# Patient Record
Sex: FEMALE | Race: WHITE | NOT HISPANIC OR LATINO | Employment: FULL TIME | URBAN - METROPOLITAN AREA
[De-identification: names, ages, dates, MRNs, and addresses within clinical notes are randomized per-mention and may not be internally consistent; named-entity substitution may affect disease eponyms.]

---

## 2023-03-03 ENCOUNTER — TELEPHONE (OUTPATIENT)
Dept: PEDIATRIC CARDIOLOGY | Facility: CLINIC | Age: 38
End: 2023-03-03
Payer: COMMERCIAL

## 2023-03-03 DIAGNOSIS — Z87.74 ADULT PATIENT WITH HISTORY OF CONGENITAL HEART DISEASE: Primary | ICD-10-CM

## 2023-03-03 NOTE — TELEPHONE ENCOUNTER
Returned patients call. No answer; Left message to advise that [facility] appointments are only available on Wednesday & Thursdays. Left call back number for further questions.

## 2023-03-03 NOTE — TELEPHONE ENCOUNTER
----- Message from Tomasa Girard sent at 3/3/2023 11:27 AM CST -----  Contact: Megan [phone]  Returning a phone call.    Who left a message for the patient:  NURSE    Do they know what this is regarding:  Yes    Would they like a phone call back or a response via [facility]:   Lisa Guzman is calling to see if the May appt can be schedule for a Monday appt. Please call Megan capone for more advice.

## 2023-05-15 RX ORDER — MONTELUKAST SODIUM 10 MG/1
10 TABLET ORAL
COMMUNITY
Start: 2022-10-17 | End: 2023-10-17

## 2023-05-15 RX ORDER — ACETAMINOPHEN 500 MG
5000 TABLET ORAL
COMMUNITY
Start: 2022-12-05

## 2023-05-24 ENCOUNTER — OFFICE VISIT (OUTPATIENT)
Dept: CARDIOLOGY | Facility: CLINIC | Age: 38
End: 2023-05-24
Payer: COMMERCIAL

## 2023-05-24 ENCOUNTER — HOSPITAL ENCOUNTER (OUTPATIENT)
Dept: CARDIOLOGY | Facility: CLINIC | Age: 38
Discharge: HOME | End: 2023-05-24
Payer: COMMERCIAL

## 2023-05-24 ENCOUNTER — HOSPITAL ENCOUNTER (OUTPATIENT)
Dept: CARDIOLOGY | Facility: HOSPITAL | Age: 38
Discharge: HOME | End: 2023-05-24
Attending: PEDIATRICS
Payer: COMMERCIAL

## 2023-05-24 VITALS
SYSTOLIC BLOOD PRESSURE: 180 MMHG | WEIGHT: 270 LBS | HEIGHT: 66 IN | BODY MASS INDEX: 44.57 KG/M2 | DIASTOLIC BLOOD PRESSURE: 86 MMHG | BODY MASS INDEX: 43.39 KG/M2 | OXYGEN SATURATION: 100 % | HEART RATE: 76 BPM | HEIGHT: 66 IN | WEIGHT: 277.31 LBS | HEART RATE: 70 BPM

## 2023-05-24 DIAGNOSIS — Q24.9 ADULT CONGENITAL HEART DISEASE: ICD-10-CM

## 2023-05-24 DIAGNOSIS — I06.0 RHEUMATIC AORTIC STENOSIS: ICD-10-CM

## 2023-05-24 DIAGNOSIS — Z87.74 ADULT PATIENT WITH HISTORY OF CONGENITAL HEART DISEASE: ICD-10-CM

## 2023-05-24 DIAGNOSIS — Z95.4 H/O ROSS PROCEDURE: Primary | ICD-10-CM

## 2023-05-24 DIAGNOSIS — I37.0 NONRHEUMATIC PULMONARY VALVE STENOSIS: ICD-10-CM

## 2023-05-24 DIAGNOSIS — Z95.2 PULMONARY VALVE REPLACED: ICD-10-CM

## 2023-05-24 DIAGNOSIS — I01.9 RHEUMATIC FEVER WITH CARDIAC INVOLVEMENT: ICD-10-CM

## 2023-05-24 LAB
ASCENDING AORTA: 4.13 CM
AV INDEX (PROSTH): 0.28
AV MEAN GRADIENT: 34 MMHG
AV PEAK GRADIENT: 53 MMHG
AV VALVE AREA: 1.01 CM2
AV VELOCITY RATIO: 0.24
BSA FOR ECHO PROCEDURE: 2.39 M2
CV ECHO LV RWT: 0.37 CM
DOP CALC AO PEAK VEL: 3.64 M/S
DOP CALC AO VTI: 87.92 CM
DOP CALC LVOT AREA: 3.6 CM2
DOP CALC LVOT DIAMETER: 2.15 CM
DOP CALC LVOT PEAK VEL: 0.89 M/S
DOP CALC LVOT STROKE VOLUME: 89.16 CM3
DOP CALC RVOT PEAK VEL: 0.86 M/S
DOP CALC RVOT VTI: 25.99 CM
DOP CALCLVOT PEAK VEL VTI: 24.57 CM
E WAVE DECELERATION TIME: 279.78 MSEC
E/A RATIO: 1.63
E/E' RATIO: 17.65 M/S
ECHO LV POSTERIOR WALL: 0.83 CM (ref 0.6–1.1)
FRACTIONAL SHORTENING: 30 % (ref 28–44)
INTERVENTRICULAR SEPTUM: 0.96 CM (ref 0.6–1.1)
LA MAJOR: 5.36 CM
LA MINOR: 5.19 CM
LA WIDTH: 3.36 CM
LEFT ATRIUM SIZE: 4.18 CM
LEFT ATRIUM VOLUME INDEX MOD: 21.8 ML/M2
LEFT ATRIUM VOLUME INDEX: 27.4 ML/M2
LEFT ATRIUM VOLUME MOD: 50.1 CM3
LEFT ATRIUM VOLUME: 62.96 CM3
LEFT INTERNAL DIMENSION IN SYSTOLE: 3.1 CM (ref 2.1–4)
LEFT VENTRICLE DIASTOLIC VOLUME INDEX: 38.99 ML/M2
LEFT VENTRICLE DIASTOLIC VOLUME: 89.67 ML
LEFT VENTRICLE MASS INDEX: 56 G/M2
LEFT VENTRICLE SYSTOLIC VOLUME INDEX: 16.5 ML/M2
LEFT VENTRICLE SYSTOLIC VOLUME: 37.89 ML
LEFT VENTRICULAR INTERNAL DIMENSION IN DIASTOLE: 4.44 CM (ref 3.5–6)
LEFT VENTRICULAR MASS: 128.96 G
LV LATERAL E/E' RATIO: 16.67 M/S
LV SEPTAL E/E' RATIO: 18.75 M/S
MV A" WAVE DURATION": 11.99 MSEC
MV PEAK A VEL: 0.92 M/S
MV PEAK E VEL: 1.5 M/S
MV STENOSIS PRESSURE HALF TIME: 81.14 MS
MV VALVE AREA P 1/2 METHOD: 2.71 CM2
PISA TR MAX VEL: 3.29 M/S
PULM VEIN S/D RATIO: 0.72
PV MEAN GRADIENT: 31.5 MMHG
PV PEAK D VEL: 0.5 M/S
PV PEAK S VEL: 0.36 M/S
PV PEAK VELOCITY: 3.56 CM/S
RA MAJOR: 5.53 CM
RA WIDTH: 3.95 CM
RIGHT VENTRICULAR END-DIASTOLIC DIMENSION: 4.42 CM
SINUS: 3.95 CM
STJ: 4.09 CM
TDI LATERAL: 0.09 M/S
TDI SEPTAL: 0.08 M/S
TDI: 0.09 M/S
TR MAX PG: 43 MMHG
TRICUSPID ANNULAR PLANE SYSTOLIC EXCURSION: 1.43 CM

## 2023-05-24 PROCEDURE — 93325 ECHO (CUPID ONLY): ICD-10-PCS | Mod: 26,,, | Performed by: PEDIATRICS

## 2023-05-24 PROCEDURE — 99205 PR OFFICE/OUTPT VISIT, NEW, LEVL V, 60-74 MIN: ICD-10-PCS | Mod: 25,S$GLB,, | Performed by: PEDIATRICS

## 2023-05-24 PROCEDURE — 93010 ELECTROCARDIOGRAM REPORT: CPT | Mod: S$GLB,,, | Performed by: INTERNAL MEDICINE

## 2023-05-24 PROCEDURE — 99205 OFFICE O/P NEW HI 60 MIN: CPT | Mod: 25,S$GLB,, | Performed by: PEDIATRICS

## 2023-05-24 PROCEDURE — 93005 EKG 12-LEAD: ICD-10-PCS | Mod: S$GLB,,, | Performed by: PEDIATRICS

## 2023-05-24 PROCEDURE — 99999 PR PBB SHADOW E&M-EST. PATIENT-LVL IV: ICD-10-PCS | Mod: PBBFAC,,, | Performed by: PEDIATRICS

## 2023-05-24 PROCEDURE — 93303 ECHO TRANSTHORACIC: CPT | Mod: 26,,, | Performed by: PEDIATRICS

## 2023-05-24 PROCEDURE — 93010 EKG 12-LEAD: ICD-10-PCS | Mod: S$GLB,,, | Performed by: INTERNAL MEDICINE

## 2023-05-24 PROCEDURE — 93320 ECHO (CUPID ONLY): ICD-10-PCS | Mod: 26,,, | Performed by: PEDIATRICS

## 2023-05-24 PROCEDURE — 93005 ELECTROCARDIOGRAM TRACING: CPT | Mod: S$GLB,,, | Performed by: PEDIATRICS

## 2023-05-24 PROCEDURE — 93303 ECHO (CUPID ONLY): ICD-10-PCS | Mod: 26,,, | Performed by: PEDIATRICS

## 2023-05-24 PROCEDURE — 93320 DOPPLER ECHO COMPLETE: CPT | Mod: 26,,, | Performed by: PEDIATRICS

## 2023-05-24 PROCEDURE — 93325 DOPPLER ECHO COLOR FLOW MAPG: CPT

## 2023-05-24 PROCEDURE — 93325 DOPPLER ECHO COLOR FLOW MAPG: CPT | Mod: 26,,, | Performed by: PEDIATRICS

## 2023-05-24 PROCEDURE — 99999 PR PBB SHADOW E&M-EST. PATIENT-LVL IV: CPT | Mod: PBBFAC,,, | Performed by: PEDIATRICS

## 2023-05-24 RX ORDER — METOPROLOL SUCCINATE 50 MG/1
50 TABLET, EXTENDED RELEASE ORAL EVERY MORNING
Status: ON HOLD | COMMUNITY
Start: 2023-05-07 | End: 2023-12-21 | Stop reason: HOSPADM

## 2023-05-24 RX ORDER — NORETHINDRONE 0.35 MG/1
TABLET ORAL
COMMUNITY
Start: 2023-05-10

## 2023-05-24 RX ORDER — FUROSEMIDE 20 MG/1
20 TABLET ORAL DAILY PRN
Status: ON HOLD | COMMUNITY
End: 2023-12-21 | Stop reason: HOSPADM

## 2023-05-24 NOTE — PROGRESS NOTES
2023    re:Megan Cook  :1985    [place]  [address] [facility] [place] [address]    Dr. Ahmad Agha [facility]     Dear Doctors:    Megan Cook is a 38 y.o. female seen in my [facility] clinic today for evaluation of heart disease.  To summarize her diagnoses are as follow:  1. Rheumatic heart disease diagnosed at age 11, primarily aortic valve disease   - initially treated with a Ross procedure at the [facility] in    - subsequent aortic root and aortic valve replacement with a bioprosthetic valve at [facility] in [place], [place]   - now with likely moderate to severe aortic and pulmonary valve stenosis with preserved ventricular function   - somewhat abnormal appearing mitral valve, functioning normally  2. Hypertension  3. Obesity  4. Currently being evaluated for thyroid abnormality     To summarize, my recommendations are as follows:  1. SBE prophylaxis is definitely indicated before dental work  2. Healthy diet, regular low intensity exercise.  At present, I would recommend against highly strenuous exercise.    3. Schedule for cardiac CTA  4. After evaluating the CTA, we will likely schedule her for a diagnostic catheterization and consider intervention on her valves  5. Check BMP and BNP today   6. We will request surgical records from [facility] and from [facility]     Discussion:  Not surprisingly, her 23-year-old pulmonary valve is stenotic.  It is actually surprising how well this valve is held up.  I suspect she would be a good candidate for a catheter based pulmonary valve replacement.  A CTA of the chest should help with that.  We are going to try to get some medical records to learn what type of pulmonary valve was initially placed at the time of her Ross procedure.  Her bioprosthetic aortic valve is stenotic.  Although the peak velocity is less than 4 m/sec, the estimated aortic valve  area get is right around 1 centimeters squared, and the aortic stenosis is likely in the moderate to severe range.  She could potentially be a candidate for catheter based intervention on that valve as well.  We will get her CTA and then discuss further.  Her symptoms are much better now than they were few years ago, and the catheterization may prompt us to delay intervention for a few years if her stenoses are only mild.    History of present illness:  The history is provided by the patient.  She is an excellent historian.  At present, I have limited medical records.  At 11 years of age, she was diagnosed with rheumatic fever.  She tells me she had strep throat and was treated with antibiotics.  However, a short while afterwards she developed symptoms of severe heart failure.  She was managed medically until her Ross procedure in 2000.  She then developed an aortic aneurysm, and she underwent aortic root replacement and placement of a bioprosthetic aortic valve in 2012.  She was on penicillin daily until about 15 years of age.  She ultimately developed a mild allergy to penicillin, and it causes mouth sores.  She uses clindamycin for dental work.    A few years ago she was having a lot of issues with orthopnea and dyspnea on exertion.  She was found to be hypertensive and started on metoprolol.  She was also placed on Lasix.  Her symptoms are actually much better now.  She thinks the medication helped, specifically the metoprolol.  She only takes the Lasix on weekends because it was hard to use Lasix and work.  She has occasional swelling in her ankles, but overall she feels well.  No chest pain, shortness of breath, palpitations, syncope, near syncope, cyanosis, or edema.    Past Medical History:   Diagnosis Date    Rheumatic fever with cardiac involvement      Past Surgical History:   Procedure Laterality Date    AORTIC VALVE REPLACEMENT  2000    AORTIC VALVE REPLACEMENT  2012     History reviewed. No pertinent  "family history.  Social History     Socioeconomic History    Marital status: Single   Tobacco Use    Smoking status: Never    Smokeless tobacco: Never   Substance and Sexual Activity    Alcohol use: Yes     Alcohol/week: 1.0 standard drink     Types: 1 Glasses of wine per week     Comment: social     Current Outpatient Medications on File Prior to Visit   Medication Sig Dispense Refill    cholecalciferol, vitamin D3, 125 mcg (5,000 unit) Tab Take 5,000 Units by mouth.      PAMELA 0.35 mg tablet Take by mouth.      furosemide (LASIX) 20 MG tablet Take 20 mg by mouth daily as needed.      metoprolol succinate (TOPROL-XL) 50 MG 24 hr tablet Take 50 mg by mouth every morning.      montelukast (SINGULAIR) 10 mg tablet Take 10 mg by mouth.      [DISCONTINUED] norethindrone-ethinyl estradiol (OVCON) 0.4-35 mg-mcg per tablet Take 1 tablet by mouth every morning.       No current facility-administered medications on file prior to visit.     Review of patient's allergies indicates:   Allergen Reactions    Cephalexin Other (See Comments)     She gets mouth sores.     Penicillin g Other (See Comments)     Causes mouth sores.       The review of systems is as noted above. It is otherwise negative for other symptoms related to the general, neurological, psychiatric, endocrine, gastrointestinal, genitourinary, respiratory, dermatologic, musculoskeletal, hematologic, and immunologic systems.    BP (!) 180/86 (BP Location: Right arm, Patient Position: Sitting, BP Method: X-Large (Automatic))   Pulse 70   Ht 5' 6" (1.676 m)   Wt 125.8 kg (277 lb 5.4 oz)   SpO2 100%   BMI 44.76 kg/m²     Vitals:    05/24/23 1054 05/24/23 1055   BP: 129/83 (!) 180/86   BP Location: Left arm Right arm   Patient Position: Sitting Sitting   BP Method: X-Large (Automatic) X-Large (Automatic)   Pulse: 76 70   SpO2: 100%    Weight: 125.8 kg (277 lb 5.4 oz)    Height: 5' 6" (1.676 m)          Wt Readings from Last 3 Encounters:   05/24/23 125.8 kg (277 lb " "5.4 oz)   05/24/23 122.5 kg (270 lb)     Ht Readings from Last 3 Encounters:   05/24/23 5' 6" (1.676 m)   05/24/23 5' 6" (1.676 m)     Body mass index is 44.76 kg/m².  Facility age limit for growth percentiles is 20 years.  Facility age limit for growth percentiles is 20 years.  Facility age limit for growth percentiles is 20 years.    In general, she is an obese but otherwise very healthy-appearing nondysmorphic female in no apparent distress.  The eyes, nares, and oropharynx are clear.  Eyelids and conjunctiva are normal without drainage or erythema.  Pupils equal and round bilaterally.  The head is normocephalic and atraumatic.  The neck is supple without jugular venous distention or thyroid enlargement.  The lungs are clear to auscultation bilaterally.  There are no scars on the chest wall.  The 1st heart sound is normal.  The 2nd heart sound is physiologically split.  A grade 2-3/6 systolic ejection murmur is heard at the upper left and upper right sternal border.  I do not hear a diastolic murmur.  The abdominal exam is benign without hepatosplenomegaly, tenderness, or distention.  Pulses are normal in all 4 extremities with brisk capillary refill and no clubbing, cyanosis, or edema.  No rashes are noted.    I personally reviewed the following tests performed today and my interpretation follows:  An EKG is basically normal.    Results for orders placed during the hospital encounter of 05/24/23    Echo Saline Bubble? No    Interpretation Summary  CONGENITAL CARDIAC HISTORY:  Aortic valve stenosis.  S/P Ross - [facility] (2000 at age 15)  S/P Bovine aortic valve replacement -[facility] (2012)    SEGMENTAL CARDIAC CONNECTIONS:  Abdominal situs solitus.  Atrial situs solitus.  Atrioventricular alignment is concordant.  D-loop ventricles.  The tricuspid valve appears grossly normal.  The mitral valve appears grossly normal.  There is mild dilation and mild hypertrophy of the right ventricle.  The left ventricle " appears qualitatively normal.  The ventriculoarterial alignment is concordant.  Bioprosthetic pulmonary valve.  Bioprosthetic aortic valve.  Cardiac position is levocardia.  There is a left aortic arch with additional branches not well demonstrated.  No interatrial septal defect present.  No ventricular septal defect present.      IMPRESSION:  Qualitative impression of at least mild dilation of the right atrium.  There is no evidence for atrial level shunt in technically difficult subxiphoid imaging.  There is a trivial to mild jet of tricuspid insufficiency.  Qualitative impression of at least mild hypertrophy and dilation of the right ventricle with good systolic function.  Right ventricular pressure estimated at least 43 mmHg above right atrial pressure from poorly defined TR doppler profile.  Echodensity suggesting pulmonary homograft or bioprosthetic valve in pulmonary position that is heavily calcified.  Acceleration is noted across the pulmonary valve with peak velocity 3.6 m/sec, mean gradient 32 mm Hg and at least mild insufficiency.  The right pulmonary artery appears normal in size with difficult to demonstrate left pulmonary artery.  The left atrial volume index is normal measuring 28 ml/m2.  There is echodensity suggesting annuloplasty ring with no evidence of significant mitral stenosis and trivial insufficiency.  The mitral leaflets appear normal in structure and function.  Flattened septal motion with good movement of the LV free wall, SF = 30 % and EF estimated 60 -65% from apical views.  No left ventricular outflow tract obstruction.  Stent mounted bioprosthetic valve in aortic position with calcified leaflets,  Peak velocity across the aortic valve 3.6 m/sec with mean gradient 34 mm Hg and at least mild insufficiency by color Doppler.  Aortic dimensions:  Stent mounted aortic valve in good position.  Ascending aorta     = 41 mm.  The aorta appears normal in size with left aortic arch branching  pattern.  There is acceleration to peak velocity 2 m/sec with poorly defined Doppler profile and no diastolic runoff suggestive of overlapping measurement of LPA flow.  2D and color images suggest no evidence for coarctation.  No pericardial effusion.    Lab Results   Component Value Date    WBC 10.5 (H) 11/14/2022    HGB 13.4 11/14/2022    HCT 41.5 11/14/2022    MCV 87.4 11/14/2022     11/14/2022       CMP  Sodium   Date Value Ref Range Status   05/24/2023 141 136 - 145 mmol/L Final     Potassium   Date Value Ref Range Status   05/24/2023 3.8 3.5 - 5.1 mmol/L Final     Chloride   Date Value Ref Range Status   05/24/2023 107 95 - 110 mmol/L Final     CO2   Date Value Ref Range Status   05/24/2023 24 23 - 29 mmol/L Final     Glucose   Date Value Ref Range Status   05/24/2023 94 70 - 110 mg/dL Final     BUN   Date Value Ref Range Status   05/24/2023 11 6 - 20 mg/dL Final     Creatinine   Date Value Ref Range Status   05/24/2023 0.9 0.5 - 1.4 mg/dL Final     Calcium   Date Value Ref Range Status   05/24/2023 10.1 8.7 - 10.5 mg/dL Final     Albumin Level   Date Value Ref Range Status   11/14/2022 4.4 3.2 - 4.8 g/dL Final     Alk Phos   Date Value Ref Range Status   11/14/2022 96 46 - 116 IU/L Final     Aspartate Aminotransferase   Date Value Ref Range Status   11/14/2022 21 <34 IU/L Final     Alanine Aminotransferase   Date Value Ref Range Status   11/14/2022 22 10 - 49 IU/L Final     Anion Gap   Date Value Ref Range Status   05/24/2023 10 8 - 16 mmol/L Final     eGFR   Date Value Ref Range Status   05/24/2023 >60.0 >60 mL/min/1.73 m^2 Final     Lab Results   Component Value Date    CHOL 202 (H) 11/14/2022     Lab Results   Component Value Date    HDL 52 11/14/2022     Lab Results   Component Value Date    LDLCALC 132 (H) 11/14/2022     No results found for: DLDL  Lab Results   Component Value Date    TRIG 88 11/14/2022       f1 No results found for: CHOLHDL    Thank you for referring this patient to our clinic.   Please call with any questions.    Sincerely,        Michael Wilcox MD  Pediatric Cardiology  Adult Congenital Heart Disease  Pediatric Heart Failure and Transplantation  [facility] [address] [place]  [address]  [phone]

## 2023-05-29 ENCOUNTER — HOSPITAL ENCOUNTER (OUTPATIENT)
Dept: RADIOLOGY | Facility: HOSPITAL | Age: 38
Discharge: HOME | End: 2023-05-29
Attending: PEDIATRICS
Payer: COMMERCIAL

## 2023-05-29 DIAGNOSIS — Z95.4 H/O ROSS PROCEDURE: ICD-10-CM

## 2023-05-29 PROCEDURE — 71275 CT ANGIOGRAPHY CHEST: CPT | Mod: TC

## 2023-05-29 PROCEDURE — 74174 CTA ABD&PLVS W/CONTRAST: CPT | Mod: TC

## 2023-05-29 PROCEDURE — 25500020 PHARM REV CODE 255: Performed by: PEDIATRICS

## 2023-05-29 PROCEDURE — 71275 CTA CARDIAC TAVR_PARTNERS (XPD): ICD-10-PCS | Mod: 26,,, | Performed by: RADIOLOGY

## 2023-05-29 PROCEDURE — 71275 CT ANGIOGRAPHY CHEST: CPT | Mod: 26,,, | Performed by: RADIOLOGY

## 2023-05-29 PROCEDURE — 74174 CTA CARDIAC TAVR_PARTNERS (XPD): ICD-10-PCS | Mod: 26,,, | Performed by: RADIOLOGY

## 2023-05-29 PROCEDURE — 74174 CTA ABD&PLVS W/CONTRAST: CPT | Mod: 26,,, | Performed by: RADIOLOGY

## 2023-05-29 RX ADMIN — IOHEXOL 100 ML: 350 INJECTION, SOLUTION INTRAVENOUS at 02:05

## 2023-06-04 ENCOUNTER — PATIENT MESSAGE (OUTPATIENT)
Dept: CARDIOLOGY | Facility: CLINIC | Age: 38
End: 2023-06-04
Payer: COMMERCIAL

## 2023-06-09 ENCOUNTER — TELEPHONE (OUTPATIENT)
Dept: PEDIATRIC CARDIOLOGY | Facility: CLINIC | Age: 38
End: 2023-06-09
Payer: COMMERCIAL

## 2023-06-09 ENCOUNTER — PATIENT MESSAGE (OUTPATIENT)
Dept: CARDIOLOGY | Facility: CLINIC | Age: 38
End: 2023-06-09
Payer: COMMERCIAL

## 2023-06-09 NOTE — TELEPHONE ENCOUNTER
Patient discussed in our adult congenital heart disease conference today.  Members of our congenital cardiac surgery, interventional cardiology, electrophysiology, and adult congenital cardiology teams were present.     I left a message on her phone.  I will also send her the following portal message:     The echo suggests moderate obstruction across both the pulmonary valve in the aortic valve.  It is not clear whether intervention is necessary at this point, but we definitely want to assess for that.  There is a possibility you would be a candidate for catheter based valves if something was necessary.  To start, we would like to get a diagnostic cardiac catheterization where they go in your artery and vein with a catheter to measure pressure gradients and take pictures of the heart.  That should help us know whether you actually need intervention right now and whether it can be done with a catheter.      I would anticipate you going home the same day, but I would always recommend being prepared to spend the night.    Is it okay if I have our team get in touch with you to schedule this?

## 2023-06-13 ENCOUNTER — PATIENT MESSAGE (OUTPATIENT)
Dept: PEDIATRIC CARDIOLOGY | Facility: CLINIC | Age: 38
End: 2023-06-13
Payer: COMMERCIAL

## 2023-06-15 ENCOUNTER — PATIENT MESSAGE (OUTPATIENT)
Dept: PEDIATRIC CARDIOLOGY | Facility: CLINIC | Age: 38
End: 2023-06-15
Payer: COMMERCIAL

## 2023-06-15 ENCOUNTER — DOCUMENTATION ONLY (OUTPATIENT)
Dept: PEDIATRIC CARDIOLOGY | Facility: CLINIC | Age: 38
End: 2023-06-15
Payer: COMMERCIAL

## 2023-06-15 NOTE — PROGRESS NOTES
We received an operative note from her most recent surgery.  Surgery was at [facility] on 4/2/12 with Dr. Jack Gutierrez.    Severe dilation of the aortic root was noted.  The root was resected down to the level of the coronary arteries.  Of note, there was enough aorta present to be able to reconstruct the aorta without use of prosthetic graft material.  A 23 mm Kerri-Davis pericardial valve was placed.

## 2023-06-28 ENCOUNTER — PATIENT MESSAGE (OUTPATIENT)
Dept: PEDIATRIC CARDIOLOGY | Facility: CLINIC | Age: 38
End: 2023-06-28
Payer: COMMERCIAL

## 2023-06-28 ENCOUNTER — TELEPHONE (OUTPATIENT)
Dept: PEDIATRIC CARDIOLOGY | Facility: CLINIC | Age: 38
End: 2023-06-28
Payer: COMMERCIAL

## 2023-06-28 NOTE — TELEPHONE ENCOUNTER
Messaged pt to reschedule cardiac cath due to physician availability. Pt agreed to 8/1, Dr. Wilcox updated at this time.

## 2023-07-31 ENCOUNTER — TELEPHONE (OUTPATIENT)
Dept: PEDIATRIC CARDIOLOGY | Facility: CLINIC | Age: 38
End: 2023-07-31
Payer: COMMERCIAL

## 2023-07-31 ENCOUNTER — PATIENT MESSAGE (OUTPATIENT)
Dept: PEDIATRIC CARDIOLOGY | Facility: CLINIC | Age: 38
End: 2023-07-31
Payer: COMMERCIAL

## 2023-08-01 ENCOUNTER — ANESTHESIA EVENT (OUTPATIENT)
Dept: MEDSURG UNIT | Facility: HOSPITAL | Age: 38
End: 2023-08-01
Payer: COMMERCIAL

## 2023-08-01 ENCOUNTER — HOSPITAL ENCOUNTER (OUTPATIENT)
Facility: HOSPITAL | Age: 38
Discharge: HOME | End: 2023-08-01
Attending: PEDIATRICS | Admitting: PEDIATRICS
Payer: COMMERCIAL

## 2023-08-01 ENCOUNTER — ANESTHESIA (OUTPATIENT)
Dept: MEDSURG UNIT | Facility: HOSPITAL | Age: 38
End: 2023-08-01
Payer: COMMERCIAL

## 2023-08-01 VITALS
BODY MASS INDEX: 43.39 KG/M2 | RESPIRATION RATE: 19 BRPM | TEMPERATURE: 98 F | HEIGHT: 66 IN | HEART RATE: 67 BPM | WEIGHT: 270 LBS | OXYGEN SATURATION: 98 % | SYSTOLIC BLOOD PRESSURE: 99 MMHG | DIASTOLIC BLOOD PRESSURE: 57 MMHG

## 2023-08-01 DIAGNOSIS — Z95.2 PULMONARY VALVE REPLACED: ICD-10-CM

## 2023-08-01 DIAGNOSIS — Q24.9 ADULT CONGENITAL HEART DISEASE: Primary | ICD-10-CM

## 2023-08-01 DIAGNOSIS — I37.0 NONRHEUMATIC PULMONARY VALVE STENOSIS: ICD-10-CM

## 2023-08-01 DIAGNOSIS — I06.0 RHEUMATIC AORTIC STENOSIS: ICD-10-CM

## 2023-08-01 DIAGNOSIS — I09.89 RHEUMATIC PULMONARY VALVE STENOSIS: ICD-10-CM

## 2023-08-01 DIAGNOSIS — I01.9 RHEUMATIC FEVER WITH CARDIAC INVOLVEMENT: ICD-10-CM

## 2023-08-01 DIAGNOSIS — Z95.4 H/O ROSS PROCEDURE: ICD-10-CM

## 2023-08-01 LAB
ABO + RH BLD: NORMAL
ALBUMIN SERPL BCP-MCNC: 3.2 G/DL (ref 3.5–5.2)
ALP SERPL-CCNC: 81 U/L (ref 55–135)
ALT SERPL W/O P-5'-P-CCNC: 15 U/L (ref 10–44)
ANION GAP SERPL CALC-SCNC: 8 MMOL/L (ref 8–16)
AST SERPL-CCNC: 14 U/L (ref 10–40)
B-HCG UR QL: NEGATIVE
BASOPHILS # BLD AUTO: 0.07 K/UL (ref 0–0.2)
BASOPHILS NFR BLD: 0.7 % (ref 0–1.9)
BILIRUB SERPL-MCNC: 0.5 MG/DL (ref 0.1–1)
BLD GP AB SCN CELLS X3 SERPL QL: NORMAL
BUN SERPL-MCNC: 12 MG/DL (ref 6–20)
CALCIUM SERPL-MCNC: 8.7 MG/DL (ref 8.7–10.5)
CHLORIDE SERPL-SCNC: 108 MMOL/L (ref 95–110)
CO2 SERPL-SCNC: 22 MMOL/L (ref 23–29)
CREAT SERPL-MCNC: 0.7 MG/DL (ref 0.5–1.4)
CTP QC/QA: YES
DIFFERENTIAL METHOD: ABNORMAL
EOSINOPHIL # BLD AUTO: 0.2 K/UL (ref 0–0.5)
EOSINOPHIL NFR BLD: 1.9 % (ref 0–8)
ERYTHROCYTE [DISTWIDTH] IN BLOOD BY AUTOMATED COUNT: 14.4 % (ref 11.5–14.5)
EST. GFR  (NO RACE VARIABLE): >60 ML/MIN/1.73 M^2
GLUCOSE SERPL-MCNC: 109 MG/DL (ref 70–110)
HCT VFR BLD AUTO: 40.2 % (ref 37–48.5)
HGB BLD-MCNC: 13.3 G/DL (ref 12–16)
IMM GRANULOCYTES # BLD AUTO: 0.05 K/UL (ref 0–0.04)
IMM GRANULOCYTES NFR BLD AUTO: 0.5 % (ref 0–0.5)
LYMPHOCYTES # BLD AUTO: 2.6 K/UL (ref 1–4.8)
LYMPHOCYTES NFR BLD: 25.1 % (ref 18–48)
MCH RBC QN AUTO: 28.2 PG (ref 27–31)
MCHC RBC AUTO-ENTMCNC: 33.1 G/DL (ref 32–36)
MCV RBC AUTO: 85 FL (ref 82–98)
MONOCYTES # BLD AUTO: 0.8 K/UL (ref 0.3–1)
MONOCYTES NFR BLD: 7.6 % (ref 4–15)
NEUTROPHILS # BLD AUTO: 6.5 K/UL (ref 1.8–7.7)
NEUTROPHILS NFR BLD: 64.2 % (ref 38–73)
NRBC BLD-RTO: 0 /100 WBC
PLATELET # BLD AUTO: 210 K/UL (ref 150–450)
PLATELET BLD QL SMEAR: ABNORMAL
PMV BLD AUTO: 11.7 FL (ref 9.2–12.9)
POTASSIUM SERPL-SCNC: 4 MMOL/L (ref 3.5–5.1)
PROT SERPL-MCNC: 6.4 G/DL (ref 6–8.4)
RBC # BLD AUTO: 4.72 M/UL (ref 4–5.4)
SODIUM SERPL-SCNC: 138 MMOL/L (ref 136–145)
SPECIMEN OUTDATE: NORMAL
WBC # BLD AUTO: 10.15 K/UL (ref 3.9–12.7)

## 2023-08-01 PROCEDURE — 93567 NJX CAR CTH SPRVLV AORTGRPHY: CPT | Mod: ,,, | Performed by: PEDIATRICS

## 2023-08-01 PROCEDURE — 25000003 PHARM REV CODE 250: Performed by: NURSE ANESTHETIST, CERTIFIED REGISTERED

## 2023-08-01 PROCEDURE — 86900 BLOOD TYPING SEROLOGIC ABO: CPT | Performed by: PEDIATRICS

## 2023-08-01 PROCEDURE — 93596 R&L HRT CATH CHD NML NT CNJ: CPT | Performed by: PEDIATRICS

## 2023-08-01 PROCEDURE — 25000003 PHARM REV CODE 250: Performed by: STUDENT IN AN ORGANIZED HEALTH CARE EDUCATION/TRAINING PROGRAM

## 2023-08-01 PROCEDURE — 93568 PR INJECT PULMONARY ANGIOGRAPHY DURING HEART CATH: ICD-10-PCS | Mod: ,,, | Performed by: PEDIATRICS

## 2023-08-01 PROCEDURE — 93568 NJX CAR CTH NSLC P-ART ANGRP: CPT | Mod: ,,, | Performed by: PEDIATRICS

## 2023-08-01 PROCEDURE — 83605 ASSAY OF LACTIC ACID: CPT | Performed by: PEDIATRICS

## 2023-08-01 PROCEDURE — 93563 NJX CGEN CAR CTH SLCTV C ANG: CPT | Performed by: PEDIATRICS

## 2023-08-01 PROCEDURE — 93596 R&L HRT CATH CHD NML NT CNJ: CPT | Mod: 26,,, | Performed by: PEDIATRICS

## 2023-08-01 PROCEDURE — 63600175 PHARM REV CODE 636 W HCPCS: Performed by: STUDENT IN AN ORGANIZED HEALTH CARE EDUCATION/TRAINING PROGRAM

## 2023-08-01 PROCEDURE — 93567 PR INJECT SUPRAVALVULAR AORTOGRAPHY DURING HEART CATH: ICD-10-PCS | Mod: ,,, | Performed by: PEDIATRICS

## 2023-08-01 PROCEDURE — 93596 PR RT & LT HEART CATH W/IMG GUID, NORMAL NATIVE CONNECT: ICD-10-PCS | Mod: 26,,, | Performed by: PEDIATRICS

## 2023-08-01 PROCEDURE — 25500020 PHARM REV CODE 255: Performed by: PEDIATRICS

## 2023-08-01 PROCEDURE — 93568 NJX CAR CTH NSLC P-ART ANGRP: CPT | Performed by: PEDIATRICS

## 2023-08-01 PROCEDURE — 75827 VEIN X-RAY CHEST: CPT | Mod: 59 | Performed by: PEDIATRICS

## 2023-08-01 PROCEDURE — 82805 BLOOD GASES W/O2 SATURATION: CPT | Performed by: PEDIATRICS

## 2023-08-01 PROCEDURE — D9220A PRA ANESTHESIA: Mod: CRNA,,, | Performed by: NURSE ANESTHETIST, CERTIFIED REGISTERED

## 2023-08-01 PROCEDURE — 93563 NJX CGEN CAR CTH SLCTV C ANG: CPT | Mod: ,,, | Performed by: PEDIATRICS

## 2023-08-01 PROCEDURE — D9220A PRA ANESTHESIA: ICD-10-PCS | Mod: ANES,,, | Performed by: STUDENT IN AN ORGANIZED HEALTH CARE EDUCATION/TRAINING PROGRAM

## 2023-08-01 PROCEDURE — 81025 URINE PREGNANCY TEST: CPT | Performed by: PEDIATRICS

## 2023-08-01 PROCEDURE — 75827 PR  VENOGRAM SUPER VENA CAVA: ICD-10-PCS | Mod: 26,59,, | Performed by: PEDIATRICS

## 2023-08-01 PROCEDURE — 85025 COMPLETE CBC W/AUTO DIFF WBC: CPT | Performed by: PEDIATRICS

## 2023-08-01 PROCEDURE — 82330 ASSAY OF CALCIUM: CPT | Performed by: PEDIATRICS

## 2023-08-01 PROCEDURE — 82947 ASSAY GLUCOSE BLOOD QUANT: CPT | Mod: 91 | Performed by: PEDIATRICS

## 2023-08-01 PROCEDURE — 93567 NJX CAR CTH SPRVLV AORTGRPHY: CPT | Performed by: PEDIATRICS

## 2023-08-01 PROCEDURE — C1751 CATH, INF, PER/CENT/MIDLINE: HCPCS | Performed by: PEDIATRICS

## 2023-08-01 PROCEDURE — 84132 ASSAY OF SERUM POTASSIUM: CPT | Mod: 91 | Performed by: PEDIATRICS

## 2023-08-01 PROCEDURE — 63600175 PHARM REV CODE 636 W HCPCS: Performed by: NURSE ANESTHETIST, CERTIFIED REGISTERED

## 2023-08-01 PROCEDURE — 75827 VEIN X-RAY CHEST: CPT | Mod: 26,59,, | Performed by: PEDIATRICS

## 2023-08-01 PROCEDURE — D9220A PRA ANESTHESIA: ICD-10-PCS | Mod: CRNA,,, | Performed by: NURSE ANESTHETIST, CERTIFIED REGISTERED

## 2023-08-01 PROCEDURE — D9220A PRA ANESTHESIA: Mod: ANES,,, | Performed by: STUDENT IN AN ORGANIZED HEALTH CARE EDUCATION/TRAINING PROGRAM

## 2023-08-01 PROCEDURE — 80053 COMPREHEN METABOLIC PANEL: CPT | Performed by: PEDIATRICS

## 2023-08-01 PROCEDURE — 85347 COAGULATION TIME ACTIVATED: CPT | Performed by: PEDIATRICS

## 2023-08-01 PROCEDURE — 36415 COLL VENOUS BLD VENIPUNCTURE: CPT | Performed by: PEDIATRICS

## 2023-08-01 PROCEDURE — C1769 GUIDE WIRE: HCPCS | Performed by: PEDIATRICS

## 2023-08-01 PROCEDURE — C1894 INTRO/SHEATH, NON-LASER: HCPCS | Performed by: PEDIATRICS

## 2023-08-01 PROCEDURE — 93563 PR INJECT SELECT COR ANGIO DURING CONGENITAL HEART CATH: ICD-10-PCS | Mod: ,,, | Performed by: PEDIATRICS

## 2023-08-01 PROCEDURE — 37000008 HC ANESTHESIA 1ST 15 MINUTES: Performed by: PEDIATRICS

## 2023-08-01 PROCEDURE — 37000009 HC ANESTHESIA EA ADD 15 MINS: Performed by: PEDIATRICS

## 2023-08-01 RX ORDER — ACETAMINOPHEN 325 MG/1
650 TABLET ORAL EVERY 4 HOURS PRN
Status: DISCONTINUED | OUTPATIENT
Start: 2023-08-01 | End: 2023-08-01 | Stop reason: HOSPADM

## 2023-08-01 RX ORDER — DEXMEDETOMIDINE HYDROCHLORIDE 100 UG/ML
INJECTION, SOLUTION INTRAVENOUS
Status: DISCONTINUED | OUTPATIENT
Start: 2023-08-01 | End: 2023-08-01

## 2023-08-01 RX ORDER — HEPARIN SODIUM 1000 [USP'U]/ML
INJECTION, SOLUTION INTRAVENOUS; SUBCUTANEOUS
Status: DISCONTINUED | OUTPATIENT
Start: 2023-08-01 | End: 2023-08-01

## 2023-08-01 RX ORDER — FENTANYL CITRATE 50 UG/ML
INJECTION, SOLUTION INTRAMUSCULAR; INTRAVENOUS
Status: DISCONTINUED | OUTPATIENT
Start: 2023-08-01 | End: 2023-08-01

## 2023-08-01 RX ORDER — ONDANSETRON 2 MG/ML
4 INJECTION INTRAMUSCULAR; INTRAVENOUS EVERY 12 HOURS PRN
Status: DISCONTINUED | OUTPATIENT
Start: 2023-08-01 | End: 2023-08-01 | Stop reason: HOSPADM

## 2023-08-01 RX ORDER — FENTANYL CITRATE 50 UG/ML
25 INJECTION, SOLUTION INTRAMUSCULAR; INTRAVENOUS EVERY 5 MIN PRN
Status: DISCONTINUED | OUTPATIENT
Start: 2023-08-01 | End: 2023-08-01 | Stop reason: HOSPADM

## 2023-08-01 RX ORDER — LIDOCAINE HYDROCHLORIDE 10 MG/ML
1 INJECTION, SOLUTION EPIDURAL; INFILTRATION; INTRACAUDAL; PERINEURAL ONCE
Status: COMPLETED | OUTPATIENT
Start: 2023-08-01 | End: 2023-08-01

## 2023-08-01 RX ORDER — MIDAZOLAM HYDROCHLORIDE 1 MG/ML
1 INJECTION INTRAMUSCULAR; INTRAVENOUS EVERY 10 MIN PRN
Status: DISCONTINUED | OUTPATIENT
Start: 2023-08-01 | End: 2023-08-01 | Stop reason: HOSPADM

## 2023-08-01 RX ORDER — HALOPERIDOL 5 MG/ML
0.5 INJECTION INTRAMUSCULAR EVERY 10 MIN PRN
Status: DISCONTINUED | OUTPATIENT
Start: 2023-08-01 | End: 2023-08-01 | Stop reason: HOSPADM

## 2023-08-01 RX ORDER — MIDAZOLAM HYDROCHLORIDE 1 MG/ML
INJECTION, SOLUTION INTRAMUSCULAR; INTRAVENOUS
Status: DISCONTINUED | OUTPATIENT
Start: 2023-08-01 | End: 2023-08-01

## 2023-08-01 RX ORDER — IODIXANOL 320 MG/ML
INJECTION, SOLUTION INTRAVASCULAR
Status: DISCONTINUED | OUTPATIENT
Start: 2023-08-01 | End: 2023-08-01 | Stop reason: HOSPADM

## 2023-08-01 RX ORDER — PROTAMINE SULFATE 10 MG/ML
INJECTION, SOLUTION INTRAVENOUS
Status: DISCONTINUED | OUTPATIENT
Start: 2023-08-01 | End: 2023-08-01

## 2023-08-01 RX ORDER — SODIUM CHLORIDE 9 MG/ML
INJECTION, SOLUTION INTRAVENOUS CONTINUOUS
Status: DISCONTINUED | OUTPATIENT
Start: 2023-08-01 | End: 2023-08-01 | Stop reason: HOSPADM

## 2023-08-01 RX ORDER — ONDANSETRON 2 MG/ML
INJECTION INTRAMUSCULAR; INTRAVENOUS
Status: DISCONTINUED | OUTPATIENT
Start: 2023-08-01 | End: 2023-08-01

## 2023-08-01 RX ORDER — SODIUM CHLORIDE 0.9 % (FLUSH) 0.9 %
3 SYRINGE (ML) INJECTION EVERY 4 HOURS PRN
Status: DISCONTINUED | OUTPATIENT
Start: 2023-08-01 | End: 2023-08-01 | Stop reason: HOSPADM

## 2023-08-01 RX ADMIN — FENTANYL CITRATE 25 MCG: 50 INJECTION, SOLUTION INTRAMUSCULAR; INTRAVENOUS at 09:08

## 2023-08-01 RX ADMIN — DEXMEDETOMIDINE 16 MCG: 100 INJECTION, SOLUTION, CONCENTRATE INTRAVENOUS at 11:08

## 2023-08-01 RX ADMIN — ONDANSETRON 4 MG: 2 INJECTION INTRAMUSCULAR; INTRAVENOUS at 10:08

## 2023-08-01 RX ADMIN — DEXMEDETOMIDINE 12 MCG: 100 INJECTION, SOLUTION, CONCENTRATE INTRAVENOUS at 08:08

## 2023-08-01 RX ADMIN — MIDAZOLAM HYDROCHLORIDE 2 MG: 1 INJECTION, SOLUTION INTRAMUSCULAR; INTRAVENOUS at 08:08

## 2023-08-01 RX ADMIN — MIDAZOLAM HYDROCHLORIDE 1 MG: 1 INJECTION, SOLUTION INTRAMUSCULAR; INTRAVENOUS at 08:08

## 2023-08-01 RX ADMIN — DEXMEDETOMIDINE 8 MCG: 100 INJECTION, SOLUTION, CONCENTRATE INTRAVENOUS at 09:08

## 2023-08-01 RX ADMIN — DEXMEDETOMIDINE 24 MCG: 100 INJECTION, SOLUTION, CONCENTRATE INTRAVENOUS at 11:08

## 2023-08-01 RX ADMIN — LIDOCAINE HYDROCHLORIDE 10 MG: 10 INJECTION, SOLUTION EPIDURAL; INFILTRATION; INTRACAUDAL; PERINEURAL at 08:08

## 2023-08-01 RX ADMIN — HEPARIN SODIUM 5000 UNITS: 1000 INJECTION, SOLUTION INTRAVENOUS; SUBCUTANEOUS at 09:08

## 2023-08-01 RX ADMIN — MIDAZOLAM HYDROCHLORIDE 1 MG: 1 INJECTION, SOLUTION INTRAMUSCULAR; INTRAVENOUS at 10:08

## 2023-08-01 RX ADMIN — FENTANYL CITRATE 25 MCG: 50 INJECTION, SOLUTION INTRAMUSCULAR; INTRAVENOUS at 08:08

## 2023-08-01 RX ADMIN — SODIUM CHLORIDE: 9 INJECTION, SOLUTION INTRAVENOUS at 08:08

## 2023-08-01 RX ADMIN — PROTAMINE SULFATE 30 MG: 10 INJECTION, SOLUTION INTRAVENOUS at 11:08

## 2023-08-01 RX ADMIN — MIDAZOLAM HYDROCHLORIDE 1 MG: 1 INJECTION, SOLUTION INTRAMUSCULAR; INTRAVENOUS at 09:08

## 2023-08-01 NOTE — Clinical Note
75 ml of contrast were injected throughout the case. 225 mL of contrast was the total wasted during the case. 300 mL was the total amount used during the case.

## 2023-08-01 NOTE — Clinical Note
An angiography was performed of the aorta. The angiography was performed via hand injection with 4 mL of contrast.

## 2023-08-01 NOTE — Clinical Note
The catheter was inserted into the right atrium. Hemodynamics were performed.  The patient's O2 saturation measured.

## 2023-08-01 NOTE — Clinical Note
The catheter was reinserted into the ostium   right coronary artery. An angiography was performed of the right coronary arteries. The angiography was performed via hand injection with 5 mL of contrast.

## 2023-08-01 NOTE — Clinical Note
The catheter was reinserted into the ostium   left main. An angiography was performed of the left coronary arteries. Multiple views were taken. The angiography was performed via hand injection with 10 mL of contrast.

## 2023-08-01 NOTE — Clinical Note
The catheter was repositioned into the superior vena cava. The angiography was performed via power injection. The injected amount was 10 mL contrast at 10 mL/s. The PSI from the power injection was 900.

## 2023-08-01 NOTE — Clinical Note
The catheter was inserted into the pulmonary artery. The angiography was performed via power injection. The injected amount was 40 mL contrast at 20 mL/s. The PSI from the power injection was 900.

## 2023-08-01 NOTE — ANESTHESIA POSTPROCEDURE EVALUATION
Anesthesia Post Evaluation    Patient: Megan Cook    Procedure(s) Performed: Procedure(s) (LRB):  Catheterization, Heart, Combined Right and Retrograde Left, for Congenital Heart Defect (N/A)  Angiogram, Coronary, Pediatric  Angiogram, Pulmonary, Pediatric    Final Anesthesia Type: MAC      Patient location during evaluation: PACU  Patient participation: Yes- Able to Participate  Level of consciousness: awake and alert  Post-procedure vital signs: reviewed and stable  Pain management: adequate  Airway patency: patent    PONV status at discharge: No PONV  Anesthetic complications: no      Cardiovascular status: blood pressure returned to baseline  Respiratory status: unassisted  Hydration status: euvolemic  Follow-up not needed.          Vitals Value Taken Time   BP 80/50 08/01/23 1136   Temp  08/01/23 1137   Pulse 61 08/01/23 1137   Resp 19 08/01/23 1137   SpO2 93 % 08/01/23 1137   Vitals shown include unvalidated device data.      No case tracking events are documented in the log.      Pain/Karin Score: No data recorded

## 2023-08-01 NOTE — Clinical Note
The catheter was repositioned into the right pulmonary artery. Hemodynamics were performed.  The patient's O2 saturation measured.

## 2023-08-01 NOTE — DISCHARGE INSTRUCTIONS
AFTER THE PROCEDURE:  You may remove the bandage in 24 hours and wash with soap and water.  You may shower, but do not soak in a tub for three days.     PRECAUTIONS FOR THE NEXT 24 HOURS:  If you need to cough, sneeze, have a bowel movement, or bear down, hold pressure over your bandage.  Do not  anything heavier than a gallon of milk(about 5 pounds)  Avoid excessive bending over.    SYMPTOMS TO WATCH FOR AND REPORT TO YOUR DOCTOR:  BLEEDING: hold pressure over the site until bleeding stops. Proceed to Emergency Room by ambulance (do not drive yourself) if unable to stop bleeding. Notify your doctor.  HEMATOMA (hard bruise under the skin): Brad around the bruise if one develops. Call your doctor if it increases in size or if you have difficulty talking, swallowing, breathing or anything unusual.  SIGNS OF INFECTION: Fever (temperature over 100.5 F), pus or redness  RASH  CHEST PAIN OR SHORTNESS OF BREATH    You may call the Pediatric Cardiology Service doctor on call at [phone].

## 2023-08-01 NOTE — H&P
Megan Cook is a 38 y.o. female from [facility] clinic here today for planned diagnostic cath evaluation of complex congenital heart disease.  To summarize her diagnoses are as follow:  1. Rheumatic heart disease diagnosed at age 11, primarily aortic valve disease              - initially treated with a Ross procedure at the [facility] in 2000              - subsequent aortic root and aortic valve replacement with a bioprosthetic valve at [facility] in [place], [place]              - now with likely moderate to severe aortic and pulmonary valve stenosis with preserved ventricular function              - somewhat abnormal appearing mitral valve, functioning normally  2. Hypertension  3. Obesity  4. Currently being evaluated for thyroid abnormality      To summarize, my recommendations are as follows:  1. SBE prophylaxis is definitely indicated before dental work  2. Healthy diet, regular low intensity exercise.  At present, I would recommend against highly strenuous exercise.    3. Schedule for cardiac CTA  4. After evaluating the CTA, we will likely schedule her for a diagnostic catheterization and consider intervention on her valves  5. Check BMP and BNP today   6. We will request surgical records from [facility] and from [facility]      Discussion:  Not surprisingly, her 23-year-old pulmonary valve is stenotic.  It is actually surprising how well this valve is held up.  I suspect she would be a good candidate for a catheter based pulmonary valve replacement.  A CTA of the chest should help with that.  We are going to try to get some medical records to learn what type of pulmonary valve was initially placed at the time of her Ross procedure.  Her bioprosthetic aortic valve is stenotic.  Although the peak velocity is less than 4 m/sec, the estimated aortic valve area get is right around 1 centimeters squared, and the aortic stenosis is likely in the moderate to severe range.   She could potentially be a candidate for catheter based intervention on that valve as well.  We will get her CTA and then discuss further.  Her symptoms are much better now than they were few years ago, and the catheterization may prompt us to delay intervention for a few years if her stenoses are only mild.     History of present illness:  The history is provided by the patient.  She is an excellent historian.  At present, I have limited medical records.  At 11 years of age, she was diagnosed with rheumatic fever.  She tells me she had strep throat and was treated with antibiotics.  However, a short while afterwards she developed symptoms of severe heart failure.  She was managed medically until her Ross procedure in 2000.  She then developed an aortic aneurysm, and she underwent aortic root replacement and placement of a bioprosthetic aortic valve in 2012.  She was on penicillin daily until about 15 years of age.  She ultimately developed a mild allergy to penicillin, and it causes mouth sores.  She uses clindamycin for dental work.     A few years ago she was having a lot of issues with orthopnea and dyspnea on exertion.  She was found to be hypertensive and started on metoprolol.  She was also placed on Lasix.  Her symptoms are actually much better now.  She thinks the medication helped, specifically the metoprolol.  She only takes the Lasix on weekends because it was hard to use Lasix and work.  She has occasional swelling in her ankles, but overall she feels well.  No chest pain, shortness of breath, palpitations, syncope, near syncope, cyanosis, or edema.          Past Medical History:   Diagnosis Date    Rheumatic fever with cardiac involvement              Past Surgical History:   Procedure Laterality Date    AORTIC VALVE REPLACEMENT   2000    AORTIC VALVE REPLACEMENT   2012      History reviewed. No pertinent family history.  Social History            Socioeconomic History    Marital status: Single  "  Tobacco Use    Smoking status: Never    Smokeless tobacco: Never   Substance and Sexual Activity    Alcohol use: Yes       Alcohol/week: 1.0 standard drink       Types: 1 Glasses of wine per week       Comment: social             Current Outpatient Medications on File Prior to Visit   Medication Sig Dispense Refill    cholecalciferol, vitamin D3, 125 mcg (5,000 unit) Tab Take 5,000 Units by mouth.        PAMELA 0.35 mg tablet Take by mouth.        furosemide (LASIX) 20 MG tablet Take 20 mg by mouth daily as needed.        metoprolol succinate (TOPROL-XL) 50 MG 24 hr tablet Take 50 mg by mouth every morning.        montelukast (SINGULAIR) 10 mg tablet Take 10 mg by mouth.        [DISCONTINUED] norethindrone-ethinyl estradiol (OVCON) 0.4-35 mg-mcg per tablet Take 1 tablet by mouth every morning.          No current facility-administered medications on file prior to visit.            Review of patient's allergies indicates:   Allergen Reactions    Cephalexin Other (See Comments)       She gets mouth sores.     Penicillin g Other (See Comments)       Causes mouth sores.       The review of systems is as noted above. It is otherwise negative for other symptoms related to the general, neurological, psychiatric, endocrine, gastrointestinal, genitourinary, respiratory, dermatologic, musculoskeletal, hematologic, and immunologic systems.           Wt Readings from Last 3 Encounters:   05/24/23 125.8 kg (277 lb 5.4 oz)   05/24/23 122.5 kg (270 lb)          Ht Readings from Last 3 Encounters:   05/24/23 5' 6" (1.676 m)   05/24/23 5' 6" (1.676 m)      Body mass index is 44.76 kg/m².  Facility age limit for growth percentiles is 20 years.  Facility age limit for growth percentiles is 20 years.  Facility age limit for growth percentiles is 20 years.     In general, she is an obese but otherwise very healthy-appearing nondysmorphic female in no apparent distress.  The eyes, nares, and oropharynx are clear.  Eyelids and " conjunctiva are normal without drainage or erythema.  Pupils equal and round bilaterally.  The head is normocephalic and atraumatic.  The neck is supple without jugular venous distention or thyroid enlargement.  The lungs are clear to auscultation bilaterally.  There are no scars on the chest wall.  The 1st heart sound is normal.  The 2nd heart sound is physiologically split.  A grade 2-3/6 systolic ejection murmur is heard at the upper left and upper right sternal border.  I do not hear a diastolic murmur.  The abdominal exam is benign without hepatosplenomegaly, tenderness, or distention.  Pulses are normal in all 4 extremities with brisk capillary refill and no clubbing, cyanosis, or edema.  No rashes are noted.     I personally reviewed the following tests performed today and my interpretation follows:  An EKG is basically normal.     Results for orders placed during the hospital encounter of 05/24/23     Echo Saline Bubble? No     Interpretation Summary  CONGENITAL CARDIAC HISTORY:  Aortic valve stenosis.  S/P Ross - [facility] (2000 at age 15)  S/P Bovine aortic valve replacement -[facility] (2012)     SEGMENTAL CARDIAC CONNECTIONS:  Abdominal situs solitus.  Atrial situs solitus.  Atrioventricular alignment is concordant.  D-loop ventricles.  The tricuspid valve appears grossly normal.  The mitral valve appears grossly normal.  There is mild dilation and mild hypertrophy of the right ventricle.  The left ventricle appears qualitatively normal.  The ventriculoarterial alignment is concordant.  Bioprosthetic pulmonary valve.  Bioprosthetic aortic valve.  Cardiac position is levocardia.  There is a left aortic arch with additional branches not well demonstrated.  No interatrial septal defect present.  No ventricular septal defect present.        IMPRESSION:  Qualitative impression of at least mild dilation of the right atrium.  There is no evidence for atrial level shunt in technically difficult subxiphoid  imaging.  There is a trivial to mild jet of tricuspid insufficiency.  Qualitative impression of at least mild hypertrophy and dilation of the right ventricle with good systolic function.  Right ventricular pressure estimated at least 43 mmHg above right atrial pressure from poorly defined TR doppler profile.  Echodensity suggesting pulmonary homograft or bioprosthetic valve in pulmonary position that is heavily calcified.  Acceleration is noted across the pulmonary valve with peak velocity 3.6 m/sec, mean gradient 32 mm Hg and at least mild insufficiency.  The right pulmonary artery appears normal in size with difficult to demonstrate left pulmonary artery.  The left atrial volume index is normal measuring 28 ml/m2.  There is echodensity suggesting annuloplasty ring with no evidence of significant mitral stenosis and trivial insufficiency.  The mitral leaflets appear normal in structure and function.  Flattened septal motion with good movement of the LV free wall, SF = 30 % and EF estimated 60 -65% from apical views.  No left ventricular outflow tract obstruction.  Stent mounted bioprosthetic valve in aortic position with calcified leaflets,  Peak velocity across the aortic valve 3.6 m/sec with mean gradient 34 mm Hg and at least mild insufficiency by color Doppler.  Aortic dimensions:  Stent mounted aortic valve in good position.  Ascending aorta     = 41 mm.  The aorta appears normal in size with left aortic arch branching pattern.  There is acceleration to peak velocity 2 m/sec with poorly defined Doppler profile and no diastolic runoff suggestive of overlapping measurement of LPA flow.  2D and color images suggest no evidence for coarctation.  No pericardial effusion.           Lab Results   Component Value Date     WBC 10.5 (H) 11/14/2022     HGB 13.4 11/14/2022     HCT 41.5 11/14/2022     MCV 87.4 11/14/2022      11/14/2022         CMP        Sodium   Date Value Ref Range Status   05/24/2023 141 136 -  145 mmol/L Final            Potassium   Date Value Ref Range Status   05/24/2023 3.8 3.5 - 5.1 mmol/L Final            Chloride   Date Value Ref Range Status   05/24/2023 107 95 - 110 mmol/L Final            CO2   Date Value Ref Range Status   05/24/2023 24 23 - 29 mmol/L Final            Glucose   Date Value Ref Range Status   05/24/2023 94 70 - 110 mg/dL Final            BUN   Date Value Ref Range Status   05/24/2023 11 6 - 20 mg/dL Final            Creatinine   Date Value Ref Range Status   05/24/2023 0.9 0.5 - 1.4 mg/dL Final            Calcium   Date Value Ref Range Status   05/24/2023 10.1 8.7 - 10.5 mg/dL Final            Albumin Level   Date Value Ref Range Status   11/14/2022 4.4 3.2 - 4.8 g/dL Final            Alk Phos   Date Value Ref Range Status   11/14/2022 96 46 - 116 IU/L Final            Aspartate Aminotransferase   Date Value Ref Range Status   11/14/2022 21 <34 IU/L Final            Alanine Aminotransferase   Date Value Ref Range Status   11/14/2022 22 10 - 49 IU/L Final            Anion Gap   Date Value Ref Range Status   05/24/2023 10 8 - 16 mmol/L Final            eGFR   Date Value Ref Range Status   05/24/2023 >60.0 >60 mL/min/1.73 m^2 Final            Lab Results   Component Value Date     CHOL 202 (H) 11/14/2022            Lab Results   Component Value Date     HDL 52 11/14/2022            Lab Results   Component Value Date     LDLCALC 132 (H) 11/14/2022      No results found for: DLDL        Lab Results   Component Value Date     TRIG 88 11/14/2022      IMPRESSION:   Bioprosthetic aortic and pulmonary valve dysfunction (mixed), s/p RF    PLAN:  Diagnostic RHC/LHC  Possible interventions reviewed (PBV, aortic valvuloplasty, PA interventions), in case needed today

## 2023-08-01 NOTE — PLAN OF CARE
Patient awake alert following cath procedure. VSS. Cath site dressing CDI with 2+ pulses in feet. Skin warm with CRT 2-3 seconds. Plan to remain flat until 1520 and transfer home this evening. Partner at bedside. No prn pain medications given. Tolerated PO. Plan of care reviewed and questions answered.

## 2023-08-01 NOTE — TRANSFER OF CARE
"Anesthesia Transfer of Care Note    Patient: Megan Cook    Procedure(s) Performed: Procedure(s) (LRB):  Catheterization, Heart, Combined Right and Retrograde Left, for Congenital Heart Defect (N/A)  Angiogram, Coronary, Pediatric  Angiogram, Pulmonary, Pediatric    Patient location: PACU    Anesthesia Type: MAC    Transport from OR: Transported from OR on 2-3 L/min O2 by NC with adequate spontaneous ventilation    Post pain: adequate analgesia    Post assessment: no apparent anesthetic complications and tolerated procedure well    Post vital signs: stable    Level of consciousness: awake and alert    Nausea/Vomiting: no nausea/vomiting    Complications: none          Last vitals:   Visit Vitals  /79 (BP Location: Left arm, Patient Position: Lying)   Pulse 74   Temp 37.1 °C (98.8 °F) (Temporal)   Resp 16   Ht 5' 6" (1.676 m)   Wt 122.5 kg (270 lb)   LMP 07/17/2023   SpO2 98%   Breastfeeding No   BMI 43.58 kg/m²     "

## 2023-08-01 NOTE — Clinical Note
Subjective   Cameron Bowie is a 63 y.o. female.     Hyperlipidemia   This is a chronic problem. The current episode started more than 1 year ago. The problem is controlled. Recent lipid tests were reviewed and are normal. Associated symptoms include shortness of breath. Pertinent negatives include no chest pain or myalgias.   Hypertension   This is a chronic problem. The current episode started more than 1 year ago. The problem is unchanged. The problem is controlled. Associated symptoms include anxiety, peripheral edema and shortness of breath. Pertinent negatives include no chest pain, orthopnea, palpitations or PND.   Diabetes   She presents for her follow-up diabetic visit. She has type 2 diabetes mellitus. Hypoglycemia symptoms include nervousness/anxiousness. Associated symptoms include fatigue. Pertinent negatives for diabetes include no chest pain, no foot paresthesias, no foot ulcerations, no polydipsia, no polyphagia and no polyuria. Symptoms are stable. Her breakfast blood glucose is taken between 6-7 am. Her breakfast blood glucose range is generally 70-90 mg/dl. Her lunch blood glucose is taken between 11-12 pm. Her lunch blood glucose range is generally  mg/dl. Her highest blood glucose is 110-130 mg/dl. (Usually checks at least BID, recently sick  ) An ACE inhibitor/angiotensin II receptor blocker is contraindicated. Eye exam is not current (been more than a year, patient says she would schedule).   COPD   This is a chronic problem. The current episode started more than 1 year ago. The problem occurs intermittently. The problem has been unchanged. Associated symptoms include congestion, coughing and fatigue. Pertinent negatives include no chest pain, chills, diaphoresis, fever, myalgias, nausea, numbness or vomiting.   Coronary Artery Disease   Presents for follow-up visit. Symptoms include leg swelling and shortness of breath. Pertinent negatives include no chest pain, chest pressure,  The wire was inserted into the aorta. chest tightness or palpitations. Risk factors include hyperlipidemia. The symptoms have been stable.   Anxiety   Presents for follow-up visit. Symptoms include depressed mood, excessive worry, insomnia, irritability, nervous/anxious behavior and shortness of breath. Patient reports no chest pain, decreased concentration, nausea, palpitations, panic, restlessness or suicidal ideas. Symptoms occur occasionally. The quality of sleep is good.     Her past medical history is significant for CAD.        The following portions of the patient's history were reviewed and updated as appropriate: allergies, current medications, past family history, past medical history, past social history, past surgical history and problem list.    Review of Systems   Constitutional: Positive for fatigue and irritability. Negative for activity change, appetite change, chills, diaphoresis and fever.   HENT: Positive for congestion, postnasal drip, rhinorrhea, sinus pressure and sneezing. Negative for ear discharge, ear pain, facial swelling, hearing loss and mouth sores.    Respiratory: Positive for cough and shortness of breath. Negative for chest tightness.    Cardiovascular: Positive for leg swelling. Negative for chest pain, palpitations, orthopnea and PND.   Gastrointestinal: Negative for nausea and vomiting.   Endocrine: Negative for polydipsia, polyphagia and polyuria.   Musculoskeletal: Negative for myalgias.   Neurological: Negative for numbness.   Psychiatric/Behavioral: Negative for decreased concentration and suicidal ideas. The patient is nervous/anxious and has insomnia.        Objective   Physical Exam   Constitutional: She is oriented to person, place, and time. She appears well-developed and well-nourished. No distress.   HENT:   Right Ear: Hearing and ear canal normal. No drainage, swelling or tenderness. Tympanic membrane is retracted.   Left Ear: Hearing and ear canal normal. No drainage, swelling or tenderness. Tympanic  membrane is retracted.   Cardiovascular: Normal rate, regular rhythm and intact distal pulses.  Exam reveals distant heart sounds. Exam reveals no gallop, no S4 and no friction rub.    No murmur heard.  Pulmonary/Chest: She has decreased breath sounds. She has no wheezes. She has no rhonchi. She has no rales.   Musculoskeletal: She exhibits edema.    Cameron had a diabetic foot exam performed (callus left heel) today.   During the foot exam she had a monofilament test performed (normal).    Vascular Status -  Her exam exhibits right foot vasculature normal and right foot edema. Her exam exhibits left foot vasculature normal and left foot edema.  Neurological: She is alert and oriented to person, place, and time.   Skin: Skin is warm and dry. She is not diaphoretic.   Psychiatric: She has a normal mood and affect. Her behavior is normal. Judgment and thought content normal.   Nursing note and vitals reviewed.      Assessment/Plan   Problems Addressed this Visit        Cardiovascular and Mediastinum    Essential hypertension    Relevant Medications    furosemide (LASIX) 80 MG tablet    Coronary atherosclerosis of native coronary artery    Relevant Medications    sildenafil (REVATIO) 20 MG tablet       Endocrine    Type 2 diabetes mellitus with stage 3 chronic kidney disease, without long-term current use of insulin    Relevant Medications    furosemide (LASIX) 80 MG tablet      Other Visit Diagnoses     Rash    -  Primary    Relevant Medications    hydrocortisone-bacitracin-zinc oxide-nystatin (MAGIC BARRIER)    Stage 3 chronic kidney disease        Relevant Medications    furosemide (LASIX) 80 MG tablet

## 2023-08-01 NOTE — ANESTHESIA PREPROCEDURE EVALUATION
Pre-operative evaluation for Procedure(s) (LRB):  Catheterization, Heart, Combined Right and Retrograde Left, for Congenital Heart Defect (N/A)    Megan Coko is a 38 y.o. female with pmh of rheumatic heart disease (primarily AV), s/p Ross at [facility] in 2000, with subsequent aortic root and AV replacement 2012, HTN, obesity who presents with likely AV and PV stenosis with preserved function. Plan for the above procedure.     2D Echo:  3/2023:  IMPRESSION:  Qualitative impression of at least mild dilation of the right atrium.  There is no evidence for atrial level shunt in technically difficult subxiphoid imaging.  There is a trivial to mild jet of tricuspid insufficiency.  Qualitative impression of at least mild hypertrophy and dilation of the right ventricle with good systolic function.  Right ventricular pressure estimated at least 43 mmHg above right atrial pressure from poorly defined TR doppler profile.  Echodensity suggesting pulmonary homograft or bioprosthetic valve in pulmonary position that is heavily calcified.  Acceleration is noted across the pulmonary valve with peak velocity 3.6 m/sec, mean gradient 32 mm Hg and at least mild insufficiency.  The right pulmonary artery appears normal in size with difficult to demonstrate left pulmonary artery.  The left atrial volume index is normal measuring 28 ml/m2.   There is echodensity suggesting annuloplasty ring with no evidence of significant mitral stenosis and trivial insufficiency.  The mitral leaflets appear normal in structure and function.  Flattened septal motion with good movement of the LV free wall, SF = 30 % and EF estimated 60 -65% from apical views.   No left ventricular outflow tract obstruction.  Stent mounted bioprosthetic valve in aortic position with calcified leaflets,  Peak velocity across the aortic valve 3.6 m/sec with mean gradient  34 mm Hg and at least mild insufficiency by color Doppler.  Aortic dimensions:  Stent mounted aortic valve in good position.  Ascending aorta     = 41 mm.  The aorta appears normal in size with left aortic arch branching pattern.  There is acceleration to peak velocity 2 m/sec with poorly defined Doppler profile and no diastolic runoff suggestive of overlapping measurement of LPA flow.  2D and color images suggest no evidence for coarctation.   No pericardial effusion.      Patient Active Problem List   Diagnosis    Rheumatic fever with cardiac involvement    Adult congenital heart disease    H/O Ross procedure    Rheumatic aortic stenosis    Pulmonary valve replaced    Nonrheumatic pulmonary valve stenosis        No current facility-administered medications on file prior to encounter.     Current Outpatient Medications on File Prior to Encounter   Medication Sig Dispense Refill    cholecalciferol, vitamin D3, 125 mcg (5,000 unit) Tab Take 5,000 Units by mouth.      PAMELA 0.35 mg tablet Take by mouth.      furosemide (LASIX) 20 MG tablet Take 20 mg by mouth daily as needed.      metoprolol succinate (TOPROL-XL) 50 MG 24 hr tablet Take 50 mg by mouth every morning.      montelukast (SINGULAIR) 10 mg tablet Take 10 mg by mouth.         Past Surgical History:   Procedure Laterality Date    AORTIC VALVE REPLACEMENT  2000    AORTIC VALVE REPLACEMENT  2012    AORTIC VALVE REPLACEMENT  2012    with aortic root replacement at [facility] [address]    REPLACEMENT OF AORTIC VALVE USING ROSS PROCEDURE N/A 2000    [facility]           Pre-op Assessment    I have reviewed the Patient Summary Reports.     I have reviewed the Nursing Notes. I have reviewed the NPO Status.   I have reviewed the Medications.     Review of Systems  Anesthesia Hx:  System negative unless otherwise specified in the HPI or problem list above Denies Family Hx of Anesthesia complications.   Denies Personal Hx of Anesthesia complications.    Hematology/Oncology:        Hematology Comments: System negative unless otherwise specified in the HPI or problem list above Oncology Comments: System negative unless otherwise specified in the HPI or problem list above    EENT/Dental:   System negative unless otherwise specified in the HPI or problem list above   Cardiovascular:   System negative unless otherwise specified in the HPI or problem list above   Pulmonary:   System negative unless otherwise specified in the HPI or problem list above   Renal/:   System negative unless otherwise specified in the HPI or problem list above   Hepatic/GI:   System negative unless otherwise specified in the HPI or problem list above   Musculoskeletal:   System negative unless otherwise specified in the HPI or problem list above   OB/GYN/PEDS:  System negative unless otherwise specified in the HPI or problem list above   Neurological:   System negative unless otherwise specified in the HPI or problem list above   Endocrine:   System negative unless otherwise specified in the HPI or problem list above   Dermatological:   System negative unless otherwise specified in the HPI or problem list above   Psych:   System negative unless otherwise specified in the HPI or problem list above         Physical Exam  General: Well nourished, Cooperative, Alert and Oriented    Airway:  Mouth Opening: Normal  TM Distance: Normal  Tongue: Normal  Neck ROM: Normal ROM    Chest/Lungs:  Clear to auscultation, Normal Respiratory Rate    Heart:  Rate: Normal  Rhythm: Regular Rhythm        Anesthesia Plan  Type of Anesthesia, risks & benefits discussed:    Anesthesia Type: MAC, Gen Natural Airway, Gen ETT  Intra-op Monitoring Plan: Standard ASA Monitors  Post Op Pain Control Plan: multimodal analgesia  Induction:  IV  Informed Consent: Informed consent signed with the Patient and all parties understand the risks and agree with anesthesia plan.  All questions answered.   ASA Score: 3  Day of  Surgery Review of History & Physical: H&P Update referred to the surgeon/provider.    Ready For Surgery From Anesthesia Perspective.     .

## 2023-08-02 LAB
GLUCOSE SERPL-MCNC: 107 MG/DL (ref 70–110)
HCO3 UR-SCNC: 25.2 MMOL/L (ref 24–28)
HCT VFR BLD CALC: 32 %PCV (ref 36–54)
PCO2 BLDA: 48.2 MMHG (ref 35–45)
PH SMN: 7.33 [PH] (ref 7.35–7.45)
PO2 BLDA: 70 MMHG (ref 80–100)
POC ACTIVATED CLOTTING TIME K: 179 SEC (ref 74–137)
POC ACTIVATED CLOTTING TIME K: 221 SEC (ref 74–137)
POC BE: -1 MMOL/L
POC IONIZED CALCIUM: 1.18 MMOL/L (ref 1.06–1.42)
POC SATURATED O2: 92 % (ref 95–100)
POC TCO2: 27 MMOL/L (ref 23–27)
POTASSIUM BLD-SCNC: 3.9 MMOL/L (ref 3.5–5.1)
SAMPLE: ABNORMAL
SODIUM BLD-SCNC: 141 MMOL/L (ref 136–145)

## 2023-08-10 ENCOUNTER — PATIENT MESSAGE (OUTPATIENT)
Dept: CARDIOLOGY | Facility: CLINIC | Age: 38
End: 2023-08-10
Payer: COMMERCIAL

## 2023-08-21 NOTE — DISCHARGE SUMMARY
[facility] - Short Stay Cardiac Unit  Discharge Note  Short Stay    Procedure(s) (LRB):  Catheterization, Heart, Combined Right and Retrograde Left, for Congenital Heart Defect (N/A)  Angiogram, Coronary, Pediatric  Angiogram, Pulmonary, Pediatric      OUTCOME: Patient tolerated treatment/procedure well without complication and is now ready for discharge.    DISPOSITION: Home or Self Care    FINAL DIAGNOSIS:  <principal problem not specified>    FOLLOWUP: Aortic valve replacement (Ross procedure)    DISCHARGE INSTRUCTIONS:    Discharge Procedure Orders   Diet Adult Regular     Notify your health care provider if you experience any of the following:  temperature >100.4     Notify your health care provider if you experience any of the following:  persistent nausea and vomiting or diarrhea     Notify your health care provider if you experience any of the following:  severe uncontrolled pain     Notify your health care provider if you experience any of the following:  redness, tenderness, or signs of infection (pain, swelling, redness, odor or green/yellow discharge around incision site)     Notify your health care provider if you experience any of the following:  difficulty breathing or increased cough     Remove dressing in 24 hours     Activity as tolerated        TIME SPENT ON DISCHARGE: 30 minutes

## 2023-09-03 ENCOUNTER — DOCUMENTATION ONLY (OUTPATIENT)
Dept: PEDIATRIC CARDIOLOGY | Facility: CLINIC | Age: 38
End: 2023-09-03
Payer: COMMERCIAL

## 2023-09-03 NOTE — PROGRESS NOTES
Patient discussed in our adult congenital heart disease conference today.  Members of our congenital cardiac surgery, interventional cardiology, electrophysiology, and adult congenital cardiology teams were present.  We all agree that surgical intervention to replace her aortic and pulmonary valves is warranted.  Our interventional team did not feel that she was a good candidate for catheter based valve intervention at present.  I have reached out to the patient, and I am waiting to hear back from her.

## 2023-09-18 ENCOUNTER — PATIENT MESSAGE (OUTPATIENT)
Dept: CARDIOLOGY | Facility: CLINIC | Age: 38
End: 2023-09-18
Payer: COMMERCIAL

## 2023-09-25 ENCOUNTER — TELEPHONE (OUTPATIENT)
Dept: VASCULAR SURGERY | Facility: CLINIC | Age: 38
End: 2023-09-25
Payer: COMMERCIAL

## 2023-09-25 NOTE — TELEPHONE ENCOUNTER
Left message to set up surgical consult with Dr Campbell. Instructed pt to call back at my desk phone number.

## 2023-10-09 ENCOUNTER — SURGICAL CONSULT (OUTPATIENT)
Dept: VASCULAR SURGERY | Facility: CLINIC | Age: 38
End: 2023-10-09
Payer: COMMERCIAL

## 2023-10-09 VITALS
HEIGHT: 67 IN | BODY MASS INDEX: 42.87 KG/M2 | HEART RATE: 81 BPM | SYSTOLIC BLOOD PRESSURE: 143 MMHG | DIASTOLIC BLOOD PRESSURE: 67 MMHG | WEIGHT: 273.13 LBS

## 2023-10-09 DIAGNOSIS — I35.0 AORTIC VALVE STENOSIS, ETIOLOGY OF CARDIAC VALVE DISEASE UNSPECIFIED: Primary | ICD-10-CM

## 2023-10-09 DIAGNOSIS — I37.0 NONRHEUMATIC PULMONARY VALVE STENOSIS: ICD-10-CM

## 2023-10-09 PROCEDURE — 99205 OFFICE O/P NEW HI 60 MIN: CPT | Mod: S$GLB,,, | Performed by: THORACIC SURGERY (CARDIOTHORACIC VASCULAR SURGERY)

## 2023-10-09 PROCEDURE — 99205 PR OFFICE/OUTPT VISIT, NEW, LEVL V, 60-74 MIN: ICD-10-PCS | Mod: S$GLB,,, | Performed by: THORACIC SURGERY (CARDIOTHORACIC VASCULAR SURGERY)

## 2023-10-09 NOTE — PROGRESS NOTES
Pre-operative H&P/Consult Note  Congenital Cardiothoracic Surgery      SUBJECTIVE:       Chief Complaint/Reason for Consult: Aortic Stenosis, Pulmonary Stenosis and Insufficiency     History of Present Illness:  Ms. Megan Cook is a 38-year-old, 124 kg, woman with history of rheumatic heart disease and Ross procedure and mitral valve repair(including ring annuloplasty) in 2000 followed by repair of autograft aneurysm repair and aortic valve replacement with 23mm CE bioprothesis in 2012 who presents for surgical consultation for aortic stenosis and pulmonary stenosis and insufficiency.       She was referred by Dr. Wilcox.    She appears well in clinic today and reports no other significant health concerns.  She had been symptomatic with dyspnea which improved after being treated for hypertension and says she has felt better but recently notices she fatigues easily.         Her work up thus far includes echocardiogram, cardiac CT and catheterization. The most recent echo was in May of this year and was limited but demonstrated normal biventricular function, at least moderate aortic stenosis and pulmonary stenosis with a well functioning mitral valve and no evidence of intracardiac shunt.  A TAVR protocol CT was performed around the same time which measured the aortic root at 4.1cm.  The LVOT measured about 22mm.  A cath was performed in August which demonstrated normal coronary arteries,  a peak gradient of 55mmHg across the aortic valve, a gradient of 35 across the pulmonary conduit with free insufficiency, an LVEDP reported at 16 at the time with mildly elevated pulmonary pressures and PVR of 2.    She had been evaluated for a transcatheter procedure.  The conduit was not suitable for a catheter based intervention because it was heavily calcified and diffusely narrowed.  The branch Pas are of good size.      Her first surgery was at [facility] and we do not have an operative report for that but do see the mitral  valve annuloplasty ring on cath and CT and I suspect the pulmonary valve was replaced with a homograft based on the appearance on cath and CT.  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       Additional medical history is significant for PCN and Cephalosporin allergy reported.      Review of patient's allergies indicates:   Allergen Reactions    Cephalexin Other (See Comments)     She gets mouth sores.     Penicillin g Other (See Comments)     Causes mouth sores.        Past Medical History:   Diagnosis Date    Rheumatic fever with cardiac involvement      Past Surgical History:   Procedure Laterality Date    ANGIOGRAM, CORONARY, PEDIATRIC  8/1/2023    Procedure: Angiogram, Coronary, Pediatric;  Surgeon: Anthony Dubois Jr., MD;  Location: [facility] CATH LAB;  Service: Cardiology;;    ANGIOGRAM, PULMONARY, PEDIATRIC  8/1/2023    Procedure: Angiogram, Pulmonary, Pediatric;  Surgeon: Anthony Dubois Jr., MD;  Location: [facility] CATH LAB;  Service: Cardiology;;    AORTIC VALVE REPLACEMENT  2000    AORTIC VALVE REPLACEMENT  2012    AORTIC VALVE REPLACEMENT  2012    [facility]    COMBINED RIGHT AND RETROGRADE LEFT HEART CATHETERIZATION FOR CONGENITAL HEART DEFECT N/A 8/1/2023    Procedure: Catheterization, Heart, Combined Right and Retrograde Left, for Congenital Heart Defect;  Surgeon: Anthony Dubois Jr., MD;  Location: [facility] CATH LAB;  Service: Cardiology;  Laterality: N/A;    REPLACEMENT OF AORTIC VALVE USING ROSS PROCEDURE N/A 2000    [facility]     Family History   Problem Relation Age of Onset    Cardiomyopathy  Other     Congenital heart disease Other      Social History     Tobacco Use    Smoking status: Never    Smokeless tobacco: Never   Substance Use Topics    Alcohol use: Yes     Alcohol/week: 1.0 standard drink of alcohol     Types: 1 Glasses of wine per week     Comment: social      Current Outpatient Medications on File Prior to Visit   Medication Sig Dispense Refill    cholecalciferol, vitamin D3, 125 mcg (5,000 unit) Tab Take 5,000 Units by mouth.      PAMELA 0.35 mg tablet Take by mouth.      furosemide (LASIX) 20 MG tablet Take 20 mg by mouth daily as needed.      metoprolol succinate (TOPROL-XL) 50 MG 24 hr tablet Take 50 mg by mouth every morning.      montelukast (SINGULAIR) 10 mg tablet Take 10 mg by mouth.       No current facility-administered medications on file prior to visit.       Review of Systems:  Dyspnea and Fatigue, otherwise negative    OBJECTIVE:     Vital Signs (Most Recent)  Pulse: 81 (10/09/23 1420)  BP: (!) 143/67 (10/09/23 1420)      Physical Exam:   General: appears well  HEENT: normocephalic, atraumatic  CV: normal rate   Resp/Chest: normal work of breathing and chest excursion  Abd: soft, non-tender, non-distended  Extremities: warm, no edema, normal radial and pedal pulses, normal strength  Neuro: Alert, oriented, appropriate speech and behavior for age       Diagnostic Results:    TAVR CT, Echo, and Cath from this year all reviewed.    Pertinent findings are noted in HPI    ASSESSMENT/PLAN:   Ms. Megan Cook is a 38-year-old, 124 kg, woman with history of rheumatic heart disease and Ross procedure and mitral valve repair(including ring annuloplasty) in 2000 followed by repair of autograft aneurysm repair and aortic valve replacement with 23mm CE bioprothesis in 2012 who presents for surgical consultation for aortic stenosis and pulmonary stenosis and insufficiency.        We discussed the indications for surgery  to replace the aortic and pulmonary valves.  She had been worked up  for transcatheter procedures and ultimately was determined not to be an ideal candidate for transcatheter intervention which prompted her referral to me.  We discussed some degree of increased risk based on her prior surgeries but that I believe the risk remains acceptably low and that she would be best served with surgery to replace the valves.   We discussed options of mechanical vs prosthetic aortic valve replacement and she currently would like to have a mechanical valve and understands the need for life-long anticoagulation in the future.  There is some consideration for possible redo aortic root replacement, the maximum dimension is 4.1cm currently and likely low risk for future problems given the prior surgeries. We discussed that I would not recommend a mechanical valve in the pulmonary position and rather suggested either a prosthetic valve or a homograft.  I discussed that although she was not a candidate for transcatheter intervention currently for the pulmonary valve, she likely would be in the future with either replacement option I suggested.      I will see her again prior to surgery with repeat echo and pre-op studies and labs.      She would like to schedule by the end of the year which we will accommodate.      I did request that she see her dentist in the meantime and if any work required to get that out of the way prior to surgery.  She did not have any teeth bothering her today but thought she had some cavities that may need to be addressed.         Alexandru Campbell MD

## 2023-10-10 ENCOUNTER — TELEPHONE (OUTPATIENT)
Dept: VASCULAR SURGERY | Facility: CLINIC | Age: 38
End: 2023-10-10
Payer: COMMERCIAL

## 2023-10-10 DIAGNOSIS — I37.0 NONRHEUMATIC PULMONARY VALVE STENOSIS: ICD-10-CM

## 2023-10-10 DIAGNOSIS — I01.9 RHEUMATIC FEVER WITH CARDIAC INVOLVEMENT: ICD-10-CM

## 2023-10-10 DIAGNOSIS — I37.0 NONRHEUMATIC PULMONARY VALVE STENOSIS: Primary | ICD-10-CM

## 2023-10-10 DIAGNOSIS — Q24.9 ADULT CONGENITAL HEART DISEASE: Primary | ICD-10-CM

## 2023-10-10 DIAGNOSIS — Z95.4 H/O ROSS PROCEDURE: ICD-10-CM

## 2023-10-10 DIAGNOSIS — I06.0 RHEUMATIC AORTIC STENOSIS: ICD-10-CM

## 2023-10-10 DIAGNOSIS — I35.0 AORTIC VALVE STENOSIS, ETIOLOGY OF CARDIAC VALVE DISEASE UNSPECIFIED: ICD-10-CM

## 2023-10-10 DIAGNOSIS — Z95.2 PULMONARY VALVE REPLACED: ICD-10-CM

## 2023-10-16 ENCOUNTER — HOSPITAL ENCOUNTER (OUTPATIENT)
Dept: RADIOLOGY | Facility: HOSPITAL | Age: 38
Discharge: HOME | End: 2023-10-16
Attending: THORACIC SURGERY (CARDIOTHORACIC VASCULAR SURGERY)
Payer: COMMERCIAL

## 2023-10-16 DIAGNOSIS — I35.0 AORTIC VALVE STENOSIS, ETIOLOGY OF CARDIAC VALVE DISEASE UNSPECIFIED: ICD-10-CM

## 2023-10-16 DIAGNOSIS — I37.0 NONRHEUMATIC PULMONARY VALVE STENOSIS: ICD-10-CM

## 2023-10-16 PROCEDURE — 71046 XR CHEST PA AND LATERAL: ICD-10-PCS | Mod: 26,,, | Performed by: RADIOLOGY

## 2023-10-16 PROCEDURE — 71046 X-RAY EXAM CHEST 2 VIEWS: CPT | Mod: TC,PN

## 2023-10-16 PROCEDURE — 71046 X-RAY EXAM CHEST 2 VIEWS: CPT | Mod: 26,,, | Performed by: RADIOLOGY

## 2023-11-04 ENCOUNTER — PATIENT MESSAGE (OUTPATIENT)
Dept: VASCULAR SURGERY | Facility: CLINIC | Age: 38
End: 2023-11-04
Payer: COMMERCIAL

## 2023-11-06 ENCOUNTER — PATIENT MESSAGE (OUTPATIENT)
Dept: PEDIATRIC CARDIOLOGY | Facility: CLINIC | Age: 38
End: 2023-11-06
Payer: COMMERCIAL

## 2023-11-06 DIAGNOSIS — I06.0 RHEUMATIC AORTIC STENOSIS: Primary | ICD-10-CM

## 2023-11-06 DIAGNOSIS — I37.0 NONRHEUMATIC PULMONARY VALVE STENOSIS: ICD-10-CM

## 2023-11-09 ENCOUNTER — PATIENT MESSAGE (OUTPATIENT)
Dept: PEDIATRIC CARDIOLOGY | Facility: CLINIC | Age: 38
End: 2023-11-09
Payer: COMMERCIAL

## 2023-12-07 ENCOUNTER — OFFICE VISIT (OUTPATIENT)
Dept: VASCULAR SURGERY | Facility: CLINIC | Age: 38
DRG: 219 | End: 2023-12-07
Payer: COMMERCIAL

## 2023-12-07 ENCOUNTER — HOSPITAL ENCOUNTER (OUTPATIENT)
Dept: RADIOLOGY | Facility: HOSPITAL | Age: 38
Discharge: HOME | DRG: 219 | End: 2023-12-07
Attending: THORACIC SURGERY (CARDIOTHORACIC VASCULAR SURGERY)
Payer: COMMERCIAL

## 2023-12-07 ENCOUNTER — DOCUMENTATION ONLY (OUTPATIENT)
Dept: VASCULAR SURGERY | Facility: CLINIC | Age: 38
End: 2023-12-07

## 2023-12-07 ENCOUNTER — ANESTHESIA EVENT (OUTPATIENT)
Dept: SURGERY | Facility: HOSPITAL | Age: 38
DRG: 219 | End: 2023-12-07
Payer: COMMERCIAL

## 2023-12-07 ENCOUNTER — HOSPITAL ENCOUNTER (OUTPATIENT)
Dept: CARDIOLOGY | Facility: HOSPITAL | Age: 38
Discharge: HOME | DRG: 219 | End: 2023-12-07
Attending: PEDIATRICS
Payer: COMMERCIAL

## 2023-12-07 ENCOUNTER — HOSPITAL ENCOUNTER (OUTPATIENT)
Dept: CARDIOLOGY | Facility: CLINIC | Age: 38
Discharge: HOME | DRG: 219 | End: 2023-12-07
Payer: COMMERCIAL

## 2023-12-07 ENCOUNTER — OFFICE VISIT (OUTPATIENT)
Dept: CARDIOLOGY | Facility: CLINIC | Age: 38
DRG: 219 | End: 2023-12-07
Payer: COMMERCIAL

## 2023-12-07 VITALS
HEART RATE: 86 BPM | WEIGHT: 277 LBS | OXYGEN SATURATION: 98 % | BODY MASS INDEX: 44.52 KG/M2 | HEIGHT: 66 IN | SYSTOLIC BLOOD PRESSURE: 125 MMHG | DIASTOLIC BLOOD PRESSURE: 82 MMHG | WEIGHT: 280.63 LBS | HEIGHT: 66 IN | HEART RATE: 60 BPM | BODY MASS INDEX: 45.1 KG/M2

## 2023-12-07 VITALS
DIASTOLIC BLOOD PRESSURE: 82 MMHG | BODY MASS INDEX: 45.1 KG/M2 | WEIGHT: 280.63 LBS | SYSTOLIC BLOOD PRESSURE: 125 MMHG | OXYGEN SATURATION: 98 % | HEART RATE: 86 BPM | HEIGHT: 66 IN

## 2023-12-07 DIAGNOSIS — Z95.2 PULMONARY VALVE REPLACED: ICD-10-CM

## 2023-12-07 DIAGNOSIS — Q24.9 ADULT CONGENITAL HEART DISEASE: ICD-10-CM

## 2023-12-07 DIAGNOSIS — I06.0 RHEUMATIC AORTIC STENOSIS: ICD-10-CM

## 2023-12-07 DIAGNOSIS — I01.9 RHEUMATIC FEVER WITH CARDIAC INVOLVEMENT: ICD-10-CM

## 2023-12-07 DIAGNOSIS — I37.0 NONRHEUMATIC PULMONARY VALVE STENOSIS: ICD-10-CM

## 2023-12-07 DIAGNOSIS — I35.0 AORTIC VALVE STENOSIS, ETIOLOGY OF CARDIAC VALVE DISEASE UNSPECIFIED: ICD-10-CM

## 2023-12-07 DIAGNOSIS — Z95.4 H/O ROSS PROCEDURE: ICD-10-CM

## 2023-12-07 DIAGNOSIS — Q24.9 ADULT CONGENITAL HEART DISEASE: Primary | ICD-10-CM

## 2023-12-07 LAB
AV INDEX (PROSTH): 0.21
AV MEAN GRADIENT: 43 MMHG
AV PEAK GRADIENT: 65 MMHG
AV VALVE AREA BY VELOCITY RATIO: 0.6 CM²
AV VALVE AREA: 0.61 CM²
AV VELOCITY RATIO: 0.2
BSA FOR ECHO PROCEDURE: 2.42 M2
CV ECHO LV RWT: 0.4 CM
DOP CALC AO PEAK VEL: 4.04 M/S
DOP CALC AO VTI: 103.24 CM
DOP CALC LVOT AREA: 3 CM2
DOP CALC LVOT DIAMETER: 1.94 CM
DOP CALC LVOT PEAK VEL: 0.82 M/S
DOP CALC LVOT STROKE VOLUME: 63.46 CM3
DOP CALC RVOT PEAK VEL: 0.95 M/S
DOP CALC RVOT VTI: 23.85 CM
DOP CALCLVOT PEAK VEL VTI: 21.48 CM
E WAVE DECELERATION TIME: 210.83 MSEC
E/A RATIO: 1.41
E/E' RATIO: 17.37 M/S
ECHO LV POSTERIOR WALL: 0.87 CM (ref 0.6–1.1)
FRACTIONAL SHORTENING: 34 % (ref 28–44)
INTERVENTRICULAR SEPTUM: 1.15 CM (ref 0.6–1.1)
LA MAJOR: 5.5 CM
LA MINOR: 5.68 CM
LA WIDTH: 4.15 CM
LEFT ATRIUM SIZE: 4.43 CM
LEFT ATRIUM VOLUME INDEX MOD: 31.5 ML/M2
LEFT ATRIUM VOLUME INDEX: 37.8 ML/M2
LEFT ATRIUM VOLUME MOD: 72.74 CM3
LEFT ATRIUM VOLUME: 87.33 CM3
LEFT INTERNAL DIMENSION IN SYSTOLE: 2.88 CM (ref 2.1–4)
LEFT VENTRICLE DIASTOLIC VOLUME INDEX: 37.24 ML/M2
LEFT VENTRICLE DIASTOLIC VOLUME: 86.03 ML
LEFT VENTRICLE MASS INDEX: 64 G/M2
LEFT VENTRICLE SYSTOLIC VOLUME INDEX: 13.7 ML/M2
LEFT VENTRICLE SYSTOLIC VOLUME: 31.56 ML
LEFT VENTRICULAR INTERNAL DIMENSION IN DIASTOLE: 4.36 CM (ref 3.5–6)
LEFT VENTRICULAR MASS: 147.71 G
LV LATERAL E/E' RATIO: 15 M/S
LV SEPTAL E/E' RATIO: 20.63 M/S
MV A" WAVE DURATION": 9.13 MSEC
MV PEAK A VEL: 1.17 M/S
MV PEAK E VEL: 1.65 M/S
MV STENOSIS PRESSURE HALF TIME: 61.14 MS
MV VALVE AREA P 1/2 METHOD: 3.6 CM2
PISA TR MAX VEL: 2.83 M/S
PULM VEIN S/D RATIO: 0.66
PV MEAN GRADIENT: 33 MMHG
PV PEAK D VEL: 0.56 M/S
PV PEAK GRADIENT: 49 MMHG
PV PEAK S VEL: 0.37 M/S
PV PEAK VELOCITY: 3.5 M/S
QEF: 60 %
RA MAJOR: 5.71 CM
RA WIDTH: 4.48 CM
RIGHT VENTRICULAR END-DIASTOLIC DIMENSION: 4.46 CM
SINUS: 4.26 CM
STJ: 3.3 CM
TDI LATERAL: 0.11 M/S
TDI SEPTAL: 0.08 M/S
TDI: 0.1 M/S
TR MAX PG: 32 MMHG
TRICUSPID ANNULAR PLANE SYSTOLIC EXCURSION: 1.74 CM
Z-SCORE OF LEFT VENTRICULAR DIMENSION IN END DIASTOLE: -7.34
Z-SCORE OF LEFT VENTRICULAR DIMENSION IN END SYSTOLE: -5.08

## 2023-12-07 PROCEDURE — 99214 OFFICE O/P EST MOD 30 MIN: CPT | Mod: S$GLB,,, | Performed by: PEDIATRICS

## 2023-12-07 PROCEDURE — 99999 PR PBB SHADOW E&M-EST. PATIENT-LVL III: CPT | Mod: PBBFAC,,, | Performed by: PEDIATRICS

## 2023-12-07 PROCEDURE — 99999 PR PBB SHADOW E&M-EST. PATIENT-LVL II: ICD-10-PCS | Mod: PBBFAC,,, | Performed by: THORACIC SURGERY (CARDIOTHORACIC VASCULAR SURGERY)

## 2023-12-07 PROCEDURE — 93010 ELECTROCARDIOGRAM REPORT: CPT | Mod: S$GLB,,, | Performed by: INTERNAL MEDICINE

## 2023-12-07 PROCEDURE — 93320 ECHO (CUPID ONLY): ICD-10-PCS | Mod: 26,,, | Performed by: PEDIATRICS

## 2023-12-07 PROCEDURE — 99999 PR PBB SHADOW E&M-EST. PATIENT-LVL III: ICD-10-PCS | Mod: PBBFAC,,, | Performed by: PEDIATRICS

## 2023-12-07 PROCEDURE — 93325 ECHO (CUPID ONLY): ICD-10-PCS | Mod: 26,,, | Performed by: PEDIATRICS

## 2023-12-07 PROCEDURE — 99215 PR OFFICE/OUTPT VISIT, EST, LEVL V, 40-54 MIN: ICD-10-PCS | Mod: 57,S$GLB,, | Performed by: THORACIC SURGERY (CARDIOTHORACIC VASCULAR SURGERY)

## 2023-12-07 PROCEDURE — 93005 EKG 12-LEAD: ICD-10-PCS | Mod: S$GLB,,, | Performed by: PEDIATRICS

## 2023-12-07 PROCEDURE — 93325 DOPPLER ECHO COLOR FLOW MAPG: CPT | Mod: 26,,, | Performed by: PEDIATRICS

## 2023-12-07 PROCEDURE — 71046 XR CHEST PA AND LATERAL: ICD-10-PCS | Mod: 26,,, | Performed by: RADIOLOGY

## 2023-12-07 PROCEDURE — 99215 OFFICE O/P EST HI 40 MIN: CPT | Mod: 57,S$GLB,, | Performed by: THORACIC SURGERY (CARDIOTHORACIC VASCULAR SURGERY)

## 2023-12-07 PROCEDURE — 99214 PR OFFICE/OUTPT VISIT, EST, LEVL IV, 30-39 MIN: ICD-10-PCS | Mod: S$GLB,,, | Performed by: PEDIATRICS

## 2023-12-07 PROCEDURE — 93303 ECHO TRANSTHORACIC: CPT | Mod: 26,,, | Performed by: PEDIATRICS

## 2023-12-07 PROCEDURE — 87081 CULTURE SCREEN ONLY: CPT | Performed by: THORACIC SURGERY (CARDIOTHORACIC VASCULAR SURGERY)

## 2023-12-07 PROCEDURE — 93303 ECHO (CUPID ONLY): ICD-10-PCS | Mod: 26,,, | Performed by: PEDIATRICS

## 2023-12-07 PROCEDURE — 71046 X-RAY EXAM CHEST 2 VIEWS: CPT | Mod: TC

## 2023-12-07 PROCEDURE — 71046 X-RAY EXAM CHEST 2 VIEWS: CPT | Mod: 26,,, | Performed by: RADIOLOGY

## 2023-12-07 PROCEDURE — 93320 DOPPLER ECHO COMPLETE: CPT | Mod: 26,,, | Performed by: PEDIATRICS

## 2023-12-07 PROCEDURE — 93303 ECHO TRANSTHORACIC: CPT

## 2023-12-07 PROCEDURE — 93010 EKG 12-LEAD: ICD-10-PCS | Mod: S$GLB,,, | Performed by: INTERNAL MEDICINE

## 2023-12-07 PROCEDURE — 93005 ELECTROCARDIOGRAM TRACING: CPT | Mod: S$GLB,,, | Performed by: PEDIATRICS

## 2023-12-07 PROCEDURE — 93320 DOPPLER ECHO COMPLETE: CPT

## 2023-12-07 PROCEDURE — 99999 PR PBB SHADOW E&M-EST. PATIENT-LVL II: CPT | Mod: PBBFAC,,, | Performed by: THORACIC SURGERY (CARDIOTHORACIC VASCULAR SURGERY)

## 2023-12-07 RX ORDER — CETIRIZINE HYDROCHLORIDE 10 MG/1
10 TABLET ORAL DAILY
COMMUNITY

## 2023-12-07 RX ORDER — AMINOCAPROIC ACID 250 MG/ML
300 INJECTION, SOLUTION INTRAVENOUS ONCE
Status: COMPLETED | OUTPATIENT
Start: 2023-12-07 | End: 2023-12-08

## 2023-12-07 RX ORDER — MILRINONE LACTATE 0.2 MG/ML
0.25 INJECTION, SOLUTION INTRAVENOUS ONCE
Status: COMPLETED | OUTPATIENT
Start: 2023-12-07 | End: 2023-12-08

## 2023-12-07 NOTE — ANESTHESIA PREPROCEDURE EVALUATION
12/07/2023  Megan Cook is a 38 y.o., female for Pulmonary and Aortic valve vs valve and root replacement.  Third sternotomy.     HPI:  Ms. 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     Patient Active Problem List   Diagnosis    Rheumatic fever with cardiac involvement    Adult congenital heart disease    H/O Ross procedure    Rheumatic aortic stenosis    Pulmonary valve replaced    Nonrheumatic pulmonary valve stenosis         Pre-op Assessment    I have reviewed the Patient Summary Reports.     I have reviewed the Nursing Notes. I have reviewed the NPO Status.   I have reviewed the Medications.     Review of Systems  Social:  Non-Smoker, No Alcohol Use       Cardiovascular:  Exercise tolerance: good                                               Physical Exam  General: Well nourished    Airway:  Mallampati: I / I  Mouth Opening: Normal  TM Distance: Normal  Neck ROM: Normal ROM    Chest/Lungs:  Clear to auscultation        Anesthesia Plan  Type of Anesthesia, risks & benefits discussed:    Anesthesia Type: Gen ETT, Regional  Intra-op Monitoring Plan: Standard ASA Monitors, Art Line and Central Line  Post Op Pain Control Plan: multimodal analgesia and IV/PO Opioids PRN  Induction:  IV  Airway Plan: Direct, Post-Induction  Informed Consent: Informed consent signed with the Patient and all parties understand the risks and agree with anesthesia plan.  All questions answered. Patient consented to blood products? Yes  ASA Score: 4  Day of Surgery Review of History & Physical: H&P Update  referred to the surgeon/provider.  Anesthesia Plan Notes: Cephasolin allergy with mouth sores. Patient states that if we HAD to give it she would be okay but prefers to not be on it long term. D/w primary team and vickie agrees to vancomycin.     Ready For Surgery From Anesthesia Perspective.     .    Lab Results   Component Value Date    WBC 12.03 12/07/2023    HGB 12.6 12/07/2023    HCT 37.4 12/07/2023    MCV 82 12/07/2023     12/07/2023       BMP  Lab Results   Component Value Date     12/07/2023    K 4.2 12/07/2023     12/07/2023    CO2 27 12/07/2023    BUN 12 12/07/2023    CREATININE 0.9 12/07/2023    CALCIUM 9.5 12/07/2023    ANIONGAP 9 12/07/2023    EGFRNORACEVR >60.0 12/07/2023     Results for orders placed during the hospital encounter of 05/24/23    Echo Saline Bubble? No    Interpretation Summary  CONGENITAL CARDIAC HISTORY:  Aortic valve stenosis.  S/P Ross - [facility] (2000 at age 15)  S/P Bovine aortic valve replacement -[facility] (2012)    SEGMENTAL CARDIAC CONNECTIONS:  Abdominal situs solitus.  Atrial situs solitus.  Atrioventricular alignment is concordant.  D-loop ventricles.  The tricuspid valve appears grossly normal.  The mitral valve appears grossly normal.  There is mild dilation and mild hypertrophy of the right ventricle.  The left ventricle appears qualitatively normal.  The ventriculoarterial alignment is concordant.  Bioprosthetic pulmonary valve.  Bioprosthetic aortic valve.  Cardiac position is levocardia.  There is a left aortic arch with additional branches not well demonstrated.  No interatrial septal defect present.  No ventricular septal defect present.      IMPRESSION:  Qualitative impression of at least mild dilation of the right atrium.  There is no evidence for atrial level shunt in technically difficult subxiphoid imaging.  There is a trivial to mild jet of tricuspid insufficiency.  Qualitative impression of at least mild hypertrophy and dilation of  the right ventricle with good systolic function.  Right ventricular pressure estimated at least 43 mmHg above right atrial pressure from poorly defined TR doppler profile.  Echodensity suggesting pulmonary homograft or bioprosthetic valve in pulmonary position that is heavily calcified.  Acceleration is noted across the pulmonary valve with peak velocity 3.6 m/sec, mean gradient 32 mm Hg and at least mild insufficiency.  The right pulmonary artery appears normal in size with difficult to demonstrate left pulmonary artery.  The left atrial volume index is normal measuring 28 ml/m2.  There is echodensity suggesting annuloplasty ring with no evidence of significant mitral stenosis and trivial insufficiency.  The mitral leaflets appear normal in structure and function.  Flattened septal motion with good movement of the LV free wall, SF = 30 % and EF estimated 60 -65% from apical views.  No left ventricular outflow tract obstruction.  Stent mounted bioprosthetic valve in aortic position with calcified leaflets,  Peak velocity across the aortic valve 3.6 m/sec with mean gradient 34 mm Hg and at least mild insufficiency by color Doppler.  Aortic dimensions:  Stent mounted aortic valve in good position.  Ascending aorta     = 41 mm.  The aorta appears normal in size with left aortic arch branching pattern.  There is acceleration to peak velocity 2 m/sec with poorly defined Doppler profile and no diastolic runoff suggestive of overlapping measurement of LPA flow.  2D and color images suggest no evidence for coarctation.  No pericardial effusion.

## 2023-12-07 NOTE — PROGRESS NOTES
2023    re:Megan Cook  :1985    [place]  [address] [facility] [place] [address]    Dr. Ahmad Agha [facility]     Dear Doctors:    Megan Cook is a 38 y.o. female seen in my [facility] clinic today for evaluation of heart disease.  To summarize her diagnoses are as follow:  1. Rheumatic heart disease diagnosed at age 11, primarily aortic valve disease   - initially treated with a Ross procedure at the [facility] in    - subsequent aortic root repair (plicated, no prosthetic material used) and aortic valve replacement with a  23 mm Kerri-Davis pericardial valve  23 mm Kerri-Davis pericardial valve 12 by Dr. Jack Gutierrez   - NOT a TAVR candidate   - now with severe aortic and moderatepulmonary valve stenosis with preserved ventricular function   - mitral valve ring noted (likely done at time of Ross) with only mild insufficiency and no stenosis  2. Hypertension  3. Obesity  4. Penicillin and cephalexin allergy    To summarize, my recommendations are as follows:  1. SBE prophylaxis is definitely indicated before dental work  2. Surgical replacement of the aortic and pulmonary valves tomorrow     Discussion:  She is scheduled for surgery tomorrow, and there are no current contraindications.  I completely agree that surgery is indicated in this situation.  She has severe aortic stenosis and at least moderate pulmonary stenosis.    History of present illness:  She has done very well since I last saw her in clinic.  She is scheduled for surgery tomorrow.  No recent fever.  No cough, rhinorrhea, or any other evidence of upper respiratory illness.  No chest pain, shortness of breath, syncope, near syncope, palpitations, cyanosis, or edema.  No new rashes.    Cath 23:  1. Rheumatic heart disease status post Ross procedure and mitral valve ring and subsequent bioprosthetic aortic valve replacement (23mm  CE) for autograft AI.  2. Severe bioprosthetic aortic valve stenosis, peak gradient 55 mm Hg.  Likely some degree of patient-prosthesis mismatch.  3. LV diastolic dysfunction with mildly elevated LV end-diastolic pressure (16 mmHg).  4. 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  Heavily calcified shrunken homograft conduit.  5. Free RV to pulmonary artery conduit insufficiency.  6. Normal systemic and pulmonary veins.    7. No shunts detected.    CTA 5/29/23:  TAVR measurements as above.  Postoperative changes of aortic valve and aortic root replacement with mild dilation of the aortic root measuring 4.1 cm.  Uterine fibroid.    The review of systems is as noted above. It is otherwise negative for other symptoms related to the general, neurological, psychiatric, endocrine, gastrointestinal, genitourinary, respiratory, dermatologic, musculoskeletal, hematologic, and immunologic systems.    Past Medical History:   Diagnosis Date    Rheumatic fever with cardiac involvement      Past Surgical History:   Procedure Laterality Date    ANGIOGRAM, CORONARY, PEDIATRIC  8/1/2023    Procedure: Angiogram, Coronary, Pediatric;  Surgeon: Anthony Dubois Jr., MD;  Location: [facility] CATH LAB;  Service: Cardiology;;    ANGIOGRAM, PULMONARY, PEDIATRIC  8/1/2023    Procedure: Angiogram, Pulmonary, Pediatric;  Surgeon: Anthony Dubois Jr., MD;  Location: [facility] CATH LAB;  Service: Cardiology;;    AORTIC VALVE REPLACEMENT  2000    AORTIC VALVE REPLACEMENT  2012    AORTIC VALVE REPLACEMENT  2012    with aortic root replacement at [facility]    COMBINED RIGHT AND RETROGRADE LEFT HEART CATHETERIZATION FOR CONGENITAL HEART DEFECT N/A 8/1/2023    Procedure: Catheterization, Heart, Combined Right and Retrograde Left, for Congenital Heart Defect;  Surgeon: Anthony Dubois Jr., MD;  Location: [facility] CATH LAB;  Service: Cardiology;  Laterality: N/A;    REPLACEMENT OF AORTIC VALVE USING ROSS PROCEDURE N/A 2000    [facility]     Family History  "  Problem Relation Age of Onset    Cardiomyopathy Other     Congenital heart disease Other      Social History     Socioeconomic History    Marital status: Single   Tobacco Use    Smoking status: Never    Smokeless tobacco: Never   Substance and Sexual Activity    Alcohol use: Yes     Alcohol/week: 1.0 standard drink of alcohol     Types: 1 Glasses of wine per week     Comment: social     Current Outpatient Medications on File Prior to Visit   Medication Sig Dispense Refill    cetirizine (ZYRTEC) 10 MG tablet Take 10 mg by mouth once daily.      cholecalciferol, vitamin D3, 125 mcg (5,000 unit) Tab Take 5,000 Units by mouth.      PAMELA 0.35 mg tablet Take by mouth.      furosemide (LASIX) 20 MG tablet Take 20 mg by mouth daily as needed.      metoprolol succinate (TOPROL-XL) 50 MG 24 hr tablet Take 50 mg by mouth every morning.       No current facility-administered medications on file prior to visit.     Review of patient's allergies indicates:   Allergen Reactions    Cephalexin Other (See Comments)     She gets mouth sores.     Penicillin g Other (See Comments)     Causes mouth sores.       Vitals:    12/07/23 0956   BP: 125/82   BP Location: Left arm   Patient Position: Sitting   Pulse: 86   SpO2: 98%   Weight: 127.3 kg (280 lb 10.3 oz)   Height: 5' 5.98" (1.676 m)     In general, she is an obese but otherwise very healthy-appearing nondysmorphic female in no apparent distress.  The eyes, nares, and oropharynx are clear.  Eyelids and conjunctiva are normal without drainage or erythema.  Pupils equal and round bilaterally.  The head is normocephalic and atraumatic.  The neck is supple without jugular venous distention or thyroid enlargement.  The lungs are clear to auscultation bilaterally.  There are no scars on the chest wall.  The 1st heart sound is normal.  The 2nd heart sound is physiologically split.  A grade 3/6 systolic ejection murmur is heard at the upper left and upper right sternal border and is " followed by a grade 2/6 blowing diastolic murmur.  The abdominal exam is benign without hepatosplenomegaly, tenderness, or distention.  Pulses are normal in all 4 extremities with brisk capillary refill and no clubbing, cyanosis, or edema.  No rashes are noted.    I personally reviewed the following tests performed today and my interpretation follows:  An EKG is normal.    CXR:  Postoperative changes as before.  Mild cardiomegaly.  The lungs are clear.  No pleural effusion.     Results for orders placed during the hospital encounter of 12/07/23    Echo    Interpretation Summary  CONGENITAL CARDIAC HISTORY:  Aortic valve stenosis.  S/P Ross - [facility] (2000 at age 15)  S/P Bovine aortic valve replacement -[facility] (2012)    SEGMENTAL CARDIAC CONNECTIONS (previously demonstrated):  Abdominal situs solitus.  Atrial situs solitus.  Atrioventricular alignment is concordant.  D-loop ventricles.  The tricuspid valve appears grossly normal.  The mitral valve appears grossly normal.  There is mild dilation and mild hypertrophy of the right ventricle.  The left ventricle appears qualitatively normal.  The ventriculoarterial alignment is concordant.  Bioprosthetic pulmonary valve.  Bioprosthetic aortic valve.  Cardiac position is levocardia.  There is a left aortic arch with additional branches not well demonstrated.  No interatrial septal defect present.  No ventricular septal defect present.      IMPRESSION:  Difficult acoustic windows-  Qualitative impression of at least mild dilation of the right atrium.  There is no evidence for atrial level shunt in technically difficult subxiphoid imaging.  There is a trivial to mild jet of tricuspid insufficiency.  Qualitative impression of at least mild hypertrophy and dilation of the right ventricle with good systolic function.  Right ventricular pressure estimated >31 mmHg above right atrial pressure from poorly defined TR doppler profile.  Echodensity consistent with  pulmonary homograft in pulmonary position that is heavily calcified.  Acceleration is noted across the pulmonary valve with peak velocity 3.5 m/sec, mean gradient 33 mm Hg and moderate insufficiency.  The right pulmonary artery appears normal in size with difficult to demonstrate left pulmonary artery.  The left atrial volume index is normal measuring 32 ml/m2.  There is echodensity suggesting annuloplasty ring with mean inflow gradient <6 mmHg and trivial insufficiency.  The mitral leaflets appear normal in structure and function.  Flattened septal motion with good movement of the LV free wall, SF = 34% and EF estimated 60 -65% from apical views.  No left ventricular outflow tract obstruction.  Stent mounted bioprosthetic valve in aortic position with calcified leaflets,  Peak velocity across the aortic valve 4 m/sec with mean gradient 43 mm Hg and at least mild insufficiency by color Doppler.  Aortic dimensions:  Stent mounted aortic valve in good position measures 28 mmHg diameter.  Ascending aorta previously measured 41 mm diameter but not well demonstrated in this study.  The aorta appears normal in size.  There is acceleration to peak velocity 2.3 m/sec with poorly defined Doppler profile and no diastolic runoff suggestive of overlapping measurement of LPA flow.  2D and color images suggest no evidence for coarctation.  No pericardial effusion.    Lab Results   Component Value Date    WBC 12.03 12/07/2023    HGB 12.6 12/07/2023    HCT 37.4 12/07/2023    MCV 82 12/07/2023     12/07/2023       CMP  Sodium   Date Value Ref Range Status   12/07/2023 141 136 - 145 mmol/L Final     Potassium   Date Value Ref Range Status   12/07/2023 4.2 3.5 - 5.1 mmol/L Final     Chloride   Date Value Ref Range Status   12/07/2023 105 95 - 110 mmol/L Final     CO2   Date Value Ref Range Status   12/07/2023 27 23 - 29 mmol/L Final     Glucose   Date Value Ref Range Status   12/07/2023 96 70 - 110 mg/dL Final     BUN   Date  Value Ref Range Status   12/07/2023 12 6 - 20 mg/dL Final     Creatinine   Date Value Ref Range Status   12/07/2023 0.9 0.5 - 1.4 mg/dL Final     Calcium   Date Value Ref Range Status   12/07/2023 9.5 8.7 - 10.5 mg/dL Final     Total Protein   Date Value Ref Range Status   12/07/2023 7.9 6.0 - 8.4 g/dL Final     Albumin   Date Value Ref Range Status   12/07/2023 3.7 3.5 - 5.2 g/dL Final     Total Bilirubin   Date Value Ref Range Status   12/07/2023 0.6 0.1 - 1.0 mg/dL Final     Comment:     For infants and newborns, interpretation of results should be based  on gestational age, weight and in agreement with clinical  observations.    Premature Infant recommended reference ranges:  Up to 24 hours.............<8.0 mg/dL  Up to 48 hours............<12.0 mg/dL  3-5 days..................<15.0 mg/dL  6-29 days.................<15.0 mg/dL       Alkaline Phosphatase   Date Value Ref Range Status   12/07/2023 93 55 - 135 U/L Final     AST   Date Value Ref Range Status   12/07/2023 18 10 - 40 U/L Final     ALT   Date Value Ref Range Status   12/07/2023 23 10 - 44 U/L Final     Anion Gap   Date Value Ref Range Status   12/07/2023 9 8 - 16 mmol/L Final     eGFR   Date Value Ref Range Status   12/07/2023 >60.0 >60 mL/min/1.73 m^2 Final       Thank you for referring this patient to our clinic.  Please call with any questions.    Sincerely,        Michael Wilcox MD  Pediatric Cardiology  Adult Congenital Heart Disease  Pediatric Heart Failure and Transplantation  [facility] [address] [place]  [address]  [phone]

## 2023-12-07 NOTE — PROGRESS NOTES
Pt in clinic for preop testing and visits. Pt denies any s/s illness and stated her symptoms have been stable. Pt appears well. Instructions for midnight NPO time and 6am check in discussed.

## 2023-12-07 NOTE — H&P (VIEW-ONLY)
Pre-operative H&P/Consult Note  Congenital Cardiothoracic Surgery      SUBJECTIVE:     Reason for Consult:  Aortic Stenosis, Pulmonary Stenosis and Insufficiency     History of Present Illness:  Ms. 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       She was referred by Dr. Wilcox.    We met previously to discuss surgery and now she presents for her pre-operative evaluation.     She appears well in clinic today and reports no other health concerns.  She has been largely asymptomatic with her current medical therapy since we last spoke in October.       Her work up thus far includes echocardiogram, cardiac CT and catheterization. She had a repeat echo today consistent with the prior in May. There is normal biventricular function, severe aortic stenosis and moderate pulmonary stenosis with a well functioning mitral valve and no evidence of intracardiac shunt.  A TAVR protocol CT was performed in the spring as well which measured the aortic root at 4.1cm.  The LVOT measured about 22mm.  A cath was performed in August which demonstrated normal coronary arteries,  a peak gradient of 55mmHg across the aortic valve, a gradient of 35 across the pulmonary conduit with free insufficiency, and LVEDP of 16 as well as mildly elevated pulmonary pressures and PVR of 2.     She had been evaluated for a transcatheter procedure prior to being referred to see me.   The conduit was not suitable for a catheter based intervention because it was heavily calcified and diffusely narrowed.  The branch Pas are of good size.  The result of valve in valve TAVR in a 23 mm bioprosthesis in a 38 year old did not seem like the best  recommendation for her.       Her first surgery was at [facility] and we do not have an operative report for that but do see the mitral valve annuloplasty ring on cath and CT and I suspect the pulmonary valve was replaced with a homograft based on the appearance on cath and CT.  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       Additional medical history is significant for PCN and Cephalosporin allergy reported.  She has no active dental issues and did go for clearance and had a cavity filled in the interim since her last visit.        Review of patient's allergies indicates:   Allergen Reactions    Cephalexin Other (See Comments)     She gets mouth sores.     Penicillin g Other (See Comments)     Causes mouth sores.        Past Medical History:   Diagnosis Date    Rheumatic fever with cardiac involvement      Past Surgical History:   Procedure Laterality Date    ANGIOGRAM, CORONARY, PEDIATRIC  8/1/2023    Procedure: Angiogram, Coronary, Pediatric;  Surgeon: Anthony Dubois Jr., MD;  Location: [facility] CATH LAB;  Service: Cardiology;;    ANGIOGRAM, PULMONARY, PEDIATRIC  8/1/2023    Procedure: Angiogram, Pulmonary, Pediatric;  Surgeon: Anthony Dubois Jr., MD;  Location: [facility] CATH LAB;  Service: Cardiology;;    AORTIC VALVE REPLACEMENT  2000    AORTIC VALVE REPLACEMENT  2012    AORTIC VALVE REPLACEMENT  2012    with aortic root replacement at [facility]    COMBINED RIGHT AND RETROGRADE LEFT HEART CATHETERIZATION FOR CONGENITAL HEART DEFECT N/A 8/1/2023    Procedure: Catheterization, Heart, Combined Right and Retrograde Left, for Congenital Heart  Defect;  Surgeon: Anthony Dubois Jr., MD;  Location: [facility] CATH LAB;  Service: Cardiology;  Laterality: N/A;    REPLACEMENT OF AORTIC VALVE USING ROSS PROCEDURE N/A 2000    [facility]     Family History   Problem Relation Age of Onset    Cardiomyopathy Other     Congenital heart disease Other      Social History     Tobacco Use    Smoking status: Never    Smokeless tobacco: Never   Substance Use Topics    Alcohol use: Yes     Alcohol/week: 1.0 standard drink of alcohol     Types: 1 Glasses of wine per week     Comment: social      Current Outpatient Medications on File Prior to Visit   Medication Sig Dispense Refill    cetirizine (ZYRTEC) 10 MG tablet Take 10 mg by mouth once daily.      cholecalciferol, vitamin D3, 125 mcg (5,000 unit) Tab Take 5,000 Units by mouth.      PAMELA 0.35 mg tablet Take by mouth.      furosemide (LASIX) 20 MG tablet Take 20 mg by mouth daily as needed.      metoprolol succinate (TOPROL-XL) 50 MG 24 hr tablet Take 50 mg by mouth every morning.       No current facility-administered medications on file prior to visit.       Review of Systems:  negative    OBJECTIVE:     Vital Signs (Most Recent)  Pulse: 86 (12/07/23 1024)  BP: 125/82 (12/07/23 1024)  SpO2: 98 % (12/07/23 1024)      Physical Exam:   General: appears well  HEENT: normocephalic, atraumatic  CV: normal rate and regular rhythm  Resp/Chest: normal work of breathing and chest excursion  Abd: soft, non-distended  Extremities: warm, no edema, normal strength  Neuro: Alert, oriented, appropriate speech and behavior     Laboratory:  Labs today are pending        Diagnostic Results:  Pre-operative CXR pending  ECG performed today reviewed. NSR      Cardiac CT, TTE, and Cath from this year all reviewed.   Pertinent findings are noted in the HPI    ASSESSMENT/PLAN:   Ms. Megan Cook is a 38-year-old, 127 kg, woman with history of rheumatic heart disease and Ross procedure and mitral valve repair(including ring annuloplasty) in 2000  followed by repair of autograft aneurysm repair and aortic valve replacement with 23mm CE bioprothesis in 2012 who presents for pre-operative evaluation prior to aortic and pulmonary valve replacement.        We discussed the indications for surgery  to replace the aortic and pulmonary valves.  She had been worked up for transcatheter procedures and ultimately was determined not to be an ideal candidate for transcatheter intervention which prompted her referral to me.  We discussed again some degree of increased risk based on her prior surgeries but that I believe the risk remains acceptably low and that she would be best served with surgery to replace the valves.   We again discussed options of mechanical vs prosthetic aortic valve replacement and she has requested mechanical valve and understands the need for life-long anticoagulation in the future.   We previously discussed that I would not recommend a mechanical valve in the pulmonary position and we discussed that I will replace the pulmonary valve likely with a larger homograft or a bioprosthesis.   I reviewed that although she was not a candidate for transcatheter intervention currently for the pulmonary valve, she likely would be in the future with either replacement option I suggested.     I discussed the indications for the surgery as well as the risks and benefits of the procedure with her and obtained written consent for the procedure today in the office.       Alexandru Campbell MD

## 2023-12-07 NOTE — PROGRESS NOTES
Pre-operative H&P/Consult Note  Congenital Cardiothoracic Surgery      SUBJECTIVE:     Reason for Consult:  Aortic Stenosis, Pulmonary Stenosis and Insufficiency     History of Present Illness:  Ms. 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       She was referred by Dr. Wilcox.    We met previously to discuss surgery and now she presents for her pre-operative evaluation.     She appears well in clinic today and reports no other health concerns.  She has been largely asymptomatic with her current medical therapy since we last spoke in October.       Her work up thus far includes echocardiogram, cardiac CT and catheterization. She had a repeat echo today consistent with the prior in May. There is normal biventricular function, severe aortic stenosis and moderate pulmonary stenosis with a well functioning mitral valve and no evidence of intracardiac shunt.  A TAVR protocol CT was performed in the spring as well which measured the aortic root at 4.1cm.  The LVOT measured about 22mm.  A cath was performed in August which demonstrated normal coronary arteries,  a peak gradient of 55mmHg across the aortic valve, a gradient of 35 across the pulmonary conduit with free insufficiency, and LVEDP of 16 as well as mildly elevated pulmonary pressures and PVR of 2.     She had been evaluated for a transcatheter procedure prior to being referred to see me.   The conduit was not suitable for a catheter based intervention because it was heavily calcified and diffusely narrowed.  The branch Pas are of good size.  The result of valve in valve TAVR in a 23 mm bioprosthesis in a 38 year old did not seem like the best  recommendation for her.       Her first surgery was at [facility] and we do not have an operative report for that but do see the mitral valve annuloplasty ring on cath and CT and I suspect the pulmonary valve was replaced with a homograft based on the appearance on cath and CT.  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       Additional medical history is significant for PCN and Cephalosporin allergy reported.  She has no active dental issues and did go for clearance and had a cavity filled in the interim since her last visit.        Review of patient's allergies indicates:   Allergen Reactions    Cephalexin Other (See Comments)     She gets mouth sores.     Penicillin g Other (See Comments)     Causes mouth sores.        Past Medical History:   Diagnosis Date    Rheumatic fever with cardiac involvement      Past Surgical History:   Procedure Laterality Date    ANGIOGRAM, CORONARY, PEDIATRIC  8/1/2023    Procedure: Angiogram, Coronary, Pediatric;  Surgeon: Anthony Dubois Jr., MD;  Location: [facility] CATH LAB;  Service: Cardiology;;    ANGIOGRAM, PULMONARY, PEDIATRIC  8/1/2023    Procedure: Angiogram, Pulmonary, Pediatric;  Surgeon: Anthony Dubois Jr., MD;  Location: [facility] CATH LAB;  Service: Cardiology;;    AORTIC VALVE REPLACEMENT  2000    AORTIC VALVE REPLACEMENT  2012    AORTIC VALVE REPLACEMENT  2012    with aortic root replacement at [facility]    COMBINED RIGHT AND RETROGRADE LEFT HEART CATHETERIZATION FOR CONGENITAL HEART DEFECT N/A 8/1/2023    Procedure: Catheterization, Heart, Combined Right and Retrograde Left, for Congenital Heart  Defect;  Surgeon: Anthony Dubois Jr., MD;  Location: [facility] CATH LAB;  Service: Cardiology;  Laterality: N/A;    REPLACEMENT OF AORTIC VALVE USING ROSS PROCEDURE N/A 2000    [facility]     Family History   Problem Relation Age of Onset    Cardiomyopathy Other     Congenital heart disease Other      Social History     Tobacco Use    Smoking status: Never    Smokeless tobacco: Never   Substance Use Topics    Alcohol use: Yes     Alcohol/week: 1.0 standard drink of alcohol     Types: 1 Glasses of wine per week     Comment: social      Current Outpatient Medications on File Prior to Visit   Medication Sig Dispense Refill    cetirizine (ZYRTEC) 10 MG tablet Take 10 mg by mouth once daily.      cholecalciferol, vitamin D3, 125 mcg (5,000 unit) Tab Take 5,000 Units by mouth.      PAMELA 0.35 mg tablet Take by mouth.      furosemide (LASIX) 20 MG tablet Take 20 mg by mouth daily as needed.      metoprolol succinate (TOPROL-XL) 50 MG 24 hr tablet Take 50 mg by mouth every morning.       No current facility-administered medications on file prior to visit.       Review of Systems:  negative    OBJECTIVE:     Vital Signs (Most Recent)  Pulse: 86 (12/07/23 1024)  BP: 125/82 (12/07/23 1024)  SpO2: 98 % (12/07/23 1024)      Physical Exam:   General: appears well  HEENT: normocephalic, atraumatic  CV: normal rate and regular rhythm  Resp/Chest: normal work of breathing and chest excursion  Abd: soft, non-distended  Extremities: warm, no edema, normal strength  Neuro: Alert, oriented, appropriate speech and behavior     Laboratory:  Labs today are pending        Diagnostic Results:  Pre-operative CXR pending  ECG performed today reviewed. NSR      Cardiac CT, TTE, and Cath from this year all reviewed.   Pertinent findings are noted in the HPI    ASSESSMENT/PLAN:   Ms. Megan Cook is a 38-year-old, 127 kg, woman with history of rheumatic heart disease and Ross procedure and mitral valve repair(including ring annuloplasty) in 2000  followed by repair of autograft aneurysm repair and aortic valve replacement with 23mm CE bioprothesis in 2012 who presents for pre-operative evaluation prior to aortic and pulmonary valve replacement.        We discussed the indications for surgery  to replace the aortic and pulmonary valves.  She had been worked up for transcatheter procedures and ultimately was determined not to be an ideal candidate for transcatheter intervention which prompted her referral to me.  We discussed again some degree of increased risk based on her prior surgeries but that I believe the risk remains acceptably low and that she would be best served with surgery to replace the valves.   We again discussed options of mechanical vs prosthetic aortic valve replacement and she has requested mechanical valve and understands the need for life-long anticoagulation in the future.   We previously discussed that I would not recommend a mechanical valve in the pulmonary position and we discussed that I will replace the pulmonary valve likely with a larger homograft or a bioprosthesis.   I reviewed that although she was not a candidate for transcatheter intervention currently for the pulmonary valve, she likely would be in the future with either replacement option I suggested.     I discussed the indications for the surgery as well as the risks and benefits of the procedure with her and obtained written consent for the procedure today in the office.       Alexandru Campbell MD

## 2023-12-08 ENCOUNTER — HOSPITAL ENCOUNTER (INPATIENT)
Facility: HOSPITAL | Age: 38
LOS: 14 days | Discharge: HOME | DRG: 219 | End: 2023-12-22
Attending: THORACIC SURGERY (CARDIOTHORACIC VASCULAR SURGERY) | Admitting: THORACIC SURGERY (CARDIOTHORACIC VASCULAR SURGERY)
Payer: COMMERCIAL

## 2023-12-08 ENCOUNTER — ANESTHESIA (OUTPATIENT)
Dept: SURGERY | Facility: HOSPITAL | Age: 38
DRG: 219 | End: 2023-12-08
Payer: COMMERCIAL

## 2023-12-08 DIAGNOSIS — I49.8 SINUS ARRHYTHMIA: ICD-10-CM

## 2023-12-08 DIAGNOSIS — E87.79 OTHER HYPERVOLEMIA: ICD-10-CM

## 2023-12-08 DIAGNOSIS — E66.01 CLASS 3 SEVERE OBESITY DUE TO EXCESS CALORIES WITH SERIOUS COMORBIDITY AND BODY MASS INDEX (BMI) OF 50.0 TO 59.9 IN ADULT: ICD-10-CM

## 2023-12-08 DIAGNOSIS — I37.0 NONRHEUMATIC PULMONARY VALVE STENOSIS: ICD-10-CM

## 2023-12-08 DIAGNOSIS — N17.9 ENCOUNTER FOR CONTINUOUS RENAL REPLACEMENT THERAPY (CRRT) FOR ACUTE RENAL FAILURE: ICD-10-CM

## 2023-12-08 DIAGNOSIS — I06.0 RHEUMATIC AORTIC STENOSIS: ICD-10-CM

## 2023-12-08 DIAGNOSIS — I49.9 ARRHYTHMIA: ICD-10-CM

## 2023-12-08 DIAGNOSIS — Z95.2 S/P AORTIC VALVE REPLACEMENT: Primary | ICD-10-CM

## 2023-12-08 DIAGNOSIS — E87.70 HYPERVOLEMIA, UNSPECIFIED HYPERVOLEMIA TYPE: ICD-10-CM

## 2023-12-08 DIAGNOSIS — R73.9 TRANSIENT HYPERGLYCEMIA POST PROCEDURE: ICD-10-CM

## 2023-12-08 DIAGNOSIS — R34 OLIGURIA: ICD-10-CM

## 2023-12-08 DIAGNOSIS — Q24.9 ADULT CONGENITAL HEART DISEASE: ICD-10-CM

## 2023-12-08 DIAGNOSIS — Z45.2 ENCOUNTER FOR CENTRAL LINE PLACEMENT: ICD-10-CM

## 2023-12-08 DIAGNOSIS — N17.9 AKI (ACUTE KIDNEY INJURY): ICD-10-CM

## 2023-12-08 DIAGNOSIS — I49.8 JUNCTIONAL CARDIAC ARRHYTHMIA: ICD-10-CM

## 2023-12-08 DIAGNOSIS — I49.8 ACCELERATED JUNCTIONAL RHYTHM: ICD-10-CM

## 2023-12-08 DIAGNOSIS — T82.857A PROSTHETIC AORTIC VALVE STENOSIS: ICD-10-CM

## 2023-12-08 DIAGNOSIS — R60.1 ANASARCA: ICD-10-CM

## 2023-12-08 DIAGNOSIS — Q25.6 PULMONARY ARTERY STENOSIS OF PERIPHERAL BRANCH AT OR BEYOND THE HILAR BIFURCATION: ICD-10-CM

## 2023-12-08 DIAGNOSIS — R00.0 TACHYCARDIA: ICD-10-CM

## 2023-12-08 DIAGNOSIS — I35.1 AORTIC INSUFFICIENCY: ICD-10-CM

## 2023-12-08 DIAGNOSIS — I37.9 PULMONARY VALVE DISEASE: ICD-10-CM

## 2023-12-08 LAB
ABO + RH BLD: NORMAL
ALBUMIN SERPL BCP-MCNC: 2.7 G/DL (ref 3.5–5.2)
ALLENS TEST: ABNORMAL
ALLENS TEST: ABNORMAL
ALP SERPL-CCNC: 33 U/L (ref 55–135)
ALT SERPL W/O P-5'-P-CCNC: 19 U/L (ref 10–44)
ANION GAP SERPL CALC-SCNC: 17 MMOL/L (ref 8–16)
APTT PPP: 57.5 SEC (ref 21–32)
AST SERPL-CCNC: 95 U/L (ref 10–40)
B-HCG UR QL: NEGATIVE
BILIRUB SERPL-MCNC: 3.9 MG/DL (ref 0.1–1)
BLD GP AB SCN CELLS X3 SERPL QL: NORMAL
BLD PROD TYP BPU: NORMAL
BLOOD UNIT EXPIRATION DATE: NORMAL
BLOOD UNIT TYPE CODE: 6200
BLOOD UNIT TYPE CODE: 7300
BLOOD UNIT TYPE CODE: 9500
BLOOD UNIT TYPE CODE: 9500
BLOOD UNIT TYPE CODE: NORMAL
BLOOD UNIT TYPE: NORMAL
BUN SERPL-MCNC: 14 MG/DL (ref 6–20)
CALCIUM SERPL-MCNC: 10.5 MG/DL (ref 8.7–10.5)
CHLORIDE SERPL-SCNC: 116 MMOL/L (ref 95–110)
CO2 SERPL-SCNC: 16 MMOL/L (ref 23–29)
CODING SYSTEM: NORMAL
CREAT SERPL-MCNC: 1.1 MG/DL (ref 0.5–1.4)
CROSSMATCH INTERPRETATION: NORMAL
CTP QC/QA: YES
DISPENSE STATUS: NORMAL
ERYTHROCYTE [DISTWIDTH] IN BLOOD BY AUTOMATED COUNT: 14.5 % (ref 11.5–14.5)
EST. GFR  (NO RACE VARIABLE): >60 ML/MIN/1.73 M^2
FIBRINOGEN PPP-MCNC: 232 MG/DL (ref 182–400)
GLUCOSE SERPL-MCNC: 119 MG/DL (ref 70–110)
GLUCOSE SERPL-MCNC: 122 MG/DL (ref 70–110)
GLUCOSE SERPL-MCNC: 132 MG/DL (ref 70–110)
GLUCOSE SERPL-MCNC: 148 MG/DL (ref 70–110)
GLUCOSE SERPL-MCNC: 149 MG/DL (ref 70–110)
GLUCOSE SERPL-MCNC: 164 MG/DL (ref 70–110)
GLUCOSE SERPL-MCNC: 173 MG/DL (ref 70–110)
GLUCOSE SERPL-MCNC: 177 MG/DL (ref 70–110)
GLUCOSE SERPL-MCNC: 181 MG/DL (ref 70–110)
GLUCOSE SERPL-MCNC: 181 MG/DL (ref 70–110)
GLUCOSE SERPL-MCNC: 186 MG/DL (ref 70–110)
GLUCOSE SERPL-MCNC: 191 MG/DL (ref 70–110)
GLUCOSE SERPL-MCNC: 191 MG/DL (ref 70–110)
GLUCOSE SERPL-MCNC: 194 MG/DL (ref 70–110)
GLUCOSE SERPL-MCNC: 202 MG/DL (ref 70–110)
GLUCOSE SERPL-MCNC: 207 MG/DL (ref 70–110)
GLUCOSE SERPL-MCNC: 213 MG/DL (ref 70–110)
GLUCOSE SERPL-MCNC: 227 MG/DL (ref 70–110)
GLUCOSE SERPL-MCNC: 255 MG/DL (ref 70–110)
GLUCOSE SERPL-MCNC: 265 MG/DL (ref 70–110)
GLUCOSE SERPL-MCNC: 266 MG/DL (ref 70–110)
GLUCOSE SERPL-MCNC: 277 MG/DL (ref 70–110)
GLUCOSE SERPL-MCNC: 288 MG/DL (ref 70–110)
GLUCOSE SERPL-MCNC: 299 MG/DL (ref 70–110)
HCO3 UR-SCNC: 17.5 MMOL/L (ref 24–28)
HCO3 UR-SCNC: 18.6 MMOL/L (ref 24–28)
HCO3 UR-SCNC: 19.3 MMOL/L (ref 24–28)
HCO3 UR-SCNC: 19.5 MMOL/L (ref 24–28)
HCO3 UR-SCNC: 20 MMOL/L (ref 24–28)
HCO3 UR-SCNC: 21.3 MMOL/L (ref 24–28)
HCO3 UR-SCNC: 21.6 MMOL/L (ref 24–28)
HCO3 UR-SCNC: 22.5 MMOL/L (ref 24–28)
HCO3 UR-SCNC: 22.7 MMOL/L (ref 24–28)
HCO3 UR-SCNC: 22.8 MMOL/L (ref 24–28)
HCO3 UR-SCNC: 23.4 MMOL/L (ref 24–28)
HCO3 UR-SCNC: 23.4 MMOL/L (ref 24–28)
HCO3 UR-SCNC: 23.8 MMOL/L (ref 24–28)
HCO3 UR-SCNC: 23.9 MMOL/L (ref 24–28)
HCO3 UR-SCNC: 24.2 MMOL/L (ref 24–28)
HCO3 UR-SCNC: 25.2 MMOL/L (ref 24–28)
HCO3 UR-SCNC: 25.8 MMOL/L (ref 24–28)
HCO3 UR-SCNC: 26.2 MMOL/L (ref 24–28)
HCO3 UR-SCNC: 28 MMOL/L (ref 24–28)
HCO3 UR-SCNC: 29.1 MMOL/L (ref 24–28)
HCO3 UR-SCNC: 34.2 MMOL/L (ref 24–28)
HCT VFR BLD AUTO: 41 % (ref 37–48.5)
HCT VFR BLD CALC: 24 %PCV (ref 36–54)
HCT VFR BLD CALC: 26 %PCV (ref 36–54)
HCT VFR BLD CALC: 26 %PCV (ref 36–54)
HCT VFR BLD CALC: 27 %PCV (ref 36–54)
HCT VFR BLD CALC: 28 %PCV (ref 36–54)
HCT VFR BLD CALC: 29 %PCV (ref 36–54)
HCT VFR BLD CALC: 30 %PCV (ref 36–54)
HCT VFR BLD CALC: 31 %PCV (ref 36–54)
HCT VFR BLD CALC: 31 %PCV (ref 36–54)
HCT VFR BLD CALC: 33 %PCV (ref 36–54)
HCT VFR BLD CALC: 33 %PCV (ref 36–54)
HCT VFR BLD CALC: 38 %PCV (ref 36–54)
HGB BLD-MCNC: 13.8 G/DL (ref 12–16)
INR PPP: 1.2 (ref 0.8–1.2)
LDH SERPL L TO P-CCNC: 6.92 MMOL/L (ref 0.36–1.25)
LDH SERPL L TO P-CCNC: 8.13 MMOL/L (ref 0.36–1.25)
LDH SERPL L TO P-CCNC: 8.17 MMOL/L (ref 0.36–1.25)
LDH SERPL L TO P-CCNC: <0.3 MMOL/L (ref 0.36–1.25)
MAGNESIUM SERPL-MCNC: 2.6 MG/DL (ref 1.6–2.6)
MCH RBC QN AUTO: 29.9 PG (ref 27–31)
MCHC RBC AUTO-ENTMCNC: 33.7 G/DL (ref 32–36)
MCV RBC AUTO: 89 FL (ref 82–98)
NUM UNITS TRANS FFP: NORMAL
NUM UNITS TRANS PACKED RBC: NORMAL
PCO2 BLDA: 33.6 MMHG (ref 35–45)
PCO2 BLDA: 34.7 MMHG (ref 35–45)
PCO2 BLDA: 36.5 MMHG (ref 35–45)
PCO2 BLDA: 36.5 MMHG (ref 35–45)
PCO2 BLDA: 38.3 MMHG (ref 35–45)
PCO2 BLDA: 41.1 MMHG (ref 35–45)
PCO2 BLDA: 41.8 MMHG (ref 35–45)
PCO2 BLDA: 41.9 MMHG (ref 35–45)
PCO2 BLDA: 42.5 MMHG (ref 35–45)
PCO2 BLDA: 42.8 MMHG (ref 35–45)
PCO2 BLDA: 42.8 MMHG (ref 35–45)
PCO2 BLDA: 43.3 MMHG (ref 35–45)
PCO2 BLDA: 44.4 MMHG (ref 35–45)
PCO2 BLDA: 44.7 MMHG (ref 35–45)
PCO2 BLDA: 44.7 MMHG (ref 35–45)
PCO2 BLDA: 45 MMHG (ref 35–45)
PCO2 BLDA: 45 MMHG (ref 35–45)
PCO2 BLDA: 45.3 MMHG (ref 35–45)
PCO2 BLDA: 47.2 MMHG (ref 35–45)
PCO2 BLDA: 47.8 MMHG (ref 35–45)
PCO2 BLDA: 49.2 MMHG (ref 35–45)
PCO2 BLDA: 50.5 MMHG (ref 35–45)
PCO2 BLDA: 53.3 MMHG (ref 35–45)
PCO2 BLDA: 57.4 MMHG (ref 35–45)
PH SMN: 7.22 [PH] (ref 7.35–7.45)
PH SMN: 7.24 [PH] (ref 7.35–7.45)
PH SMN: 7.26 [PH] (ref 7.35–7.45)
PH SMN: 7.29 [PH] (ref 7.35–7.45)
PH SMN: 7.29 [PH] (ref 7.35–7.45)
PH SMN: 7.31 [PH] (ref 7.35–7.45)
PH SMN: 7.32 [PH] (ref 7.35–7.45)
PH SMN: 7.33 [PH] (ref 7.35–7.45)
PH SMN: 7.34 [PH] (ref 7.35–7.45)
PH SMN: 7.34 [PH] (ref 7.35–7.45)
PH SMN: 7.35 [PH] (ref 7.35–7.45)
PH SMN: 7.35 [PH] (ref 7.35–7.45)
PH SMN: 7.36 [PH] (ref 7.35–7.45)
PH SMN: 7.37 [PH] (ref 7.35–7.45)
PH SMN: 7.39 [PH] (ref 7.35–7.45)
PH SMN: 7.39 [PH] (ref 7.35–7.45)
PH SMN: 7.42 [PH] (ref 7.35–7.45)
PH SMN: 7.42 [PH] (ref 7.35–7.45)
PH SMN: 7.44 [PH] (ref 7.35–7.45)
PHOSPHATE SERPL-MCNC: 2.3 MG/DL (ref 2.7–4.5)
PLATELET # BLD AUTO: 100 K/UL (ref 150–450)
PMV BLD AUTO: 9.7 FL (ref 9.2–12.9)
PO2 BLDA: 104 MMHG (ref 80–100)
PO2 BLDA: 146 MMHG (ref 80–100)
PO2 BLDA: 170 MMHG (ref 80–100)
PO2 BLDA: 180 MMHG (ref 80–100)
PO2 BLDA: 184 MMHG (ref 80–100)
PO2 BLDA: 193 MMHG (ref 80–100)
PO2 BLDA: 198 MMHG (ref 80–100)
PO2 BLDA: 215 MMHG (ref 80–100)
PO2 BLDA: 226 MMHG (ref 80–100)
PO2 BLDA: 229 MMHG (ref 80–100)
PO2 BLDA: 270 MMHG (ref 80–100)
PO2 BLDA: 283 MMHG (ref 80–100)
PO2 BLDA: 317 MMHG (ref 80–100)
PO2 BLDA: 410 MMHG (ref 80–100)
PO2 BLDA: 458 MMHG (ref 80–100)
PO2 BLDA: 475 MMHG (ref 80–100)
PO2 BLDA: 494 MMHG (ref 80–100)
PO2 BLDA: 52 MMHG (ref 40–60)
PO2 BLDA: 53 MMHG (ref 40–60)
PO2 BLDA: 65 MMHG (ref 80–100)
PO2 BLDA: 74 MMHG (ref 80–100)
PO2 BLDA: 82 MMHG (ref 80–100)
PO2 BLDA: 83 MMHG (ref 80–100)
PO2 BLDA: 97 MMHG (ref 80–100)
POC BE: -1 MMOL/L
POC BE: -1 MMOL/L
POC BE: -2 MMOL/L
POC BE: -3 MMOL/L
POC BE: -4 MMOL/L
POC BE: -4 MMOL/L
POC BE: -5 MMOL/L
POC BE: -6 MMOL/L
POC BE: -6 MMOL/L
POC BE: -7 MMOL/L
POC BE: -8 MMOL/L
POC BE: -8 MMOL/L
POC BE: -9 MMOL/L
POC BE: 0 MMOL/L
POC BE: 0 MMOL/L
POC BE: 1 MMOL/L
POC BE: 10 MMOL/L
POC BE: 2 MMOL/L
POC BE: 5 MMOL/L
POC IONIZED CALCIUM: 1.14 MMOL/L (ref 1.06–1.42)
POC IONIZED CALCIUM: 1.16 MMOL/L (ref 1.06–1.42)
POC IONIZED CALCIUM: 1.16 MMOL/L (ref 1.06–1.42)
POC IONIZED CALCIUM: 1.18 MMOL/L (ref 1.06–1.42)
POC IONIZED CALCIUM: 1.18 MMOL/L (ref 1.06–1.42)
POC IONIZED CALCIUM: 1.2 MMOL/L (ref 1.06–1.42)
POC IONIZED CALCIUM: 1.21 MMOL/L (ref 1.06–1.42)
POC IONIZED CALCIUM: 1.24 MMOL/L (ref 1.06–1.42)
POC IONIZED CALCIUM: 1.24 MMOL/L (ref 1.06–1.42)
POC IONIZED CALCIUM: 1.27 MMOL/L (ref 1.06–1.42)
POC IONIZED CALCIUM: 1.28 MMOL/L (ref 1.06–1.42)
POC IONIZED CALCIUM: 1.3 MMOL/L (ref 1.06–1.42)
POC IONIZED CALCIUM: 1.31 MMOL/L (ref 1.06–1.42)
POC IONIZED CALCIUM: 1.35 MMOL/L (ref 1.06–1.42)
POC IONIZED CALCIUM: 1.38 MMOL/L (ref 1.06–1.42)
POC IONIZED CALCIUM: 1.39 MMOL/L (ref 1.06–1.42)
POC IONIZED CALCIUM: 1.39 MMOL/L (ref 1.06–1.42)
POC IONIZED CALCIUM: 1.4 MMOL/L (ref 1.06–1.42)
POC IONIZED CALCIUM: 1.42 MMOL/L (ref 1.06–1.42)
POC IONIZED CALCIUM: 1.42 MMOL/L (ref 1.06–1.42)
POC IONIZED CALCIUM: 1.51 MMOL/L (ref 1.06–1.42)
POC IONIZED CALCIUM: 1.56 MMOL/L (ref 1.06–1.42)
POC IONIZED CALCIUM: 1.6 MMOL/L (ref 1.06–1.42)
POC IONIZED CALCIUM: 1.62 MMOL/L (ref 1.06–1.42)
POC SATURATED O2: 100 % (ref 95–100)
POC SATURATED O2: 82 % (ref 95–100)
POC SATURATED O2: 84 % (ref 95–100)
POC SATURATED O2: 88 % (ref 95–100)
POC SATURATED O2: 93 % (ref 95–100)
POC SATURATED O2: 95 % (ref 95–100)
POC SATURATED O2: 95 % (ref 95–100)
POC SATURATED O2: 97 % (ref 95–100)
POC SATURATED O2: 97 % (ref 95–100)
POC SATURATED O2: 99 % (ref 95–100)
POC SATURATED O2: 99 % (ref 95–100)
POC TCO2: 19 MMOL/L (ref 23–27)
POC TCO2: 20 MMOL/L (ref 23–27)
POC TCO2: 21 MMOL/L (ref 23–27)
POC TCO2: 23 MMOL/L (ref 23–27)
POC TCO2: 23 MMOL/L (ref 23–27)
POC TCO2: 24 MMOL/L (ref 23–27)
POC TCO2: 24 MMOL/L (ref 24–29)
POC TCO2: 25 MMOL/L (ref 23–27)
POC TCO2: 25 MMOL/L (ref 24–29)
POC TCO2: 26 MMOL/L (ref 23–27)
POC TCO2: 27 MMOL/L (ref 23–27)
POC TCO2: 28 MMOL/L (ref 23–27)
POC TCO2: 30 MMOL/L (ref 23–27)
POC TCO2: 30 MMOL/L (ref 23–27)
POC TCO2: 36 MMOL/L (ref 23–27)
POCT GLUCOSE: 148 MG/DL (ref 70–110)
POCT GLUCOSE: 192 MG/DL (ref 70–110)
POTASSIUM BLD-SCNC: 2.7 MMOL/L (ref 3.5–5.1)
POTASSIUM BLD-SCNC: 2.8 MMOL/L (ref 3.5–5.1)
POTASSIUM BLD-SCNC: 2.8 MMOL/L (ref 3.5–5.1)
POTASSIUM BLD-SCNC: 2.9 MMOL/L (ref 3.5–5.1)
POTASSIUM BLD-SCNC: 3 MMOL/L (ref 3.5–5.1)
POTASSIUM BLD-SCNC: 3.1 MMOL/L (ref 3.5–5.1)
POTASSIUM BLD-SCNC: 3.1 MMOL/L (ref 3.5–5.1)
POTASSIUM BLD-SCNC: 3.2 MMOL/L (ref 3.5–5.1)
POTASSIUM BLD-SCNC: 3.2 MMOL/L (ref 3.5–5.1)
POTASSIUM BLD-SCNC: 3.3 MMOL/L (ref 3.5–5.1)
POTASSIUM BLD-SCNC: 3.5 MMOL/L (ref 3.5–5.1)
POTASSIUM BLD-SCNC: 3.6 MMOL/L (ref 3.5–5.1)
POTASSIUM BLD-SCNC: 3.8 MMOL/L (ref 3.5–5.1)
POTASSIUM BLD-SCNC: 3.9 MMOL/L (ref 3.5–5.1)
POTASSIUM BLD-SCNC: 4.1 MMOL/L (ref 3.5–5.1)
POTASSIUM BLD-SCNC: 4.1 MMOL/L (ref 3.5–5.1)
POTASSIUM BLD-SCNC: 4.2 MMOL/L (ref 3.5–5.1)
POTASSIUM BLD-SCNC: 4.3 MMOL/L (ref 3.5–5.1)
POTASSIUM BLD-SCNC: 4.3 MMOL/L (ref 3.5–5.1)
POTASSIUM BLD-SCNC: 4.4 MMOL/L (ref 3.5–5.1)
POTASSIUM BLD-SCNC: 4.8 MMOL/L (ref 3.5–5.1)
POTASSIUM BLD-SCNC: 5.2 MMOL/L (ref 3.5–5.1)
POTASSIUM SERPL-SCNC: 2.9 MMOL/L (ref 3.5–5.1)
PROT SERPL-MCNC: 4.7 G/DL (ref 6–8.4)
PROTHROMBIN TIME: 12.8 SEC (ref 9–12.5)
RBC # BLD AUTO: 4.62 M/UL (ref 4–5.4)
SAMPLE: ABNORMAL
SITE: ABNORMAL
SITE: ABNORMAL
SODIUM BLD-SCNC: 138 MMOL/L (ref 136–145)
SODIUM BLD-SCNC: 139 MMOL/L (ref 136–145)
SODIUM BLD-SCNC: 140 MMOL/L (ref 136–145)
SODIUM BLD-SCNC: 141 MMOL/L (ref 136–145)
SODIUM BLD-SCNC: 143 MMOL/L (ref 136–145)
SODIUM BLD-SCNC: 147 MMOL/L (ref 136–145)
SODIUM BLD-SCNC: 149 MMOL/L (ref 136–145)
SODIUM BLD-SCNC: 150 MMOL/L (ref 136–145)
SODIUM BLD-SCNC: 151 MMOL/L (ref 136–145)
SODIUM BLD-SCNC: 152 MMOL/L (ref 136–145)
SODIUM BLD-SCNC: 153 MMOL/L (ref 136–145)
SODIUM BLD-SCNC: 153 MMOL/L (ref 136–145)
SODIUM BLD-SCNC: 154 MMOL/L (ref 136–145)
SODIUM SERPL-SCNC: 149 MMOL/L (ref 136–145)
SPECIMEN OUTDATE: NORMAL
TRANS ERYTHROCYTES VOL PATIENT: NORMAL ML
UNIT NUMBER: NORMAL
WBC # BLD AUTO: 18.74 K/UL (ref 3.9–12.7)

## 2023-12-08 PROCEDURE — 27000188 HC CONGENITAL BYPASS PUMP

## 2023-12-08 PROCEDURE — C1751 CATH, INF, PER/CENT/MIDLINE: HCPCS | Performed by: ANESTHESIOLOGY

## 2023-12-08 PROCEDURE — 27100088 HC CELL SAVER

## 2023-12-08 PROCEDURE — 63600175 PHARM REV CODE 636 W HCPCS: Performed by: ANESTHESIOLOGY

## 2023-12-08 PROCEDURE — 36000713 HC OR TIME LEV V EA ADD 15 MIN: Performed by: THORACIC SURGERY (CARDIOTHORACIC VASCULAR SURGERY)

## 2023-12-08 PROCEDURE — 83605 ASSAY OF LACTIC ACID: CPT

## 2023-12-08 PROCEDURE — 33475 REPLACEMENT PULMONARY VALVE: CPT | Mod: ,,, | Performed by: THORACIC SURGERY (CARDIOTHORACIC VASCULAR SURGERY)

## 2023-12-08 PROCEDURE — 25000003 PHARM REV CODE 250: Performed by: ANESTHESIOLOGY

## 2023-12-08 PROCEDURE — 27201039 HC RAPID INFUSION SYSTEM (R.I.S.)

## 2023-12-08 PROCEDURE — 82800 BLOOD PH: CPT

## 2023-12-08 PROCEDURE — 33405 PR REPLACE AORT VALV,PROSTH VALV: ICD-10-PCS | Mod: 82,51,, | Performed by: THORACIC SURGERY (CARDIOTHORACIC VASCULAR SURGERY)

## 2023-12-08 PROCEDURE — 27800595 HC HEART VALVES

## 2023-12-08 PROCEDURE — P9021 RED BLOOD CELLS UNIT: HCPCS | Performed by: THORACIC SURGERY (CARDIOTHORACIC VASCULAR SURGERY)

## 2023-12-08 PROCEDURE — 85014 HEMATOCRIT: CPT

## 2023-12-08 PROCEDURE — 27200750 HC INSULATED NEEDLE/ STIMUPLEX: Performed by: ANESTHESIOLOGY

## 2023-12-08 PROCEDURE — 88305 TISSUE EXAM BY PATHOLOGIST: ICD-10-PCS | Mod: 26,,, | Performed by: PATHOLOGY

## 2023-12-08 PROCEDURE — 88305 TISSUE EXAM BY PATHOLOGIST: CPT | Mod: 26,,, | Performed by: PATHOLOGY

## 2023-12-08 PROCEDURE — 27100171 HC OXYGEN HIGH FLOW UP TO 24 HOURS

## 2023-12-08 PROCEDURE — 27000191 HC C-V MONITORING

## 2023-12-08 PROCEDURE — S0017 INJECTION, AMINOCAPROIC ACID: HCPCS | Performed by: ANESTHESIOLOGY

## 2023-12-08 PROCEDURE — 27200953 HC CARDIOPLEGIA SYSTEM

## 2023-12-08 PROCEDURE — D9220A PRA ANESTHESIA: Mod: CRNA,,, | Performed by: NURSE ANESTHETIST, CERTIFIED REGISTERED

## 2023-12-08 PROCEDURE — D9220A PRA ANESTHESIA: Mod: ANES,,, | Performed by: ANESTHESIOLOGY

## 2023-12-08 PROCEDURE — 33405 REPLACEMENT AORTIC VALVE OPN: CPT | Mod: 51,,, | Performed by: THORACIC SURGERY (CARDIOTHORACIC VASCULAR SURGERY)

## 2023-12-08 PROCEDURE — 93320 DOPPLER ECHO COMPLETE: CPT | Mod: 76 | Performed by: PEDIATRICS

## 2023-12-08 PROCEDURE — 27100025 HC TUBING, SET FLUID WARMER: Performed by: ANESTHESIOLOGY

## 2023-12-08 PROCEDURE — 27201037 HC PRESSURE MONITORING SET UP

## 2023-12-08 PROCEDURE — 36556 INSERT NON-TUNNEL CV CATH: CPT | Mod: 59,,, | Performed by: ANESTHESIOLOGY

## 2023-12-08 PROCEDURE — 84100 ASSAY OF PHOSPHORUS: CPT | Performed by: PHYSICIAN ASSISTANT

## 2023-12-08 PROCEDURE — 33475 PR REPLACEMENT, PULMONARY VALVE: ICD-10-PCS | Mod: ,,, | Performed by: THORACIC SURGERY (CARDIOTHORACIC VASCULAR SURGERY)

## 2023-12-08 PROCEDURE — 27800505 HC CATH, RADIAL ARTERY KIT: Performed by: ANESTHESIOLOGY

## 2023-12-08 PROCEDURE — 36620 INSERTION CATHETER ARTERY: CPT | Mod: 59,,, | Performed by: ANESTHESIOLOGY

## 2023-12-08 PROCEDURE — 27201423 OPTIME MED/SURG SUP & DEVICES STERILE SUPPLY: Performed by: THORACIC SURGERY (CARDIOTHORACIC VASCULAR SURGERY)

## 2023-12-08 PROCEDURE — 93317 PR ECHO TRANSESOPH,CONG ANOM,ACQ,INTERP: ICD-10-PCS | Mod: 26,,, | Performed by: PEDIATRICS

## 2023-12-08 PROCEDURE — 36000712 HC OR TIME LEV V 1ST 15 MIN: Performed by: THORACIC SURGERY (CARDIOTHORACIC VASCULAR SURGERY)

## 2023-12-08 PROCEDURE — P9012 CRYOPRECIPITATE EACH UNIT: HCPCS | Performed by: THORACIC SURGERY (CARDIOTHORACIC VASCULAR SURGERY)

## 2023-12-08 PROCEDURE — 83735 ASSAY OF MAGNESIUM: CPT | Performed by: PHYSICIAN ASSISTANT

## 2023-12-08 PROCEDURE — 76942 PR U/S GUIDANCE FOR NEEDLE GUIDANCE: ICD-10-PCS | Mod: 26,59,, | Performed by: ANESTHESIOLOGY

## 2023-12-08 PROCEDURE — 93317 ECHO TRANSESOPHAGEAL: CPT | Mod: 26,76,, | Performed by: PEDIATRICS

## 2023-12-08 PROCEDURE — 33475 PR REPLACEMENT, PULMONARY VALVE: ICD-10-PCS | Mod: 82,,, | Performed by: THORACIC SURGERY (CARDIOTHORACIC VASCULAR SURGERY)

## 2023-12-08 PROCEDURE — 82330 ASSAY OF CALCIUM: CPT

## 2023-12-08 PROCEDURE — P9021 RED BLOOD CELLS UNIT: HCPCS | Performed by: SURGERY

## 2023-12-08 PROCEDURE — 36592 COLLECT BLOOD FROM PICC: CPT

## 2023-12-08 PROCEDURE — P9035 PLATELET PHERES LEUKOREDUCED: HCPCS | Performed by: THORACIC SURGERY (CARDIOTHORACIC VASCULAR SURGERY)

## 2023-12-08 PROCEDURE — 85610 PROTHROMBIN TIME: CPT | Performed by: PHYSICIAN ASSISTANT

## 2023-12-08 PROCEDURE — 63600175 PHARM REV CODE 636 W HCPCS: Performed by: THORACIC SURGERY (CARDIOTHORACIC VASCULAR SURGERY)

## 2023-12-08 PROCEDURE — 37799 UNLISTED PX VASCULAR SURGERY: CPT

## 2023-12-08 PROCEDURE — 80053 COMPREHEN METABOLIC PANEL: CPT | Performed by: PHYSICIAN ASSISTANT

## 2023-12-08 PROCEDURE — 86920 COMPATIBILITY TEST SPIN: CPT | Performed by: THORACIC SURGERY (CARDIOTHORACIC VASCULAR SURGERY)

## 2023-12-08 PROCEDURE — 93325 PR DOPPLER COLOR FLOW VELOCITY MAP: ICD-10-PCS | Mod: 26,,, | Performed by: PEDIATRICS

## 2023-12-08 PROCEDURE — 27100019 HC AMBU BAG ADULT/PED: Performed by: ANESTHESIOLOGY

## 2023-12-08 PROCEDURE — P9012 CRYOPRECIPITATE EACH UNIT: HCPCS | Performed by: SURGERY

## 2023-12-08 PROCEDURE — 82565 ASSAY OF CREATININE: CPT

## 2023-12-08 PROCEDURE — 25000003 PHARM REV CODE 250: Performed by: PHYSICIAN ASSISTANT

## 2023-12-08 PROCEDURE — D9220A PRA ANESTHESIA: ICD-10-PCS | Mod: CRNA,,, | Performed by: NURSE ANESTHETIST, CERTIFIED REGISTERED

## 2023-12-08 PROCEDURE — 88305 TISSUE EXAM BY PATHOLOGIST: CPT | Performed by: PATHOLOGY

## 2023-12-08 PROCEDURE — 84295 ASSAY OF SERUM SODIUM: CPT

## 2023-12-08 PROCEDURE — 63600531 PHARM REV CODE 636 NO ALT 250 W HCPCS: Mod: JZ,JG | Performed by: ANESTHESIOLOGY

## 2023-12-08 PROCEDURE — 81025 URINE PREGNANCY TEST: CPT | Performed by: THORACIC SURGERY (CARDIOTHORACIC VASCULAR SURGERY)

## 2023-12-08 PROCEDURE — 33405 PR REPLACE AORT VALV,PROSTH VALV: ICD-10-PCS | Mod: 51,,, | Performed by: THORACIC SURGERY (CARDIOTHORACIC VASCULAR SURGERY)

## 2023-12-08 PROCEDURE — 27201015 HC HEMO-CONCENTRATOR

## 2023-12-08 PROCEDURE — 25000003 PHARM REV CODE 250: Performed by: THORACIC SURGERY (CARDIOTHORACIC VASCULAR SURGERY)

## 2023-12-08 PROCEDURE — 86965 POOLING BLOOD PLATELETS: CPT | Performed by: THORACIC SURGERY (CARDIOTHORACIC VASCULAR SURGERY)

## 2023-12-08 PROCEDURE — 33475 REPLACEMENT PULMONARY VALVE: CPT | Mod: 82,,, | Performed by: THORACIC SURGERY (CARDIOTHORACIC VASCULAR SURGERY)

## 2023-12-08 PROCEDURE — 85027 COMPLETE CBC AUTOMATED: CPT | Performed by: PHYSICIAN ASSISTANT

## 2023-12-08 PROCEDURE — 37000009 HC ANESTHESIA EA ADD 15 MINS: Performed by: THORACIC SURGERY (CARDIOTHORACIC VASCULAR SURGERY)

## 2023-12-08 PROCEDURE — 64999 PR NERVE BLOCK, TRANSVERSUS THORACIS PLANE, SINGLE SHOT: ICD-10-PCS | Mod: ,,, | Performed by: ANESTHESIOLOGY

## 2023-12-08 PROCEDURE — 76937 PR  US GUIDE, VASCULAR ACCESS: ICD-10-PCS | Mod: 26,,, | Performed by: ANESTHESIOLOGY

## 2023-12-08 PROCEDURE — 85384 FIBRINOGEN ACTIVITY: CPT | Performed by: PHYSICIAN ASSISTANT

## 2023-12-08 PROCEDURE — 93316 PR ECHO TRANSESOPH,CONG ANOM,PROB PLACE: ICD-10-PCS | Mod: 59,,, | Performed by: ANESTHESIOLOGY

## 2023-12-08 PROCEDURE — 27201041 HC RESERVOIR, CARDIOTOMY

## 2023-12-08 PROCEDURE — 36556 PR INSERT NON-TUNNEL CV CATH 5+ YRS OLD: ICD-10-PCS | Mod: 59,,, | Performed by: ANESTHESIOLOGY

## 2023-12-08 PROCEDURE — 93320 PR DOPPLER ECHO HEART,COMPLETE: ICD-10-PCS | Mod: 26,,, | Performed by: PEDIATRICS

## 2023-12-08 PROCEDURE — 64999 UNLISTED PX NERVOUS SYSTEM: CPT | Mod: ,,, | Performed by: ANESTHESIOLOGY

## 2023-12-08 PROCEDURE — 27100026 HC SHUNT SENSOR, TERUMO

## 2023-12-08 PROCEDURE — C1768 GRAFT, VASCULAR: HCPCS | Performed by: THORACIC SURGERY (CARDIOTHORACIC VASCULAR SURGERY)

## 2023-12-08 PROCEDURE — P9017 PLASMA 1 DONOR FRZ W/IN 8 HR: HCPCS | Performed by: SURGERY

## 2023-12-08 PROCEDURE — 20000000 HC ICU ROOM

## 2023-12-08 PROCEDURE — 99499 UNLISTED E&M SERVICE: CPT | Mod: ,,, | Performed by: SURGERY

## 2023-12-08 PROCEDURE — 84132 ASSAY OF SERUM POTASSIUM: CPT

## 2023-12-08 PROCEDURE — P9035 PLATELET PHERES LEUKOREDUCED: HCPCS | Performed by: SURGERY

## 2023-12-08 PROCEDURE — D9220A PRA ANESTHESIA: ICD-10-PCS | Mod: ANES,,, | Performed by: ANESTHESIOLOGY

## 2023-12-08 PROCEDURE — 99900026 HC AIRWAY MAINTENANCE (STAT)

## 2023-12-08 PROCEDURE — 82803 BLOOD GASES ANY COMBINATION: CPT

## 2023-12-08 PROCEDURE — 99499 NO LOS: ICD-10-PCS | Mod: ,,, | Performed by: SURGERY

## 2023-12-08 PROCEDURE — 63600175 PHARM REV CODE 636 W HCPCS: Performed by: NURSE ANESTHETIST, CERTIFIED REGISTERED

## 2023-12-08 PROCEDURE — 93010 EKG 12-LEAD: ICD-10-PCS | Mod: ,,, | Performed by: INTERNAL MEDICINE

## 2023-12-08 PROCEDURE — 86850 RBC ANTIBODY SCREEN: CPT | Performed by: THORACIC SURGERY (CARDIOTHORACIC VASCULAR SURGERY)

## 2023-12-08 PROCEDURE — 99900035 HC TECH TIME PER 15 MIN (STAT)

## 2023-12-08 PROCEDURE — 27000445 HC TEMPORARY PACEMAKER LEADS

## 2023-12-08 PROCEDURE — 93320 DOPPLER ECHO COMPLETE: CPT | Mod: 26,,, | Performed by: PEDIATRICS

## 2023-12-08 PROCEDURE — 94003 VENT MGMT INPAT SUBQ DAY: CPT

## 2023-12-08 PROCEDURE — 88300 SURGICAL PATH GROSS: CPT | Performed by: PATHOLOGY

## 2023-12-08 PROCEDURE — 37000008 HC ANESTHESIA 1ST 15 MINUTES: Performed by: THORACIC SURGERY (CARDIOTHORACIC VASCULAR SURGERY)

## 2023-12-08 PROCEDURE — 86920 COMPATIBILITY TEST SPIN: CPT | Performed by: SURGERY

## 2023-12-08 PROCEDURE — 93325 DOPPLER ECHO COLOR FLOW MAPG: CPT | Mod: 26,,, | Performed by: PEDIATRICS

## 2023-12-08 PROCEDURE — 25000003 PHARM REV CODE 250: Performed by: NURSE ANESTHETIST, CERTIFIED REGISTERED

## 2023-12-08 PROCEDURE — 93316 ECHO TRANSESOPHAGEAL: CPT | Mod: 59,,, | Performed by: ANESTHESIOLOGY

## 2023-12-08 PROCEDURE — 33405 REPLACEMENT AORTIC VALVE OPN: CPT | Mod: 82,51,, | Performed by: THORACIC SURGERY (CARDIOTHORACIC VASCULAR SURGERY)

## 2023-12-08 PROCEDURE — 85730 THROMBOPLASTIN TIME PARTIAL: CPT | Performed by: PHYSICIAN ASSISTANT

## 2023-12-08 PROCEDURE — 93325 DOPPLER ECHO COLOR FLOW MAPG: CPT | Performed by: PEDIATRICS

## 2023-12-08 PROCEDURE — P9017 PLASMA 1 DONOR FRZ W/IN 8 HR: HCPCS | Performed by: THORACIC SURGERY (CARDIOTHORACIC VASCULAR SURGERY)

## 2023-12-08 PROCEDURE — P9016 RBC LEUKOCYTES REDUCED: HCPCS | Performed by: THORACIC SURGERY (CARDIOTHORACIC VASCULAR SURGERY)

## 2023-12-08 PROCEDURE — 86965 POOLING BLOOD PLATELETS: CPT | Performed by: SURGERY

## 2023-12-08 PROCEDURE — P9045 ALBUMIN (HUMAN), 5%, 250 ML: HCPCS | Mod: JZ,JG | Performed by: NURSE ANESTHETIST, CERTIFIED REGISTERED

## 2023-12-08 PROCEDURE — 93005 ELECTROCARDIOGRAM TRACING: CPT

## 2023-12-08 PROCEDURE — 36620 PR INSERT CATH,ART,PERCUT,SHORTTERM: ICD-10-PCS | Mod: 59,,, | Performed by: ANESTHESIOLOGY

## 2023-12-08 PROCEDURE — 76937 US GUIDE VASCULAR ACCESS: CPT | Mod: 26,,, | Performed by: ANESTHESIOLOGY

## 2023-12-08 PROCEDURE — C1729 CATH, DRAINAGE: HCPCS | Performed by: THORACIC SURGERY (CARDIOTHORACIC VASCULAR SURGERY)

## 2023-12-08 PROCEDURE — 93010 ELECTROCARDIOGRAM REPORT: CPT | Mod: ,,, | Performed by: INTERNAL MEDICINE

## 2023-12-08 PROCEDURE — 85520 HEPARIN ASSAY: CPT

## 2023-12-08 PROCEDURE — 93317 ECHO TRANSESOPHAGEAL: CPT | Performed by: PEDIATRICS

## 2023-12-08 PROCEDURE — 76942 ECHO GUIDE FOR BIOPSY: CPT | Mod: 26,59,, | Performed by: ANESTHESIOLOGY

## 2023-12-08 DEVICE — IMPLANTABLE DEVICE: Type: IMPLANTABLE DEVICE | Site: HEART | Status: FUNCTIONAL

## 2023-12-08 DEVICE — VALVE MOSAIC AOR ULTRA 29MM: Type: IMPLANTABLE DEVICE | Site: HEART | Status: FUNCTIONAL

## 2023-12-08 DEVICE — FELT TEFLON 1INX6IN: Type: IMPLANTABLE DEVICE | Site: HEART | Status: FUNCTIONAL

## 2023-12-08 RX ORDER — POTASSIUM CHLORIDE 29.8 MG/ML
40 INJECTION INTRAVENOUS
Status: DISCONTINUED | OUTPATIENT
Start: 2023-12-08 | End: 2023-12-09

## 2023-12-08 RX ORDER — CALCIUM GLUCONATE 20 MG/ML
2 INJECTION, SOLUTION INTRAVENOUS
Status: DISCONTINUED | OUTPATIENT
Start: 2023-12-08 | End: 2023-12-18

## 2023-12-08 RX ORDER — PROPOFOL 10 MG/ML
VIAL (ML) INTRAVENOUS
Status: DISCONTINUED | OUTPATIENT
Start: 2023-12-08 | End: 2023-12-08

## 2023-12-08 RX ORDER — MIDAZOLAM HYDROCHLORIDE 1 MG/ML
INJECTION, SOLUTION INTRAMUSCULAR; INTRAVENOUS
Status: DISCONTINUED | OUTPATIENT
Start: 2023-12-08 | End: 2023-12-08

## 2023-12-08 RX ORDER — EPINEPHRINE 1 MG/ML
INJECTION, SOLUTION, CONCENTRATE INTRAVENOUS
Status: DISCONTINUED | OUTPATIENT
Start: 2023-12-08 | End: 2023-12-08

## 2023-12-08 RX ORDER — PHENYLEPHRINE HYDROCHLORIDE 10 MG/ML
INJECTION INTRAVENOUS
Status: DISCONTINUED | OUTPATIENT
Start: 2023-12-08 | End: 2023-12-08

## 2023-12-08 RX ORDER — CALCIUM GLUCONATE 20 MG/ML
3 INJECTION, SOLUTION INTRAVENOUS
Status: DISCONTINUED | OUTPATIENT
Start: 2023-12-08 | End: 2023-12-18

## 2023-12-08 RX ORDER — SODIUM CHLORIDE 9 MG/ML
INJECTION, SOLUTION INTRAVENOUS
Status: DISCONTINUED | OUTPATIENT
Start: 2023-12-08 | End: 2023-12-18

## 2023-12-08 RX ORDER — PROTAMINE SULFATE 10 MG/ML
INJECTION, SOLUTION INTRAVENOUS
Status: DISCONTINUED | OUTPATIENT
Start: 2023-12-08 | End: 2023-12-08

## 2023-12-08 RX ORDER — SODIUM,POTASSIUM PHOSPHATES 280-250MG
1 POWDER IN PACKET (EA) ORAL ONCE
Status: DISCONTINUED | OUTPATIENT
Start: 2023-12-08 | End: 2023-12-11

## 2023-12-08 RX ORDER — EPINEPHRINE 1 MG/ML
INJECTION INTRAMUSCULAR; INTRAVENOUS; SUBCUTANEOUS
Status: DISPENSED
Start: 2023-12-08 | End: 2023-12-09

## 2023-12-08 RX ORDER — NICARDIPINE HYDROCHLORIDE 0.2 MG/ML
0-15 INJECTION INTRAVENOUS CONTINUOUS
Status: DISCONTINUED | OUTPATIENT
Start: 2023-12-09 | End: 2023-12-11

## 2023-12-08 RX ORDER — MAGNESIUM SULFATE HEPTAHYDRATE 40 MG/ML
2 INJECTION, SOLUTION INTRAVENOUS
Status: DISCONTINUED | OUTPATIENT
Start: 2023-12-08 | End: 2023-12-18

## 2023-12-08 RX ORDER — ONDANSETRON 2 MG/ML
INJECTION INTRAMUSCULAR; INTRAVENOUS
Status: DISCONTINUED | OUTPATIENT
Start: 2023-12-08 | End: 2023-12-08

## 2023-12-08 RX ORDER — VASOPRESSIN 20 [USP'U]/ML
INJECTION, SOLUTION INTRAMUSCULAR; SUBCUTANEOUS
Status: DISCONTINUED | OUTPATIENT
Start: 2023-12-08 | End: 2023-12-08

## 2023-12-08 RX ORDER — POTASSIUM CHLORIDE 14.9 MG/ML
20 INJECTION INTRAVENOUS
Status: DISCONTINUED | OUTPATIENT
Start: 2023-12-08 | End: 2023-12-09

## 2023-12-08 RX ORDER — MILRINONE LACTATE 0.2 MG/ML
0.12 INJECTION, SOLUTION INTRAVENOUS CONTINUOUS
Status: DISCONTINUED | OUTPATIENT
Start: 2023-12-08 | End: 2023-12-10

## 2023-12-08 RX ORDER — MAGNESIUM SULFATE HEPTAHYDRATE 40 MG/ML
4 INJECTION, SOLUTION INTRAVENOUS
Status: DISCONTINUED | OUTPATIENT
Start: 2023-12-08 | End: 2023-12-18

## 2023-12-08 RX ORDER — DOCUSATE SODIUM 100 MG/1
100 CAPSULE, LIQUID FILLED ORAL 2 TIMES DAILY
Status: DISCONTINUED | OUTPATIENT
Start: 2023-12-08 | End: 2023-12-10

## 2023-12-08 RX ORDER — DOCUSATE SODIUM 100 MG/1
100 CAPSULE, LIQUID FILLED ORAL 2 TIMES DAILY
Status: CANCELLED | OUTPATIENT
Start: 2023-12-08

## 2023-12-08 RX ORDER — VASOPRESSIN 20 [USP'U]/ML
INJECTION, SOLUTION INTRAMUSCULAR; SUBCUTANEOUS
Status: DISPENSED
Start: 2023-12-08 | End: 2023-12-09

## 2023-12-08 RX ORDER — ONDANSETRON 2 MG/ML
4 INJECTION INTRAMUSCULAR; INTRAVENOUS EVERY 12 HOURS PRN
Status: DISCONTINUED | OUTPATIENT
Start: 2023-12-08 | End: 2023-12-22 | Stop reason: HOSPADM

## 2023-12-08 RX ORDER — ROCURONIUM BROMIDE 10 MG/ML
INJECTION, SOLUTION INTRAVENOUS
Status: DISCONTINUED | OUTPATIENT
Start: 2023-12-08 | End: 2023-12-08

## 2023-12-08 RX ORDER — MUPIROCIN 20 MG/G
OINTMENT TOPICAL 2 TIMES DAILY
Status: COMPLETED | OUTPATIENT
Start: 2023-12-08 | End: 2023-12-13

## 2023-12-08 RX ORDER — MORPHINE SULFATE 2 MG/ML
2 INJECTION, SOLUTION INTRAMUSCULAR; INTRAVENOUS
Status: CANCELLED | OUTPATIENT
Start: 2023-12-08

## 2023-12-08 RX ORDER — MUPIROCIN 20 MG/G
OINTMENT TOPICAL 2 TIMES DAILY
Status: CANCELLED | OUTPATIENT
Start: 2023-12-08 | End: 2023-12-13

## 2023-12-08 RX ORDER — FENTANYL CITRATE 50 UG/ML
INJECTION, SOLUTION INTRAMUSCULAR; INTRAVENOUS
Status: DISCONTINUED | OUTPATIENT
Start: 2023-12-08 | End: 2023-12-08

## 2023-12-08 RX ORDER — SODIUM CHLORIDE 0.9 % (FLUSH) 0.9 %
10 SYRINGE (ML) INJECTION
Status: DISCONTINUED | OUTPATIENT
Start: 2023-12-08 | End: 2023-12-08 | Stop reason: HOSPADM

## 2023-12-08 RX ORDER — MORPHINE SULFATE 2 MG/ML
2 INJECTION, SOLUTION INTRAMUSCULAR; INTRAVENOUS
Status: DISCONTINUED | OUTPATIENT
Start: 2023-12-08 | End: 2023-12-10

## 2023-12-08 RX ORDER — ALBUMIN HUMAN 50 G/1000ML
SOLUTION INTRAVENOUS
Status: DISCONTINUED | OUTPATIENT
Start: 2023-12-08 | End: 2023-12-08

## 2023-12-08 RX ORDER — DEXMEDETOMIDINE HYDROCHLORIDE 4 UG/ML
0-1.4 INJECTION, SOLUTION INTRAVENOUS CONTINUOUS
Status: DISCONTINUED | OUTPATIENT
Start: 2023-12-08 | End: 2023-12-13

## 2023-12-08 RX ORDER — CALCIUM GLUCONATE 20 MG/ML
1 INJECTION, SOLUTION INTRAVENOUS
Status: DISCONTINUED | OUTPATIENT
Start: 2023-12-08 | End: 2023-12-18

## 2023-12-08 RX ORDER — LIDOCAINE HYDROCHLORIDE 20 MG/ML
INJECTION, SOLUTION EPIDURAL; INFILTRATION; INTRACAUDAL; PERINEURAL
Status: DISCONTINUED | OUTPATIENT
Start: 2023-12-08 | End: 2023-12-08

## 2023-12-08 RX ORDER — FAMOTIDINE 10 MG/ML
20 INJECTION INTRAVENOUS 2 TIMES DAILY
Status: DISCONTINUED | OUTPATIENT
Start: 2023-12-08 | End: 2023-12-09

## 2023-12-08 RX ORDER — LIDOCAINE HYDROCHLORIDE 20 MG/ML
INJECTION INTRAVENOUS
Status: DISCONTINUED | OUTPATIENT
Start: 2023-12-08 | End: 2023-12-08

## 2023-12-08 RX ORDER — ROPIVACAINE HYDROCHLORIDE 5 MG/ML
INJECTION, SOLUTION EPIDURAL; INFILTRATION; PERINEURAL
Status: COMPLETED | OUTPATIENT
Start: 2023-12-08 | End: 2023-12-08

## 2023-12-08 RX ORDER — MAGNESIUM SULFATE HEPTAHYDRATE 500 MG/ML
INJECTION, SOLUTION INTRAMUSCULAR; INTRAVENOUS
Status: DISCONTINUED | OUTPATIENT
Start: 2023-12-08 | End: 2023-12-08

## 2023-12-08 RX ORDER — ACETAMINOPHEN 325 MG/1
650 TABLET ORAL EVERY 6 HOURS PRN
Status: DISCONTINUED | OUTPATIENT
Start: 2023-12-08 | End: 2023-12-10

## 2023-12-08 RX ORDER — HEPARIN SODIUM 1000 [USP'U]/ML
INJECTION, SOLUTION INTRAVENOUS; SUBCUTANEOUS
Status: DISCONTINUED | OUTPATIENT
Start: 2023-12-08 | End: 2023-12-08

## 2023-12-08 RX ADMIN — MIDAZOLAM HYDROCHLORIDE 2 MG: 1 INJECTION, SOLUTION INTRAMUSCULAR; INTRAVENOUS at 03:12

## 2023-12-08 RX ADMIN — AMINOCAPROIC ACID 5 G: 250 INJECTION, SOLUTION INTRAVENOUS at 09:12

## 2023-12-08 RX ADMIN — VASOPRESSIN 0.4 UNITS: 20 INJECTION INTRAVENOUS at 06:12

## 2023-12-08 RX ADMIN — PHENYLEPHRINE HYDROCHLORIDE 100 MCG: 10 INJECTION INTRAVENOUS at 07:12

## 2023-12-08 RX ADMIN — FENTANYL CITRATE 50 MCG: 50 INJECTION, SOLUTION INTRAMUSCULAR; INTRAVENOUS at 05:12

## 2023-12-08 RX ADMIN — ROCURONIUM BROMIDE 40 MG: 10 INJECTION INTRAVENOUS at 02:12

## 2023-12-08 RX ADMIN — MIDAZOLAM HYDROCHLORIDE 2 MG: 1 INJECTION, SOLUTION INTRAMUSCULAR; INTRAVENOUS at 10:12

## 2023-12-08 RX ADMIN — PHENYLEPHRINE HYDROCHLORIDE 100 MCG: 10 INJECTION INTRAVENOUS at 10:12

## 2023-12-08 RX ADMIN — EPINEPHRINE 20 MCG: 1 INJECTION, SOLUTION, CONCENTRATE INTRAVENOUS at 03:12

## 2023-12-08 RX ADMIN — ROPIVACAINE HYDROCHLORIDE 30 ML: 5 INJECTION EPIDURAL; INFILTRATION; PERINEURAL at 08:12

## 2023-12-08 RX ADMIN — INSULIN HUMAN 0.05 UNITS/KG/HR: 1 INJECTION, SOLUTION INTRAVENOUS at 02:12

## 2023-12-08 RX ADMIN — DEXMEDETOMIDINE 0.3 MCG/KG/HR: 100 INJECTION, SOLUTION, CONCENTRATE INTRAVENOUS at 08:12

## 2023-12-08 RX ADMIN — ONDANSETRON 1 G: 2 INJECTION INTRAMUSCULAR; INTRAVENOUS at 06:12

## 2023-12-08 RX ADMIN — POTASSIUM PHOSPHATE, MONOBASIC POTASSIUM PHOSPHATE, DIBASIC 15 MMOL: 224; 236 INJECTION, SOLUTION, CONCENTRATE INTRAVENOUS at 11:12

## 2023-12-08 RX ADMIN — VASOPRESSIN 0.5 UNITS: 20 INJECTION INTRAVENOUS at 05:12

## 2023-12-08 RX ADMIN — SODIUM CHLORIDE: 0.9 INJECTION, SOLUTION INTRAVENOUS at 07:12

## 2023-12-08 RX ADMIN — PHENYLEPHRINE HYDROCHLORIDE 100 MCG: 10 INJECTION INTRAVENOUS at 09:12

## 2023-12-08 RX ADMIN — VASOPRESSIN 1.5 UNITS: 20 INJECTION INTRAVENOUS at 03:12

## 2023-12-08 RX ADMIN — VANCOMYCIN HYDROCHLORIDE 2000 MG: 5 INJECTION, POWDER, LYOPHILIZED, FOR SOLUTION INTRAVENOUS at 07:12

## 2023-12-08 RX ADMIN — MIDAZOLAM HYDROCHLORIDE 3 MG: 1 INJECTION, SOLUTION INTRAMUSCULAR; INTRAVENOUS at 09:12

## 2023-12-08 RX ADMIN — ONDANSETRON 1 G: 2 INJECTION INTRAMUSCULAR; INTRAVENOUS at 03:12

## 2023-12-08 RX ADMIN — PROPOFOL 100 MG: 10 INJECTION, EMULSION INTRAVENOUS at 07:12

## 2023-12-08 RX ADMIN — EPINEPHRINE 10 MCG: 1 INJECTION, SOLUTION, CONCENTRATE INTRAVENOUS at 08:12

## 2023-12-08 RX ADMIN — ROCURONIUM BROMIDE 20 MG: 10 INJECTION INTRAVENOUS at 06:12

## 2023-12-08 RX ADMIN — FENTANYL CITRATE 100 MCG: 50 INJECTION, SOLUTION INTRAMUSCULAR; INTRAVENOUS at 08:12

## 2023-12-08 RX ADMIN — VASOPRESSIN 1 UNITS: 20 INJECTION INTRAVENOUS at 08:12

## 2023-12-08 RX ADMIN — Medication 4950 UNITS: at 07:12

## 2023-12-08 RX ADMIN — EPINEPHRINE 10 MCG: 1 INJECTION, SOLUTION, CONCENTRATE INTRAVENOUS at 06:12

## 2023-12-08 RX ADMIN — MAGNESIUM SULFATE HEPTAHYDRATE 2 G: 500 INJECTION, SOLUTION INTRAMUSCULAR; INTRAVENOUS at 02:12

## 2023-12-08 RX ADMIN — ONDANSETRON 1 G: 2 INJECTION INTRAMUSCULAR; INTRAVENOUS at 02:12

## 2023-12-08 RX ADMIN — PHENYLEPHRINE HYDROCHLORIDE 250 MCG: 10 INJECTION INTRAVENOUS at 03:12

## 2023-12-08 RX ADMIN — PHENYLEPHRINE HYDROCHLORIDE 100 MCG: 10 INJECTION INTRAVENOUS at 03:12

## 2023-12-08 RX ADMIN — ROCURONIUM BROMIDE 100 MG: 10 INJECTION INTRAVENOUS at 07:12

## 2023-12-08 RX ADMIN — HEPARIN SODIUM 25000 UNITS: 1000 INJECTION, SOLUTION INTRAVENOUS; SUBCUTANEOUS at 06:12

## 2023-12-08 RX ADMIN — ALBUMIN (HUMAN) 10 ML: 12.5 SOLUTION INTRAVENOUS at 07:12

## 2023-12-08 RX ADMIN — ROCURONIUM BROMIDE 30 MG: 10 INJECTION INTRAVENOUS at 03:12

## 2023-12-08 RX ADMIN — MIDAZOLAM HYDROCHLORIDE 2 MG: 1 INJECTION, SOLUTION INTRAMUSCULAR; INTRAVENOUS at 05:12

## 2023-12-08 RX ADMIN — VASOPRESSIN 0.5 UNITS: 20 INJECTION INTRAVENOUS at 09:12

## 2023-12-08 RX ADMIN — MAGNESIUM SULFATE HEPTAHYDRATE 2 G: 500 INJECTION, SOLUTION INTRAMUSCULAR; INTRAVENOUS at 09:12

## 2023-12-08 RX ADMIN — LIDOCAINE HYDROCHLORIDE 100 MG: 20 INJECTION, SOLUTION EPIDURAL; INFILTRATION; INTRACAUDAL at 02:12

## 2023-12-08 RX ADMIN — ROCURONIUM BROMIDE 30 MG: 10 INJECTION INTRAVENOUS at 01:12

## 2023-12-08 RX ADMIN — PROTAMINE SULFATE 200 MG: 10 INJECTION, SOLUTION INTRAVENOUS at 08:12

## 2023-12-08 RX ADMIN — ROCURONIUM BROMIDE 50 MG: 10 INJECTION INTRAVENOUS at 08:12

## 2023-12-08 RX ADMIN — VASOPRESSIN 0.4 UNITS: 20 INJECTION INTRAVENOUS at 07:12

## 2023-12-08 RX ADMIN — VASOPRESSIN 0.02 UNITS/KG/HR: 20 INJECTION INTRAVENOUS at 03:12

## 2023-12-08 RX ADMIN — PHENYLEPHRINE HYDROCHLORIDE 200 MCG: 10 INJECTION INTRAVENOUS at 09:12

## 2023-12-08 RX ADMIN — VASOPRESSIN 0.3 UNITS: 20 INJECTION INTRAVENOUS at 05:12

## 2023-12-08 RX ADMIN — LIDOCAINE HYDROCHLORIDE 100 MG: 20 INJECTION, SOLUTION EPIDURAL; INFILTRATION; INTRACAUDAL at 09:12

## 2023-12-08 RX ADMIN — LIDOCAINE HYDROCHLORIDE 80 MG: 20 INJECTION INTRAVENOUS at 07:12

## 2023-12-08 RX ADMIN — FENTANYL CITRATE 100 MCG: 50 INJECTION, SOLUTION INTRAMUSCULAR; INTRAVENOUS at 03:12

## 2023-12-08 RX ADMIN — PHENYLEPHRINE HYDROCHLORIDE 200 MCG: 10 INJECTION INTRAVENOUS at 10:12

## 2023-12-08 RX ADMIN — PHENYLEPHRINE HYDROCHLORIDE 200 MCG: 10 INJECTION INTRAVENOUS at 03:12

## 2023-12-08 RX ADMIN — NICARDIPINE HYDROCHLORIDE 5 MG/HR: 0.2 INJECTION, SOLUTION INTRAVENOUS at 03:12

## 2023-12-08 RX ADMIN — ONDANSETRON 0.5 G: 2 INJECTION INTRAMUSCULAR; INTRAVENOUS at 07:12

## 2023-12-08 RX ADMIN — ROCURONIUM BROMIDE 50 MG: 10 INJECTION INTRAVENOUS at 10:12

## 2023-12-08 RX ADMIN — FENTANYL CITRATE 100 MCG: 50 INJECTION, SOLUTION INTRAMUSCULAR; INTRAVENOUS at 09:12

## 2023-12-08 RX ADMIN — ROCURONIUM BROMIDE 30 MG: 10 INJECTION INTRAVENOUS at 05:12

## 2023-12-08 RX ADMIN — FENTANYL CITRATE 150 MCG: 50 INJECTION, SOLUTION INTRAMUSCULAR; INTRAVENOUS at 10:12

## 2023-12-08 RX ADMIN — ONDANSETRON 0.5 G: 2 INJECTION INTRAMUSCULAR; INTRAVENOUS at 09:12

## 2023-12-08 RX ADMIN — ALBUMIN (HUMAN) 100 ML: 12.5 SOLUTION INTRAVENOUS at 08:12

## 2023-12-08 RX ADMIN — PHENYLEPHRINE HYDROCHLORIDE 150 MCG: 10 INJECTION INTRAVENOUS at 10:12

## 2023-12-08 RX ADMIN — FENTANYL CITRATE 100 MCG: 50 INJECTION, SOLUTION INTRAMUSCULAR; INTRAVENOUS at 10:12

## 2023-12-08 RX ADMIN — FENTANYL CITRATE 200 MCG: 50 INJECTION, SOLUTION INTRAMUSCULAR; INTRAVENOUS at 07:12

## 2023-12-08 RX ADMIN — MIDAZOLAM HYDROCHLORIDE 2 MG: 1 INJECTION, SOLUTION INTRAMUSCULAR; INTRAVENOUS at 07:12

## 2023-12-08 RX ADMIN — ROCURONIUM BROMIDE 50 MG: 10 INJECTION INTRAVENOUS at 09:12

## 2023-12-08 RX ADMIN — EPINEPHRINE 0.05 MCG/KG/MIN: 1 INJECTION, SOLUTION, CONCENTRATE INTRAVENOUS at 02:12

## 2023-12-08 RX ADMIN — VASOPRESSIN 0.1 UNITS/MIN: 20 INJECTION INTRAVENOUS at 11:12

## 2023-12-08 RX ADMIN — VANCOMYCIN HYDROCHLORIDE 1500 MG: 1.5 INJECTION, POWDER, LYOPHILIZED, FOR SOLUTION INTRAVENOUS at 09:12

## 2023-12-08 RX ADMIN — VASOPRESSIN 2 UNITS: 20 INJECTION INTRAVENOUS at 06:12

## 2023-12-08 RX ADMIN — ONDANSETRON 1 G: 2 INJECTION INTRAMUSCULAR; INTRAVENOUS at 10:12

## 2023-12-08 RX ADMIN — PROTAMINE SULFATE 250 MG: 10 INJECTION, SOLUTION INTRAVENOUS at 05:12

## 2023-12-08 RX ADMIN — ROCURONIUM BROMIDE 50 MG: 10 INJECTION INTRAVENOUS at 12:12

## 2023-12-08 RX ADMIN — ALBUMIN (HUMAN) 390 ML: 12.5 SOLUTION INTRAVENOUS at 09:12

## 2023-12-08 RX ADMIN — AMINOCAPROIC ACID 5 G: 250 INJECTION, SOLUTION INTRAVENOUS at 08:12

## 2023-12-08 RX ADMIN — ALBUMIN (HUMAN) 500 ML: 12.5 SOLUTION INTRAVENOUS at 05:12

## 2023-12-08 RX ADMIN — PROPOFOL 50 MG: 10 INJECTION, EMULSION INTRAVENOUS at 07:12

## 2023-12-08 RX ADMIN — FENTANYL CITRATE 150 MCG: 50 INJECTION, SOLUTION INTRAMUSCULAR; INTRAVENOUS at 07:12

## 2023-12-08 RX ADMIN — ALBUMIN (HUMAN) 500 ML: 12.5 SOLUTION INTRAVENOUS at 06:12

## 2023-12-08 RX ADMIN — HEPARIN SODIUM 25000 UNITS: 1000 INJECTION, SOLUTION INTRAVENOUS; SUBCUTANEOUS at 10:12

## 2023-12-08 RX ADMIN — MIDAZOLAM HYDROCHLORIDE 2 MG: 1 INJECTION, SOLUTION INTRAMUSCULAR; INTRAVENOUS at 09:12

## 2023-12-08 RX ADMIN — MIDAZOLAM HYDROCHLORIDE 3 MG: 1 INJECTION, SOLUTION INTRAMUSCULAR; INTRAVENOUS at 07:12

## 2023-12-08 RX ADMIN — ONDANSETRON 1 G: 2 INJECTION INTRAMUSCULAR; INTRAVENOUS at 05:12

## 2023-12-08 RX ADMIN — ONDANSETRON 0.5 G: 2 INJECTION INTRAMUSCULAR; INTRAVENOUS at 08:12

## 2023-12-08 RX ADMIN — MILRINONE LACTATE IN DEXTROSE 0.5 MCG/KG/MIN: 200 INJECTION, SOLUTION INTRAVENOUS at 02:12

## 2023-12-08 NOTE — ANESTHESIA PROCEDURE NOTES
Central Line    Diagnosis: Pulm Insuff and Aortic Stenosis congenital  Patient location during procedure: done in OR    Staffing  Authorizing Provider: Kalen Lindsey MD  Performing Provider: Kalen Lindsey MD    Staffing  Performed: anesthesiologist   Anesthesiologist: Kalen Lindsey MD  Performed by: Kalen Lindsey MD  Authorized by: Kalen Lindsey MD    Anesthesiologist was present at the time of the procedure.  Preanesthetic Checklist  Completed: patient identified, IV checked, site marked, risks and benefits discussed, surgical consent, monitors and equipment checked, pre-op evaluation, timeout performed and anesthesia consent given  Indication   Indication: hemodynamic monitoring, vascular access, med administration     Anesthesia   general anesthesia    Central Line   Skin Prep: skin prepped with ChloraPrep, skin prep agent completely dried prior to procedure  Sterile Barriers Followed: Yes    All five maximal barriers used- gloves, gown, cap, mask, and large sterile sheet    hand hygiene performed prior to central venous catheter insertion  Location: right internal jugular.   Catheter type: quad lumen  Catheter Size: 8.5 Fr  Inserted Catheter Length: 16 cm  Ultrasound: vascular probe with ultrasound   Vessel Caliber: large, patent  Vascular Doppler:  not done, compressibility normal  Needle advanced into vessel with real time Ultrasound guidance.  Guidewire confirmed in vessel.  Image recorded and saved.  sterile gel and probe cover used in ultrasound-guided central venous catheter insertion   Manometry: Venous cannualation confirmed by visual estimation of blood vessel pressure using manometry.  Insertion Attempts: 1   Securement:line sutured, chlorhexidine patch, sterile dressing applied and blood return through all ports    Post-Procedure   X-Ray: no pneumothorax on x-ray   Adverse Events:none      Guidewire  Guidewire removed intact, verified with nurse.

## 2023-12-08 NOTE — ANESTHESIA PROCEDURE NOTES
Anesthesia Probe Placement    Diagnosis: Pulm Insuff and Aortic Stenosis congenital  Patient location during procedure: OR    Staffing  Authorizing Provider: Kalen Lindsey MD  Performing Provider: Kalen Lindsey MD    Staffing  Anesthesiologist Present  Yes  Preanesthetic Checklist  Completed: patient identified, risks and benefits discussed, surgical consent, monitors and equipment checked, pre-op evaluation, timeout performed, anesthesia consent given, oxygen available, suction available, hand hygiene performed and patient being monitored  Setup & Induction  Patient preparation: bite block inserted  Probe Insertion: easyStudy to be read by Annalee.

## 2023-12-08 NOTE — HPI
Ms. 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       The patient presents to the SICU s/p AVR and PVR with Dr. Campbell on 12/08/2023. During the procedure the patient was on bypass for 398 minutes and clamped for 138 minutes.     On admission, they are intubated, sedated with Precedex, and in stable condition. Inotropic and vasopressor requirements upon admission are 0.05 of Epinephrine, and 0.1 Vasopressin, 0.25 Milrinone to maintain blood pressure at a MAP 65 and SBP < 120. Central access includes a RIJ CVC, arterial access includes a left radial arterial line. They also have 2 pleural chest tubed and 1 mediastinal chest tube with appropriate output.    Intraoperatively, they received 2,000 mL of NS, 4,965 mL of PRBCs, 441 mL FFP, 1,077mL Platelets, and 475 mL Cryoprecipitate. Urine output intraoperatively was 1,835cc.     Immediate post-operative plans include hemodynamic stabilization and weaning of cardiac and respiratory support. Plan to wean vasopressors and inotropes as tolerated, also wean ventilator support with goal of early extubation, and closely monitor fluid status with strict Is/Os and continued fluid resuscitation as needed.

## 2023-12-08 NOTE — ANESTHESIA PROCEDURE NOTES
Peripheral Block    Patient location during procedure: OR   Block not for primary anesthetic.  Reason for block: at surgeon's request and post-op pain management   Post-op Pain Location: Sternum      Staffing  Authorizing Provider: Kalen Lindsey MD  Performing Provider: Kalen Lindsey MD    Staffing  Performed by: Kalen Lindsey MD  Authorized by: Kalen Lindsey MD    Preanesthetic Checklist  Completed: patient identified, IV checked, site marked, risks and benefits discussed, surgical consent, monitors and equipment checked, pre-op evaluation and timeout performed  Peripheral Block  Patient position: supine  Prep: ChloraPrep  Patient monitoring: heart rate, cardiac monitor, continuous pulse ox, continuous capnometry and frequent blood pressure checks  Block type: Transversus thoracis (transverse thoracic)  Laterality: bilateral  Injection technique: single shot  Needle  Needle type: Stimuplex   Needle gauge: 22 G  Needle length: 2 in  Needle localization: ultrasound guidance   -ultrasound image captured on disc.  Assessment  Injection assessment: negative aspiration, negative parasthesia and local visualized surrounding nerve  Paresthesia pain: none  Heart rate change: no  Slow fractionated injection: yes    Medications:    Medications: ropivacaine (NAROPIN) injection 0.5% - Perineural   30 mL - 12/8/2023 8:45:00 AM    Additional Notes  15cc of 0.5% ropivacaine injected bilaterally under ultrasound guidance.  No evidence of pneumothorax or intravascular injection

## 2023-12-08 NOTE — ANESTHESIA PROCEDURE NOTES
Central Line    Diagnosis: Pulm Insuff and Aortic Stenosis congenital  Patient location during procedure: done in OR    Staffing  Authorizing Provider: Kalen Lindsey MD  Performing Provider: Kalen Lindsey MD    Staffing  Performed: anesthesiologist   Anesthesiologist: Kalen Lindsey MD  Performed by: Kalen Lindsey MD  Authorized by: Kalen Lindsey MD    Anesthesiologist was present at the time of the procedure.  Preanesthetic Checklist  Completed: patient identified, IV checked, site marked, risks and benefits discussed, surgical consent, monitors and equipment checked, pre-op evaluation, timeout performed and anesthesia consent given  Indication   Indication: hemodynamic monitoring, vascular access, med administration     Anesthesia   general anesthesia    Central Line   Skin Prep: skin prepped with ChloraPrep, skin prep agent completely dried prior to procedure  Sterile Barriers Followed: Yes    All five maximal barriers used- gloves, gown, cap, mask, and large sterile sheet    hand hygiene performed prior to central venous catheter insertion  Location: right femoral vein.   Catheter type: single lumen  Catheter Size: 4 Fr  Inserted Catheter Length: 5 cm  Ultrasound: vascular probe with ultrasound   Vessel Caliber: medium, patent  Vascular Doppler:  not done, compressibility normal  Needle advanced into vessel with real time Ultrasound guidance.  Guidewire confirmed in vessel.  Image recorded and saved.  sterile gel and probe cover used in ultrasound-guided central venous catheter insertion   Manometry: Venous cannualation confirmed by visual estimation of blood vessel pressure using manometry.  Insertion Attempts: 1   Securement:line sutured, chlorhexidine patch, sterile dressing applied and blood return through all ports    Post-Procedure   X-Ray: no pneumothorax on x-ray   Adverse Events:none      Guidewire Guidewire removed intact.

## 2023-12-08 NOTE — ASSESSMENT & PLAN NOTE
38-year-old, 127 kg, woman with history of rheumatic heart disease and Ross procedure and mitral valve repair(including ring annuloplasty) in 2000 followed by repair of autograft aneurysm repair and aortic valve replacement with 23mm CE bioprothesis in 2012 who presents for aortic and pulmonary valve replacement on 12/8      Neuro/Psych:   -- Sedation: Precedex  -- Pain: Fentanyl pushes while intubated. Tylenol 650 mg PRN, Morphine 2 mg PRN             Cards:   History of rheumatic fever with rheumatic heart disease . S/p Ross procedure and mitral valve repair(including ring annuloplasty) in 2000   Now s/p aortic and pulmonary valve replacement with Dr. Campbell on 12/8  -- HDS  -- MAPS >65  -- SBP   -- Pressors to maintain blood pressure goals   -- Wean as tolerated       Pulm:   -- Intubated  -- Goal O2 sat > 90%  -- Wean as able  -- Two pleural and one mediastinal chest tube to water seal       Renal:  -- Keep pitts for strict I/O  -- BUN/Cr   -- mIVF LR @ 100   Recent Labs   Lab 12/07/23  1058   BUN 12   CREATININE 0.9     -- Urine output: 1,998 mL       FEN / GI:   -- Net +7 since admission   -- Replace lytes as needed  -- Nutrition: NPO  -- Docusate   -- Famotidine       ID:   -- Tm: afebrile; WBC 12.03 preop. 18.7 post op   Recent Labs   Lab 12/07/23  1058   WBC 12.03   -- Vancomycin two doses in OR       Heme/Onc:  -- H/H stable, 12.6 preop. 13.8 post op.   -- Daily CBC  -- okay to start anticoagulation one day after surgery   -- Received 8 units of pRBC in the OR   Recent Labs   Lab 12/07/23  1058   HGB 12.6            Endo:   -- Gluc goal 140-180  -- POCT 152      PPx:   Feeding: NPO  Analgesia/Sedation: tylenol, morphine, fentanyl /Precedex   Thromboembolic prevention: None  HOB >30: Yes  Stress Ulcer ppx: Famotidine   Glucose control: Critical care goal 140-180 g/dl   Bowel reg: docusate  Invasive Lines/Drains/Airway: PIV x2, Sue, Central line, Pitts, Chest tube x3   Deescalation: dc'ed          Dispo/Code Status/Palliative:   -- SICU / Full Code

## 2023-12-08 NOTE — INTERVAL H&P NOTE
The patient has been examined and the H&P has been reviewed:    I concur with the findings and no changes have occurred since H&P was written.    Surgery risks, benefits and alternative options discussed and understood by patient/family.        Reason for Admission:  Aortic and Pulmonary Stenosis    H&P Update:     I have reviewed the recent outpatient H&P that was performed by our team in preparation for today's surgery and confirmed there are no changes.  I saw the 38-year-old woman, Ms. Megan Cook, this morning and spoke with her and confirmed no changes in the interim.       We will proceed with surgery(AVR and PVR)  today as planned.       Alexandru Campbell MD

## 2023-12-08 NOTE — ANESTHESIA PROCEDURE NOTES
Arterial    Diagnosis: Pulm Insuff and Aortic Stenosis congenital    Patient location during procedure: done in OR    Staffing  Authorizing Provider: Kalen Lindsey MD  Performing Provider: Kalen Lindsey MD    Staffing  Performed by: Kalen Lindesy MD  Authorized by: Kalen Lindsey MD    Anesthesiologist was present at the time of the procedure.    Preanesthetic Checklist  Completed: patient identified, IV checked, site marked, risks and benefits discussed, surgical consent, monitors and equipment checked, pre-op evaluation, timeout performed and anesthesia consent givenArterial  Skin Prep: chlorhexidine gluconate  Local Infiltration: none  Orientation: left  Location: radial    Catheter Size: 20 G  Catheter placement by Anatomical landmarks. Heme positive aspiration all ports. Insertion Attempts: 1  Assessment  Dressing: sutured in place and taped  Patient: Tolerated well

## 2023-12-08 NOTE — ANESTHESIA PROCEDURE NOTES
Arterial    Diagnosis: Pulm Insuff and Aortic Stenosis congenital    Patient location during procedure: done in OR    Staffing  Authorizing Provider: Kalen Lindsey MD  Performing Provider: Kalen Lindsey MD    Staffing  Performed by: Kalen Lindsey MD  Authorized by: Kalen Lindsey MD    Anesthesiologist was present at the time of the procedure.    Preanesthetic Checklist  Completed: patient identified, IV checked, site marked, risks and benefits discussed, surgical consent, monitors and equipment checked, pre-op evaluation, timeout performed and anesthesia consent givenArterial  Skin Prep: chlorhexidine gluconate  Local Infiltration: none  Orientation: left  Location: femoral    Catheter Size: 4 Fr Cook  Catheter placement by Ultrasound guidance. Heme positive aspiration all ports.   Vessel Caliber: medium, patent, compressibility normal  Vascular Doppler:  not done  Needle advanced into vessel with real time Ultrasound guidance.  Guidewire confirmed in vessel.  Sterile sheath used.  Image recorded and saved.Insertion Attempts: 1  Assessment  Dressing: sutured in place and taped  Patient: Tolerated well

## 2023-12-08 NOTE — ANESTHESIA PROCEDURE NOTES
Intubation    Date/Time: 12/8/2023 7:38 AM    Performed by: Tammy Bro CRNA  Authorized by: Kalen Lindsey MD    Intubation:     Induction:  Intravenous    Intubated:  Postinduction    Mask Ventilation:  Easy mask    Attempts:  1    Attempted By:  CRNA    Method of Intubation:  Video laryngoscopy    Blade:  Dutton 3    Laryngeal View Grade: Grade I - full view of cords      Difficult Airway Encountered?: No      Complications:  None    Airway Device:  Oral endotracheal tube    Airway Device Size:  7.5    Style/Cuff Inflation:  Cuffed (inflated to minimal occlusive pressure)    Inflation Amount (mL):  5    Tube secured:  21    Secured at:  The lips    Placement Verified By:  Capnometry    Complicating Factors:  None    Findings Post-Intubation:  BS equal bilateral and atraumatic/condition of teeth unchanged

## 2023-12-09 PROBLEM — R60.1 ANASARCA: Status: ACTIVE | Noted: 2023-12-09

## 2023-12-09 PROBLEM — E66.01 CLASS 3 SEVERE OBESITY DUE TO EXCESS CALORIES WITH SERIOUS COMORBIDITY AND BODY MASS INDEX (BMI) OF 50.0 TO 59.9 IN ADULT: Status: ACTIVE | Noted: 2023-12-09

## 2023-12-09 PROBLEM — N17.9 AKI (ACUTE KIDNEY INJURY): Status: ACTIVE | Noted: 2023-12-09

## 2023-12-09 PROBLEM — R34 OLIGURIA: Status: ACTIVE | Noted: 2023-12-09

## 2023-12-09 PROBLEM — R73.9 TRANSIENT HYPERGLYCEMIA POST PROCEDURE: Status: ACTIVE | Noted: 2023-12-09

## 2023-12-09 PROBLEM — N17.9 ENCOUNTER FOR CONTINUOUS RENAL REPLACEMENT THERAPY (CRRT) FOR ACUTE RENAL FAILURE: Status: ACTIVE | Noted: 2023-12-09

## 2023-12-09 PROBLEM — E87.70 HYPERVOLEMIA: Status: ACTIVE | Noted: 2023-12-09

## 2023-12-09 PROBLEM — E66.813 CLASS 3 SEVERE OBESITY DUE TO EXCESS CALORIES WITH SERIOUS COMORBIDITY AND BODY MASS INDEX (BMI) OF 50.0 TO 59.9 IN ADULT: Status: ACTIVE | Noted: 2023-12-09

## 2023-12-09 PROBLEM — Q25.6 PULMONARY ARTERY STENOSIS OF PERIPHERAL BRANCH AT OR BEYOND THE HILAR BIFURCATION: Status: ACTIVE | Noted: 2023-12-09

## 2023-12-09 LAB
ALBUMIN SERPL BCP-MCNC: 2.6 G/DL (ref 3.5–5.2)
ALBUMIN SERPL BCP-MCNC: 2.8 G/DL (ref 3.5–5.2)
ALBUMIN SERPL BCP-MCNC: 2.9 G/DL (ref 3.5–5.2)
ALBUMIN SERPL BCP-MCNC: 3.3 G/DL (ref 3.5–5.2)
ALLENS TEST: ABNORMAL
ALLENS TEST: NORMAL
ALP SERPL-CCNC: 38 U/L (ref 55–135)
ALP SERPL-CCNC: 40 U/L (ref 55–135)
ALP SERPL-CCNC: 43 U/L (ref 55–135)
ALT SERPL W/O P-5'-P-CCNC: 115 U/L (ref 10–44)
ALT SERPL W/O P-5'-P-CCNC: 26 U/L (ref 10–44)
ALT SERPL W/O P-5'-P-CCNC: 65 U/L (ref 10–44)
ANION GAP SERPL CALC-SCNC: 10 MMOL/L (ref 8–16)
ANION GAP SERPL CALC-SCNC: 12 MMOL/L (ref 8–16)
ANION GAP SERPL CALC-SCNC: 13 MMOL/L (ref 8–16)
ANION GAP SERPL CALC-SCNC: 17 MMOL/L (ref 8–16)
ANISOCYTOSIS BLD QL SMEAR: SLIGHT
APTT PPP: 24.4 SEC (ref 21–32)
APTT PPP: 28.4 SEC (ref 21–32)
APTT PPP: 32.8 SEC (ref 21–32)
AST SERPL-CCNC: 131 U/L (ref 10–40)
AST SERPL-CCNC: 200 U/L (ref 10–40)
AST SERPL-CCNC: 269 U/L (ref 10–40)
BASOPHILS # BLD AUTO: 0.04 K/UL (ref 0–0.2)
BASOPHILS # BLD AUTO: 0.04 K/UL (ref 0–0.2)
BASOPHILS NFR BLD: 0.2 % (ref 0–1.9)
BASOPHILS NFR BLD: 0.2 % (ref 0–1.9)
BILIRUB SERPL-MCNC: 4.7 MG/DL (ref 0.1–1)
BILIRUB SERPL-MCNC: 5.8 MG/DL (ref 0.1–1)
BILIRUB SERPL-MCNC: 6.8 MG/DL (ref 0.1–1)
BUN SERPL-MCNC: 19 MG/DL (ref 6–20)
BUN SERPL-MCNC: 30 MG/DL (ref 6–20)
BUN SERPL-MCNC: 30 MG/DL (ref 6–20)
BUN SERPL-MCNC: 33 MG/DL (ref 6–20)
BURR CELLS BLD QL SMEAR: ABNORMAL
CA-I BLDV-SCNC: 1.33 MMOL/L (ref 1.06–1.42)
CALCIUM SERPL-MCNC: 10.8 MG/DL (ref 8.7–10.5)
CALCIUM SERPL-MCNC: 8.8 MG/DL (ref 8.7–10.5)
CALCIUM SERPL-MCNC: 9.7 MG/DL (ref 8.7–10.5)
CALCIUM SERPL-MCNC: 9.7 MG/DL (ref 8.7–10.5)
CHLORIDE SERPL-SCNC: 115 MMOL/L (ref 95–110)
CHLORIDE SERPL-SCNC: 116 MMOL/L (ref 95–110)
CHLORIDE SERPL-SCNC: 116 MMOL/L (ref 95–110)
CHLORIDE SERPL-SCNC: 117 MMOL/L (ref 95–110)
CK SERPL-CCNC: 4954 U/L (ref 20–180)
CO2 SERPL-SCNC: 16 MMOL/L (ref 23–29)
CO2 SERPL-SCNC: 17 MMOL/L (ref 23–29)
CO2 SERPL-SCNC: 19 MMOL/L (ref 23–29)
CO2 SERPL-SCNC: 21 MMOL/L (ref 23–29)
CREAT SERPL-MCNC: 1.7 MG/DL (ref 0.5–1.4)
CREAT SERPL-MCNC: 2.7 MG/DL (ref 0.5–1.4)
CREAT SERPL-MCNC: 3 MG/DL (ref 0.5–1.4)
CREAT SERPL-MCNC: 3.3 MG/DL (ref 0.5–1.4)
DELSYS: ABNORMAL
DIFFERENTIAL METHOD: ABNORMAL
DIFFERENTIAL METHOD: ABNORMAL
EOSINOPHIL # BLD AUTO: 0.1 K/UL (ref 0–0.5)
EOSINOPHIL # BLD AUTO: 0.1 K/UL (ref 0–0.5)
EOSINOPHIL NFR BLD: 0.3 % (ref 0–8)
EOSINOPHIL NFR BLD: 0.7 % (ref 0–8)
ERYTHROCYTE [DISTWIDTH] IN BLOOD BY AUTOMATED COUNT: 14.6 % (ref 11.5–14.5)
ERYTHROCYTE [DISTWIDTH] IN BLOOD BY AUTOMATED COUNT: 14.6 % (ref 11.5–14.5)
ERYTHROCYTE [DISTWIDTH] IN BLOOD BY AUTOMATED COUNT: 14.7 % (ref 11.5–14.5)
ERYTHROCYTE [SEDIMENTATION RATE] IN BLOOD BY WESTERGREN METHOD: 20 MM/H
ERYTHROCYTE [SEDIMENTATION RATE] IN BLOOD BY WESTERGREN METHOD: 22 MM/H
ERYTHROCYTE [SEDIMENTATION RATE] IN BLOOD BY WESTERGREN METHOD: 22 MM/H
EST. GFR  (NO RACE VARIABLE): 17.8 ML/MIN/1.73 M^2
EST. GFR  (NO RACE VARIABLE): 19.9 ML/MIN/1.73 M^2
EST. GFR  (NO RACE VARIABLE): 22.6 ML/MIN/1.73 M^2
EST. GFR  (NO RACE VARIABLE): 39.4 ML/MIN/1.73 M^2
FIBRINOGEN PPP-MCNC: 223 MG/DL (ref 182–400)
FIBRINOGEN PPP-MCNC: 231 MG/DL (ref 182–400)
FIBRINOGEN PPP-MCNC: 300 MG/DL (ref 182–400)
FIBRINOGEN PPP-MCNC: 305 MG/DL (ref 182–400)
FIO2: 60
FIO2: 70
FIO2: 70
GLUCOSE SERPL-MCNC: 122 MG/DL (ref 70–110)
GLUCOSE SERPL-MCNC: 132 MG/DL (ref 70–110)
GLUCOSE SERPL-MCNC: 136 MG/DL (ref 70–110)
GLUCOSE SERPL-MCNC: 196 MG/DL (ref 70–110)
HCO3 UR-SCNC: 16.3 MMOL/L (ref 24–28)
HCO3 UR-SCNC: 16.9 MMOL/L (ref 24–28)
HCO3 UR-SCNC: 18 MMOL/L (ref 24–28)
HCO3 UR-SCNC: 18.1 MMOL/L (ref 24–28)
HCO3 UR-SCNC: 18.7 MMOL/L (ref 24–28)
HCO3 UR-SCNC: 18.8 MMOL/L (ref 24–28)
HCO3 UR-SCNC: 19 MMOL/L (ref 24–28)
HCO3 UR-SCNC: 19 MMOL/L (ref 24–28)
HCO3 UR-SCNC: 19.4 MMOL/L (ref 24–28)
HCO3 UR-SCNC: 19.7 MMOL/L (ref 24–28)
HCO3 UR-SCNC: 19.7 MMOL/L (ref 24–28)
HCO3 UR-SCNC: 19.9 MMOL/L (ref 24–28)
HCO3 UR-SCNC: 19.9 MMOL/L (ref 24–28)
HCO3 UR-SCNC: 20.5 MMOL/L (ref 24–28)
HCO3 UR-SCNC: 21.1 MMOL/L (ref 24–28)
HCO3 UR-SCNC: 21.3 MMOL/L (ref 24–28)
HCO3 UR-SCNC: 21.9 MMOL/L (ref 24–28)
HCT VFR BLD AUTO: 33.2 % (ref 37–48.5)
HCT VFR BLD AUTO: 35.2 % (ref 37–48.5)
HCT VFR BLD AUTO: 37.6 % (ref 37–48.5)
HCT VFR BLD CALC: 28 %PCV (ref 36–54)
HCT VFR BLD CALC: 28 %PCV (ref 36–54)
HCT VFR BLD CALC: 30 %PCV (ref 36–54)
HCT VFR BLD CALC: 30 %PCV (ref 36–54)
HCT VFR BLD CALC: 31 %PCV (ref 36–54)
HCT VFR BLD CALC: 31 %PCV (ref 36–54)
HCT VFR BLD CALC: 32 %PCV (ref 36–54)
HCT VFR BLD CALC: 33 %PCV (ref 36–54)
HCT VFR BLD CALC: 34 %PCV (ref 36–54)
HCT VFR BLD CALC: 35 %PCV (ref 36–54)
HCT VFR BLD CALC: 35 %PCV (ref 36–54)
HCT VFR BLD CALC: 36 %PCV (ref 36–54)
HCT VFR BLD CALC: 37 %PCV (ref 36–54)
HCT VFR BLD CALC: 37 %PCV (ref 36–54)
HCT VFR BLD CALC: 38 %PCV (ref 36–54)
HCT VFR BLD CALC: 38 %PCV (ref 36–54)
HCT VFR BLD CALC: 40 %PCV (ref 36–54)
HGB BLD-MCNC: 11.9 G/DL (ref 12–16)
HGB BLD-MCNC: 12.3 G/DL (ref 12–16)
HGB BLD-MCNC: 13.6 G/DL (ref 12–16)
IMM GRANULOCYTES # BLD AUTO: 0.13 K/UL (ref 0–0.04)
IMM GRANULOCYTES # BLD AUTO: 0.13 K/UL (ref 0–0.04)
IMM GRANULOCYTES NFR BLD AUTO: 0.6 % (ref 0–0.5)
IMM GRANULOCYTES NFR BLD AUTO: 0.7 % (ref 0–0.5)
INR PPP: 1.1 (ref 0.8–1.2)
INR PPP: 1.2 (ref 0.8–1.2)
INR PPP: 1.2 (ref 0.8–1.2)
LDH SERPL L TO P-CCNC: 0.91 MMOL/L (ref 0.36–1.25)
LDH SERPL L TO P-CCNC: 1.06 MMOL/L (ref 0.36–1.25)
LDH SERPL L TO P-CCNC: 1.09 MMOL/L (ref 0.36–1.25)
LDH SERPL L TO P-CCNC: 1.24 MMOL/L (ref 0.36–1.25)
LDH SERPL L TO P-CCNC: 1.39 MMOL/L (ref 0.36–1.25)
LDH SERPL L TO P-CCNC: 2 MMOL/L (ref 0.36–1.25)
LDH SERPL L TO P-CCNC: 2.6 MMOL/L (ref 0.36–1.25)
LDH SERPL L TO P-CCNC: 2.78 MMOL/L (ref 0.36–1.25)
LDH SERPL L TO P-CCNC: 2.79 MMOL/L (ref 0.36–1.25)
LDH SERPL L TO P-CCNC: 3.44 MMOL/L (ref 0.36–1.25)
LDH SERPL L TO P-CCNC: 4.82 MMOL/L (ref 0.36–1.25)
LDH SERPL L TO P-CCNC: 5.66 MMOL/L (ref 0.36–1.25)
LDH SERPL L TO P-CCNC: 6.26 MMOL/L (ref 0.36–1.25)
LDH SERPL L TO P-CCNC: 6.9 MMOL/L (ref 0.36–1.25)
LDH SERPL L TO P-CCNC: 6.92 MMOL/L (ref 0.36–1.25)
LDH SERPL L TO P-CCNC: 7.14 MMOL/L (ref 0.36–1.25)
LDH SERPL L TO P-CCNC: 7.46 MMOL/L (ref 0.36–1.25)
LYMPHOCYTES # BLD AUTO: 1.3 K/UL (ref 1–4.8)
LYMPHOCYTES # BLD AUTO: 2.1 K/UL (ref 1–4.8)
LYMPHOCYTES NFR BLD: 7.2 % (ref 18–48)
LYMPHOCYTES NFR BLD: 9.7 % (ref 18–48)
MAGNESIUM SERPL-MCNC: 2.2 MG/DL (ref 1.6–2.6)
MAGNESIUM SERPL-MCNC: 2.3 MG/DL (ref 1.6–2.6)
MAGNESIUM SERPL-MCNC: 2.4 MG/DL (ref 1.6–2.6)
MCH RBC QN AUTO: 29.3 PG (ref 27–31)
MCH RBC QN AUTO: 29.8 PG (ref 27–31)
MCH RBC QN AUTO: 29.9 PG (ref 27–31)
MCHC RBC AUTO-ENTMCNC: 34.9 G/DL (ref 32–36)
MCHC RBC AUTO-ENTMCNC: 35.8 G/DL (ref 32–36)
MCHC RBC AUTO-ENTMCNC: 36.2 G/DL (ref 32–36)
MCV RBC AUTO: 83 FL (ref 82–98)
MCV RBC AUTO: 83 FL (ref 82–98)
MCV RBC AUTO: 84 FL (ref 82–98)
MIN VOL: 10.4
MIN VOL: 10.5
MIN VOL: 10.6
MIN VOL: 8.95
MIN VOL: 9.73
MIN VOL: 9.86
MIN VOL: 9.86
MODE: ABNORMAL
MONOCYTES # BLD AUTO: 1.5 K/UL (ref 0.3–1)
MONOCYTES # BLD AUTO: 2.2 K/UL (ref 0.3–1)
MONOCYTES NFR BLD: 10.1 % (ref 4–15)
MONOCYTES NFR BLD: 8.7 % (ref 4–15)
MRSA SPEC QL CULT: NORMAL
NEUTROPHILS # BLD AUTO: 14.5 K/UL (ref 1.8–7.7)
NEUTROPHILS # BLD AUTO: 17.1 K/UL (ref 1.8–7.7)
NEUTROPHILS NFR BLD: 79.1 % (ref 38–73)
NEUTROPHILS NFR BLD: 82.5 % (ref 38–73)
NRBC BLD-RTO: 0 /100 WBC
NRBC BLD-RTO: 0 /100 WBC
PCO2 BLDA: 26.9 MMHG (ref 35–45)
PCO2 BLDA: 27.2 MMHG (ref 35–45)
PCO2 BLDA: 27.8 MMHG (ref 35–45)
PCO2 BLDA: 28 MMHG (ref 35–45)
PCO2 BLDA: 28.5 MMHG (ref 35–45)
PCO2 BLDA: 30.1 MMHG (ref 35–45)
PCO2 BLDA: 31.1 MMHG (ref 35–45)
PCO2 BLDA: 31.6 MMHG (ref 35–45)
PCO2 BLDA: 33 MMHG (ref 35–45)
PCO2 BLDA: 33.5 MMHG (ref 35–45)
PCO2 BLDA: 34.8 MMHG (ref 35–45)
PCO2 BLDA: 35.2 MMHG (ref 35–45)
PCO2 BLDA: 37.2 MMHG (ref 35–45)
PCO2 BLDA: 38.9 MMHG (ref 35–45)
PCO2 BLDA: 39 MMHG (ref 35–45)
PCO2 BLDA: 39 MMHG (ref 35–45)
PCO2 BLDA: 39.5 MMHG (ref 35–45)
PEEP: 8
PH SMN: 7.29 [PH] (ref 7.35–7.45)
PH SMN: 7.3 [PH] (ref 7.35–7.45)
PH SMN: 7.3 [PH] (ref 7.35–7.45)
PH SMN: 7.32 [PH] (ref 7.35–7.45)
PH SMN: 7.33 [PH] (ref 7.35–7.45)
PH SMN: 7.35 [PH] (ref 7.35–7.45)
PH SMN: 7.39 [PH] (ref 7.35–7.45)
PH SMN: 7.39 [PH] (ref 7.35–7.45)
PH SMN: 7.4 [PH] (ref 7.35–7.45)
PH SMN: 7.4 [PH] (ref 7.35–7.45)
PH SMN: 7.41 [PH] (ref 7.35–7.45)
PH SMN: 7.42 [PH] (ref 7.35–7.45)
PH SMN: 7.43 [PH] (ref 7.35–7.45)
PH SMN: 7.44 [PH] (ref 7.35–7.45)
PH SMN: 7.45 [PH] (ref 7.35–7.45)
PHOSPHATE SERPL-MCNC: 2.1 MG/DL (ref 2.7–4.5)
PHOSPHATE SERPL-MCNC: 3.5 MG/DL (ref 2.7–4.5)
PHOSPHATE SERPL-MCNC: 3.6 MG/DL (ref 2.7–4.5)
PIP: 24
PIP: 26
PIP: 26
PIP: 27
PLATELET # BLD AUTO: 35 K/UL (ref 150–450)
PLATELET # BLD AUTO: 37 K/UL (ref 150–450)
PLATELET # BLD AUTO: 66 K/UL (ref 150–450)
PLATELET BLD QL SMEAR: ABNORMAL
PLATELET BLD QL SMEAR: ABNORMAL
PMV BLD AUTO: 9.9 FL (ref 9.2–12.9)
PMV BLD AUTO: ABNORMAL FL (ref 9.2–12.9)
PMV BLD AUTO: ABNORMAL FL (ref 9.2–12.9)
PO2 BLDA: 100 MMHG (ref 80–100)
PO2 BLDA: 62 MMHG (ref 80–100)
PO2 BLDA: 65 MMHG (ref 80–100)
PO2 BLDA: 66 MMHG (ref 80–100)
PO2 BLDA: 67 MMHG (ref 80–100)
PO2 BLDA: 72 MMHG (ref 80–100)
PO2 BLDA: 72 MMHG (ref 80–100)
PO2 BLDA: 73 MMHG (ref 80–100)
PO2 BLDA: 73 MMHG (ref 80–100)
PO2 BLDA: 74 MMHG (ref 80–100)
PO2 BLDA: 76 MMHG (ref 80–100)
PO2 BLDA: 76 MMHG (ref 80–100)
PO2 BLDA: 80 MMHG (ref 80–100)
PO2 BLDA: 81 MMHG (ref 80–100)
PO2 BLDA: 81 MMHG (ref 80–100)
PO2 BLDA: 85 MMHG (ref 80–100)
PO2 BLDA: 88 MMHG (ref 80–100)
POC BE: -2 MMOL/L
POC BE: -4 MMOL/L
POC BE: -5 MMOL/L
POC BE: -5 MMOL/L
POC BE: -6 MMOL/L
POC BE: -7 MMOL/L
POC BE: -8 MMOL/L
POC BE: -8 MMOL/L
POC BE: -9 MMOL/L
POC IONIZED CALCIUM: 1.21 MMOL/L (ref 1.06–1.42)
POC IONIZED CALCIUM: 1.23 MMOL/L (ref 1.06–1.42)
POC IONIZED CALCIUM: 1.26 MMOL/L (ref 1.06–1.42)
POC IONIZED CALCIUM: 1.29 MMOL/L (ref 1.06–1.42)
POC IONIZED CALCIUM: 1.29 MMOL/L (ref 1.06–1.42)
POC IONIZED CALCIUM: 1.31 MMOL/L (ref 1.06–1.42)
POC IONIZED CALCIUM: 1.35 MMOL/L (ref 1.06–1.42)
POC IONIZED CALCIUM: 1.39 MMOL/L (ref 1.06–1.42)
POC IONIZED CALCIUM: 1.41 MMOL/L (ref 1.06–1.42)
POC IONIZED CALCIUM: 1.43 MMOL/L (ref 1.06–1.42)
POC IONIZED CALCIUM: 1.47 MMOL/L (ref 1.06–1.42)
POC IONIZED CALCIUM: 1.48 MMOL/L (ref 1.06–1.42)
POC IONIZED CALCIUM: 1.49 MMOL/L (ref 1.06–1.42)
POC IONIZED CALCIUM: 1.5 MMOL/L (ref 1.06–1.42)
POC IONIZED CALCIUM: 1.51 MMOL/L (ref 1.06–1.42)
POC IONIZED CALCIUM: 1.52 MMOL/L (ref 1.06–1.42)
POC IONIZED CALCIUM: 1.52 MMOL/L (ref 1.06–1.42)
POC SATURATED O2: 93 % (ref 95–100)
POC SATURATED O2: 94 % (ref 95–100)
POC SATURATED O2: 94 % (ref 95–100)
POC SATURATED O2: 95 % (ref 95–100)
POC SATURATED O2: 96 % (ref 95–100)
POC SATURATED O2: 97 % (ref 95–100)
POC SATURATED O2: 97 % (ref 95–100)
POC TCO2: 17 MMOL/L (ref 23–27)
POC TCO2: 18 MMOL/L (ref 23–27)
POC TCO2: 19 MMOL/L (ref 23–27)
POC TCO2: 19 MMOL/L (ref 23–27)
POC TCO2: 20 MMOL/L (ref 23–27)
POC TCO2: 21 MMOL/L (ref 23–27)
POC TCO2: 22 MMOL/L (ref 23–27)
POC TCO2: 22 MMOL/L (ref 23–27)
POC TCO2: 23 MMOL/L (ref 23–27)
POCT GLUCOSE: 100 MG/DL (ref 70–110)
POCT GLUCOSE: 112 MG/DL (ref 70–110)
POCT GLUCOSE: 119 MG/DL (ref 70–110)
POCT GLUCOSE: 121 MG/DL (ref 70–110)
POCT GLUCOSE: 122 MG/DL (ref 70–110)
POCT GLUCOSE: 124 MG/DL (ref 70–110)
POCT GLUCOSE: 125 MG/DL (ref 70–110)
POCT GLUCOSE: 126 MG/DL (ref 70–110)
POCT GLUCOSE: 127 MG/DL (ref 70–110)
POCT GLUCOSE: 129 MG/DL (ref 70–110)
POCT GLUCOSE: 134 MG/DL (ref 70–110)
POCT GLUCOSE: 137 MG/DL (ref 70–110)
POCT GLUCOSE: 138 MG/DL (ref 70–110)
POCT GLUCOSE: 143 MG/DL (ref 70–110)
POCT GLUCOSE: 152 MG/DL (ref 70–110)
POCT GLUCOSE: 154 MG/DL (ref 70–110)
POCT GLUCOSE: 156 MG/DL (ref 70–110)
POCT GLUCOSE: 166 MG/DL (ref 70–110)
POCT GLUCOSE: 189 MG/DL (ref 70–110)
POCT GLUCOSE: 198 MG/DL (ref 70–110)
POCT GLUCOSE: 208 MG/DL (ref 70–110)
POCT GLUCOSE: 220 MG/DL (ref 70–110)
POCT GLUCOSE: 94 MG/DL (ref 70–110)
POCT GLUCOSE: 95 MG/DL (ref 70–110)
POTASSIUM BLD-SCNC: 2.9 MMOL/L (ref 3.5–5.1)
POTASSIUM BLD-SCNC: 3 MMOL/L (ref 3.5–5.1)
POTASSIUM BLD-SCNC: 3 MMOL/L (ref 3.5–5.1)
POTASSIUM BLD-SCNC: 3.3 MMOL/L (ref 3.5–5.1)
POTASSIUM BLD-SCNC: 3.3 MMOL/L (ref 3.5–5.1)
POTASSIUM BLD-SCNC: 3.5 MMOL/L (ref 3.5–5.1)
POTASSIUM BLD-SCNC: 3.5 MMOL/L (ref 3.5–5.1)
POTASSIUM BLD-SCNC: 3.6 MMOL/L (ref 3.5–5.1)
POTASSIUM BLD-SCNC: 3.7 MMOL/L (ref 3.5–5.1)
POTASSIUM BLD-SCNC: 3.8 MMOL/L (ref 3.5–5.1)
POTASSIUM BLD-SCNC: 4.1 MMOL/L (ref 3.5–5.1)
POTASSIUM BLD-SCNC: 4.5 MMOL/L (ref 3.5–5.1)
POTASSIUM SERPL-SCNC: 3.1 MMOL/L (ref 3.5–5.1)
POTASSIUM SERPL-SCNC: 4 MMOL/L (ref 3.5–5.1)
POTASSIUM SERPL-SCNC: 4.2 MMOL/L (ref 3.5–5.1)
POTASSIUM SERPL-SCNC: 4.2 MMOL/L (ref 3.5–5.1)
POTASSIUM SERPL-SCNC: 4.5 MMOL/L (ref 3.5–5.1)
PROT SERPL-MCNC: 4.4 G/DL (ref 6–8.4)
PROT SERPL-MCNC: 4.5 G/DL (ref 6–8.4)
PROT SERPL-MCNC: 5.3 G/DL (ref 6–8.4)
PROTHROMBIN TIME: 11.9 SEC (ref 9–12.5)
PROTHROMBIN TIME: 12.3 SEC (ref 9–12.5)
PROTHROMBIN TIME: 12.4 SEC (ref 9–12.5)
RBC # BLD AUTO: 3.99 M/UL (ref 4–5.4)
RBC # BLD AUTO: 4.2 M/UL (ref 4–5.4)
RBC # BLD AUTO: 4.55 M/UL (ref 4–5.4)
SAMPLE: ABNORMAL
SAMPLE: NORMAL
SCHISTOCYTES BLD QL SMEAR: ABNORMAL
SITE: ABNORMAL
SITE: NORMAL
SODIUM BLD-SCNC: 143 MMOL/L (ref 136–145)
SODIUM BLD-SCNC: 146 MMOL/L (ref 136–145)
SODIUM BLD-SCNC: 146 MMOL/L (ref 136–145)
SODIUM BLD-SCNC: 147 MMOL/L (ref 136–145)
SODIUM BLD-SCNC: 148 MMOL/L (ref 136–145)
SODIUM BLD-SCNC: 148 MMOL/L (ref 136–145)
SODIUM BLD-SCNC: 149 MMOL/L (ref 136–145)
SODIUM BLD-SCNC: 150 MMOL/L (ref 136–145)
SODIUM BLD-SCNC: 151 MMOL/L (ref 136–145)
SODIUM BLD-SCNC: 151 MMOL/L (ref 136–145)
SODIUM SERPL-SCNC: 145 MMOL/L (ref 136–145)
SODIUM SERPL-SCNC: 147 MMOL/L (ref 136–145)
SODIUM SERPL-SCNC: 148 MMOL/L (ref 136–145)
SODIUM SERPL-SCNC: 149 MMOL/L (ref 136–145)
SP02: 93
SP02: 94
SP02: 95
SP02: 96
VANCOMYCIN SERPL-MCNC: 24.5 UG/ML
VT: 500
WBC # BLD AUTO: 17.62 K/UL (ref 3.9–12.7)
WBC # BLD AUTO: 19.14 K/UL (ref 3.9–12.7)
WBC # BLD AUTO: 21.54 K/UL (ref 3.9–12.7)

## 2023-12-09 PROCEDURE — 85730 THROMBOPLASTIN TIME PARTIAL: CPT | Mod: 91 | Performed by: THORACIC SURGERY (CARDIOTHORACIC VASCULAR SURGERY)

## 2023-12-09 PROCEDURE — 99223 PR INITIAL HOSPITAL CARE,LEVL III: ICD-10-PCS | Mod: ,,, | Performed by: NURSE PRACTITIONER

## 2023-12-09 PROCEDURE — 82800 BLOOD PH: CPT

## 2023-12-09 PROCEDURE — 85730 THROMBOPLASTIN TIME PARTIAL: CPT | Performed by: PHYSICIAN ASSISTANT

## 2023-12-09 PROCEDURE — 82565 ASSAY OF CREATININE: CPT

## 2023-12-09 PROCEDURE — 63600175 PHARM REV CODE 636 W HCPCS

## 2023-12-09 PROCEDURE — 85384 FIBRINOGEN ACTIVITY: CPT | Mod: 91 | Performed by: PHYSICIAN ASSISTANT

## 2023-12-09 PROCEDURE — 93010 ELECTROCARDIOGRAM REPORT: CPT | Mod: ,,, | Performed by: INTERNAL MEDICINE

## 2023-12-09 PROCEDURE — 99291 PR CRITICAL CARE, E/M 30-74 MINUTES: ICD-10-PCS | Mod: ,,, | Performed by: INTERNAL MEDICINE

## 2023-12-09 PROCEDURE — 93005 ELECTROCARDIOGRAM TRACING: CPT

## 2023-12-09 PROCEDURE — 83735 ASSAY OF MAGNESIUM: CPT | Mod: 91

## 2023-12-09 PROCEDURE — 82803 BLOOD GASES ANY COMBINATION: CPT

## 2023-12-09 PROCEDURE — 84132 ASSAY OF SERUM POTASSIUM: CPT

## 2023-12-09 PROCEDURE — 84295 ASSAY OF SERUM SODIUM: CPT

## 2023-12-09 PROCEDURE — 85610 PROTHROMBIN TIME: CPT | Performed by: PHYSICIAN ASSISTANT

## 2023-12-09 PROCEDURE — 93010 EKG 12-LEAD: ICD-10-PCS | Mod: ,,, | Performed by: INTERNAL MEDICINE

## 2023-12-09 PROCEDURE — 25000003 PHARM REV CODE 250

## 2023-12-09 PROCEDURE — 83605 ASSAY OF LACTIC ACID: CPT

## 2023-12-09 PROCEDURE — 85730 THROMBOPLASTIN TIME PARTIAL: CPT | Mod: 91

## 2023-12-09 PROCEDURE — 63600175 PHARM REV CODE 636 W HCPCS: Performed by: ANESTHESIOLOGY

## 2023-12-09 PROCEDURE — P9045 ALBUMIN (HUMAN), 5%, 250 ML: HCPCS | Mod: JZ,JG

## 2023-12-09 PROCEDURE — 83735 ASSAY OF MAGNESIUM: CPT | Performed by: PHYSICIAN ASSISTANT

## 2023-12-09 PROCEDURE — 85014 HEMATOCRIT: CPT

## 2023-12-09 PROCEDURE — 85384 FIBRINOGEN ACTIVITY: CPT | Performed by: PHYSICIAN ASSISTANT

## 2023-12-09 PROCEDURE — 37799 UNLISTED PX VASCULAR SURGERY: CPT

## 2023-12-09 PROCEDURE — 90945 DIALYSIS ONE EVALUATION: CPT

## 2023-12-09 PROCEDURE — 99223 1ST HOSP IP/OBS HIGH 75: CPT | Mod: ,,, | Performed by: NURSE PRACTITIONER

## 2023-12-09 PROCEDURE — 85384 FIBRINOGEN ACTIVITY: CPT | Mod: 91

## 2023-12-09 PROCEDURE — 27100171 HC OXYGEN HIGH FLOW UP TO 24 HOURS

## 2023-12-09 PROCEDURE — 85025 COMPLETE CBC W/AUTO DIFF WBC: CPT

## 2023-12-09 PROCEDURE — 99900026 HC AIRWAY MAINTENANCE (STAT)

## 2023-12-09 PROCEDURE — 85025 COMPLETE CBC W/AUTO DIFF WBC: CPT | Mod: 91 | Performed by: THORACIC SURGERY (CARDIOTHORACIC VASCULAR SURGERY)

## 2023-12-09 PROCEDURE — 94003 VENT MGMT INPAT SUBQ DAY: CPT

## 2023-12-09 PROCEDURE — 86022 PLATELET ANTIBODIES: CPT

## 2023-12-09 PROCEDURE — 63600175 PHARM REV CODE 636 W HCPCS: Mod: JZ,JG

## 2023-12-09 PROCEDURE — 99900035 HC TECH TIME PER 15 MIN (STAT)

## 2023-12-09 PROCEDURE — 82330 ASSAY OF CALCIUM: CPT

## 2023-12-09 PROCEDURE — 99223 PR INITIAL HOSPITAL CARE,LEVL III: ICD-10-PCS | Mod: ,,, | Performed by: HOSPITALIST

## 2023-12-09 PROCEDURE — 25000003 PHARM REV CODE 250: Performed by: ANESTHESIOLOGY

## 2023-12-09 PROCEDURE — 80069 RENAL FUNCTION PANEL: CPT

## 2023-12-09 PROCEDURE — 20000000 HC ICU ROOM

## 2023-12-09 PROCEDURE — 83735 ASSAY OF MAGNESIUM: CPT | Mod: 91 | Performed by: PHYSICIAN ASSISTANT

## 2023-12-09 PROCEDURE — 99291 CRITICAL CARE FIRST HOUR: CPT | Mod: ,,, | Performed by: INTERNAL MEDICINE

## 2023-12-09 PROCEDURE — 25000003 PHARM REV CODE 250: Performed by: STUDENT IN AN ORGANIZED HEALTH CARE EDUCATION/TRAINING PROGRAM

## 2023-12-09 PROCEDURE — 99223 1ST HOSP IP/OBS HIGH 75: CPT | Mod: ,,, | Performed by: HOSPITALIST

## 2023-12-09 PROCEDURE — 63600175 PHARM REV CODE 636 W HCPCS: Performed by: PHYSICIAN ASSISTANT

## 2023-12-09 PROCEDURE — 80202 ASSAY OF VANCOMYCIN: CPT | Performed by: THORACIC SURGERY (CARDIOTHORACIC VASCULAR SURGERY)

## 2023-12-09 PROCEDURE — 80053 COMPREHEN METABOLIC PANEL: CPT | Performed by: PHYSICIAN ASSISTANT

## 2023-12-09 PROCEDURE — 82550 ASSAY OF CK (CPK): CPT | Performed by: HOSPITALIST

## 2023-12-09 PROCEDURE — 84100 ASSAY OF PHOSPHORUS: CPT | Performed by: PHYSICIAN ASSISTANT

## 2023-12-09 PROCEDURE — 94761 N-INVAS EAR/PLS OXIMETRY MLT: CPT | Mod: XB

## 2023-12-09 PROCEDURE — 85610 PROTHROMBIN TIME: CPT | Mod: 91

## 2023-12-09 PROCEDURE — 85027 COMPLETE CBC AUTOMATED: CPT | Performed by: PHYSICIAN ASSISTANT

## 2023-12-09 PROCEDURE — 27000923 HC TRIALYSIS CATHETER, ANY SIZE

## 2023-12-09 PROCEDURE — 85610 PROTHROMBIN TIME: CPT | Mod: 91 | Performed by: THORACIC SURGERY (CARDIOTHORACIC VASCULAR SURGERY)

## 2023-12-09 PROCEDURE — 84100 ASSAY OF PHOSPHORUS: CPT | Mod: 91 | Performed by: PHYSICIAN ASSISTANT

## 2023-12-09 PROCEDURE — 84132 ASSAY OF SERUM POTASSIUM: CPT | Performed by: PHYSICIAN ASSISTANT

## 2023-12-09 PROCEDURE — 63600175 PHARM REV CODE 636 W HCPCS: Performed by: STUDENT IN AN ORGANIZED HEALTH CARE EDUCATION/TRAINING PROGRAM

## 2023-12-09 PROCEDURE — 27000491 HC MATTRESS, SOFT CARE OVERLAY

## 2023-12-09 PROCEDURE — 25000003 PHARM REV CODE 250: Performed by: PHYSICIAN ASSISTANT

## 2023-12-09 PROCEDURE — 80053 COMPREHEN METABOLIC PANEL: CPT | Mod: 91 | Performed by: INTERNAL MEDICINE

## 2023-12-09 RX ORDER — FENTANYL CITRATE 50 UG/ML
50 INJECTION, SOLUTION INTRAMUSCULAR; INTRAVENOUS
Status: DISCONTINUED | OUTPATIENT
Start: 2023-12-09 | End: 2023-12-10

## 2023-12-09 RX ORDER — FAMOTIDINE 10 MG/ML
20 INJECTION INTRAVENOUS DAILY
Status: DISCONTINUED | OUTPATIENT
Start: 2023-12-10 | End: 2023-12-10

## 2023-12-09 RX ORDER — FUROSEMIDE 10 MG/ML
40 INJECTION INTRAMUSCULAR; INTRAVENOUS ONCE
Status: COMPLETED | OUTPATIENT
Start: 2023-12-09 | End: 2023-12-09

## 2023-12-09 RX ORDER — INDOMETHACIN 25 MG/1
50 CAPSULE ORAL ONCE
Status: DISCONTINUED | OUTPATIENT
Start: 2023-12-09 | End: 2023-12-11

## 2023-12-09 RX ORDER — HEPARIN SODIUM,PORCINE/D5W 25000/250
0-40 INTRAVENOUS SOLUTION INTRAVENOUS CONTINUOUS
Status: DISCONTINUED | OUTPATIENT
Start: 2023-12-09 | End: 2023-12-09

## 2023-12-09 RX ORDER — PROPOFOL 10 MG/ML
0-50 INJECTION, EMULSION INTRAVENOUS CONTINUOUS
Status: DISCONTINUED | OUTPATIENT
Start: 2023-12-09 | End: 2023-12-13

## 2023-12-09 RX ORDER — ALBUMIN HUMAN 50 G/1000ML
SOLUTION INTRAVENOUS
Status: DISPENSED
Start: 2023-12-09 | End: 2023-12-09

## 2023-12-09 RX ORDER — FUROSEMIDE 10 MG/ML
INJECTION INTRAMUSCULAR; INTRAVENOUS
Status: COMPLETED
Start: 2023-12-09 | End: 2023-12-09

## 2023-12-09 RX ORDER — SODIUM CHLORIDE, SODIUM LACTATE, POTASSIUM CHLORIDE, CALCIUM CHLORIDE 600; 310; 30; 20 MG/100ML; MG/100ML; MG/100ML; MG/100ML
INJECTION, SOLUTION INTRAVENOUS CONTINUOUS
Status: DISCONTINUED | OUTPATIENT
Start: 2023-12-09 | End: 2023-12-10

## 2023-12-09 RX ORDER — MAGNESIUM SULFATE HEPTAHYDRATE 40 MG/ML
2 INJECTION, SOLUTION INTRAVENOUS
Status: DISCONTINUED | OUTPATIENT
Start: 2023-12-09 | End: 2023-12-10

## 2023-12-09 RX ORDER — ALBUMIN HUMAN 50 G/1000ML
25 SOLUTION INTRAVENOUS ONCE
Status: COMPLETED | OUTPATIENT
Start: 2023-12-09 | End: 2023-12-09

## 2023-12-09 RX ORDER — HYDROCODONE BITARTRATE AND ACETAMINOPHEN 500; 5 MG/1; MG/1
TABLET ORAL CONTINUOUS
Status: DISCONTINUED | OUTPATIENT
Start: 2023-12-09 | End: 2023-12-10

## 2023-12-09 RX ORDER — NOREPINEPHRINE BITARTRATE/D5W 4MG/250ML
0-3 PLASTIC BAG, INJECTION (ML) INTRAVENOUS CONTINUOUS
Status: DISCONTINUED | OUTPATIENT
Start: 2023-12-09 | End: 2023-12-10

## 2023-12-09 RX ADMIN — POTASSIUM CHLORIDE 20 MEQ: 200 INJECTION, SOLUTION INTRAVENOUS at 06:12

## 2023-12-09 RX ADMIN — PROPOFOL 35 MCG/KG/MIN: 10 INJECTION, EMULSION INTRAVENOUS at 03:12

## 2023-12-09 RX ADMIN — INSULIN HUMAN 1.5 UNITS/HR: 1 INJECTION, SOLUTION INTRAVENOUS at 05:12

## 2023-12-09 RX ADMIN — FUROSEMIDE 160 MG: 10 INJECTION, SOLUTION INTRAMUSCULAR; INTRAVENOUS at 10:12

## 2023-12-09 RX ADMIN — SODIUM CHLORIDE: 9 INJECTION, SOLUTION INTRAVENOUS at 08:12

## 2023-12-09 RX ADMIN — DEXMEDETOMIDINE HYDROCHLORIDE 1 MCG/KG/HR: 4 INJECTION INTRAVENOUS at 01:12

## 2023-12-09 RX ADMIN — DEXMEDETOMIDINE HYDROCHLORIDE 1 MCG/KG/HR: 4 INJECTION INTRAVENOUS at 05:12

## 2023-12-09 RX ADMIN — FENTANYL CITRATE 50 MCG: 50 INJECTION INTRAMUSCULAR; INTRAVENOUS at 12:12

## 2023-12-09 RX ADMIN — ALBUMIN (HUMAN) 25 G: 12.5 SOLUTION INTRAVENOUS at 12:12

## 2023-12-09 RX ADMIN — FENTANYL CITRATE 50 MCG: 50 INJECTION INTRAMUSCULAR; INTRAVENOUS at 09:12

## 2023-12-09 RX ADMIN — PROPOFOL 25 MCG/KG/MIN: 10 INJECTION, EMULSION INTRAVENOUS at 07:12

## 2023-12-09 RX ADMIN — DEXMEDETOMIDINE HYDROCHLORIDE 1.4 MCG/KG/HR: 4 INJECTION INTRAVENOUS at 11:12

## 2023-12-09 RX ADMIN — DEXMEDETOMIDINE HYDROCHLORIDE 1.2 MCG/KG/HR: 4 INJECTION INTRAVENOUS at 08:12

## 2023-12-09 RX ADMIN — ALBUMIN (HUMAN) 25 G: 12.5 SOLUTION INTRAVENOUS at 07:12

## 2023-12-09 RX ADMIN — SODIUM CHLORIDE 10 MG/HR: 9 INJECTION, SOLUTION INTRAVENOUS at 08:12

## 2023-12-09 RX ADMIN — MUPIROCIN: 20 OINTMENT TOPICAL at 09:12

## 2023-12-09 RX ADMIN — HEPARIN SODIUM 12 UNITS/KG/HR: 10000 INJECTION, SOLUTION INTRAVENOUS at 10:12

## 2023-12-09 RX ADMIN — MUPIROCIN: 20 OINTMENT TOPICAL at 08:12

## 2023-12-09 RX ADMIN — SODIUM PHOSPHATE, MONOBASIC, MONOHYDRATE AND SODIUM PHOSPHATE, DIBASIC, ANHYDROUS 20.01 MMOL: 142; 276 INJECTION, SOLUTION INTRAVENOUS at 08:12

## 2023-12-09 RX ADMIN — CHLOROTHIAZIDE SODIUM 500 MG: 500 INJECTION, POWDER, LYOPHILIZED, FOR SOLUTION INTRAVENOUS at 10:12

## 2023-12-09 RX ADMIN — FAMOTIDINE 20 MG: 10 INJECTION, SOLUTION INTRAVENOUS at 12:12

## 2023-12-09 RX ADMIN — NOREPINEPHRINE BITARTRATE 0.02 MCG/KG/MIN: 4 INJECTION, SOLUTION INTRAVENOUS at 09:12

## 2023-12-09 RX ADMIN — FUROSEMIDE 40 MG: 10 INJECTION INTRAMUSCULAR; INTRAVENOUS at 06:12

## 2023-12-09 RX ADMIN — PROPOFOL 20 MCG/KG/MIN: 10 INJECTION, EMULSION INTRAVENOUS at 12:12

## 2023-12-09 RX ADMIN — EPINEPHRINE 0.04 MCG/KG/MIN: 1 INJECTION INTRAMUSCULAR; INTRAVENOUS; SUBCUTANEOUS at 10:12

## 2023-12-09 RX ADMIN — FAMOTIDINE 20 MG: 10 INJECTION, SOLUTION INTRAVENOUS at 08:12

## 2023-12-09 RX ADMIN — FENTANYL CITRATE 50 MCG: 50 INJECTION INTRAMUSCULAR; INTRAVENOUS at 01:12

## 2023-12-09 RX ADMIN — MILRINONE LACTATE IN DEXTROSE 0.25 MCG/KG/MIN: 200 INJECTION, SOLUTION INTRAVENOUS at 05:12

## 2023-12-09 RX ADMIN — MUPIROCIN: 20 OINTMENT TOPICAL at 12:12

## 2023-12-09 RX ADMIN — PROPOFOL 35 MCG/KG/MIN: 10 INJECTION, EMULSION INTRAVENOUS at 11:12

## 2023-12-09 RX ADMIN — DEXMEDETOMIDINE HYDROCHLORIDE 0.8 MCG/KG/HR: 4 INJECTION INTRAVENOUS at 01:12

## 2023-12-09 RX ADMIN — FUROSEMIDE 40 MG: 10 INJECTION, SOLUTION INTRAVENOUS at 06:12

## 2023-12-09 RX ADMIN — POTASSIUM CHLORIDE 40 MEQ: 29.8 INJECTION, SOLUTION INTRAVENOUS at 04:12

## 2023-12-09 NOTE — PLAN OF CARE
Pt follows commands, moves all extremities. Pt on 70% and 8 PEEP, O2 97%. HR  ST. MAP >65, SBP . Last CVP 11.   Gtts: Epi, Milrinone, Vaso, Precedex, Insulin off.   OG to LIWS. 2 pleural and 1 med chest tube with sanguineous output. UOP 0-5cc/h. See notes. MD's aware.  40mg lasix given and gtt ordered.  Labs trended, lytes replaced per order.   500cc albumin given. VSS at this time. Will continue to monitor. See flowsheet for full assessment details.

## 2023-12-09 NOTE — PROGRESS NOTES
"Pt transported to SICU [address]28 with portable telemetryAmbubag" in use. Pt connected to ICU monitor Ventilator. Charge RN, CTS/SICU residents, and RT called to bedside for patient arrival. Report received from Anesthesia and MD Adrian. New orders received and implemented. Labs sent.       "

## 2023-12-09 NOTE — SUBJECTIVE & OBJECTIVE
Interval History/Significant Events: UOP dropped off, got 0.5L albumin, no change, started mIVF. UOP still 0 for last few hours. Vaso 0.06-0.1, currently 0.04. Epi 0.04, milrinone 0.25.     Follow-up For: Procedure(s) (LRB):  Replacement-valve-aortic (N/A)  REPLACEMENT, PULMONARY VALVE (N/A)    Post-Operative Day: 1 Day Post-Op    Objective:     Vital Signs (Most Recent):  Temp: 98.8 °F (37.1 °C) (12/09/23 1425)  Pulse: 105 (12/09/23 1425)  Resp: (!) 43 (12/09/23 1425)  BP: 127/64 (12/09/23 1400)  SpO2: 95 % (12/09/23 1425) Vital Signs (24h Range):  Temp:  [97.3 °F (36.3 °C)-99 °F (37.2 °C)] 98.8 °F (37.1 °C)  Pulse:  [] 105  Resp:  [20-43] 43  SpO2:  [92 %-100 %] 95 %  BP: (111-149)/(58-71) 127/64  Arterial Line BP: ()/(47-73) 111/66     Weight: (!) 143.8 kg (317 lb 0.3 oz)  Body mass index is 52.76 kg/m².      Intake/Output Summary (Last 24 hours) at 12/9/2023 1511  Last data filed at 12/9/2023 1400  Gross per 24 hour   Intake 97071.8 ml   Output 1466 ml   Net 9324.8 ml            Physical Exam  Vitals and nursing note reviewed.   Constitutional:       Appearance: Normal appearance. She is obese.   HENT:      Head: Normocephalic and atraumatic.      Mouth/Throat:      Mouth: Mucous membranes are moist.   Cardiovascular:      Rate and Rhythm: Normal rate and regular rhythm.      Pulses: Normal pulses.   Pulmonary:      Effort: Pulmonary effort is normal.      Breath sounds: Normal breath sounds.      Comments: Patient is intubated. Symmetrical chest rise.    Vent Mode: A/C  Oxygen Concentration (%):  [] 70  Resp Rate Total:  [22 br/min-34 br/min] 23 br/min  Vt Set:  [500 mL] 500 mL  PEEP/CPAP:  [7 cmH20-8 cmH20] 8 cmH20  Mean Airway Pressure:  [14 tjC27-02 cmH20] 14 cmH20     Chest:      Comments: Three chest tubes in midline   Abdominal:      General: Abdomen is flat.      Palpations: Abdomen is soft.   Musculoskeletal:         General: Normal range of motion.      Cervical back: Normal range of  motion.   Skin:     General: Skin is warm and dry.      Capillary Refill: Capillary refill takes less than 2 seconds.   Neurological:      General: No focal deficit present.      Mental Status: She is alert and oriented to person, place, and time.   Psychiatric:         Mood and Affect: Mood normal.         Behavior: Behavior normal.            Vents:  Vent Mode: A/C (12/09/23 1425)  Set Rate: 20 BPM (12/09/23 1425)  Vt Set: 500 mL (12/09/23 1425)  PEEP/CPAP: 8 cmH20 (12/09/23 1425)  Oxygen Concentration (%): 60 (12/09/23 1425)  Peak Airway Pressure: 27 cmH20 (12/09/23 1425)  Plateau Pressure: 0 cmH20 (12/09/23 1425)  Total Ve: 10.5 L/m (12/09/23 1425)  Negative Inspiratory Force (cm H2O): 0 (12/09/23 1425)  F/VT Ratio<105 (RSBI): (!) 99.54 (12/09/23 1425)    Lines/Drains/Airways       Central Venous Catheter Line  Duration             Percutaneous Central Line Insertion/Assessment - Quad Lumen  12/08/23 0949 Internal Jugular Right 1 day    Trialysis (Dialysis) Catheter 12/09/23 1327 left internal jugular <1 day              Drain  Duration                  NG/OG Tube 12/08/23  1 day         Urethral Catheter 12/08/23 0854 Temperature probe;Silicone;Non-latex 14 Fr. 1 day         Chest Tube 12/08/23 2059 Tube - 3 Mediastinal 24 Fr. <1 day         Y Chest Tube 1 and 2 12/08/23 2058 1 Right Pleural 24 Fr. 2 Left Pleural 24 Fr. <1 day              Airway  Duration                  Airway - Non-Surgical 12/08/23 0738 1 day              Arterial Line  Duration             Arterial Line 12/09/23 0809 Left Radial <1 day              Peripheral Intravenous Line  Duration                  Peripheral IV - Single Lumen 12/08/23 0644 20 G Left Hand 1 day         Peripheral IV - Single Lumen 12/08/23 0747 16 G Right Forearm 1 day                    Significant Labs:    CBC/Anemia Profile:  Recent Labs   Lab 12/08/23  2232 12/08/23  2234 12/09/23  0315 12/09/23  0408 12/09/23  1033 12/09/23  1225 12/09/23  1425   WBC 18.74*  --   19.14*  --  17.62*  --   --    HGB 13.8  --  13.6  --  12.3  --   --    HCT 41.0   < > 37.6   < > 35.2* 31* 33*   *  --  66*  --  35*  --   --    MCV 89  --  83  --  84  --   --    RDW 14.5  --  14.6*  --  14.6*  --   --     < > = values in this interval not displayed.          Chemistries:  Recent Labs   Lab 12/08/23  2232 12/09/23  0315 12/09/23  0858 12/09/23  1257   * 149*  --  148*   K 2.9* 3.1* 4.2 4.0   * 115*  --  116*   CO2 16* 17*  --  19*   BUN 14 19  --  30*   CREATININE 1.1 1.7*  --  2.7*   CALCIUM 10.5 10.8*  --  9.7   ALBUMIN 2.7* 3.3*  --  2.8*   PROT 4.7* 5.3*  --  4.5*   BILITOT 3.9* 6.8*  --  5.8*   ALKPHOS 33* 38*  --  40*   ALT 19 26  --  65*   AST 95* 131*  --  200*   MG 2.6 2.4  --   --    PHOS 2.3* 2.1*  --   --              Significant Imaging:  I have reviewed all pertinent imaging results/findings within the past 24 hours.  Review of Systems

## 2023-12-09 NOTE — PLAN OF CARE
Recommendations     1. Once extubated, advance PO diet order to Cardiac as tolerated.   2. If PO intake poor >48 hrs, add Boost Plus ONS BID to supplement intake.   3. If pt remains intubated >72 hrs, begin enteral feeds with Peptamen Intense VHP.  4. RD to monitor & follow-up.     Goals: Meet % EEN, EPN by RD f/u date  Nutrition Goal Status: new  Communication of RD Recs: reviewed with RN

## 2023-12-09 NOTE — SUBJECTIVE & OBJECTIVE
Interval History/Significant Events: UOP dropped off, got 0.5L albumin, no change, started mIVF. UOP still 0 for last few hours. Vaso 0.06-0.1, currently 0.04. Epi 0.04, milrinone 0.25.     Follow-up For: Procedure(s) (LRB):  Replacement-valve-aortic (N/A)  REPLACEMENT, PULMONARY VALVE (N/A)    Post-Operative Day: 1 Day Post-Op    Objective:     Vital Signs (Most Recent):  Temp: 98.8 °F (37.1 °C) (12/09/23 0830)  Pulse: 91 (12/09/23 0830)  Resp: (!) 23 (12/09/23 0830)  BP: (!) 149/70 (12/09/23 0815)  SpO2: (!) 94 % (12/09/23 0830) Vital Signs (24h Range):  Temp:  [98.2 °F (36.8 °C)-98.8 °F (37.1 °C)] 98.8 °F (37.1 °C)  Pulse:  [] 91  Resp:  [22-43] 23  SpO2:  [92 %-100 %] 94 %  BP: (111-149)/(58-71) 149/70  Arterial Line BP: ()/(47-72) 126/66     Weight: (!) 143.8 kg (317 lb 0.3 oz)  Body mass index is 52.76 kg/m².      Intake/Output Summary (Last 24 hours) at 12/9/2023 0900  Last data filed at 12/9/2023 0800  Gross per 24 hour   Intake 62543.62 ml   Output 2576 ml   Net 7469.62 ml          Physical Exam  Vitals and nursing note reviewed.   Constitutional:       Appearance: She is obese.   HENT:      Head: Normocephalic and atraumatic.   Cardiovascular:      Rate and Rhythm: Regular rhythm. Tachycardia present.   Pulmonary:      Comments: Patient is intubated. Symmetrical chest rise.    Vent Mode: A/C  Oxygen Concentration (%):  [] 70  Resp Rate Total:  [22 br/min-34 br/min] 23 br/min  Vt Set:  [500 mL] 500 mL  PEEP/CPAP:  [7 cmH20-8 cmH20] 8 cmH20  Mean Airway Pressure:  [14 suQ60-75 cmH20] 14 cmH20     Chest:      Comments: Three chest tubes in midline   Abdominal:      General: Abdomen is flat.      Palpations: Abdomen is soft.   Skin:     General: Skin is warm and dry.      Capillary Refill: Capillary refill takes less than 2 seconds.            Vents:  Vent Mode: A/C (12/09/23 0828)  Set Rate: 22 BPM (12/09/23 0828)  Vt Set: 500 mL (12/09/23 0828)  PEEP/CPAP: 8 cmH20 (12/09/23 0828)  Oxygen  Concentration (%): 70 (12/09/23 0830)  Peak Airway Pressure: 27 cmH20 (12/09/23 0828)  Plateau Pressure: 0 cmH20 (12/09/23 0828)  Total Ve: 9.81 L/m (12/09/23 0828)  Negative Inspiratory Force (cm H2O): 0 (12/09/23 0828)  F/VT Ratio<105 (RSBI): (!) 53 (12/09/23 0828)    Lines/Drains/Airways       Central Venous Catheter Line  Duration                  Percutaneous Central Line Insertion/Assessment - Quad lumen  12/08/23 0949 Internal Jugular Right <1 day    Percutaneous Central Line Insertion/Assessment - Quad Lumen  12/08/23 0949 Internal Jugular Right <1 day              Drain  Duration                  NG/OG Tube 12/08/23  1 day         Urethral Catheter 12/08/23 0854 Temperature probe;Silicone;Non-latex 14 Fr. 1 day         Chest Tube 12/08/23 2059 Tube - 3 Mediastinal 24 Fr. <1 day         Y Chest Tube 1 and 2 12/08/23 2058 1 Right Pleural 24 Fr. 2 Left Pleural 24 Fr. <1 day              Airway  Duration                  Airway - Non-Surgical 12/08/23 0738 1 day              Arterial Line  Duration             Arterial Line 12/09/23 0809 Left Radial <1 day              Peripheral Intravenous Line  Duration                  Peripheral IV - Single Lumen 12/08/23 0644 20 G Left Hand 1 day         Peripheral IV - Single Lumen 12/08/23 0747 16 G Right Forearm 1 day                    Significant Labs:    CBC/Anemia Profile:  Recent Labs   Lab 12/07/23  1058 12/08/23  0810 12/08/23  2232 12/08/23  2234 12/09/23  0315 12/09/23  0408 12/09/23  0606 12/09/23  0707 12/09/23  0828   WBC 12.03  --  18.74*  --  19.14*  --   --   --   --    HGB 12.6  --  13.8  --  13.6  --   --   --   --    HCT 37.4   < > 41.0   < > 37.6   < > 35* 35* 30*     --  100*  --  66*  --   --   --   --    MCV 82  --  89  --  83  --   --   --   --    RDW 13.5  --  14.5  --  14.6*  --   --   --   --     < > = values in this interval not displayed.        Chemistries:  Recent Labs   Lab 12/07/23  1058 12/08/23  2232 12/09/23  0315    149*  149*   K 4.2 2.9* 3.1*    116* 115*   CO2 27 16* 17*   BUN 12 14 19   CREATININE 0.9 1.1 1.7*   CALCIUM 9.5 10.5 10.8*   ALBUMIN 3.7 2.7* 3.3*   PROT 7.9 4.7* 5.3*   BILITOT 0.6 3.9* 6.8*   ALKPHOS 93 33* 38*   ALT 23 19 26   AST 18 95* 131*   MG  --  2.6 2.4   PHOS  --  2.3* 2.1*           Significant Imaging:  I have reviewed all pertinent imaging results/findings within the past 24 hours.

## 2023-12-09 NOTE — PROGRESS NOTES
Nurses Note -- 4 Eyes      12/9/2023   0700 AM      Skin assessed during: Admit      [] No Altered Skin Integrity Present    []Prevention Measures Documented      [x] Yes- Altered Skin Integrity Present or Discovered   [x] LDA Added if Not in Epic (Describe Wound)   [] New Altered Skin Integrity was Present on Admit and Documented in LDA   [] Wound Image Taken       Unable to take image, phone not connected to  during assessment.         Wound Care Consulted? Yes    Attending Nurse:  Janelle Bañuelos RN    Second RN/Staff Member:   Romina Terry RN

## 2023-12-09 NOTE — ASSESSMENT & PLAN NOTE
38-year-old, 127 kg, woman with history of rheumatic heart disease and Ross procedure and mitral valve repair(including ring annuloplasty) in 2000 followed by repair of autograft aneurysm repair and aortic valve replacement with 23mm CE bioprothesis in 2012 who presents for aortic and pulmonary valve replacement on 12/8      Neuro/Psych:   -- Sedation: Precedex  -- Pain: Fentanyl pushes while intubated. Tylenol 650 mg PRN, Morphine 2 mg PRN             Cards:   History of rheumatic fever with rheumatic heart disease . S/p Ross procedure and mitral valve repair(including ring annuloplasty) in 2000   Now s/p aortic and pulmonary valve replacement with Dr. Campbell on 12/8  -- HDS  -- MAPS >65  -- SBP   -- Pressors to maintain blood pressure goals   Epi 0.04, milrinone 0.25  Wean vaso as tolerated  -- Wean as tolerated       Pulm:   -- Intubated  -- Goal O2 sat > 90%  -- Wean as able  -- Two pleural and one mediastinal chest tube to water seal       Renal:  -- Keep pitts for strict I/O  -- BUN/Cr   -- mIVF LR @ 100   Recent Labs   Lab 12/07/23  1058 12/08/23 2232 12/09/23  0315   BUN 12 14 19   CREATININE 0.9 1.1 1.7*       -- Urine output: 400cc night shift      FEN / GI:   -- Net +8L since admission   -- Replace lytes as needed  -- Nutrition: NPO  -- Docusate   -- Famotidine       ID:   -- Tm: afebrile; WBC 12.03 preop. 18.7 post op   Recent Labs   Lab 12/07/23  1058 12/08/23 2232 12/09/23  0315   WBC 12.03 18.74* 19.14*     -- Vancomycin two doses in OR       Heme/Onc:  -- H/H stable, 12.6 preop. 13.8 post op.   -- Daily CBC  -- okay to start anticoagulation one day after surgery   -- Received 8 units of pRBC in the OR   Recent Labs   Lab 12/07/23  1058 12/08/23 2232 12/09/23  0315   HGB 12.6 13.8 13.6    100* 66*   APTT  --  57.5* 32.8*   INR  --  1.2 1.2           Endo:   -- Gluc goal 140-180  -- POCT 152      PPx:   Feeding: NPO  Analgesia/Sedation: tylenol, morphine, fentanyl /Precedex  Recheck Visit    Impression:   · Generalized anxiety disorder with some initial improvement    Plan:   · Continue sertraline 25 mg daily  · Recheck 2 weeks. Consider increase of dose at that time if we don't see more improvement.    Follow up:   Return in about 2 weeks (around 2/8/2017) for Recheck Anxiety.    Diagnoses associated with this encounter:  1. Encounter for long-term (current) use of medications    2. Generalized anxiety disorder        No orders of the defined types were placed in this encounter.      ______________________________________________________    Chief Complaint   Patient presents with   • Medication Management     anxiety       History:  Leland Burks is a 17 year old male  and is here for follow-up of generalized anxiety disorder. The patient has been on sertraline 25 mg daily for about 4 weeks. He tells me that he feels less \"on edge,\" and in separate conversation his mother told me the same thing. He denies suicidal ideation or other significant side effects from medication. No other problems today.      The problem list was reviewed and updated today as follows:  Patient Active Problem List    Diagnosis Date Noted   • Generalized anxiety disorder 01/03/2017     Priority: Medium     ...  1/3/2017: Started on sertraline 2 weeks ago.     • Acne 01/27/2014     Priority: Medium     Managed by Dr. Oates. Currently transitioning from epidural to Tazorac. 11/27/2015: Tazorac, minocycline  1/3/2017; Accutane since Sept 2016     • Allergic rhinitis 10/24/2013     Priority: Medium   • Astigmatism 08/24/2015     Priority: Low   • Myopia 08/24/2015     Priority: Low   • Routine child health maintenance 10/24/2013     Priority: Low       ALLERGIES:  No Known Allergies  The medication list was reviewed at the time of the appointment.    Physical:  The patient is alert and active and in no acute distress.  Visit Vitals   • /72   • Pulse 88   • Temp 97.4 °F (36.3 °C) (Temporal Artery)   •    Thromboembolic prevention: None  HOB >30: Yes  Stress Ulcer ppx: Famotidine   Glucose control: Critical care goal 140-180 g/dl   Bowel reg: docusate  Invasive Lines/Drains/Airway: PIV x2, [place], Central line, [place], Chest tube x3   Deescalation: dc'ed         Dispo/Code Status/Palliative:   -- SICU / Full Code     Resp 20   • Ht 5' 8.5\" (1.74 m)   • Wt 62.3 kg   • BMI 20.59 kg/m2      total time of our appointment approximately 10 minutes, all which was spent in discussion and counseling.

## 2023-12-09 NOTE — EICU
Intervention Initiated From:  [facility] / EICU    Buddy intervened regarding:  Documentation and Time-Out    Nurse Notified:  Yes    Doctor Notified: Dr Zhu supervising Dr FCO Cruz    Yes    Comments: Privileges on Dr Zhu verified. Patient positioned for procedure  1300 left side of neck cleansed with chlora prep  1310 Srterile drape applied  1311 Using live US to locate left IJ, inserted needle with negative pressure on syringe  1314 Blood in syringe , syringe removed. Guide wire inserted into micro catheter  1315 Catheter removed. Holding guide wire secure 2nd cannula inserted Manometery done venous blood  1316Guide wire reinserted cannula, cannula removed. Guide wire verified placement with live US  1319 Holding guide wire secure. Small incision made at the entry site. Holding wire inserted 1st dilator then removed  1320 2nd dilatator inserted then removed, again holding guide wire secure.   1327 Holding guide wire secure inserted 13/ 20 cm Aspirated all ported blood return then flushed with sterile saline. Portable CXR ordered.   1329 Sutured down line. Biopatch applied at entry, Central line dressing applied.

## 2023-12-09 NOTE — SUBJECTIVE & OBJECTIVE
Follow-up For: Procedure(s) (LRB):  Replacement-valve-aortic (N/A)  REPLACEMENT, PULMONARY VALVE (N/A)    Post-Operative Day: Day of Surgery     Past Medical History:   Diagnosis Date    Rheumatic fever with cardiac involvement        Past Surgical History:   Procedure Laterality Date    ANGIOGRAM, CORONARY, PEDIATRIC  8/1/2023    Procedure: Angiogram, Coronary, Pediatric;  Surgeon: Anthony Dubois Jr., MD;  Location: [facility] CATH LAB;  Service: Cardiology;;    ANGIOGRAM, PULMONARY, PEDIATRIC  8/1/2023    Procedure: Angiogram, Pulmonary, Pediatric;  Surgeon: Anthony Dubois Jr., MD;  Location: [facility] CATH LAB;  Service: Cardiology;;    AORTIC VALVE REPLACEMENT  2000    AORTIC VALVE REPLACEMENT  2012    AORTIC VALVE REPLACEMENT  2012    with aortic root replacement at [facility]    COMBINED RIGHT AND RETROGRADE LEFT HEART CATHETERIZATION FOR CONGENITAL HEART DEFECT N/A 8/1/2023    Procedure: Catheterization, Heart, Combined Right and Retrograde Left, for Congenital Heart Defect;  Surgeon: Anthony Dubois Jr., MD;  Location: [facility] CATH LAB;  Service: Cardiology;  Laterality: N/A;    REPLACEMENT OF AORTIC VALVE USING ROSS PROCEDURE N/A 2000    [facility]       Review of patient's allergies indicates:   Allergen Reactions    Cephalexin Other (See Comments)     She gets mouth sores.     Penicillin g Other (See Comments)     Causes mouth sores.        Family History       Problem Relation (Age of Onset)    Cardiomyopathy Other    Congenital heart disease Other          Tobacco Use    Smoking status: Never    Smokeless tobacco: Never   Substance and Sexual Activity    Alcohol use: Yes     Alcohol/week: 1.0 standard drink of alcohol     Types: 1 Glasses of wine per week     Comment: social    Drug use: Not on file    Sexual activity: Not on file      Review of Systems   Reason unable to perform ROS: Patient is sedated.     Objective:     Vital Signs (Most Recent):  Temp: 98.1 °F (36.7 °C) (12/08/23 0633)  Pulse: 84 (12/08/23  0633)  Resp: 18 (12/08/23 0633)  BP: 107/61 (12/08/23 0633)  SpO2: 98 % (12/08/23 0633) Vital Signs (24h Range):  Temp:  [98.1 °F (36.7 °C)] 98.1 °F (36.7 °C)  Pulse:  [84] 84  Resp:  [18] 18  SpO2:  [98 %] 98 %  BP: (107)/(61) 107/61     Weight: 127 kg (280 lb)  Body mass index is 46.59 kg/m².      Intake/Output Summary (Last 24 hours) at 12/8/2023 1841  Last data filed at 12/8/2023 1835  Gross per 24 hour   Intake 4916 ml   Output 1445 ml   Net 3471 ml          Physical Exam  Vitals and nursing note reviewed.   Constitutional:       Appearance: She is obese.   HENT:      Head: Normocephalic and atraumatic.   Cardiovascular:      Rate and Rhythm: Regular rhythm. Tachycardia present.   Pulmonary:      Comments: Patient is intubated. Symmetrical chest rise.    Chest:      Comments: Three chest tubes in midline   Abdominal:      General: Abdomen is flat.      Palpations: Abdomen is soft.   Skin:     General: Skin is warm and dry.      Capillary Refill: Capillary refill takes less than 2 seconds.            Vents:       Lines/Drains/Airways       Central Venous Catheter Line  Duration                  Percutaneous Central Line Insertion/Assessment - Quad lumen  12/08/23 0949 Internal Jugular Right <1 day    Percutaneous Central Line Insertion/Assessment - Quad Lumen  12/08/23 0949 Internal Jugular Right <1 day    Percutaneous Central Line Insertion/Assessment - single lumen  12/08/23 1041 Femoral Vein Right <1 day              Drain  Duration                  Urethral Catheter 12/08/23 0854 Temperature probe;Silicone;Non-latex 14 Fr. <1 day              Airway  Duration                  Airway - Non-Surgical 12/08/23 0738 <1 day              Arterial Line  Duration             Arterial Line 12/08/23 0948 Left Femoral <1 day    Arterial Line 12/08/23 0948 Left Radial <1 day              Peripheral Intravenous Line  Duration                  Peripheral IV - Single Lumen 12/08/23 0644 20 G Left Hand <1 day          Peripheral IV - Single Lumen 12/08/23 0747 16 G Right Forearm <1 day                    Significant Labs:    CBC/Anemia Profile:  Recent Labs   Lab 12/07/23  1058 12/08/23  1130 12/08/23  1600 12/08/23  1633 12/08/23  1703   WBC 12.03  --   --   --   --    HGB 12.6  --   --   --   --    HCT 37.4   < > 30* 33* 31*     --   --   --   --    MCV 82  --   --   --   --    RDW 13.5  --   --   --   --     < > = values in this interval not displayed.        Chemistries:  Recent Labs   Lab 12/07/23  1058      K 4.2      CO2 27   BUN 12   CREATININE 0.9   CALCIUM 9.5   ALBUMIN 3.7   PROT 7.9   BILITOT 0.6   ALKPHOS 93   ALT 23   AST 18       All pertinent labs within the past 24 hours have been reviewed.    Significant Imaging: I have reviewed all pertinent imaging results/findings within the past 24 hours.

## 2023-12-09 NOTE — PT/OT/SLP PROGRESS
Physical Therapy  Consult    Patient Name:  Megan Cook   MRN:  [id number]  Admitting Diagnosis:  Rheumatic aortic stenosis   Recent Surgery: Procedure(s) (LRB):  Replacement-valve-aortic (N/A)  REPLACEMENT, PULMONARY VALVE (N/A) 1 Day Post-Op  Admit Date: 12/8/2023  Length of Stay: 1 days    Physical Therapy orders received and acknowledged. Patient not seen today due to pt remains intubated/sedated s/p AVR and PVR on 12/8. PT to attempt when Megan Cook is medically appropriate for progressive mobility.    Jessenia Lucas, PT, DPT  12/9/2023  Pager: [phone]

## 2023-12-09 NOTE — HPI
HPI:  Ms. 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        The patient presents to the SICU s/p AVR and PVR with Dr. Campbell on 12/08/2023. During the procedure the patient was on bypass for 398 minutes and clamped for 138 minutes.      On admission, they are intubated, sedated with Precedex, and in stable condition. Inotropic and vasopressor requirements upon admission are 0.05 of Epinephrine, and 0.1 Vasopressin, 0.25 Milrinone to maintain blood pressure at a MAP 65 and SBP < 120. Central access includes a RIJ CVC, arterial access includes a left radial arterial line. They also have 2 pleural chest tubed and 1 mediastinal chest tube with appropriate output.     Intraoperatively, they received 2,000 mL of NS, 4,965 mL of PRBCs, 441 mL FFP, 1,077mL Platelets, and 475 mL Cryoprecipitate. Urine output intraoperatively was 1,835cc.      Immediate post-operative plans include hemodynamic stabilization and weaning of cardiac and respiratory support. Plan to wean vasopressors and inotropes as tolerated, also wean ventilator support with goal of early extubation, and closely monitor fluid status with strict Is/Os and continued fluid resuscitation as needed.

## 2023-12-09 NOTE — BRIEF OP NOTE
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 Item Serial No.  Lot No. LRB No. Used Action   VALVE CARBOMEDICS TOP HAT SZ25 - [id number]  VALVE CARBOMEDICS TOP HAT SZ25 [id number] [employer]  N/A 1 Implanted   FELT ELIAS [id number] - [id number]  FELT ELIAS [id number]  [employer]  N/A 1 Implanted   VALVE MOSAIC AOR ULTRA 29MM - [id number]  VALVE MOSAIC AOR ULTRA 29MM [id number] [facility]  N/A 1 Implanted   GRAFT VASC HEMSHLD [id number] - [id number]  GRAFT VASC HEMSHLD [id number] MAQUET [id number] N/A 1 Implanted       Operative Findings: 25mm mechanical aortic valve.  29mm bioprosthetic pulmonary valve.    Difficult case with replacement of the RV to PA conduit, prolonged bypass time.     Estimated Blood Loss: * No values recorded between 12/8/2023  9:46 AM and 12/8/2023 10:14 PM *  Unable to measure, cardiopulmonary bypass used    Estimated Blood Loss has been documented.         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 Aortic Valve (gross only)     References:    Click here for ordering Quick Tip   Question Answer Comment   Procedure Type: Cardiovascular    Specimen Class: Routine/Screening    Which provider would you like to cc? ALVARO BUTLER    Release to patient Immediate        12/08/23 1923                    [id number]    Alvaro Butler MD

## 2023-12-09 NOTE — PROGRESS NOTES
[facility] - Surgical Intensive Care  Critical Care - Surgery  Progress Note    Patient Name: Megan Cook  MRN: [id number]  Admission Date: 12/8/2023  Hospital Length of Stay: 1 days  Code Status: Full Code  Attending Provider: Alexandru Campbell MD  Primary Care Provider: Adelaida Disla NP   Principal Problem: Rheumatic aortic stenosis    Subjective:     Hospital/ICU Course:  No notes on file    Interval History/Significant Events: UOP dropped off, got 0.5L albumin, no change, started mIVF. UOP still 0 for last few hours. Vaso 0.06-0.1, currently 0.04. Epi 0.04, milrinone 0.25.     Follow-up For: Procedure(s) (LRB):  Replacement-valve-aortic (N/A)  REPLACEMENT, PULMONARY VALVE (N/A)    Post-Operative Day: 1 Day Post-Op    Objective:     Vital Signs (Most Recent):  Temp: 98.8 °F (37.1 °C) (12/09/23 0830)  Pulse: 91 (12/09/23 0830)  Resp: (!) 23 (12/09/23 0830)  BP: (!) 149/70 (12/09/23 0815)  SpO2: (!) 94 % (12/09/23 0830) Vital Signs (24h Range):  Temp:  [98.2 °F (36.8 °C)-98.8 °F (37.1 °C)] 98.8 °F (37.1 °C)  Pulse:  [] 91  Resp:  [22-43] 23  SpO2:  [92 %-100 %] 94 %  BP: (111-149)/(58-71) 149/70  Arterial Line BP: ()/(47-72) 126/66     Weight: (!) 143.8 kg (317 lb 0.3 oz)  Body mass index is 52.76 kg/m².      Intake/Output Summary (Last 24 hours) at 12/9/2023 0900  Last data filed at 12/9/2023 0800  Gross per 24 hour   Intake 99064.62 ml   Output 2576 ml   Net 7469.62 ml          Physical Exam  Vitals and nursing note reviewed.   Constitutional:       Appearance: She is obese.   HENT:      Head: Normocephalic and atraumatic.   Cardiovascular:      Rate and Rhythm: Regular rhythm. Tachycardia present.   Pulmonary:      Comments: Patient is intubated. Symmetrical chest rise.    Vent Mode: A/C  Oxygen Concentration (%):  [] 70  Resp Rate Total:  [22 br/min-34 br/min] 23 br/min  Vt Set:  [500 mL] 500 mL  PEEP/CPAP:  [7 cmH20-8 cmH20] 8 cmH20  Mean Airway Pressure:  [14 waY24-98 cmH20] 14  cmH20     Chest:      Comments: Three chest tubes in midline   Abdominal:      General: Abdomen is flat.      Palpations: Abdomen is soft.   Skin:     General: Skin is warm and dry.      Capillary Refill: Capillary refill takes less than 2 seconds.            Vents:  Vent Mode: A/C (12/09/23 0828)  Set Rate: 22 BPM (12/09/23 0828)  Vt Set: 500 mL (12/09/23 0828)  PEEP/CPAP: 8 cmH20 (12/09/23 0828)  Oxygen Concentration (%): 70 (12/09/23 0830)  Peak Airway Pressure: 27 cmH20 (12/09/23 0828)  Plateau Pressure: 0 cmH20 (12/09/23 0828)  Total Ve: 9.81 L/m (12/09/23 0828)  Negative Inspiratory Force (cm H2O): 0 (12/09/23 0828)  F/VT Ratio<105 (RSBI): (!) 53 (12/09/23 0828)    Lines/Drains/Airways       Central Venous Catheter Line  Duration                  Percutaneous Central Line Insertion/Assessment - Quad lumen  12/08/23 0949 Internal Jugular Right <1 day    Percutaneous Central Line Insertion/Assessment - Quad Lumen  12/08/23 0949 Internal Jugular Right <1 day              Drain  Duration                  NG/OG Tube 12/08/23  1 day         Urethral Catheter 12/08/23 0854 Temperature probe;Silicone;Non-latex 14 Fr. 1 day         Chest Tube 12/08/23 2059 Tube - 3 Mediastinal 24 Fr. <1 day         Y Chest Tube 1 and 2 12/08/23 2058 1 Right Pleural 24 Fr. 2 Left Pleural 24 Fr. <1 day              Airway  Duration                  Airway - Non-Surgical 12/08/23 0738 1 day              Arterial Line  Duration             Arterial Line 12/09/23 0809 Left Radial <1 day              Peripheral Intravenous Line  Duration                  Peripheral IV - Single Lumen 12/08/23 0644 20 G Left Hand 1 day         Peripheral IV - Single Lumen 12/08/23 0747 16 G Right Forearm 1 day                    Significant Labs:    CBC/Anemia Profile:  Recent Labs   Lab 12/07/23  1058 12/08/23  0810 12/08/23  2232 12/08/23  2234 12/09/23  0315 12/09/23  0408 12/09/23  0606 12/09/23  0707 12/09/23 0828   WBC 12.03  --  18.74*  --  19.14*  --    --   --   --    HGB 12.6  --  13.8  --  13.6  --   --   --   --    HCT 37.4   < > 41.0   < > 37.6   < > 35* 35* 30*     --  100*  --  66*  --   --   --   --    MCV 82  --  89  --  83  --   --   --   --    RDW 13.5  --  14.5  --  14.6*  --   --   --   --     < > = values in this interval not displayed.        Chemistries:  Recent Labs   Lab 12/07/23  1058 12/08/23  2232 12/09/23  0315    149* 149*   K 4.2 2.9* 3.1*    116* 115*   CO2 27 16* 17*   BUN 12 14 19   CREATININE 0.9 1.1 1.7*   CALCIUM 9.5 10.5 10.8*   ALBUMIN 3.7 2.7* 3.3*   PROT 7.9 4.7* 5.3*   BILITOT 0.6 3.9* 6.8*   ALKPHOS 93 33* 38*   ALT 23 19 26   AST 18 95* 131*   MG  --  2.6 2.4   PHOS  --  2.3* 2.1*           Significant Imaging:  I have reviewed all pertinent imaging results/findings within the past 24 hours.  Assessment/Plan:     Cardiac/Vascular  * Rheumatic aortic stenosis  38-year-old, 127 kg, woman with history of rheumatic heart disease and Ross procedure and mitral valve repair(including ring annuloplasty) in 2000 followed by repair of autograft aneurysm repair and aortic valve replacement with 23mm CE bioprothesis in 2012 who presents for aortic and pulmonary valve replacement on 12/8      Neuro/Psych:   -- Sedation: Precedex  -- Pain: Fentanyl pushes while intubated. Tylenol 650 mg PRN, Morphine 2 mg PRN             Cards:   History of rheumatic fever with rheumatic heart disease . S/p Ross procedure and mitral valve repair(including ring annuloplasty) in 2000   Now s/p aortic and pulmonary valve replacement with Dr. Campbell on 12/8  -- HDS  -- MAPS >65  -- SBP   -- Pressors to maintain blood pressure goals   Epi 0.04, milrinone 0.25  Wean vaso as tolerated  -- Wean as tolerated       Pulm:   -- Intubated  -- Goal O2 sat > 90%  -- Wean as able  -- Two pleural and one mediastinal chest tube to water seal       Renal:  -- Keep pitts for strict I/O  -- BUN/Cr   -- mIVF LR @ 100   -- nephrology consulted for possible  HD  Recent Labs   Lab 12/07/23  1058 12/08/23  2232 12/09/23  0315   BUN 12 14 19   CREATININE 0.9 1.1 1.7*       -- Urine output: 400cc night shift      FEN / GI:   -- Net +8L since admission   -- Replace lytes as needed  -- Nutrition: NPO  -- Docusate   -- Famotidine       ID:   -- Tm: afebrile; WBC 12.03 preop. 18.7 post op   Recent Labs   Lab 12/07/23  1058 12/08/23  2232 12/09/23  0315   WBC 12.03 18.74* 19.14*     -- Vancomycin two doses in OR       Heme/Onc:  -- H/H stable, 12.6 preop. 13.8 post op.   -- Daily CBC  -- okay to start anticoagulation one day after surgery   -- Received 8 units of pRBC in the OR   Recent Labs   Lab 12/07/23  1058 12/08/23 2232 12/09/23  0315   HGB 12.6 13.8 13.6    100* 66*   APTT  --  57.5* 32.8*   INR  --  1.2 1.2           Endo:   -- Gluc goal 140-180  -- POCT 152      PPx:   Feeding: NPO  Analgesia/Sedation: tylenol, morphine, fentanyl /Precedex   Thromboembolic prevention: None  HOB >30: Yes  Stress Ulcer ppx: Famotidine   Glucose control: Critical care goal 140-180 g/dl   Bowel reg: docusate  Invasive Lines/Drains/Airway: PIV x2, Dyer, Central line, Nicolas, Chest tube x3   Deescalation: dc'ed         Dispo/Code Status/Palliative:   -- SICU / Full Code             Critical care was time spent personally by me on the following activities: development of treatment plan with patient or surrogate and bedside caregivers, discussions with consultants, evaluation of patient's response to treatment, examination of patient, ordering and performing treatments and interventions, ordering and review of laboratory studies, ordering and review of radiographic studies, pulse oximetry, re-evaluation of patient's condition.  This critical care time did not overlap with that of any other provider or involve time for any procedures.     Gloria Cruz MD  Critical Care - Surgery  [facility] - [facility]

## 2023-12-09 NOTE — CONSULTS
[facility] - Surgical Intensive Care  Endocrinology  Diabetes Consult Note    Consult Requested by: Alexandru Campbell MD   Reason for admit: Rheumatic aortic stenosis    HISTORY OF PRESENT ILLNESS:  Reason for Consult: Management of Hyperglycemia     Surgical Procedure and Date:   12/8/23  Replacement-valve-aortic (N/A)  REPLACEMENT, PULMONARY VALVE (N/A)      Lab Results   Component Value Date    HGBA1C 5.5 11/14/2022         HPI:   Patient is a 38 y.o. female with a diagnosis of rheumatic heart disease and Ross procedure and mitral valve repair (including ring annuloplasty) in 2000 followed by repair of autograft aneurysm repair and aortic valve replacement with 23mm CE bioprothesis in 2012 who presents for pre-operative evaluation for aortic and pulmonary valve replacement. Endocrinology consulted for management of hyperglycemia.       Interval HPI:   Overnight events: Admitted to SICU s/p AVR, PVR. Remains intubated. BG reasonably well controlled on IV intensive insulin protocol. Remains on vasopressors. No diet orders on file    Eating:   NPO  Nausea: No  Hypoglycemia and intervention: No  Fever: No  TPN and/or TF: No  If yes, type of TF/TPN and rate: n/a    PMH, PSH, FH, SH reviewed     ROS:  Unable to obtain due to: Intubated/Sedated     Review of Systems    Current Medications and/or Treatments Impacting Glycemic Control  Immunotherapy:    Immunosuppressants       None          Steroids:   Hormones (From admission, onward)      Start     Stop Route Frequency Ordered    12/08/23 2246  vasopressin (PITRESSIN) 1 Units/mL in dextrose 5 % (D5W) 100 mL infusion         -- IV Continuous 12/08/23 2247    12/08/23 2232  vasopressin (PITRESSIN) 20 unit/mL injection        Note to Pharmacy: Created by cabinet override    12/09/23 1044   12/08/23 2232          Pressors:    Autonomic Drugs (From admission, onward)      Start     Stop Route Frequency Ordered    12/08/23 2253  EPINEPHrine 5 mg in dextrose 5% 250 mL infusion  (premix)        Question Answer Comment   Begin at (in mcg/kg/min): 0.04    Titrate by: (in mcg/kg/min) 0.02    Titrate interval: (in minutes) 5    Titrate to maintain: (SBP or MAP or Cardiac Index) MAP    Greater than: (in mmHg) 65    Maximum dose: (in mcg/kg/min) 2        -- IV Continuous 12/08/23 2253 12/08/23 2232  EPINEPHrine (ADRENALIN) 1 mg/mL injection        Note to Pharmacy: Created by cabinet override    12/09/23 1044   12/08/23 2232          Hyperglycemia/Diabetes Medications:   Antihyperglycemics (From admission, onward)      Start     Stop Route Frequency Ordered    12/08/23 2230  insulin regular in 0.9 % NaCl 100 unit/100 mL (1 unit/mL) infusion        Question Answer Comment   Insulin rate changes (DO NOT MODIFY ANSWER) \\[employer]\epic\Images\Pharmacy\InsulinInfusions\CTS INSULIN [id number].pdf    Enter initial dose (Units/hr): 1        -- IV Continuous 12/08/23 2222             PHYSICAL EXAMINATION:  Vitals:    12/09/23 1200   BP: (!) 141/65   Pulse: 106   Resp: (!) 25   Temp: 98.4 °F (36.9 °C)     Body mass index is 52.76 kg/m².     Physical Exam   Constitutional: Well developed, obese, NAD.  ENT: External ears no masses with nose patent  Neck: Supple; trachea midline  Cardiovascular: Normal heart sounds, no LE edema. DP +2 bilaterally.  Lungs: Normal effort; lungs anterior bilaterally clear to auscultation.  Intubated on a ventilator.  Abdomen: Soft, no masses, no hernias.  Hypoactive BS noted.  MS: No clubbing or cyanosis of nails noted; unable to assess gait.  Skin: No rashes, lesions, or ulcers; no nodules. Mid-sternal incision with telfa island dressing, CDI.  CT x 2.  LLE wrapped with ACE wrap.  Psychiatric: BARBIE  Neurological: BARBIE  Foot: Nails in good condition, no amputations noted      Labs Reviewed and Include   Recent Labs   Lab 12/09/23  0315 12/09/23  0858   *  --    CALCIUM 10.8*  --    ALBUMIN 3.3*  --    PROT 5.3*  --    *  --    K 3.1* 4.2   CO2 17*  --    *  --  "   BUN 19  --    CREATININE 1.7*  --    ALKPHOS 38*  --    ALT 26  --    *  --    BILITOT 6.8*  --      Lab Results   Component Value Date    WBC 17.62 (H) 12/09/2023    HGB 12.3 12/09/2023    HCT 35.2 (L) 12/09/2023    MCV 84 12/09/2023    PLT 66 (L) 12/09/2023     No results for input(s): "TSH", "FREET4" in the last 168 hours.  Lab Results   Component Value Date    HGBA1C 5.5 11/14/2022       Nutritional status:   Body mass index is 52.76 kg/m².  Lab Results   Component Value Date    ALBUMIN 3.3 (L) 12/09/2023    ALBUMIN 2.7 (L) 12/08/2023    ALBUMIN 3.7 12/07/2023     No results found for: "PREALBUMIN"    Estimated Creatinine Clearance: 65.6 mL/min (A) (based on SCr of 1.7 mg/dL (H)).    Accu-Checks  Recent Labs     12/09/23  0204 12/09/23  0307 12/09/23  0402 12/09/23  0501 12/09/23  0519 12/09/23  0602 12/09/23  0723 12/09/23  0827 12/09/23  0858 12/09/23  1028   POCTGLUCOSE 134* 127* 119* 94 95 112* 137* 166* 198* 220*        ASSESSMENT and PLAN    Cardiac/Vascular  * Rheumatic aortic stenosis  Managed per primary team  Avoid hypoglycemia        S/P aortic valve replacement  Managed per primary team  Optimize BG control        Endocrine  Class 3 severe obesity due to excess calories with serious comorbidity and body mass index (BMI) of 50.0 to 59.9 in adult  Body mass index is 52.76 kg/m².  May increase insulin resistance.         Transient hyperglycemia post procedure  BG goal 110-140 (CTS protocol)     Continue IV insulin infusion protocol  Requires intensive BG monitoring while on protocol     ** Please call Endocrine for any BG related issues **    ** Please notify Endocrine for any change and/or advance in diet**    Discharge planning: TBD            Plan discussed with RN  at bedside.     Lay Lynn NP  Endocrinology  [facility] - Surgical Intensive Care  "

## 2023-12-09 NOTE — PROGRESS NOTES
Congenital Cardiac Surgery Note:     Ms. Megan Cook underwent re-replacement of her aortic valve and re-replacement of her RV-PA conduit/pulmonary valve on 12-8-2023.  The case was complicated by prolonged bypass time due to need to redo the RV to PA conduit due to distal obstruction.      Ultimately, with normal biventricular function and well function 25 mechanical aortic valve and 29mm bioprosthetic pulmonary valve.        Biggest concern this morning is that she became anuric within hours of arriving to the ICU and has yet to respond to diuretics.      Otherwise her hemodynamics are good and they have weaned inotropes and vasopressors and she is not bleeding, with stable Hct and low chest tube output since arriving in the ICU.          Vitals:    12/09/23 0800 12/09/23 0815 12/09/23 0828 12/09/23 0830   BP: 130/65 (!) 149/70     BP Location: Right arm      Patient Position: Lying      Pulse: 95 94 92 91   Resp: (!) 22 (!) 23 (!) 23 (!) 23   Temp: 98.8 °F (37.1 °C) 98.8 °F (37.1 °C) 98.8 °F (37.1 °C) 98.8 °F (37.1 °C)   TempSrc: Core Bladder      SpO2: 95% 95% (!) 94% (!) 94%   Weight:       Height:             Physical Exam:  General: sedated and intubated  CV: normal rate and regular, sinus rhythm based on telemetry  Resp: mechanically ventilated  Neuro: sedated but moving  Drains: Serosanguinous drainage, low volume overnight  Incision: dressed     Imaging:  CXR: pending today     Assessment and Plan:    Overall stable and with good hemodynamics this morning.     Acute Renal failure is biggest concern.    Discussed her care with Dr. Zhu, they are ordering a diuretic challenge, are going to obtain access for the likely need for dialysis/ultrafiltration, and consulting nephrology.      They will wean the ventilator as appropriate but no plans for extubation considering her renal failure.      They are weaning vasopressors and inotropes.      And they will start heparin infusion, low dose and no bolus for  now until hemostasis is confirmed.    Ultimately, anticoagulation plan will be for warfarin with an INR target of 2.5(goal range 2-3) and daily aspirin.  Will bridge with heparin in the interim, if there is no bleeding with low dose(pTT 40-60), can eventually increase pTT goal to 60-80.         I appreciate the assistance of the ICU team in her care.       Alexandru Campbell MD

## 2023-12-09 NOTE — CARE UPDATE
SICU Staff Addendum  Please see resident/bela note from 12/9/2023 for full details of the patients history of present illness / progress note for today. I have performed a substantial portion of the critical care visit.I have reviewed and concur with the BELA/resident's history, physical, assessment, and plan.  I have personally interviewed and examined the patient at bedside.  See below for any additional findings.    Reason for admission:  Rheumatic aortic stenosis  Present on Admission:   Rheumatic aortic stenosis      Goals for Today:   - POD 1 s/p redo pulmonary and mechanical aortic valve replacement   - had a few pump runs and required quite a bit of product intraoperatively. Chest tube output appropriate, between 10-30 cc / hr  - Pt this morning on epi, vaso, milrinone. Given normal biv function, will titrate off the milrinone throughout the day to decrease the need for pure SVR augmenting medications, cale in the setting of her anuria   - Pt following commands when sedation lightened. Given pt not being extubated today, will target a RASS of -1 to -2. Will cont with daily SAT as appropriate   - Pt auric the last 10 hours. Given lasix challenge + diuril challenge this AM with no UOP. Nephrology consulted for initiation of renal replacement therapy. Worsening base deficit 2/2 to renal dysfunction   - starting heparin gtt for aortic valve, Ptt goal of 40-60. Eventually INR goal will be 2-3 once we bridge to warfarin   - trickle feeds   - patient seen with Dr. Alexandru Campbell     60  minutes of critical care time was spent personally by me on the following activities: development of treatment plan with patient or surrogate and bedside caregivers, discussions with consultants, evaluation of patient's response to treatment, examination of patient, ordering and performing treatments and interventions, ordering and review of laboratory studies, ordering and review of radiographic studies, pulse oximetry, re-evaluation of  patient's condition.  This critical care time did not overlap with that of any other provider or involve time for any procedures.    Komal Zhu MD  Anesthesia Critical Care  Spectra [phone]

## 2023-12-09 NOTE — ANESTHESIA POSTPROCEDURE EVALUATION
Anesthesia Post Evaluation    Patient: Megan Cook    Procedure(s) Performed: Procedure(s) (LRB):  Replacement-valve-aortic (N/A)  REPLACEMENT, PULMONARY VALVE (N/A)    Final Anesthesia Type: general      Patient location during evaluation: ICU  Patient participation: No - Unable to Participate, Sedation  Level of consciousness: sedated  Post-procedure vital signs: reviewed and stable  Pain management: adequate  Airway patency: patent    PONV status at discharge: No PONV  Anesthetic complications: no      Cardiovascular status: blood pressure returned to baseline  Respiratory status: ventilator  Hydration status: euvolemic  Follow-up not needed.              Vitals Value Taken Time   /59 12/09/23 1502   Temp 36.9 °C (98.42 °F) 12/09/23 1532   Pulse 103 12/09/23 1532   Resp 21 12/09/23 1532   SpO2 94 % 12/09/23 1532   Vitals shown include unvalidated device data.      No case tracking events are documented in the log.      Pain/Karin Score: Pain Rating Prior to Med Admin: 0 (12/9/2023  1:46 AM)  Pain Rating Post Med Admin: 0 (12/9/2023  1:20 PM)

## 2023-12-09 NOTE — ASSESSMENT & PLAN NOTE
Aute kidney injury secondary to volume overload  Scr went up to 2.7 from 1.7  (Baseline 0.4-1)  Uop yesterday 2L with 160 mg lasix and 500 mg Diuril  X ray yesterday :slightly increased bilateral middle and lower lung zone predominant airspace opacities.  Increased trace left effusion.  Plan  Would recommend to give lasix 160 and Diuril 500mg  Vaso 0.04 and epi intubated  Lasix 0-60mg /hr  Obtain urine sodium and urine chloride  Obtain renal ultrasound  Renal panel daily  Strict input output  There is urgent RRT indication, but if oxygen requirment increase, become Volume overloaded then will consider RRT

## 2023-12-09 NOTE — PROGRESS NOTES
..Autotransfusion/Rapid Infusion Record:      12/08/2023  Autotransfusionist:  Juliann Michael    Surgeon(s) and Role:     * Alexandru Campbell MD - Primary     * Black Michael MD - Assisting     * Papi Kahn MD - Assisting  Anesthesiologist:  Kalen Lindsey MD    Past Medical History:   Diagnosis Date    Rheumatic fever with cardiac involvement        Procedure(s) (LRB):  Replacement-valve-aortic (N/A)  REPLACEMENT, PULMONARY VALVE (N/A)     9:18 PM    Equipment:    Cell Saver     R.I.S.  : [place] Model: CATSmart or CATSplus : [employer]   Model: [id number]     Serial number: [id number]   Serial number: [id number]   Disposable lot #: [id number]   Disposable lot #:      Were extra cardiotomies used for cell saver?  no    Solutions:  Anticoagulant: ACD-A   Expiration date: 6/24 Volume used: 6700   Wash solution: 0.9% NaCl   Expiration date: 9/25 Volume used: 93779     Cell saver checklist  Time completed:           [x]   Circuit assembled correctly     [x]   Cell saver powered and operational     [x]   Vacuum connected, functional, adjust to max -150mmHg     [x]   Anticoagulant drip rate adjusted     [x]   Transfer bag properly labeled with patient name, expiration time, volume,       anticoagulant, OR number, and initials     [x]   Cell saver disinfected after use (completed at end of case)       Cell Saver volumes:    Total volume processed:     03284 mL     Total volume pRBCs recovered     8961 mL     Volume pRBCs infused     8961 mL         RIS checklist   Time completed:  [x]   RIS circuit assembled correctly     [x]   RIS power and operational     [x]   RIS disinfected after use (completed at end of case)       RIS volumes:    Total volume infused:    (see anesthesia record for blood       product information)   8630 mL       Additional comments:

## 2023-12-09 NOTE — SUBJECTIVE & OBJECTIVE
Interval HPI:   Overnight events: Admitted to SICU s/p AVR, PVR. Remains intubated. BG reasonably well controlled on IV intensive insulin protocol. Remains on vasopressors. No diet orders on file    Eating:   NPO  Nausea: No  Hypoglycemia and intervention: No  Fever: No  TPN and/or TF: No  If yes, type of TF/TPN and rate: n/a    PMH, PSH, FH, SH reviewed     ROS:  Unable to obtain due to: Intubated/Sedated     Review of Systems    Current Medications and/or Treatments Impacting Glycemic Control  Immunotherapy:    Immunosuppressants       None          Steroids:   Hormones (From admission, onward)      Start     Stop Route Frequency Ordered    12/08/23 2246  vasopressin (PITRESSIN) 1 Units/mL in dextrose 5 % (D5W) 100 mL infusion         -- IV Continuous 12/08/23 2247 12/08/23 2232  vasopressin (PITRESSIN) 20 unit/mL injection        Note to Pharmacy: Created by [employer] override    12/09/23 1044   12/08/23 2232          Pressors:    Autonomic Drugs (From admission, onward)      Start     Stop Route Frequency Ordered    12/08/23 2253  EPINEPHrine 5 mg in dextrose 5% 250 mL infusion (premix)        Question Answer Comment   Begin at (in mcg/kg/min): 0.04    Titrate by: (in mcg/kg/min) 0.02    Titrate interval: (in minutes) 5    Titrate to maintain: (SBP or MAP or Cardiac Index) MAP    Greater than: (in mmHg) 65    Maximum dose: (in mcg/kg/min) 2        -- IV Continuous 12/08/23 2253 12/08/23 2232  EPINEPHrine (ADRENALIN) 1 mg/mL injection        Note to Pharmacy: Created by cabinet override    12/09/23 1044   12/08/23 2232          Hyperglycemia/Diabetes Medications:   Antihyperglycemics (From admission, onward)      Start     Stop Route Frequency Ordered    12/08/23 2230  insulin regular in 0.9 % NaCl 100 unit/100 mL (1 unit/mL) infusion        Question Answer Comment   Insulin rate changes (DO NOT MODIFY ANSWER) [email or web address] [email or web address]    Enter initial dose  (Units/hr): 1        -- IV Continuous 12/08/23 2222             PHYSICAL EXAMINATION:  Vitals:    12/09/23 1200   BP: (!) 141/65   Pulse: 106   Resp: (!) 25   Temp: 98.4 °F (36.9 °C)     Body mass index is 52.76 kg/m².     Physical Exam   Constitutional: Well developed, obese, NAD.  ENT: External ears no masses with nose patent  Neck: Supple; trachea midline  Cardiovascular: Normal heart sounds, no LE edema. DP +2 bilaterally.  Lungs: Normal effort; lungs anterior bilaterally clear to auscultation.  Intubated on a ventilator.  Abdomen: Soft, no masses, no hernias.  Hypoactive BS noted.  MS: No clubbing or cyanosis of nails noted; unable to assess gait.  Skin: No rashes, lesions, or ulcers; no nodules. Mid-sternal incision with telfa island dressing, CDI.  CT x 2.  LLE wrapped with ACE wrap.  Psychiatric: BARBIE  Neurological: BARBIE  Foot: Nails in good condition, no amputations noted

## 2023-12-09 NOTE — CONSULTS
[facility] - Surgical Intensive Care  Nephrology  Consult Note    Patient Name: Megan Cook  MRN: [id number]  Admission Date: 12/8/2023  Hospital Length of Stay: 1 days  Attending Provider: Alexandru Campbell MD   Primary Care Physician: Adelaida Disla NP  Principal Problem:Rheumatic aortic stenosis    Inpatient consult to Nephrology  Consult performed by: Daniel Ch MD  Consult ordered by: Gloria Cruz MD  Reason for consult: edil        Subjective:     HPI: HPI:  Ms. 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        The patient presents to the SICU s/p AVR and PVR with Dr. Campbell on 12/08/2023. During the procedure the patient was on bypass for 398 minutes and clamped for 138 minutes.      On admission, they are intubated, sedated with Precedex, and in stable condition. Inotropic and vasopressor requirements upon admission are 0.05 of Epinephrine, and 0.1 Vasopressin, 0.25 Milrinone to maintain blood pressure at a MAP 65 and SBP < 120. Central access includes a RIJ CVC, arterial access includes a left radial arterial line. They also have 2 pleural chest tubed and 1 mediastinal chest tube with appropriate output.     Intraoperatively, they received 2,000 mL of NS, 4,965 mL of PRBCs, 441 mL FFP, 1,077mL Platelets, and 475 mL Cryoprecipitate. Urine output intraoperatively was 1,835cc.      Immediate post-operative plans include hemodynamic stabilization and weaning of cardiac and respiratory support. Plan to wean vasopressors and inotropes as tolerated, also wean ventilator support with goal of early extubation, and closely monitor fluid status with strict Is/Os and continued fluid  resuscitation as needed.    Interval History/Significant Events:   Patient seen and examined , plan is to start RRT        Objective:     Vital Signs (Most Recent):  Temp: 98.8 °F (37.1 °C) (12/09/23 1425)  Pulse: 105 (12/09/23 1425)  Resp: (!) 43 (12/09/23 1425)  BP: 127/64 (12/09/23 1400)  SpO2: 95 % (12/09/23 1425) Vital Signs (24h Range):  Temp:  [97.3 °F (36.3 °C)-99 °F (37.2 °C)] 98.8 °F (37.1 °C)  Pulse:  [] 105  Resp:  [20-43] 43  SpO2:  [92 %-100 %] 95 %  BP: (111-149)/(58-71) 127/64  Arterial Line BP: ()/(47-73) 111/66     Weight: (!) 143.8 kg (317 lb 0.3 oz)  Body mass index is 52.76 kg/m².      Intake/Output Summary (Last 24 hours) at 12/9/2023 1511  Last data filed at 12/9/2023 1400  Gross per 24 hour   Intake 02679.8 ml   Output 1466 ml   Net 9324.8 ml            Physical Exam  Vitals and nursing note reviewed.   Constitutional:       Appearance: Normal appearance. She is obese.   HENT:      Head: Normocephalic and atraumatic.      Mouth/Throat:      Mouth: Mucous membranes are moist.   Cardiovascular:      Rate and Rhythm: Normal rate and regular rhythm.      Pulses: Normal pulses.   Pulmonary:      Effort: Pulmonary effort is normal.      Breath sounds: Normal breath sounds.      Comments: Patient is intubated. Symmetrical chest rise.    Vent Mode: A/C  Oxygen Concentration (%):  [] 70  Resp Rate Total:  [22 br/min-34 br/min] 23 br/min  Vt Set:  [500 mL] 500 mL  PEEP/CPAP:  [7 cmH20-8 cmH20] 8 cmH20  Mean Airway Pressure:  [14 slS91-82 cmH20] 14 cmH20     Chest:      Comments: Three chest tubes in midline   Abdominal:      General: Abdomen is flat.      Palpations: Abdomen is soft.   Musculoskeletal:         General: Normal range of motion.      Cervical back: Normal range of motion.   Skin:     General: Skin is warm and dry.      Capillary Refill: Capillary refill takes less than 2 seconds.   Neurological:      General: No focal deficit present.      Mental Status: She is alert and  oriented to person, place, and time.   Psychiatric:         Mood and Affect: Mood normal.         Behavior: Behavior normal.            Vents:  Vent Mode: A/C (12/09/23 1425)  Set Rate: 20 BPM (12/09/23 1425)  Vt Set: 500 mL (12/09/23 1425)  PEEP/CPAP: 8 cmH20 (12/09/23 1425)  Oxygen Concentration (%): 60 (12/09/23 1425)  Peak Airway Pressure: 27 cmH20 (12/09/23 1425)  Plateau Pressure: 0 cmH20 (12/09/23 1425)  Total Ve: 10.5 L/m (12/09/23 1425)  Negative Inspiratory Force (cm H2O): 0 (12/09/23 1425)  F/VT Ratio<105 (RSBI): (!) 99.54 (12/09/23 1425)    Lines/Drains/Airways       Central Venous Catheter Line  Duration             Percutaneous Central Line Insertion/Assessment - Quad Lumen  12/08/23 0949 Internal Jugular Right 1 day    Trialysis (Dialysis) Catheter 12/09/23 1327 left internal jugular <1 day              Drain  Duration                  NG/OG Tube 12/08/23  1 day         Urethral Catheter 12/08/23 0854 Temperature probe;Silicone;Non-latex 14 Fr. 1 day         Chest Tube 12/08/23 2059 Tube - 3 Mediastinal 24 Fr. <1 day         Y Chest Tube 1 and 2 12/08/23 2058 1 Right Pleural 24 Fr. 2 Left Pleural 24 Fr. <1 day              Airway  Duration                  Airway - Non-Surgical 12/08/23 0738 1 day              Arterial Line  Duration             Arterial Line 12/09/23 0809 Left Radial <1 day              Peripheral Intravenous Line  Duration                  Peripheral IV - Single Lumen 12/08/23 0644 20 G Left Hand 1 day         Peripheral IV - Single Lumen 12/08/23 0747 16 G Right Forearm 1 day                    Significant Labs:    CBC/Anemia Profile:  Recent Labs   Lab 12/08/23  2232 12/08/23  2234 12/09/23  0315 12/09/23  0408 12/09/23  1033 12/09/23  1225 12/09/23  1425   WBC 18.74*  --  19.14*  --  17.62*  --   --    HGB 13.8  --  13.6  --  12.3  --   --    HCT 41.0   < > 37.6   < > 35.2* 31* 33*   *  --  66*  --  35*  --   --    MCV 89  --  83  --  84  --   --    RDW 14.5  --  14.6*  --   14.6*  --   --     < > = values in this interval not displayed.          Chemistries:  Recent Labs   Lab 12/08/23  2232 12/09/23  0315 12/09/23  0858 12/09/23  1257   * 149*  --  148*   K 2.9* 3.1* 4.2 4.0   * 115*  --  116*   CO2 16* 17*  --  19*   BUN 14 19  --  30*   CREATININE 1.1 1.7*  --  2.7*   CALCIUM 10.5 10.8*  --  9.7   ALBUMIN 2.7* 3.3*  --  2.8*   PROT 4.7* 5.3*  --  4.5*   BILITOT 3.9* 6.8*  --  5.8*   ALKPHOS 33* 38*  --  40*   ALT 19 26  --  65*   AST 95* 131*  --  200*   MG 2.6 2.4  --   --    PHOS 2.3* 2.1*  --   --              Significant Imaging:  I have reviewed all pertinent imaging results/findings within the past 24 hours.  Review of Systems  Assessment/Plan:     Renal/  TINO (acute kidney injury)  Aute kidney injury secondary to volume overload  Scr went up to 2.7 from 1.7  (Baseline 0.4-1)  Uop yesterday 2L with 160 mg lasix and 500 mg Diuril  X ray yesterday :slightly increased bilateral middle and lower lung zone predominant airspace opacities.  Increased trace left effusion.  Clinically look volume overloaded      Plan  Plan to start on SLED for Volume removal Net Goal negative 500 ml  Obtain urine sodium and urine chloride  Obtain renal ultrasound  Renal panel daily  Strict input output  Consent obtained    Thank you for your consult. I will follow-up with patient. Please contact us if you have any additional questions.    Daniel Ch MD  Nephrology  [facility] - Surgical Intensive Care

## 2023-12-09 NOTE — PROGRESS NOTES
Notified Dr Us of UOP 3-5cc/h, VS, gtts, latest ABG and lactic. Nicolas flushed with 20cc sterile water, 20cc returned. Bladder scanned with 0 urine output. No new orders received. Will continue to monitor.

## 2023-12-09 NOTE — PROGRESS NOTES
Updated Dr. Us on VS, gtts, labs, and 0 UOP. MD spoke with staff MD over the phone. No new orders received.

## 2023-12-09 NOTE — H&P
[facility] - Surgical Intensive Care  Critical Care - Surgery  History & Physical    Patient Name: Megan Cook  MRN: [id number]  Admission Date: 12/8/2023  Code Status: Full Code  Attending Physician: Alexandru Campbell MD   Primary Care Provider: Adelaida Disla NP   Principal Problem: Rheumatic aortic stenosis    Subjective:     HPI:  Ms. 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       The patient presents to the SICU s/p AVR and PVR with Dr. Campbell on 12/08/2023. During the procedure the patient was on bypass for 398 minutes and clamped for 138 minutes.     On admission, they are intubated, sedated with Precedex, and in stable condition. Inotropic and vasopressor requirements upon admission are 0.05 of Epinephrine, and 0.1 Vasopressin, 0.25 Milrinone to maintain blood pressure at a MAP 65 and SBP < 120. Central access includes a RIJ CVC, arterial access includes a left radial arterial line. They also have 2 pleural chest tubed and 1 mediastinal chest tube with appropriate output.    Intraoperatively, they received 2,000 mL of NS, 4,965 mL of PRBCs, 441 mL FFP, 1,077mL Platelets, and 475 mL Cryoprecipitate. Urine output intraoperatively was 1,835cc.     Immediate post-operative plans include hemodynamic stabilization and weaning of cardiac and respiratory support. Plan to wean vasopressors and inotropes as tolerated, also wean ventilator support with goal of early extubation, and closely monitor fluid status with strict Is/Os and continued fluid resuscitation as needed.     Hospital/ICU Course:  No notes on file    Follow-up For: Procedure(s) (LRB):  Replacement-valve-aortic  (N/A)  REPLACEMENT, PULMONARY VALVE (N/A)    Post-Operative Day: Day of Surgery     Past Medical History:   Diagnosis Date    Rheumatic fever with cardiac involvement        Past Surgical History:   Procedure Laterality Date    ANGIOGRAM, CORONARY, PEDIATRIC  8/1/2023    Procedure: Angiogram, Coronary, Pediatric;  Surgeon: Anthony Dubois Jr., MD;  Location: [facility] CATH LAB;  Service: Cardiology;;    ANGIOGRAM, PULMONARY, PEDIATRIC  8/1/2023    Procedure: Angiogram, Pulmonary, Pediatric;  Surgeon: Anthony Dubois Jr., MD;  Location: [facility] CATH LAB;  Service: Cardiology;;    AORTIC VALVE REPLACEMENT  2000    AORTIC VALVE REPLACEMENT  2012    AORTIC VALVE REPLACEMENT  2012    with aortic root replacement at [facility]    COMBINED RIGHT AND RETROGRADE LEFT HEART CATHETERIZATION FOR CONGENITAL HEART DEFECT N/A 8/1/2023    Procedure: Catheterization, Heart, Combined Right and Retrograde Left, for Congenital Heart Defect;  Surgeon: Anthony Dubois Jr., MD;  Location: [facility] CATH LAB;  Service: Cardiology;  Laterality: N/A;    REPLACEMENT OF AORTIC VALVE USING ROSS PROCEDURE N/A 2000    [facility]       Review of patient's allergies indicates:   Allergen Reactions    Cephalexin Other (See Comments)     She gets mouth sores.     Penicillin g Other (See Comments)     Causes mouth sores.        Family History       Problem Relation (Age of Onset)    Cardiomyopathy Other    Congenital heart disease Other          Tobacco Use    Smoking status: Never    Smokeless tobacco: Never   Substance and Sexual Activity    Alcohol use: Yes     Alcohol/week: 1.0 standard drink of alcohol     Types: 1 Glasses of wine per week     Comment: social    Drug use: Not on file    Sexual activity: Not on file      Review of Systems   Reason unable to perform ROS: Patient is sedated.     Objective:     Vital Signs (Most Recent):  Temp: 98.1 °F (36.7 °C) (12/08/23 0633)  Pulse: 84 (12/08/23 0633)  Resp: 18 (12/08/23 0633)  BP: 107/61 (12/08/23 0633)  SpO2: 98 %  (12/08/23 0633) Vital Signs (24h Range):  Temp:  [98.1 °F (36.7 °C)] 98.1 °F (36.7 °C)  Pulse:  [84] 84  Resp:  [18] 18  SpO2:  [98 %] 98 %  BP: (107)/(61) 107/61     Weight: 127 kg (280 lb)  Body mass index is 46.59 kg/m².      Intake/Output Summary (Last 24 hours) at 12/8/2023 1841  Last data filed at 12/8/2023 1835  Gross per 24 hour   Intake 4916 ml   Output 1445 ml   Net 3471 ml          Physical Exam  Vitals and nursing note reviewed.   Constitutional:       Appearance: She is obese.   HENT:      Head: Normocephalic and atraumatic.   Cardiovascular:      Rate and Rhythm: Regular rhythm. Tachycardia present.   Pulmonary:      Comments: Patient is intubated. Symmetrical chest rise.    Chest:      Comments: Three chest tubes in midline   Abdominal:      General: Abdomen is flat.      Palpations: Abdomen is soft.   Skin:     General: Skin is warm and dry.      Capillary Refill: Capillary refill takes less than 2 seconds.            Vents:       Lines/Drains/Airways       Central Venous Catheter Line  Duration                  Percutaneous Central Line Insertion/Assessment - Quad lumen  12/08/23 0949 Internal Jugular Right <1 day    Percutaneous Central Line Insertion/Assessment - Quad Lumen  12/08/23 0949 Internal Jugular Right <1 day    Percutaneous Central Line Insertion/Assessment - single lumen  12/08/23 1041 Femoral Vein Right <1 day              Drain  Duration                  Urethral Catheter 12/08/23 0854 Temperature probe;Silicone;Non-latex 14 Fr. <1 day              Airway  Duration                  Airway - Non-Surgical 12/08/23 0738 <1 day              Arterial Line  Duration             Arterial Line 12/08/23 0948 Left Femoral <1 day    Arterial Line 12/08/23 0948 Left Radial <1 day              Peripheral Intravenous Line  Duration                  Peripheral IV - Single Lumen 12/08/23 0644 20 G Left Hand <1 day         Peripheral IV - Single Lumen 12/08/23 0747 16 G Right Forearm <1 day                     Significant Labs:    CBC/Anemia Profile:  Recent Labs   Lab 12/07/23  1058 12/08/23  1130 12/08/23  1600 12/08/23  1633 12/08/23  1703   WBC 12.03  --   --   --   --    HGB 12.6  --   --   --   --    HCT 37.4   < > 30* 33* 31*     --   --   --   --    MCV 82  --   --   --   --    RDW 13.5  --   --   --   --     < > = values in this interval not displayed.        Chemistries:  Recent Labs   Lab 12/07/23  1058      K 4.2      CO2 27   BUN 12   CREATININE 0.9   CALCIUM 9.5   ALBUMIN 3.7   PROT 7.9   BILITOT 0.6   ALKPHOS 93   ALT 23   AST 18       All pertinent labs within the past 24 hours have been reviewed.    Significant Imaging: I have reviewed all pertinent imaging results/findings within the past 24 hours.  Assessment/Plan:     Cardiac/Vascular  * Rheumatic aortic stenosis  38-year-old, 127 kg, woman with history of rheumatic heart disease and Ross procedure and mitral valve repair(including ring annuloplasty) in 2000 followed by repair of autograft aneurysm repair and aortic valve replacement with 23mm CE bioprothesis in 2012 who presents for aortic and pulmonary valve replacement on 12/8      Neuro/Psych:   -- Sedation: Precedex  -- Pain: Fentanyl pushes while intubated. Tylenol 650 mg PRN, Morphine 2 mg PRN             Cards:   History of rheumatic fever with rheumatic heart disease . S/p Ross procedure and mitral valve repair(including ring annuloplasty) in 2000   Now s/p aortic and pulmonary valve replacement with Dr. Campbell on 12/8  -- HDS  -- MAPS >65  -- SBP   -- Pressors to maintain blood pressure goals   -- Wean as tolerated       Pulm:   -- Intubated  -- Goal O2 sat > 90%  -- Wean as able  -- Two pleural and one mediastinal chest tube to water seal       Renal:  -- Keep pitts for strict I/O  -- BUN/Cr   -- mIVF LR @ 100   Recent Labs   Lab 12/07/23  1058   BUN 12   CREATININE 0.9     -- Urine output: 1,998 mL       FEN / GI:   -- Net +7 since admission   -- Replace  lytes as needed  -- Nutrition: NPO  -- Docusate   -- Famotidine       ID:   -- Tm: afebrile; WBC 12.03 preop. 18.7 post op   Recent Labs   Lab 12/07/23  1058   WBC 12.03   -- Vancomycin two doses in OR       Heme/Onc:  -- H/H stable, 12.6 preop. 13.8 post op.   -- Daily CBC  -- okay to start anticoagulation one day after surgery   -- Received 8 units of pRBC in the OR   Recent Labs   Lab 12/07/23  1058   HGB 12.6            Endo:   -- Gluc goal 140-180  -- POCT 152      PPx:   Feeding: NPO  Analgesia/Sedation: tylenol, morphine, fentanyl /Precedex   Thromboembolic prevention: None  HOB >30: Yes  Stress Ulcer ppx: Famotidine   Glucose control: Critical care goal 140-180 g/dl   Bowel reg: docusate  Invasive Lines/Drains/Airway: PIV x2, Use, Central line, Nicolas, Chest tube x3   Deescalation: dc'ed         Dispo/Code Status/Palliative:   -- SICU / Full Code              Monroe Hagan MD  Critical Care - Surgery  [facility] - Surgical Intensive Care

## 2023-12-09 NOTE — PROGRESS NOTES
"Pharmacokinetic Initial Assessment: IV Vancomycin    Assessment/Plan:    Initiate intravenous vancomycin with loading dose of 2000 mg once followed by a maintenance dose of vancomycin 1500 mg IV every 12 hours  Desired empiric serum trough concentration is 10 to 20 mcg/mL  Draw vancomycin trough level 60 min prior to fourth dose on 12/9 at approximately 2000  Pharmacy will continue to follow and monitor vancomycin.      Please contact pharmacy at extension [phone] with any questions regarding this assessment.     Thank you for the consult,   Dorothy Barclay       Patient brief summary:  Megan Cook is a 38 y.o. female initiated on antimicrobial therapy with IV Vancomycin for surgical prophylaxis.     Drug Allergies:   Review of patient's allergies indicates:   Allergen Reactions    Cephalexin Other (See Comments)     She gets mouth sores.     Penicillin g Other (See Comments)     Causes mouth sores.        Actual Body Weight:   127 kg     Renal Function:   Estimated Creatinine Clearance: 114.8 mL/min (based on SCr of 0.9 mg/dL).,     Dialysis Method (if applicable):  N/A    CBC (last 72 hours):  Recent Labs   Lab Result Units 12/07/23  1058   WBC K/uL 12.03   Hemoglobin g/dL 12.6   Hematocrit % 37.4   Platelets K/uL 287   Gran % % 71.4   Lymph % % 19.6   Mono % % 6.6   Eosinophil % % 1.5   Basophil % % 0.5   Differential Method  Automated       Metabolic Panel (last 72 hours):  Recent Labs   Lab Result Units 12/07/23  1058   Sodium mmol/L 141   Potassium mmol/L 4.2   Chloride mmol/L 105   CO2 mmol/L 27   Glucose mg/dL 96   BUN mg/dL 12   Creatinine mg/dL 0.9   Albumin g/dL 3.7   Total Bilirubin mg/dL 0.6   Alkaline Phosphatase U/L 93   AST U/L 18   ALT U/L 23       Drug levels (last 3 results):  No results for input(s): "VANCOMYCINRA", "VANCORANDOM", "VANCOMYCINPE", "VANCOPEAK", "VANCOMYCINTR", "VANCOTROUGH" in the last 72 hours.    Microbiologic Results:  Microbiology Results (last 7 days)       ** No results " found for the last 168 hours. **

## 2023-12-09 NOTE — HPI
Reason for Consult: Management of Hyperglycemia     Surgical Procedure and Date:   12/8/23  Replacement-valve-aortic (N/A)  REPLACEMENT, PULMONARY VALVE (N/A)      Lab Results   Component Value Date    HGBA1C 5.5 11/14/2022         HPI:   Patient is a 38 y.o. female with a diagnosis of rheumatic heart disease and Ross procedure and mitral valve repair (including ring annuloplasty) in 2000 followed by repair of autograft aneurysm repair and aortic valve replacement with 23mm CE bioprothesis in 2012 who presents for pre-operative evaluation for aortic and pulmonary valve replacement. Endocrinology consulted for management of hyperglycemia.

## 2023-12-09 NOTE — TRANSFER OF CARE
"Anesthesia Transfer of Care Note    Patient: Megan Cook    Procedure(s) Performed: Procedure(s) (LRB):  Replacement-valve-aortic (N/A)  REPLACEMENT, PULMONARY VALVE (N/A)    Patient location: ICU    Anesthesia Type: general    Transport from OR: Transported from OR intubated on 100% O2 by AMBU with adequate controlled ventilation. Upon arrival to PACU/ICU, patient attached to ventilator and auscultated to confirm bilateral breath sounds and adequate TV. Continuous ECG monitoring in transport. Continuous SpO2 monitoring in transport. Continuos invasive BP monitoring in transport. Continuous CVP monitoring in transport    Post pain: adequate analgesia    Post assessment: no apparent anesthetic complications and tolerated procedure well    Post vital signs: stable    Level of consciousness: sedated    Nausea/Vomiting: no nausea/vomiting    Complications: none    Transfer of care protocol was followedComments: Bedside team report given in SICU. Patient left in stable condition.      Last vitals: Visit Vitals  /61 (BP Location: Right arm, Patient Position: Lying)   Pulse 98   Temp 36.8 °C (98.3 °F) (Oral)   Resp (!) 24   Ht 5' 5" (1.651 m)   Wt 127 kg (280 lb)   LMP 11/08/2023 (Approximate)   SpO2 (!) 94%   Breastfeeding No   BMI 46.59 kg/m²     "

## 2023-12-09 NOTE — PROGRESS NOTES
Nurses Note -- 4 Eyes      12/9/2023   1:20 AM      Skin assessed during: Admit from OR      [x] No Altered Skin Integrity Present    [x]Prevention Measures Documented- Sacral foam in place, heel foams and heel boots in place      [] Yes- Altered Skin Integrity Present or Discovered   [] LDA Added if Not in Epic (Describe Wound)   [] New Altered Skin Integrity was Present on Admit and Documented in LDA   [] Wound Image Taken    Wound Care Consulted? No    Attending Nurse:  ADWOA Morrell    Second RN/Staff Member:   ADWOA Landon

## 2023-12-09 NOTE — ASSESSMENT & PLAN NOTE
BG goal 110-140 (CTS protocol)     Continue IV insulin infusion protocol  Requires intensive BG monitoring while on protocol     ** Please call Endocrine for any BG related issues **    ** Please notify Endocrine for any change and/or advance in diet**    Discharge planning: TBLEANDRO

## 2023-12-09 NOTE — PROCEDURES
"Megan Cook is a 38 y.o. female patient.    Temp: 99 °F (37.2 °C) (12/09/23 1300)  Pulse: 104 (12/09/23 1300)  Resp: 20 (12/09/23 1300)  BP: 129/62 (12/09/23 1300)  SpO2: 95 % (12/09/23 1300)  Weight: (!) 143.8 kg (317 lb 0.3 oz) (12/09/23 1047)  Height: 5' 5" (165.1 cm) (12/09/23 1047)       Central Line    Date/Time: 12/9/2023 1:46 PM    Performed by: Gloria Cruz MD  Authorized by: Gloria Cruz MD    Location procedure was performed:  [facility] CRITICAL CARE SURGERY  Consent Done ?:  Yes  Time out complete?: Verified correct patient, procedure, equipment, staff, and site/side    Indications:  Hemodialysis  Preparation:  Skin prepped with ChloraPrep  Location:  Left internal jugular  Catheter size:  13 Fr  Inserted Catheter Length (cm):  20  Ultrasound guidance: Yes    Vessel Caliber:  Large   patent  Comprressibility:  Normal  Needle advanced into vessel with real time ultrasound guidance.    Guidewire confirmed in vessel.    Steril sheath on probe.    Sterile gel used.  Manometry: Yes    Number of attempts:  1  Securement:  Line sutured, chlorhexidine patch, sterile dressing applied and blood return through all ports  Complications: No    Specimens: No    Implants: No    Other Complications:  LIJ trialysis line placed. Manometry performed with no rising column. Placement confirmation pending CXR   LIJ trialysis line placed. Manometry performed with no rising column. Placement confirmation pending CXR      12/9/2023    "

## 2023-12-09 NOTE — OP NOTE
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    Pulmonary Valve ultimately replaced with 29mm bioprosthesis inside a dacron graft.    Initial attempts at pulmonary valve replacement with unrecognized distal obstruction initially and unable to identify level of obstruction. Two attempts were made with homografts and then that was all taken down and the final conduit was placed.    After removal of cross clamp there was VT that required shock but ultimately converted spontaneously with no additional arrythmia and normal LV function throughout the remainder of case.  The additional attempts at PVR were done with the heart beating as there was no intra-cardiac shunt confirmed with negative bubble study.    In the end, echo showed normal LV and RV function and normal functioning AVR and PVR.    Of note, after closure, there was a ten beat run of VT that resolved spontaneously.      DESCRIPTION OF PROCEDURE:    The Patient was brought to the operating room and placed on the operating table in the supine position.  After adequate general endotracheal anesthesia had been attained and adequate monitoring lines had been placed  the patient is prepped and draped in the usual sterile fashion.  Femoral access was obtained in the event urgent femoral cannulation was necessary though it was not.  A redo median sternotomy incision was made. The cardiac structures were dissected free and a pericardial well made.   Cannulation sutures were placed.  After adequate heparin circulation time the aorta was cannulated with a 22 [place] aortic cannula.  The superior vena cava and inferior vena cava were cannulated via the right atrium.  Cardiopulmonary bypass was instituted.  We placed a left ventricular vent via the right superior pulmonary vein prior to cooling the patient.  The patient was then cooled toward 32° centigrade.  A cardioplegia needle was placed in the ascending aorta to be used for antegrade cardioplegia as well as left ventricular venting.  The aorta was crossclamped.  Antegrade cardiolplegia was given and a prompt cardiac arrest occured  After the heart arrested we then opened the aorta with Metzenbaum scissors along the prior aortotomy line.  The prosthetic aortic valve was removed.   We then sized the aortic annulus to a 25 mm mechanical supra-annular valve.  This valve was brought in the operative field.  Valve sutures consisting of 2 0 Ethibond horizontal mattress ventricle to aorta sutures were then placed.  The valve sutures were then placed through the valve sewing ring and the valve was then lowered into position and the sutures were secured with Core-Knot.  The valve leaflets were opened with the leaflet yesterday and were unobstructed.  Given the supraannular valve and a low left coronary I passed a 4mm probe easily through the left coronary to confirm it was unobstructed.    The aortotomy was then closed with 4-0 Prolene double-layer running suture.    The pulmonary homograft was incised and as much as could be excised was.  It was heavily calcified and the posterior aspect along the RV side could not be safely debrided  completely due to concern of injury to the left coronary.  A 26mm homograft was placed with running suture lines proximally and distally.  The heart was deaired and the cross-clamp removed.  There was VT that resolved within a couple of minutes and a couple of shocks but ultimately resolved spontaneously.  There was sinus rhythm the remainder of the case.    The patient was rewarmed.  After adequate rewarming, the patient was weaned from cardiopulmonary bypass but there was substantial bleeding from the proximal homograft site. I went back on bypass and redid the proximal suture line with some pledgeted sutures.  When I weaned from bypass the second time the homograft began to tear the suture line.  I went back on again and replaced it with a new 24mm homograft.  When we weaned from bypass a the third time, the hemostasis was better but gradually worsened and the echo showed a gradient of 70mmHg across the conduit.  I measured the RV pressure and it was systemic.  The level of obstruction was not clear by echo or external inspection. The sutures lines were bleeding and progressively worsened with the high pressures.  The RV and LV function were normal, the aortic valve was difficult to visualize on echo.  The hemodynamics were good. I called my partner Dr. Michael to assist me.  We fashioned a new RV to PA conduit with a 29mm bioprosthetic valve in a 32mm Dacron graft.     We hepranized again and went back on bypass and took the homograft out.  The distal PA site was enlarged, the ventriculotomy was enlarged. It was unclear what the cause of obstruction was with the homograft, it was not evident when taking it out.   The new conduit was placed. We weaned from bypass again, this time without difficulty or concerns on echo.   Modified ultrafiltration was finished  the cannulas were removed and protamine was given. Hemostasis was achieved. Bilateral pleural tubes were placed. A mediastinal tube was placed.  The sternum  was approximated with #6 steel wire. The presternal fascia and  linea alba were approximated with running Vicryl.  The skin was closed staples.  Sterile dressings were applied.  The patient was taken to the adult cardiovascular intensive care unit intubated and in critical but stable condition. I was present and scrubbed for the entire procedure.  There was no qualified resident available in the performance of this procedure.  I was present in the intensive care unit for the postoperative hand off.    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 Aortic Valve (gross only)     References:    Click here for ordering Quick Tip   Question Answer Comment   Procedure Type: Cardiovascular    Specimen Class: Routine/Screening    Which provider would you like to cc? ALVARO BUTLER    Release to patient Immediate        12/08/23 1923                  Alvaro Butler MD

## 2023-12-10 PROBLEM — I49.8 ACCELERATED JUNCTIONAL RHYTHM: Status: ACTIVE | Noted: 2023-12-10

## 2023-12-10 LAB
ALBUMIN SERPL BCP-MCNC: 2.4 G/DL (ref 3.5–5.2)
ALBUMIN SERPL BCP-MCNC: 2.6 G/DL (ref 3.5–5.2)
ALBUMIN SERPL BCP-MCNC: 2.8 G/DL (ref 3.5–5.2)
ALBUMIN SERPL BCP-MCNC: 3 G/DL (ref 3.5–5.2)
ALBUMIN SERPL BCP-MCNC: 3.1 G/DL (ref 3.5–5.2)
ALLENS TEST: ABNORMAL
ALLENS TEST: NORMAL
ALP SERPL-CCNC: 52 U/L (ref 55–135)
ALP SERPL-CCNC: 54 U/L (ref 55–135)
ALP SERPL-CCNC: 66 U/L (ref 55–135)
ALT SERPL W/O P-5'-P-CCNC: 226 U/L (ref 10–44)
ALT SERPL W/O P-5'-P-CCNC: 267 U/L (ref 10–44)
ALT SERPL W/O P-5'-P-CCNC: 304 U/L (ref 10–44)
ANION GAP SERPL CALC-SCNC: 11 MMOL/L (ref 8–16)
ANION GAP SERPL CALC-SCNC: 12 MMOL/L (ref 8–16)
ANION GAP SERPL CALC-SCNC: 15 MMOL/L (ref 8–16)
APTT PPP: 29.9 SEC (ref 21–32)
AST SERPL-CCNC: 364 U/L (ref 10–40)
AST SERPL-CCNC: 390 U/L (ref 10–40)
AST SERPL-CCNC: 407 U/L (ref 10–40)
BASOPHILS # BLD AUTO: 0.07 K/UL (ref 0–0.2)
BASOPHILS NFR BLD: 0.5 % (ref 0–1.9)
BILIRUB SERPL-MCNC: 5.1 MG/DL (ref 0.1–1)
BILIRUB SERPL-MCNC: 5.2 MG/DL (ref 0.1–1)
BILIRUB SERPL-MCNC: 5.4 MG/DL (ref 0.1–1)
BLD PROD TYP BPU: NORMAL
BLOOD UNIT EXPIRATION DATE: NORMAL
BLOOD UNIT TYPE CODE: 600
BLOOD UNIT TYPE CODE: 6200
BLOOD UNIT TYPE: NORMAL
BUN SERPL-MCNC: 10 MG/DL (ref 6–20)
BUN SERPL-MCNC: 11 MG/DL (ref 6–20)
BUN SERPL-MCNC: 12 MG/DL (ref 6–20)
BUN SERPL-MCNC: 20 MG/DL (ref 6–20)
BUN SERPL-MCNC: 23 MG/DL (ref 6–20)
CA-I BLDV-SCNC: 1.05 MMOL/L (ref 1.06–1.42)
CALCIUM SERPL-MCNC: 7.5 MG/DL (ref 8.7–10.5)
CALCIUM SERPL-MCNC: 7.9 MG/DL (ref 8.7–10.5)
CALCIUM SERPL-MCNC: 8.1 MG/DL (ref 8.7–10.5)
CALCIUM SERPL-MCNC: 8.4 MG/DL (ref 8.7–10.5)
CALCIUM SERPL-MCNC: 8.8 MG/DL (ref 8.7–10.5)
CHLORIDE SERPL-SCNC: 104 MMOL/L (ref 95–110)
CHLORIDE SERPL-SCNC: 105 MMOL/L (ref 95–110)
CHLORIDE SERPL-SCNC: 107 MMOL/L (ref 95–110)
CHLORIDE SERPL-SCNC: 111 MMOL/L (ref 95–110)
CHLORIDE SERPL-SCNC: 112 MMOL/L (ref 95–110)
CO2 SERPL-SCNC: 16 MMOL/L (ref 23–29)
CO2 SERPL-SCNC: 19 MMOL/L (ref 23–29)
CO2 SERPL-SCNC: 20 MMOL/L (ref 23–29)
CO2 SERPL-SCNC: 21 MMOL/L (ref 23–29)
CO2 SERPL-SCNC: 21 MMOL/L (ref 23–29)
CODING SYSTEM: NORMAL
CREAT SERPL-MCNC: 1.6 MG/DL (ref 0.5–1.4)
CREAT SERPL-MCNC: 1.9 MG/DL (ref 0.5–1.4)
CREAT SERPL-MCNC: 1.9 MG/DL (ref 0.5–1.4)
CREAT SERPL-MCNC: 2.4 MG/DL (ref 0.5–1.4)
CREAT SERPL-MCNC: 2.6 MG/DL (ref 0.5–1.4)
CROSSMATCH INTERPRETATION: NORMAL
DELSYS: ABNORMAL
DIFFERENTIAL METHOD: ABNORMAL
DISPENSE STATUS: NORMAL
EOSINOPHIL # BLD AUTO: 0 K/UL (ref 0–0.5)
EOSINOPHIL NFR BLD: 0 % (ref 0–8)
ERYTHROCYTE [DISTWIDTH] IN BLOOD BY AUTOMATED COUNT: 15.1 % (ref 11.5–14.5)
ERYTHROCYTE [DISTWIDTH] IN BLOOD BY AUTOMATED COUNT: 15.8 % (ref 11.5–14.5)
ERYTHROCYTE [SEDIMENTATION RATE] IN BLOOD BY WESTERGREN METHOD: 18 MM/H
EST. GFR  (NO RACE VARIABLE): 23.6 ML/MIN/1.73 M^2
EST. GFR  (NO RACE VARIABLE): 26 ML/MIN/1.73 M^2
EST. GFR  (NO RACE VARIABLE): 34.4 ML/MIN/1.73 M^2
EST. GFR  (NO RACE VARIABLE): 34.4 ML/MIN/1.73 M^2
EST. GFR  (NO RACE VARIABLE): 42.3 ML/MIN/1.73 M^2
FIBRINOGEN PPP-MCNC: 356 MG/DL (ref 182–400)
FIBRINOGEN PPP-MCNC: 417 MG/DL (ref 182–400)
FIBRINOGEN PPP-MCNC: 428 MG/DL (ref 182–400)
FIO2: 40
FIO2: 50
GLUCOSE SERPL-MCNC: 113 MG/DL (ref 70–110)
GLUCOSE SERPL-MCNC: 114 MG/DL (ref 70–110)
GLUCOSE SERPL-MCNC: 118 MG/DL (ref 70–110)
GLUCOSE SERPL-MCNC: 120 MG/DL (ref 70–110)
GLUCOSE SERPL-MCNC: 124 MG/DL (ref 70–110)
HCO3 UR-SCNC: 14.6 MMOL/L (ref 24–28)
HCO3 UR-SCNC: 15.2 MMOL/L (ref 24–28)
HCO3 UR-SCNC: 15.4 MMOL/L (ref 24–28)
HCO3 UR-SCNC: 17.1 MMOL/L (ref 24–28)
HCO3 UR-SCNC: 17.4 MMOL/L (ref 24–28)
HCO3 UR-SCNC: 17.9 MMOL/L (ref 24–28)
HCO3 UR-SCNC: 18.4 MMOL/L (ref 24–28)
HCO3 UR-SCNC: 19.4 MMOL/L (ref 24–28)
HCO3 UR-SCNC: 19.7 MMOL/L (ref 24–28)
HCO3 UR-SCNC: 20.9 MMOL/L (ref 24–28)
HCT VFR BLD AUTO: 27.6 % (ref 37–48.5)
HCT VFR BLD AUTO: 32.3 % (ref 37–48.5)
HCT VFR BLD CALC: 25 %PCV (ref 36–54)
HCT VFR BLD CALC: 27 %PCV (ref 36–54)
HCT VFR BLD CALC: 27 %PCV (ref 36–54)
HCT VFR BLD CALC: 28 %PCV (ref 36–54)
HCT VFR BLD CALC: 29 %PCV (ref 36–54)
HCT VFR BLD CALC: 30 %PCV (ref 36–54)
HCT VFR BLD CALC: 31 %PCV (ref 36–54)
HCT VFR BLD CALC: 32 %PCV (ref 36–54)
HGB BLD-MCNC: 11 G/DL (ref 12–16)
HGB BLD-MCNC: 9.7 G/DL (ref 12–16)
IMM GRANULOCYTES # BLD AUTO: 0.05 K/UL (ref 0–0.04)
IMM GRANULOCYTES NFR BLD AUTO: 0.3 % (ref 0–0.5)
INR PPP: 1.1 (ref 0.8–1.2)
LDH SERPL L TO P-CCNC: 0.72 MMOL/L (ref 0.36–1.25)
LDH SERPL L TO P-CCNC: 0.81 MMOL/L (ref 0.36–1.25)
LDH SERPL L TO P-CCNC: 0.88 MMOL/L (ref 0.36–1.25)
LDH SERPL L TO P-CCNC: 0.9 MMOL/L (ref 0.36–1.25)
LDH SERPL L TO P-CCNC: 0.93 MMOL/L (ref 0.36–1.25)
LDH SERPL L TO P-CCNC: 0.96 MMOL/L (ref 0.36–1.25)
LDH SERPL L TO P-CCNC: 0.98 MMOL/L (ref 0.36–1.25)
LDH SERPL L TO P-CCNC: 0.99 MMOL/L (ref 0.36–1.25)
LDH SERPL L TO P-CCNC: 1.01 MMOL/L (ref 0.36–1.25)
LDH SERPL L TO P-CCNC: 1.03 MMOL/L (ref 0.36–1.25)
LYMPHOCYTES # BLD AUTO: 1 K/UL (ref 1–4.8)
LYMPHOCYTES NFR BLD: 6.4 % (ref 18–48)
MAGNESIUM SERPL-MCNC: 1.8 MG/DL (ref 1.6–2.6)
MAGNESIUM SERPL-MCNC: 1.9 MG/DL (ref 1.6–2.6)
MAGNESIUM SERPL-MCNC: 2.1 MG/DL (ref 1.6–2.6)
MCH RBC QN AUTO: 28.9 PG (ref 27–31)
MCH RBC QN AUTO: 29.8 PG (ref 27–31)
MCHC RBC AUTO-ENTMCNC: 34.1 G/DL (ref 32–36)
MCHC RBC AUTO-ENTMCNC: 35.1 G/DL (ref 32–36)
MCV RBC AUTO: 85 FL (ref 82–98)
MCV RBC AUTO: 85 FL (ref 82–98)
MIN VOL: 10.4
MIN VOL: 10.9
MIN VOL: 11
MIN VOL: 11.1
MIN VOL: 9.01
MODE: ABNORMAL
MONOCYTES # BLD AUTO: 0.6 K/UL (ref 0.3–1)
MONOCYTES NFR BLD: 4 % (ref 4–15)
NEUTROPHILS # BLD AUTO: 13.7 K/UL (ref 1.8–7.7)
NEUTROPHILS NFR BLD: 88.8 % (ref 38–73)
NRBC BLD-RTO: 0 /100 WBC
NUM UNITS TRANS FFP: NORMAL
PCO2 BLDA: 21.8 MMHG (ref 35–45)
PCO2 BLDA: 22.3 MMHG (ref 35–45)
PCO2 BLDA: 23.5 MMHG (ref 35–45)
PCO2 BLDA: 24.6 MMHG (ref 35–45)
PCO2 BLDA: 25.6 MMHG (ref 35–45)
PCO2 BLDA: 25.7 MMHG (ref 35–45)
PCO2 BLDA: 27.1 MMHG (ref 35–45)
PCO2 BLDA: 28.5 MMHG (ref 35–45)
PCO2 BLDA: 30.7 MMHG (ref 35–45)
PCO2 BLDA: 30.9 MMHG (ref 35–45)
PEEP: 8
PH SMN: 7.41 [PH] (ref 7.35–7.45)
PH SMN: 7.42 [PH] (ref 7.35–7.45)
PH SMN: 7.42 [PH] (ref 7.35–7.45)
PH SMN: 7.43 [PH] (ref 7.35–7.45)
PH SMN: 7.44 [PH] (ref 7.35–7.45)
PH SMN: 7.45 [PH] (ref 7.35–7.45)
PH SMN: 7.46 [PH] (ref 7.35–7.45)
PH SMN: 7.49 [PH] (ref 7.35–7.45)
PHOSPHATE SERPL-MCNC: 2 MG/DL (ref 2.7–4.5)
PHOSPHATE SERPL-MCNC: 2.6 MG/DL (ref 2.7–4.5)
PHOSPHATE SERPL-MCNC: 3.3 MG/DL (ref 2.7–4.5)
PIP: 25
PIP: 27
PIP: 28
PIP: 29
PIP: 29
PLATELET # BLD AUTO: 29 K/UL (ref 150–450)
PLATELET # BLD AUTO: 30 K/UL (ref 150–450)
PLATELET BLD QL SMEAR: ABNORMAL
PMV BLD AUTO: ABNORMAL FL (ref 9.2–12.9)
PMV BLD AUTO: ABNORMAL FL (ref 9.2–12.9)
PO2 BLDA: 116 MMHG (ref 80–100)
PO2 BLDA: 126 MMHG (ref 80–100)
PO2 BLDA: 69 MMHG (ref 80–100)
PO2 BLDA: 71 MMHG (ref 80–100)
PO2 BLDA: 73 MMHG (ref 80–100)
PO2 BLDA: 73 MMHG (ref 80–100)
PO2 BLDA: 76 MMHG (ref 80–100)
PO2 BLDA: 79 MMHG (ref 80–100)
PO2 BLDA: 82 MMHG (ref 80–100)
PO2 BLDA: 99 MMHG (ref 80–100)
POC BE: -10 MMOL/L
POC BE: -3 MMOL/L
POC BE: -4 MMOL/L
POC BE: -5 MMOL/L
POC BE: -6 MMOL/L
POC BE: -6 MMOL/L
POC BE: -7 MMOL/L
POC BE: -7 MMOL/L
POC BE: -9 MMOL/L
POC BE: -9 MMOL/L
POC IONIZED CALCIUM: 0.97 MMOL/L (ref 1.06–1.42)
POC IONIZED CALCIUM: 0.99 MMOL/L (ref 1.06–1.42)
POC IONIZED CALCIUM: 1.04 MMOL/L (ref 1.06–1.42)
POC IONIZED CALCIUM: 1.05 MMOL/L (ref 1.06–1.42)
POC IONIZED CALCIUM: 1.06 MMOL/L (ref 1.06–1.42)
POC IONIZED CALCIUM: 1.11 MMOL/L (ref 1.06–1.42)
POC IONIZED CALCIUM: 1.17 MMOL/L (ref 1.06–1.42)
POC IONIZED CALCIUM: 1.18 MMOL/L (ref 1.06–1.42)
POC SATURATED O2: 95 % (ref 95–100)
POC SATURATED O2: 96 % (ref 95–100)
POC SATURATED O2: 97 % (ref 95–100)
POC SATURATED O2: 98 % (ref 95–100)
POC SATURATED O2: 99 % (ref 95–100)
POC SATURATED O2: 99 % (ref 95–100)
POC TCO2: 15 MMOL/L (ref 23–27)
POC TCO2: 16 MMOL/L (ref 23–27)
POC TCO2: 16 MMOL/L (ref 23–27)
POC TCO2: 18 MMOL/L (ref 23–27)
POC TCO2: 18 MMOL/L (ref 23–27)
POC TCO2: 19 MMOL/L (ref 23–27)
POC TCO2: 19 MMOL/L (ref 23–27)
POC TCO2: 20 MMOL/L (ref 23–27)
POC TCO2: 21 MMOL/L (ref 23–27)
POC TCO2: 22 MMOL/L (ref 23–27)
POCT GLUCOSE: 105 MG/DL (ref 70–110)
POCT GLUCOSE: 106 MG/DL (ref 70–110)
POCT GLUCOSE: 109 MG/DL (ref 70–110)
POCT GLUCOSE: 113 MG/DL (ref 70–110)
POCT GLUCOSE: 113 MG/DL (ref 70–110)
POCT GLUCOSE: 115 MG/DL (ref 70–110)
POCT GLUCOSE: 117 MG/DL (ref 70–110)
POCT GLUCOSE: 119 MG/DL (ref 70–110)
POCT GLUCOSE: 123 MG/DL (ref 70–110)
POCT GLUCOSE: 124 MG/DL (ref 70–110)
POCT GLUCOSE: 129 MG/DL (ref 70–110)
POCT GLUCOSE: 131 MG/DL (ref 70–110)
POCT GLUCOSE: 90 MG/DL (ref 70–110)
POCT GLUCOSE: 96 MG/DL (ref 70–110)
POCT GLUCOSE: 99 MG/DL (ref 70–110)
POTASSIUM BLD-SCNC: 4.4 MMOL/L (ref 3.5–5.1)
POTASSIUM BLD-SCNC: 4.6 MMOL/L (ref 3.5–5.1)
POTASSIUM BLD-SCNC: 4.8 MMOL/L (ref 3.5–5.1)
POTASSIUM BLD-SCNC: 5 MMOL/L (ref 3.5–5.1)
POTASSIUM BLD-SCNC: 5.1 MMOL/L (ref 3.5–5.1)
POTASSIUM BLD-SCNC: 5.1 MMOL/L (ref 3.5–5.1)
POTASSIUM SERPL-SCNC: 4.8 MMOL/L (ref 3.5–5.1)
POTASSIUM SERPL-SCNC: 4.9 MMOL/L (ref 3.5–5.1)
POTASSIUM SERPL-SCNC: 5.2 MMOL/L (ref 3.5–5.1)
POTASSIUM SERPL-SCNC: 5.2 MMOL/L (ref 3.5–5.1)
POTASSIUM SERPL-SCNC: 5.3 MMOL/L (ref 3.5–5.1)
PROT SERPL-MCNC: 4.9 G/DL (ref 6–8.4)
PROT SERPL-MCNC: 5 G/DL (ref 6–8.4)
PROT SERPL-MCNC: 5.1 G/DL (ref 6–8.4)
PROTHROMBIN TIME: 11.9 SEC (ref 9–12.5)
RBC # BLD AUTO: 3.26 M/UL (ref 4–5.4)
RBC # BLD AUTO: 3.81 M/UL (ref 4–5.4)
SAMPLE: ABNORMAL
SAMPLE: NORMAL
SITE: ABNORMAL
SITE: NORMAL
SODIUM BLD-SCNC: 136 MMOL/L (ref 136–145)
SODIUM BLD-SCNC: 136 MMOL/L (ref 136–145)
SODIUM BLD-SCNC: 139 MMOL/L (ref 136–145)
SODIUM BLD-SCNC: 139 MMOL/L (ref 136–145)
SODIUM BLD-SCNC: 140 MMOL/L (ref 136–145)
SODIUM BLD-SCNC: 143 MMOL/L (ref 136–145)
SODIUM SERPL-SCNC: 137 MMOL/L (ref 136–145)
SODIUM SERPL-SCNC: 138 MMOL/L (ref 136–145)
SODIUM SERPL-SCNC: 138 MMOL/L (ref 136–145)
SODIUM SERPL-SCNC: 142 MMOL/L (ref 136–145)
SODIUM SERPL-SCNC: 143 MMOL/L (ref 136–145)
SP02: 94
SP02: 94
SP02: 95
SP02: 95
SP02: 99
TRIGL SERPL-MCNC: 307 MG/DL (ref 30–150)
VANCOMYCIN SERPL-MCNC: 18.2 UG/ML
VT: 400
VT: 500
WBC # BLD AUTO: 15.42 K/UL (ref 3.9–12.7)
WBC # BLD AUTO: 19.96 K/UL (ref 3.9–12.7)

## 2023-12-10 PROCEDURE — 80100008 HC CRRT DAILY MAINTENANCE

## 2023-12-10 PROCEDURE — 25000003 PHARM REV CODE 250

## 2023-12-10 PROCEDURE — 84132 ASSAY OF SERUM POTASSIUM: CPT

## 2023-12-10 PROCEDURE — 82330 ASSAY OF CALCIUM: CPT

## 2023-12-10 PROCEDURE — 63600175 PHARM REV CODE 636 W HCPCS

## 2023-12-10 PROCEDURE — 84478 ASSAY OF TRIGLYCERIDES: CPT | Performed by: STUDENT IN AN ORGANIZED HEALTH CARE EDUCATION/TRAINING PROGRAM

## 2023-12-10 PROCEDURE — 83605 ASSAY OF LACTIC ACID: CPT

## 2023-12-10 PROCEDURE — 82803 BLOOD GASES ANY COMBINATION: CPT

## 2023-12-10 PROCEDURE — 80202 ASSAY OF VANCOMYCIN: CPT | Performed by: THORACIC SURGERY (CARDIOTHORACIC VASCULAR SURGERY)

## 2023-12-10 PROCEDURE — 25000003 PHARM REV CODE 250: Performed by: STUDENT IN AN ORGANIZED HEALTH CARE EDUCATION/TRAINING PROGRAM

## 2023-12-10 PROCEDURE — 83735 ASSAY OF MAGNESIUM: CPT | Mod: 91

## 2023-12-10 PROCEDURE — 82800 BLOOD PH: CPT

## 2023-12-10 PROCEDURE — 99233 SBSQ HOSP IP/OBS HIGH 50: CPT | Mod: ,,, | Performed by: INTERNAL MEDICINE

## 2023-12-10 PROCEDURE — 99233 SBSQ HOSP IP/OBS HIGH 50: CPT | Mod: ,,, | Performed by: HOSPITALIST

## 2023-12-10 PROCEDURE — 63600175 PHARM REV CODE 636 W HCPCS: Performed by: STUDENT IN AN ORGANIZED HEALTH CARE EDUCATION/TRAINING PROGRAM

## 2023-12-10 PROCEDURE — 85025 COMPLETE CBC W/AUTO DIFF WBC: CPT | Performed by: INTERNAL MEDICINE

## 2023-12-10 PROCEDURE — 99233 PR SUBSEQUENT HOSPITAL CARE,LEVL III: ICD-10-PCS | Mod: ,,, | Performed by: NURSE PRACTITIONER

## 2023-12-10 PROCEDURE — 99900035 HC TECH TIME PER 15 MIN (STAT)

## 2023-12-10 PROCEDURE — 99291 PR CRITICAL CARE, E/M 30-74 MINUTES: ICD-10-PCS | Mod: ,,, | Performed by: INTERNAL MEDICINE

## 2023-12-10 PROCEDURE — 63600175 PHARM REV CODE 636 W HCPCS: Performed by: ANESTHESIOLOGY

## 2023-12-10 PROCEDURE — 80053 COMPREHEN METABOLIC PANEL: CPT | Performed by: INTERNAL MEDICINE

## 2023-12-10 PROCEDURE — 99233 PR SUBSEQUENT HOSPITAL CARE,LEVL III: ICD-10-PCS | Mod: ,,, | Performed by: INTERNAL MEDICINE

## 2023-12-10 PROCEDURE — 94761 N-INVAS EAR/PLS OXIMETRY MLT: CPT | Mod: XB

## 2023-12-10 PROCEDURE — 82330 ASSAY OF CALCIUM: CPT | Performed by: THORACIC SURGERY (CARDIOTHORACIC VASCULAR SURGERY)

## 2023-12-10 PROCEDURE — 85384 FIBRINOGEN ACTIVITY: CPT | Mod: 91 | Performed by: PHYSICIAN ASSISTANT

## 2023-12-10 PROCEDURE — 25000003 PHARM REV CODE 250: Performed by: PHYSICIAN ASSISTANT

## 2023-12-10 PROCEDURE — 85610 PROTHROMBIN TIME: CPT | Performed by: PHYSICIAN ASSISTANT

## 2023-12-10 PROCEDURE — 80053 COMPREHEN METABOLIC PANEL: CPT | Mod: 91 | Performed by: INTERNAL MEDICINE

## 2023-12-10 PROCEDURE — 25000003 PHARM REV CODE 250: Performed by: THORACIC SURGERY (CARDIOTHORACIC VASCULAR SURGERY)

## 2023-12-10 PROCEDURE — 85014 HEMATOCRIT: CPT

## 2023-12-10 PROCEDURE — 90945 DIALYSIS ONE EVALUATION: CPT

## 2023-12-10 PROCEDURE — 85027 COMPLETE CBC AUTOMATED: CPT | Performed by: PHYSICIAN ASSISTANT

## 2023-12-10 PROCEDURE — C9113 INJ PANTOPRAZOLE SODIUM, VIA: HCPCS | Performed by: STUDENT IN AN ORGANIZED HEALTH CARE EDUCATION/TRAINING PROGRAM

## 2023-12-10 PROCEDURE — 37799 UNLISTED PX VASCULAR SURGERY: CPT

## 2023-12-10 PROCEDURE — 27100171 HC OXYGEN HIGH FLOW UP TO 24 HOURS

## 2023-12-10 PROCEDURE — 84295 ASSAY OF SERUM SODIUM: CPT

## 2023-12-10 PROCEDURE — 99233 PR SUBSEQUENT HOSPITAL CARE,LEVL III: ICD-10-PCS | Mod: ,,, | Performed by: HOSPITALIST

## 2023-12-10 PROCEDURE — 94003 VENT MGMT INPAT SUBQ DAY: CPT

## 2023-12-10 PROCEDURE — 83735 ASSAY OF MAGNESIUM: CPT | Performed by: INTERNAL MEDICINE

## 2023-12-10 PROCEDURE — 85730 THROMBOPLASTIN TIME PARTIAL: CPT | Performed by: PHYSICIAN ASSISTANT

## 2023-12-10 PROCEDURE — 25000003 PHARM REV CODE 250: Performed by: INTERNAL MEDICINE

## 2023-12-10 PROCEDURE — 63600175 PHARM REV CODE 636 W HCPCS: Performed by: HOSPITALIST

## 2023-12-10 PROCEDURE — 80069 RENAL FUNCTION PANEL: CPT

## 2023-12-10 PROCEDURE — 82565 ASSAY OF CREATININE: CPT

## 2023-12-10 PROCEDURE — 99900026 HC AIRWAY MAINTENANCE (STAT)

## 2023-12-10 PROCEDURE — 85002 BLEEDING TIME TEST: CPT

## 2023-12-10 PROCEDURE — 80069 RENAL FUNCTION PANEL: CPT | Mod: 91 | Performed by: THORACIC SURGERY (CARDIOTHORACIC VASCULAR SURGERY)

## 2023-12-10 PROCEDURE — 20000000 HC ICU ROOM

## 2023-12-10 PROCEDURE — 99233 SBSQ HOSP IP/OBS HIGH 50: CPT | Mod: ,,, | Performed by: NURSE PRACTITIONER

## 2023-12-10 PROCEDURE — 25000003 PHARM REV CODE 250: Performed by: ANESTHESIOLOGY

## 2023-12-10 PROCEDURE — 99291 CRITICAL CARE FIRST HOUR: CPT | Mod: ,,, | Performed by: INTERNAL MEDICINE

## 2023-12-10 PROCEDURE — 25000003 PHARM REV CODE 250: Performed by: HOSPITALIST

## 2023-12-10 PROCEDURE — 84100 ASSAY OF PHOSPHORUS: CPT | Performed by: INTERNAL MEDICINE

## 2023-12-10 RX ORDER — GLUCAGON 1 MG
1 KIT INJECTION
Status: DISCONTINUED | OUTPATIENT
Start: 2023-12-10 | End: 2023-12-13

## 2023-12-10 RX ORDER — AMOXICILLIN 250 MG
1 CAPSULE ORAL 2 TIMES DAILY
Status: DISCONTINUED | OUTPATIENT
Start: 2023-12-10 | End: 2023-12-14

## 2023-12-10 RX ORDER — MAGNESIUM SULFATE HEPTAHYDRATE 40 MG/ML
2 INJECTION, SOLUTION INTRAVENOUS
Status: DISPENSED | OUTPATIENT
Start: 2023-12-10 | End: 2023-12-11

## 2023-12-10 RX ORDER — BALSAM PERU/CASTOR OIL
OINTMENT (GRAM) TOPICAL 2 TIMES DAILY
Status: DISCONTINUED | OUTPATIENT
Start: 2023-12-10 | End: 2023-12-22 | Stop reason: HOSPADM

## 2023-12-10 RX ORDER — PANTOPRAZOLE SODIUM 40 MG/10ML
40 INJECTION, POWDER, LYOPHILIZED, FOR SOLUTION INTRAVENOUS 2 TIMES DAILY
Status: DISCONTINUED | OUTPATIENT
Start: 2023-12-10 | End: 2023-12-10

## 2023-12-10 RX ORDER — NOREPINEPHRINE BITARTRATE/D5W 8 MG/250ML
0-.1 PLASTIC BAG, INJECTION (ML) INTRAVENOUS CONTINUOUS
Status: DISCONTINUED | OUTPATIENT
Start: 2023-12-10 | End: 2023-12-13

## 2023-12-10 RX ORDER — DOCUSATE SODIUM 50 MG/5ML
100 LIQUID ORAL DAILY
Status: DISCONTINUED | OUTPATIENT
Start: 2023-12-10 | End: 2023-12-14

## 2023-12-10 RX ORDER — OXYCODONE HYDROCHLORIDE 5 MG/1
5 TABLET ORAL EVERY 6 HOURS PRN
Status: DISCONTINUED | OUTPATIENT
Start: 2023-12-10 | End: 2023-12-19

## 2023-12-10 RX ORDER — NOREPINEPHRINE BITARTRATE/D5W 4MG/250ML
0-.1 PLASTIC BAG, INJECTION (ML) INTRAVENOUS CONTINUOUS
Status: DISCONTINUED | OUTPATIENT
Start: 2023-12-10 | End: 2023-12-10

## 2023-12-10 RX ORDER — PANTOPRAZOLE SODIUM 40 MG/10ML
40 INJECTION, POWDER, LYOPHILIZED, FOR SOLUTION INTRAVENOUS DAILY
Status: DISCONTINUED | OUTPATIENT
Start: 2023-12-10 | End: 2023-12-14

## 2023-12-10 RX ORDER — ACETAMINOPHEN 650 MG/20.3ML
650 LIQUID ORAL EVERY 8 HOURS PRN
Status: DISCONTINUED | OUTPATIENT
Start: 2023-12-10 | End: 2023-12-19

## 2023-12-10 RX ORDER — HYDROCODONE BITARTRATE AND ACETAMINOPHEN 500; 5 MG/1; MG/1
TABLET ORAL CONTINUOUS
Status: ACTIVE | OUTPATIENT
Start: 2023-12-10 | End: 2023-12-11

## 2023-12-10 RX ORDER — INSULIN ASPART 100 [IU]/ML
0-10 INJECTION, SOLUTION INTRAVENOUS; SUBCUTANEOUS EVERY 4 HOURS PRN
Status: DISCONTINUED | OUTPATIENT
Start: 2023-12-10 | End: 2023-12-13

## 2023-12-10 RX ADMIN — SODIUM CHLORIDE: 9 INJECTION, SOLUTION INTRAVENOUS at 11:12

## 2023-12-10 RX ADMIN — CALCIUM GLUCONATE: 98 INJECTION, SOLUTION INTRAVENOUS at 11:12

## 2023-12-10 RX ADMIN — SODIUM PHOSPHATE, MONOBASIC, MONOHYDRATE AND SODIUM PHOSPHATE, DIBASIC, ANHYDROUS 20.01 MMOL: 142; 276 INJECTION, SOLUTION INTRAVENOUS at 05:12

## 2023-12-10 RX ADMIN — NOREPINEPHRINE BITARTRATE 0.08 MCG/KG/MIN: 4 INJECTION, SOLUTION INTRAVENOUS at 06:12

## 2023-12-10 RX ADMIN — NOREPINEPHRINE BITARTRATE 0.08 MCG/KG/MIN: 4 INJECTION, SOLUTION INTRAVENOUS at 01:12

## 2023-12-10 RX ADMIN — SENNOSIDES AND DOCUSATE SODIUM 1 TABLET: 8.6; 5 TABLET ORAL at 09:12

## 2023-12-10 RX ADMIN — FENTANYL CITRATE 50 MCG: 50 INJECTION INTRAMUSCULAR; INTRAVENOUS at 01:12

## 2023-12-10 RX ADMIN — OXYCODONE HYDROCHLORIDE 5 MG: 5 TABLET ORAL at 02:12

## 2023-12-10 RX ADMIN — EPINEPHRINE 0.02 MCG/KG/MIN: 1 INJECTION INTRAMUSCULAR; INTRAVENOUS; SUBCUTANEOUS at 01:12

## 2023-12-10 RX ADMIN — SODIUM CHLORIDE: 9 INJECTION, SOLUTION INTRAVENOUS at 12:12

## 2023-12-10 RX ADMIN — PROPOFOL 40 MCG/KG/MIN: 10 INJECTION, EMULSION INTRAVENOUS at 07:12

## 2023-12-10 RX ADMIN — CALCIUM GLUCONATE: 98 INJECTION, SOLUTION INTRAVENOUS at 06:12

## 2023-12-10 RX ADMIN — PROPOFOL 45 MCG/KG/MIN: 10 INJECTION, EMULSION INTRAVENOUS at 02:12

## 2023-12-10 RX ADMIN — NOREPINEPHRINE BITARTRATE 0.05 MCG/KG/MIN: 8 INJECTION, SOLUTION INTRAVENOUS at 10:12

## 2023-12-10 RX ADMIN — MUPIROCIN: 20 OINTMENT TOPICAL at 09:12

## 2023-12-10 RX ADMIN — PROPOFOL 40 MCG/KG/MIN: 10 INJECTION, EMULSION INTRAVENOUS at 09:12

## 2023-12-10 RX ADMIN — VASOPRESSIN 0.04 UNITS/MIN: 20 INJECTION INTRAVENOUS at 07:12

## 2023-12-10 RX ADMIN — OXYCODONE HYDROCHLORIDE 5 MG: 5 TABLET ORAL at 08:12

## 2023-12-10 RX ADMIN — CALCIUM GLUCONATE 2 G: 20 INJECTION, SOLUTION INTRAVENOUS at 03:12

## 2023-12-10 RX ADMIN — PROPOFOL 50 MCG/KG/MIN: 10 INJECTION, EMULSION INTRAVENOUS at 07:12

## 2023-12-10 RX ADMIN — PROPOFOL 45 MCG/KG/MIN: 10 INJECTION, EMULSION INTRAVENOUS at 01:12

## 2023-12-10 RX ADMIN — MAGNESIUM SULFATE HEPTAHYDRATE 2 G: 40 INJECTION, SOLUTION INTRAVENOUS at 05:12

## 2023-12-10 RX ADMIN — NOREPINEPHRINE BITARTRATE 0.02 MCG/KG/MIN: 8 INJECTION, SOLUTION INTRAVENOUS at 10:12

## 2023-12-10 RX ADMIN — PROPOFOL 50 MCG/KG/MIN: 10 INJECTION, EMULSION INTRAVENOUS at 05:12

## 2023-12-10 RX ADMIN — Medication: at 09:12

## 2023-12-10 RX ADMIN — MUPIROCIN: 20 OINTMENT TOPICAL at 08:12

## 2023-12-10 RX ADMIN — MAGNESIUM SULFATE HEPTAHYDRATE 2 G: 40 INJECTION, SOLUTION INTRAVENOUS at 03:12

## 2023-12-10 RX ADMIN — DOCUSATE SODIUM 100 MG: 50 LIQUID ORAL at 02:12

## 2023-12-10 RX ADMIN — PANTOPRAZOLE SODIUM 40 MG: 40 INJECTION, POWDER, FOR SOLUTION INTRAVENOUS at 02:12

## 2023-12-10 RX ADMIN — FAMOTIDINE 20 MG: 10 INJECTION, SOLUTION INTRAVENOUS at 08:12

## 2023-12-10 RX ADMIN — DEXTROSE MONOHYDRATE, SODIUM CITRATE, AND CITRIC ACID MONOHYDRATE: 2.45; 2.2; .8 INJECTION, SOLUTION INTRAVENOUS at 06:12

## 2023-12-10 RX ADMIN — SODIUM PHOSPHATE, MONOBASIC, MONOHYDRATE AND SODIUM PHOSPHATE, DIBASIC, ANHYDROUS 39.99 MMOL: 142; 276 INJECTION, SOLUTION INTRAVENOUS at 05:12

## 2023-12-10 RX ADMIN — PROPOFOL 45 MCG/KG/MIN: 10 INJECTION, EMULSION INTRAVENOUS at 12:12

## 2023-12-10 RX ADMIN — OXYCODONE HYDROCHLORIDE 5 MG: 5 TABLET ORAL at 10:12

## 2023-12-10 RX ADMIN — PROPOFOL 40 MCG/KG/MIN: 10 INJECTION, EMULSION INTRAVENOUS at 04:12

## 2023-12-10 RX ADMIN — SENNOSIDES AND DOCUSATE SODIUM 1 TABLET: 8.6; 5 TABLET ORAL at 11:12

## 2023-12-10 NOTE — PROGRESS NOTES
[facility] - Surgical Intensive Care  Nephrology  Progress Note    Patient Name: Megan Cook  MRN: [id number]  Admission Date: 12/8/2023  Hospital Length of Stay: 2 days  Attending Provider: Alexandru Campbell MD   Primary Care Physician: Adelaida Disla NP  Principal Problem:Rheumatic aortic stenosis    Subjective:     HPI: HPI:  Ms. 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        The patient presents to the SICU s/p AVR and PVR with Dr. Campbell on 12/08/2023. During the procedure the patient was on bypass for 398 minutes and clamped for 138 minutes.      On admission, they are intubated, sedated with Precedex, and in stable condition. Inotropic and vasopressor requirements upon admission are 0.05 of Epinephrine, and 0.1 Vasopressin, 0.25 Milrinone to maintain blood pressure at a MAP 65 and SBP < 120. Central access includes a RIJ CVC, arterial access includes a left radial arterial line. They also have 2 pleural chest tubed and 1 mediastinal chest tube with appropriate output.     Intraoperatively, they received 2,000 mL of NS, 4,965 mL of PRBCs, 441 mL FFP, 1,077mL Platelets, and 475 mL Cryoprecipitate. Urine output intraoperatively was 1,835cc.      Immediate post-operative plans include hemodynamic stabilization and weaning of cardiac and respiratory support. Plan to wean vasopressors and inotropes as tolerated, also wean ventilator support with goal of early extubation, and closely monitor fluid status with strict Is/Os and continued fluid resuscitation as needed.    Interval History/Significant Events: UOP dropped off,   Objective:     Vital Signs (Most Recent):  Temp: 99 °F (37.2 °C)  (12/10/23 0800)  Pulse: 74 (12/10/23 0800)  Resp: (!) 25 (12/10/23 0800)  BP: (!) 111/56 (12/10/23 0800)  SpO2: 98 % (12/10/23 0800) Vital Signs (24h Range):  Temp:  [91.8 °F (33.2 °C)-99.1 °F (37.3 °C)] 99 °F (37.2 °C)  Pulse:  [] 74  Resp:  [20-43] 25  SpO2:  [82 %-100 %] 98 %  BP: ()/(54-72) 111/56  Arterial Line BP: ()/(49-73) 120/65     Weight: (!) 143.8 kg (317 lb 0.3 oz)  Body mass index is 52.76 kg/m².      Intake/Output Summary (Last 24 hours) at 12/10/2023 0828  Last data filed at 12/10/2023 0800  Gross per 24 hour   Intake 5324.1 ml   Output 3906 ml   Net 1418.1 ml            Physical Exam  Vitals and nursing note reviewed.   Constitutional:       Appearance: Normal appearance. She is obese.   HENT:      Head: Normocephalic and atraumatic.      Mouth/Throat:      Mouth: Mucous membranes are moist.   Cardiovascular:      Rate and Rhythm: Normal rate and regular rhythm.      Pulses: Normal pulses.   Pulmonary:      Effort: Pulmonary effort is normal.      Breath sounds: Normal breath sounds.      Comments: Patient is intubated. Symmetrical chest rise.    Vent Mode: A/C  Oxygen Concentration (%):  [] 70  Resp Rate Total:  [22 br/min-34 br/min] 23 br/min  Vt Set:  [500 mL] 500 mL  PEEP/CPAP:  [7 cmH20-8 cmH20] 8 cmH20  Mean Airway Pressure:  [14 riH05-97 cmH20] 14 cmH20     Chest:      Comments: Three chest tubes in midline   Abdominal:      General: Abdomen is flat.      Palpations: Abdomen is soft.   Musculoskeletal:         General: Normal range of motion.      Cervical back: Normal range of motion.   Skin:     General: Skin is warm and dry.      Capillary Refill: Capillary refill takes less than 2 seconds.   Neurological:      General: No focal deficit present.      Mental Status: She is alert and oriented to person, place, and time.   Psychiatric:         Mood and Affect: Mood normal.         Behavior: Behavior normal.            Vents:  Vent Mode: A/C (12/10/23 0706)  Set Rate:  18 BPM (12/10/23 0706)  Vt Set: 500 mL (12/10/23 0706)  PEEP/CPAP: 8 cmH20 (12/10/23 0706)  Oxygen Concentration (%): 50 (12/10/23 0800)  Peak Airway Pressure: 27 cmH20 (12/10/23 0706)  Plateau Pressure: 0 cmH20 (12/10/23 0706)  Total Ve: 8.74 L/m (12/10/23 0706)  Negative Inspiratory Force (cm H2O): 0 (12/10/23 0706)  F/VT Ratio<105 (RSBI): (!) 58.28 (12/10/23 0706)    Lines/Drains/Airways       Central Venous Catheter Line  Duration             Percutaneous Central Line Insertion/Assessment - Quad Lumen  12/08/23 0949 Internal Jugular Right 1 day    Trialysis (Dialysis) Catheter 12/09/23 1327 left internal jugular <1 day              Drain  Duration                  NG/OG Tube 12/08/23  2 days         Chest Tube 12/08/23 2059 Tube - 3 Mediastinal 24 Fr. 1 day         Urethral Catheter 12/08/23 0854 Temperature probe;Silicone;Non-latex 14 Fr. 1 day         Y Chest Tube 1 and 2 12/08/23 2058 1 Right Pleural 24 Fr. 2 Left Pleural 24 Fr. 1 day              Airway  Duration                  Airway - Non-Surgical 12/08/23 0738 2 days              Arterial Line  Duration             Arterial Line 12/09/23 0809 Left Radial 1 day              Peripheral Intravenous Line  Duration                  Peripheral IV - Single Lumen 12/08/23 0644 20 G Left Hand 2 days         Peripheral IV - Single Lumen 12/08/23 0747 16 G Right Forearm 2 days                    Significant Labs:    CBC/Anemia Profile:  Recent Labs   Lab 12/09/23  1033 12/09/23  1225 12/09/23  1846 12/09/23  1852 12/10/23  0303 12/10/23  0307 12/10/23  0507 12/10/23  0706   WBC 17.62*  --  21.54*  --  19.96*  --   --   --    HGB 12.3  --  11.9*  --  11.0*  --   --   --    HCT 35.2*   < > 33.2*   < > 32.3* 31* 32* 27*   PLT 35*  --  37*  --  30*  --   --   --    MCV 84  --  83  --  85  --   --   --    RDW 14.6*  --  14.7*  --  15.1*  --   --   --     < > = values in this interval not displayed.          Chemistries:  Recent Labs   Lab 12/09/23  1725 12/09/23  9979  12/09/23  2101 12/10/23  0002 12/10/23  0303   *  --  145 143 142   K 4.5  --  4.2 4.8 4.9   *  --  117* 112* 111*   CO2 21*  --  16* 20* 19*   BUN 33*  --  30* 23* 20   CREATININE 3.3*  --  3.0* 2.6* 2.4*   CALCIUM 9.7  --  8.8 8.8 8.4*   ALBUMIN 2.6*  --  2.9* 3.1* 3.0*   PROT 4.4*  --   --  5.0* 4.9*   BILITOT 4.7*  --   --  5.2* 5.1*   ALKPHOS 43*  --   --  52* 54*   *  --   --  226* 267*   *  --   --  390* 407*   MG  --  2.3 2.2  --  1.9   PHOS  --  3.6 3.5  --  2.6*             Significant Imaging:  I have reviewed all pertinent imaging results/findings within the past 24 hours.  Review of Systems  Assessment/Plan:     Renal/  TINO (acute kidney injury)  Aute kidney injury secondary to volume overload  Scr went up to 2.7 from 1.7  (Baseline 0.4-1)  Uop yesterday 2L with 160 mg lasix and 500 mg Diuril  X ray yesterday :slightly increased bilateral middle and lower lung zone predominant airspace opacities.  Increased trace left effusion.      Plan  Total intake in 12 hr shift is 4.7 L , UOP ~0, total output 3.2 L, Net Positive 1.5,  9 L positive since admission  Will continue SLED for  Metabolic clearance and Volume removal Net Goal negative 1L  Renal panel daily  Strict input output          Thank you for your consult. I will follow-up with patient. Please contact us if you have any additional questions.    Daniel Ch MD  Nephrology  [facility] - Surgical Intensive Care

## 2023-12-10 NOTE — SUBJECTIVE & OBJECTIVE
Interval History/Significant Events: UOP dropped off,   Objective:     Vital Signs (Most Recent):  Temp: 99 °F (37.2 °C) (12/10/23 0800)  Pulse: 74 (12/10/23 0800)  Resp: (!) 25 (12/10/23 0800)  BP: (!) 111/56 (12/10/23 0800)  SpO2: 98 % (12/10/23 0800) Vital Signs (24h Range):  Temp:  [91.8 °F (33.2 °C)-99.1 °F (37.3 °C)] 99 °F (37.2 °C)  Pulse:  [] 74  Resp:  [20-43] 25  SpO2:  [82 %-100 %] 98 %  BP: ()/(54-72) 111/56  Arterial Line BP: ()/(49-73) 120/65     Weight: (!) 143.8 kg (317 lb 0.3 oz)  Body mass index is 52.76 kg/m².      Intake/Output Summary (Last 24 hours) at 12/10/2023 0828  Last data filed at 12/10/2023 0800  Gross per 24 hour   Intake 5324.1 ml   Output 3906 ml   Net 1418.1 ml            Physical Exam  Vitals and nursing note reviewed.   Constitutional:       Appearance: Normal appearance. She is obese.   HENT:      Head: Normocephalic and atraumatic.      Mouth/Throat:      Mouth: Mucous membranes are moist.   Cardiovascular:      Rate and Rhythm: Normal rate and regular rhythm.      Pulses: Normal pulses.   Pulmonary:      Effort: Pulmonary effort is normal.      Breath sounds: Normal breath sounds.      Comments: Patient is intubated. Symmetrical chest rise.    Vent Mode: A/C  Oxygen Concentration (%):  [] 70  Resp Rate Total:  [22 br/min-34 br/min] 23 br/min  Vt Set:  [500 mL] 500 mL  PEEP/CPAP:  [7 cmH20-8 cmH20] 8 cmH20  Mean Airway Pressure:  [14 kuE23-84 cmH20] 14 cmH20     Chest:      Comments: Three chest tubes in midline   Abdominal:      General: Abdomen is flat.      Palpations: Abdomen is soft.   Musculoskeletal:         General: Normal range of motion.      Cervical back: Normal range of motion.   Skin:     General: Skin is warm and dry.      Capillary Refill: Capillary refill takes less than 2 seconds.   Neurological:      General: No focal deficit present.      Mental Status: She is alert and oriented to person, place, and time.   Psychiatric:         Mood  and Affect: Mood normal.         Behavior: Behavior normal.            Vents:  Vent Mode: A/C (12/10/23 0706)  Set Rate: 18 BPM (12/10/23 0706)  Vt Set: 500 mL (12/10/23 0706)  PEEP/CPAP: 8 cmH20 (12/10/23 0706)  Oxygen Concentration (%): 50 (12/10/23 0800)  Peak Airway Pressure: 27 cmH20 (12/10/23 0706)  Plateau Pressure: 0 cmH20 (12/10/23 0706)  Total Ve: 8.74 L/m (12/10/23 0706)  Negative Inspiratory Force (cm H2O): 0 (12/10/23 0706)  F/VT Ratio<105 (RSBI): (!) 58.28 (12/10/23 0706)    Lines/Drains/Airways       Central Venous Catheter Line  Duration             Percutaneous Central Line Insertion/Assessment - Quad Lumen  12/08/23 0949 Internal Jugular Right 1 day    Trialysis (Dialysis) Catheter 12/09/23 1327 left internal jugular <1 day              Drain  Duration                  NG/OG Tube 12/08/23  2 days         Chest Tube 12/08/23 2059 Tube - 3 Mediastinal 24 Fr. 1 day         Urethral Catheter 12/08/23 0854 Temperature probe;Silicone;Non-latex 14 Fr. 1 day         Y Chest Tube 1 and 2 12/08/23 2058 1 Right Pleural 24 Fr. 2 Left Pleural 24 Fr. 1 day              Airway  Duration                  Airway - Non-Surgical 12/08/23 0738 2 days              Arterial Line  Duration             Arterial Line 12/09/23 0809 Left Radial 1 day              Peripheral Intravenous Line  Duration                  Peripheral IV - Single Lumen 12/08/23 0644 20 G Left Hand 2 days         Peripheral IV - Single Lumen 12/08/23 0747 16 G Right Forearm 2 days                    Significant Labs:    CBC/Anemia Profile:  Recent Labs   Lab 12/09/23  1033 12/09/23  1225 12/09/23  1846 12/09/23  1852 12/10/23  0303 12/10/23  0307 12/10/23  0507 12/10/23  0706   WBC 17.62*  --  21.54*  --  19.96*  --   --   --    HGB 12.3  --  11.9*  --  11.0*  --   --   --    HCT 35.2*   < > 33.2*   < > 32.3* 31* 32* 27*   PLT 35*  --  37*  --  30*  --   --   --    MCV 84  --  83  --  85  --   --   --    RDW 14.6*  --  14.7*  --  15.1*  --   --   --      < > = values in this interval not displayed.          Chemistries:  Recent Labs   Lab 12/09/23  1725 12/09/23  1730 12/09/23  2101 12/10/23  0002 12/10/23  0303   *  --  145 143 142   K 4.5  --  4.2 4.8 4.9   *  --  117* 112* 111*   CO2 21*  --  16* 20* 19*   BUN 33*  --  30* 23* 20   CREATININE 3.3*  --  3.0* 2.6* 2.4*   CALCIUM 9.7  --  8.8 8.8 8.4*   ALBUMIN 2.6*  --  2.9* 3.1* 3.0*   PROT 4.4*  --   --  5.0* 4.9*   BILITOT 4.7*  --   --  5.2* 5.1*   ALKPHOS 43*  --   --  52* 54*   *  --   --  226* 267*   *  --   --  390* 407*   MG  --  2.3 2.2  --  1.9   PHOS  --  3.6 3.5  --  2.6*             Significant Imaging:  I have reviewed all pertinent imaging results/findings within the past 24 hours.  Review of Systems

## 2023-12-10 NOTE — PROGRESS NOTES
Pharmacokinetic Sign-off: IV Vancomycin    Therapy with vancomycin complete and/or consult discontinued by provider.  Pharmacy will sign off, please re-consult as needed.    Raina Ray, PharmD, BCPS  EXT [id number]

## 2023-12-10 NOTE — PROGRESS NOTES
[facility]  Critical Care - Surgery  Progress Note    Patient Name: Megan Cook  MRN: [id number]  Admission Date: 12/8/2023  Hospital Length of Stay: 2 days  Code Status: Full Code  Attending Provider: Alexandru Campbell MD  Primary Care Provider: Adelaida Disla NP   Principal Problem: Rheumatic aortic stenosis    Subjective:     Hospital/ICU Course:  No notes on file    Interval History/Significant Events: Started on CRRT around 9 PM. Started going into junctional rhythm, BP dropping MAPs to 50s intermittently, with HR maintaining in high 90s, low 100s. EKGs showing junctional rhythm, a fib. Synchronized cardioversion with 200J. Converted to sinus momentarily, continued in junctional rhythm overnight. HR now in 70s. CRRT with . Epi off, levo 0.08, vaso 0.04.    Follow-up For: Procedure(s) (LRB):  Replacement-valve-aortic (N/A)  REPLACEMENT, PULMONARY VALVE (N/A)    Post-Operative Day: 2 Days Post-Op    Objective:     Vital Signs (Most Recent):  Temp: 98.8 °F (37.1 °C) (12/10/23 0706)  Pulse: 81 (12/10/23 0706)  Resp: (!) 25 (12/10/23 0706)  BP: (!) 108/55 (12/10/23 0706)  SpO2: 96 % (12/10/23 0706) Vital Signs (24h Range):  Temp:  [91.8 °F (33.2 °C)-99.1 °F (37.3 °C)] 98.8 °F (37.1 °C)  Pulse:  [] 81  Resp:  [20-43] 25  SpO2:  [82 %-100 %] 96 %  BP: ()/(54-72) 108/55  Arterial Line BP: ()/(49-73) 119/62     Weight: (!) 143.8 kg (317 lb 0.3 oz)  Body mass index is 52.76 kg/m².      Intake/Output Summary (Last 24 hours) at 12/10/2023 0719  Last data filed at 12/10/2023 0600  Gross per 24 hour   Intake 4611.9 ml   Output 3271 ml   Net 1340.9 ml          Physical Exam  Vitals and nursing note reviewed.   Constitutional:       Appearance: She is obese.   HENT:      Head: Normocephalic and atraumatic.   Cardiovascular:      Rate and Rhythm: Regular rhythm. Tachycardia present.   Pulmonary:      Comments: Patient is intubated. Symmetrical chest rise.    Vent Mode:  A/C  Oxygen Concentration (%):  [] 70  Resp Rate Total:  [22 br/min-34 br/min] 23 br/min  Vt Set:  [500 mL] 500 mL  PEEP/CPAP:  [7 cmH20-8 cmH20] 8 cmH20  Mean Airway Pressure:  [14 ktC53-09 cmH20] 14 cmH20     Chest:      Comments: Three chest tubes in midline   Abdominal:      General: Abdomen is flat.      Palpations: Abdomen is soft.   Skin:     General: Skin is warm and dry.      Capillary Refill: Capillary refill takes less than 2 seconds.            Vents:  Vent Mode: A/C (12/10/23 0706)  Set Rate: 18 BPM (12/10/23 0706)  Vt Set: 500 mL (12/10/23 0706)  PEEP/CPAP: 8 cmH20 (12/10/23 0706)  Oxygen Concentration (%): 50 (12/10/23 0706)  Peak Airway Pressure: 27 cmH20 (12/10/23 0706)  Plateau Pressure: 0 cmH20 (12/10/23 0706)  Total Ve: 8.74 L/m (12/10/23 0706)  Negative Inspiratory Force (cm H2O): 0 (12/10/23 0706)  F/VT Ratio<105 (RSBI): (!) 58.28 (12/10/23 0706)    Lines/Drains/Airways       Central Venous Catheter Line  Duration             Percutaneous Central Line Insertion/Assessment - Quad Lumen  12/08/23 0949 Internal Jugular Right 1 day    Trialysis (Dialysis) Catheter 12/09/23 1327 left internal jugular <1 day              Drain  Duration                  NG/OG Tube 12/08/23  2 days         Chest Tube 12/08/23 2059 Tube - 3 Mediastinal 24 Fr. 1 day         Urethral Catheter 12/08/23 0854 Temperature probe;Silicone;Non-latex 14 Fr. 1 day         Y Chest Tube 1 and 2 12/08/23 2058 1 Right Pleural 24 Fr. 2 Left Pleural 24 Fr. 1 day              Airway  Duration                  Airway - Non-Surgical 12/08/23 0738 1 day              Arterial Line  Duration             Arterial Line 12/09/23 0809 Left Radial <1 day              Peripheral Intravenous Line  Duration                  Peripheral IV - Single Lumen 12/08/23 0644 20 G Left Hand 2 days         Peripheral IV - Single Lumen 12/08/23 0747 16 G Right Forearm 1 day                    Significant Labs:    CBC/Anemia Profile:  Recent Labs   Lab  12/09/23  1033 12/09/23  1225 12/09/23  1846 12/09/23  1852 12/10/23  0303 12/10/23  0307 12/10/23  0507 12/10/23  0706   WBC 17.62*  --  21.54*  --  19.96*  --   --   --    HGB 12.3  --  11.9*  --  11.0*  --   --   --    HCT 35.2*   < > 33.2*   < > 32.3* 31* 32* 27*   PLT 35*  --  37*  --  30*  --   --   --    MCV 84  --  83  --  85  --   --   --    RDW 14.6*  --  14.7*  --  15.1*  --   --   --     < > = values in this interval not displayed.        Chemistries:  Recent Labs   Lab 12/09/23  1725 12/09/23  1730 12/09/23  2101 12/10/23  0002 12/10/23  0303   *  --  145 143 142   K 4.5  --  4.2 4.8 4.9   *  --  117* 112* 111*   CO2 21*  --  16* 20* 19*   BUN 33*  --  30* 23* 20   CREATININE 3.3*  --  3.0* 2.6* 2.4*   CALCIUM 9.7  --  8.8 8.8 8.4*   ALBUMIN 2.6*  --  2.9* 3.1* 3.0*   PROT 4.4*  --   --  5.0* 4.9*   BILITOT 4.7*  --   --  5.2* 5.1*   ALKPHOS 43*  --   --  52* 54*   *  --   --  226* 267*   *  --   --  390* 407*   MG  --  2.3 2.2  --  1.9   PHOS  --  3.6 3.5  --  2.6*           Significant Imaging:  I have reviewed all pertinent imaging results/findings within the past 24 hours.  Assessment/Plan:     Cardiac/Vascular  * Rheumatic aortic stenosis  38-year-old, 127 kg, woman with history of rheumatic heart disease and Ross procedure and mitral valve repair(including ring annuloplasty) in 2000 followed by repair of autograft aneurysm repair and aortic valve replacement with 23mm CE bioprothesis in 2012 who presents for aortic and pulmonary valve replacement on 12/8      Neuro/Psych:   -- Sedation: propofol   -- Pain: Fentanyl pushes while intubated. Tylenol 650 mg PRN, Morphine 2 mg PRN             Cards:   History of rheumatic fever with rheumatic heart disease . S/p Ross procedure and mitral valve repair(including ring annuloplasty) in 2000   Now s/p aortic and pulmonary valve replacement with Dr. Campbell on 12/8  -- HDS  -- MAPS >65  -- SBP   -- Pressors to maintain blood  pressure goals: epi off, vaso 0.04, levo 0.08  -- wean pressors as tolerated      Pulm:   -- Intubated  -- Goal O2 sat > 90%  -- Wean as able  -- Two pleural and one mediastinal chest tube to water seal       Renal:  -- Keep pitts for strict I/O  -- BUN/Cr   -- d/c mIVF  Recent Labs   Lab 12/09/23  2101 12/10/23  0002 12/10/23  0303   BUN 30* 23* 20   CREATININE 3.0* 2.6* 2.4*     -- Urine output: anuric      FEN / GI:   -- Net +9.37L since admission, net +1.5L/24h  -- Replace lytes as needed  -- Nutrition: NPO  -- Docusate   -- Famotidine       ID:   -- Tm: afebrile; WBC 12.03 preop. 18.7 post op   Recent Labs   Lab 12/09/23  1033 12/09/23  1846 12/10/23  0303   WBC 17.62* 21.54* 19.96*     -- Vancomycin two doses in OR       Heme/Onc:  -- H/H stable, 12.6 preop. 13.8 post op.   -- Daily CBC  -- holding heparin for platelets 30  -- Received 8 units of pRBC in the OR   Recent Labs   Lab 12/09/23  1033 12/09/23  1725 12/09/23  1846 12/10/23  0303   HGB 12.3  --  11.9* 11.0*   PLT 35*  --  37* 30*   APTT 28.4 24.4  --  29.9   INR 1.2  --  1.1 1.1        Endo:   -- Gluc goal 140-180  -- POCT 152      PPx:   Feeding: NPO  Analgesia/Sedation: tylenol, morphine, fentanyl /Propofol  Thromboembolic prevention: heparin held for plts 30  HOB >30: Yes  Stress Ulcer ppx: Famotidine   Glucose control: Critical care goal 140-180 g/dl   Bowel reg: docusate  Invasive Lines/Drains/Airway: PIV x2, Sue, Central line, Pitts, Chest tube x3; L IJ trialysis  Deescalation: dc'ed         Dispo/Code Status/Palliative:   -- SICU / Full Code           Critical care was time spent personally by me on the following activities: development of treatment plan with patient or surrogate and bedside caregivers, discussions with consultants, evaluation of patient's response to treatment, examination of patient, ordering and performing treatments and interventions, ordering and review of laboratory studies, ordering and review of radiographic studies,  pulse oximetry, re-evaluation of patient's condition.  This critical care time did not overlap with that of any other provider or involve time for any procedures.     Gloria Cruz MD  Critical Care - Surgery  [facility]

## 2023-12-10 NOTE — PT/OT/SLP PROGRESS
Physical Therapy      Patient Name:  Megan Cook   MRN:  [id number]    Patient not seen today secondary to not appropriate for progressive mobility today: pt intubated, on CRRT with pressor support. Will recheck status 12/11.    12/10/2023

## 2023-12-10 NOTE — PROGRESS NOTES
"[facility]  Endocrinology  Progress Note    Admit Date: 12/8/2023     Reason for Consult: Management of Hyperglycemia     Surgical Procedure and Date:   12/8/23  Replacement-valve-aortic (N/A)  REPLACEMENT, PULMONARY VALVE (N/A)      Lab Results   Component Value Date    HGBA1C 5.5 11/14/2022         HPI:   Patient is a 38 y.o. female with a diagnosis of rheumatic heart disease and Ross procedure and mitral valve repair (including ring annuloplasty) in 2000 followed by repair of autograft aneurysm repair and aortic valve replacement with 23mm CE bioprothesis in 2012 who presents for pre-operative evaluation for aortic and pulmonary valve replacement. Endocrinology consulted for management of hyperglycemia.       Interval HPI:   Overnight events: Remains in SICU. POD 2. BG well controlled overnight on IV intensive insulin protocol with infusion rates ranging from 1.5-2.1 u/hr. Creatinine 2.4; started on CRRT. No diet orders on file    Eating:   NPO  Nausea: No  Hypoglycemia and intervention: No  Fever: No  TPN and/or TF: No  If yes, type of TF/TPN and rate: n/a    BP (!) 111/56 (BP Location: Right arm, Patient Position: Lying)   Pulse 74   Temp 99 °F (37.2 °C) (Core Bladder)   Resp (!) 25   Ht 5' 5" (1.651 m)   Wt (!) 143.8 kg (317 lb 0.3 oz)   LMP 11/08/2023 (Approximate)   SpO2 98%   Breastfeeding No   BMI 52.76 kg/m²     Labs Reviewed and Include    Recent Labs   Lab 12/10/23  0303   *   CALCIUM 8.4*   ALBUMIN 3.0*   PROT 4.9*      K 4.9   CO2 19*   *   BUN 20   CREATININE 2.4*   ALKPHOS 54*   *   *   BILITOT 5.1*     Lab Results   Component Value Date    WBC 19.96 (H) 12/10/2023    HGB 11.0 (L) 12/10/2023    HCT 27 (L) 12/10/2023    MCV 85 12/10/2023    PLT 30 (LL) 12/10/2023     No results for input(s): "TSH", "FREET4" in the last 168 hours.  Lab Results   Component Value Date    HGBA1C 5.5 11/14/2022       Nutritional status:   Body mass index is " "52.76 kg/m².  Lab Results   Component Value Date    ALBUMIN 3.0 (L) 12/10/2023    ALBUMIN 3.1 (L) 12/10/2023    ALBUMIN 2.9 (L) 12/09/2023     No results found for: "PREALBUMIN"    Estimated Creatinine Clearance: 46.5 mL/min (A) (based on SCr of 2.4 mg/dL (H)).    Accu-Checks  Recent Labs     12/10/23  0002 12/10/23  0104 12/10/23  0203 12/10/23  0305 12/10/23  0401 12/10/23  0505 12/10/23  0601 12/10/23  0710 12/10/23  0747 12/10/23  0831   POCTGLUCOSE 119* 109 117* 115* 113* 105 124* 96 90 106       Current Medications and/or Treatments Impacting Glycemic Control  Immunotherapy:    Immunosuppressants       None          Steroids:   Hormones (From admission, onward)      Start     Stop Route Frequency Ordered    12/08/23 2246  vasopressin (PITRESSIN) 1 Units/mL in dextrose 5 % (D5W) 100 mL infusion         -- IV Continuous 12/08/23 2247 12/08/23 2232  vasopressin (PITRESSIN) 20 unit/mL injection        Note to Pharmacy: Created by cabinet override    12/09/23 1044   12/08/23 2232          Pressors:    Autonomic Drugs (From admission, onward)      Start     Stop Route Frequency Ordered    12/10/23 0845  NORepinephrine 4 mg in dextrose 5% 250 mL infusion (premix)        Question Answer Comment   Begin at (in mcg/kg/min): 0.02    Titrate by: (in mcg/kg/min) 0.02    Titrate interval: (in minutes) 5    Titrate to maintain: (MAP or SBP) MAP    Greater than: (in mmHg) 65    Maximum dose: (in mcg/kg/min) 0.1        -- IV Continuous 12/10/23 0840    12/08/23 2253  EPINEPHrine 5 mg in dextrose 5% 250 mL infusion (premix)        Question Answer Comment   Begin at (in mcg/kg/min): 0.04    Titrate by: (in mcg/kg/min) 0.02    Titrate interval: (in minutes) 5    Titrate to maintain: (SBP or MAP or Cardiac Index) MAP    Greater than: (in mmHg) 65    Maximum dose: (in mcg/kg/min) 2        -- IV Continuous 12/08/23 2250    12/08/23 3209  EPINEPHrine (ADRENALIN) 1 mg/mL injection        Note to Pharmacy: Created by cabinet " override    12/09/23 1044   12/08/23 2232          Hyperglycemia/Diabetes Medications:   Antihyperglycemics (From admission, onward)      Start     Stop Route Frequency Ordered    12/08/23 2230  insulin regular in 0.9 % NaCl 100 unit/100 mL (1 unit/mL) infusion        Question Answer Comment   Insulin rate changes (DO NOT MODIFY ANSWER) [email or web address] [email or web address]    Enter initial dose (Units/hr): 1        -- IV Continuous 12/08/23 2222            ASSESSMENT and PLAN    Cardiac/Vascular  S/P aortic valve replacement  Managed per primary team  Optimize BG control        Rheumatic aortic stenosis  Managed per primary team  Avoid hypoglycemia        Endocrine  Class 3 severe obesity due to excess calories with serious comorbidity and body mass index (BMI) of 50.0 to 59.9 in adult  Body mass index is 52.76 kg/m².  May increase insulin resistance.         Transient hyperglycemia post procedure  BG goal 110-140 (CTS protocol)     Discontinue insulin infusion (titrated off this morning per protocol)  Start Moderate Dose Correction Scale  BG monitoring q 4 hrs while NPO    ** Please call Endocrine for any BG related issues **    ** Please notify Endocrine for any change and/or advance in diet**    Discharge planning: MANUELA Lynn NP  Endocrinology  [facility]

## 2023-12-10 NOTE — ASSESSMENT & PLAN NOTE
BG goal 110-140 (CTS protocol)     Discontinue insulin infusion (titrated off this morning per protocol)  Start Moderate Dose Correction Scale  BG monitoring q 4 hrs while NPO    ** Please call Endocrine for any BG related issues **    ** Please notify Endocrine for any change and/or advance in diet**    Discharge planning: TBD

## 2023-12-10 NOTE — NURSING
CRRT clotted off, arterial side able to rinse back and partially rinsed back venous side. Dialysis nurse made aware. Will be by soon to restart pt.

## 2023-12-10 NOTE — CONSULTS
[facility]  Cardiac Electrophysiology  Consult Note    Admission Date: 12/8/2023  Code Status: Full Code   Attending Provider: Alexandru Campbell MD  Consulting Provider: Alex Estrada MD  Principal Problem:Accelerated junctional rhythm    Inpatient consult to Electrophysiology  Consult performed by: Alex Estrada MD  Consult ordered by: Gloria Crzu MD        Subjective:     Chief Complaint: acc junctional rhythm     HPI:   Ms. Megan Cook is a 38-year-old, 127 kg, woman with history of rheumatic heart disease and Ross procedure and mitral valve repair(including ring annuloplasty) in 2000 followed by repair of autograft aneurysm repair and aortic valve replacement with 23mm CE bioprothesis in 2012 who underwent aortic and pulmonary valve replacement on 12/8.  She is currently on the ventilator with propofol infusion, on CRRT for acute renal failure and needing vasoactive support with vaso 0.04 and levo 0.1.  On the telemetry she was found to have accelerated junctional rhythm competing with sinus rhythm and electrophysiology was consulted.  She has not had any bradycardic events and her ventricular rate stays in 70s and 80s regardless of sinus or junctional rhythm.  Her EF is 60% based on echo from 3 days ago.  Electrophysiology was consulted for the new finding of junctional rhythm.    Past Medical History:   Diagnosis Date    Rheumatic fever with cardiac involvement        Past Surgical History:   Procedure Laterality Date    ANGIOGRAM, CORONARY, PEDIATRIC  8/1/2023    Procedure: Angiogram, Coronary, Pediatric;  Surgeon: Anthony Dubois Jr., MD;  Location: [facility] CATH LAB;  Service: Cardiology;;    ANGIOGRAM, PULMONARY, PEDIATRIC  8/1/2023    Procedure: Angiogram, Pulmonary, Pediatric;  Surgeon: Anthony Dubois Jr., MD;  Location: [facility] CATH LAB;  Service: Cardiology;;    AORTIC VALVE REPLACEMENT  2000    AORTIC VALVE REPLACEMENT  2012    AORTIC VALVE REPLACEMENT   2012    with aortic root replacement at [facility]    AORTIC VALVE REPLACEMENT N/A 12/8/2023    Procedure: Replacement-valve-aortic;  Surgeon: Alexandru Campbell MD;  Location: [facility] OR [address] [place] [address];  Service: Cardiovascular;  Laterality: N/A;    COMBINED RIGHT AND RETROGRADE LEFT HEART CATHETERIZATION FOR CONGENITAL HEART DEFECT N/A 8/1/2023    Procedure: Catheterization, Heart, Combined Right and Retrograde Left, for Congenital Heart Defect;  Surgeon: Anthony Dubois Jr., MD;  Location: [facility] CATH LAB;  Service: Cardiology;  Laterality: N/A;    PULMONARY VALVE REPLACEMENT N/A 12/8/2023    Procedure: REPLACEMENT, PULMONARY VALVE;  Surgeon: Alexandru Campbell MD;  Location: [facility] OR [address] [place] [address];  Service: Cardiovascular;  Laterality: N/A;    REPLACEMENT OF AORTIC VALVE USING ROSS PROCEDURE N/A 2000    [facility]       Review of patient's allergies indicates:   Allergen Reactions    Cephalexin Other (See Comments)     She gets mouth sores.     Penicillin g Other (See Comments)     Causes mouth sores.        No current facility-administered medications on file prior to encounter.     Current Outpatient Medications on File Prior to Encounter   Medication Sig    cholecalciferol, vitamin D3, 125 mcg (5,000 unit) Tab Take 5,000 Units by mouth.    PAMELA 0.35 mg tablet Take by mouth.    furosemide (LASIX) 20 MG tablet Take 20 mg by mouth daily as needed.    metoprolol succinate (TOPROL-XL) 50 MG 24 hr tablet Take 50 mg by mouth every morning.     Family History       Problem Relation (Age of Onset)    Cardiomyopathy Other    Congenital heart disease Other          Tobacco Use    Smoking status: Never    Smokeless tobacco: Never   Substance and Sexual Activity    Alcohol use: Yes     Alcohol/week: 1.0 standard drink of alcohol     Types: 1 Glasses of wine per week     Comment: social    Drug use: Not on file    Sexual activity: Not on file     Review of Systems   Unable to perform ROS: Intubated   Objective:     Vital Signs (Most Recent):  Temp:  "98.3 °F (36.8 °C) (12/10/23 1103)  Pulse: 81 (12/10/23 1145)  Resp: (!) 25 (12/10/23 1145)  BP: 113/73 (12/10/23 1000)  SpO2: 95 % (12/10/23 1145) Vital Signs (24h Range):  Temp:  [91.8 °F (33.2 °C)-99.1 °F (37.3 °C)] 98.3 °F (36.8 °C)  Pulse:  [] 81  Resp:  [20-43] 25  SpO2:  [82 %-100 %] 95 %  BP: ()/(54-73) 113/73  Arterial Line BP: ()/(49-70) 92/52       Weight: (!) 143.8 kg (317 lb 0.3 oz)  Body mass index is 52.76 kg/m².    SpO2: 95 %        Physical Exam  Constitutional:       Comments: Intubated and sedated    Cardiovascular:      Comments: SR competing with junctional   No bradycardia noted  Pulmonary:      Comments: On vent  Neurological:      Comments: Sedated with propofol        Significant Labs: ABG:   Recent Labs   Lab 12/10/23  0706 12/10/23  0915 12/10/23  1103   PH 7.431 7.435 7.417   PCO2 25.7* 21.8* 23.5*   HCO3 17.1* 14.6* 15.2*   POCSATURATED 99 96 95   BE -7* -10* -9*   , Blood Culture: No results for input(s): "LABBLOO" in the last 48 hours., BMP:   Recent Labs   Lab 12/09/23  1730 12/09/23  2101 12/10/23  0002 12/10/23  0303   GLU  --  122* 120* 114*   NA  --  145 143 142   K  --  4.2 4.8 4.9   CL  --  117* 112* 111*   CO2  --  16* 20* 19*   BUN  --  30* 23* 20   CREATININE  --  3.0* 2.6* 2.4*   CALCIUM  --  8.8 8.8 8.4*   MG 2.3 2.2  --  1.9   , CMP:   Recent Labs   Lab 12/09/23  1725 12/09/23  2101 12/10/23  0002 12/10/23  0303   * 145 143 142   K 4.5 4.2 4.8 4.9   * 117* 112* 111*   CO2 21* 16* 20* 19*   * 122* 120* 114*   BUN 33* 30* 23* 20   CREATININE 3.3* 3.0* 2.6* 2.4*   CALCIUM 9.7 8.8 8.8 8.4*   PROT 4.4*  --  5.0* 4.9*   ALBUMIN 2.6* 2.9* 3.1* 3.0*   BILITOT 4.7*  --  5.2* 5.1*   ALKPHOS 43*  --  52* 54*   *  --  390* 407*   *  --  226* 267*   ANIONGAP 10 12 11 12   , CBC:   Recent Labs   Lab 12/09/23  1033 12/09/23  1225 12/09/23  1846 12/09/23  1852 12/10/23  0303 12/10/23  0307 12/10/23  1103   WBC 17.62*  --  21.54*  --  " 19.96*  --   --    HGB 12.3  --  11.9*  --  11.0*  --   --    HCT 35.2*   < > 33.2*   < > 32.3*   < > 28*   PLT 35*  --  37*  --  30*  --   --     < > = values in this interval not displayed.   , and INR:   Recent Labs   Lab 12/09/23  1033 12/09/23  1846 12/10/23  0303   INR 1.2 1.1 1.1                 Assessment and Plan:     * Accelerated junctional rhythm  -s/p aortic and pulmonary valve replacement on 12/8.    -currently on the ventilator with propofol infusion and on CRRT for acute renal failure and needing vasoactive support with vaso 0.04 and levo 0.1.    -On the telemetry she was found to have accelerated junctional rhythm competing with sinus rhythm and electrophysiology was consulted.   -has not had any bradycardic events and her ventricular rate stays in 70s and 80s regardless of sinus or junctional rhythm.    -Her EF is 60% based on echo from 3 days ago.   -Electrophysiology was consulted for the new finding of junctional rhythm.    Recommendations  -no interventions needed from EP standpoint, junctional rhythm is likely sedation related  -wean sedation as tolerated        Thank you for your consult.    lAex Estrada MD  Cardiac Electrophysiology  [facility]

## 2023-12-10 NOTE — SUBJECTIVE & OBJECTIVE
Past Medical History:   Diagnosis Date    Rheumatic fever with cardiac involvement        Past Surgical History:   Procedure Laterality Date    ANGIOGRAM, CORONARY, PEDIATRIC  8/1/2023    Procedure: Angiogram, Coronary, Pediatric;  Surgeon: Anthony Dubois Jr., MD;  Location: [facility] CATH LAB;  Service: Cardiology;;    ANGIOGRAM, PULMONARY, PEDIATRIC  8/1/2023    Procedure: Angiogram, Pulmonary, Pediatric;  Surgeon: Anthony Dubois Jr., MD;  Location: [facility] CATH LAB;  Service: Cardiology;;    AORTIC VALVE REPLACEMENT  2000    AORTIC VALVE REPLACEMENT  2012    AORTIC VALVE REPLACEMENT  2012    with aortic root replacement at [facility]    AORTIC VALVE REPLACEMENT N/A 12/8/2023    Procedure: Replacement-valve-aortic;  Surgeon: Alexandru Campbell MD;  Location: [facility] OR [address] [place] [address];  Service: Cardiovascular;  Laterality: N/A;    COMBINED RIGHT AND RETROGRADE LEFT HEART CATHETERIZATION FOR CONGENITAL HEART DEFECT N/A 8/1/2023    Procedure: Catheterization, Heart, Combined Right and Retrograde Left, for Congenital Heart Defect;  Surgeon: Anthony Dubois Jr., MD;  Location: [facility] CATH LAB;  Service: Cardiology;  Laterality: N/A;    PULMONARY VALVE REPLACEMENT N/A 12/8/2023    Procedure: REPLACEMENT, PULMONARY VALVE;  Surgeon: Alexandru Campbell MD;  Location: [facility] OR [address] [place] [address];  Service: Cardiovascular;  Laterality: N/A;    REPLACEMENT OF AORTIC VALVE USING ROSS PROCEDURE N/A 2000    [facility]       Review of patient's allergies indicates:   Allergen Reactions    Cephalexin Other (See Comments)     She gets mouth sores.     Penicillin g Other (See Comments)     Causes mouth sores.        No current facility-administered medications on file prior to encounter.     Current Outpatient Medications on File Prior to Encounter   Medication Sig    cholecalciferol, vitamin D3, 125 mcg (5,000 unit) Tab Take 5,000 Units by mouth.    PAMELA 0.35 mg tablet Take by mouth.    furosemide (LASIX) 20 MG tablet Take 20 mg by mouth daily as needed.    metoprolol  "succinate (TOPROL-XL) 50 MG 24 hr tablet Take 50 mg by mouth every morning.     Family History       Problem Relation (Age of Onset)    Cardiomyopathy Other    Congenital heart disease Other          Tobacco Use    Smoking status: Never    Smokeless tobacco: Never   Substance and Sexual Activity    Alcohol use: Yes     Alcohol/week: 1.0 standard drink of alcohol     Types: 1 Glasses of wine per week     Comment: social    Drug use: Not on file    Sexual activity: Not on file     Review of Systems   Unable to perform ROS: Intubated   Objective:     Vital Signs (Most Recent):  Temp: 98.3 °F (36.8 °C) (12/10/23 1103)  Pulse: 81 (12/10/23 1145)  Resp: (!) 25 (12/10/23 1145)  BP: 113/73 (12/10/23 1000)  SpO2: 95 % (12/10/23 1145) Vital Signs (24h Range):  Temp:  [91.8 °F (33.2 °C)-99.1 °F (37.3 °C)] 98.3 °F (36.8 °C)  Pulse:  [] 81  Resp:  [20-43] 25  SpO2:  [82 %-100 %] 95 %  BP: ()/(54-73) 113/73  Arterial Line BP: ()/(49-70) 92/52       Weight: (!) 143.8 kg (317 lb 0.3 oz)  Body mass index is 52.76 kg/m².    SpO2: 95 %        Physical Exam  Constitutional:       Comments: Intubated and sedated    Cardiovascular:      Comments: SR competing with junctional   No bradycardia noted  Pulmonary:      Comments: On vent  Neurological:      Comments: Sedated with propofol        Significant Labs: ABG:   Recent Labs   Lab 12/10/23  0706 12/10/23  0915 12/10/23  1103   PH 7.431 7.435 7.417   PCO2 25.7* 21.8* 23.5*   HCO3 17.1* 14.6* 15.2*   POCSATURATED 99 96 95   BE -7* -10* -9*   , Blood Culture: No results for input(s): "LABBLOO" in the last 48 hours., BMP:   Recent Labs   Lab 12/09/23  1730 12/09/23  2101 12/10/23  0002 12/10/23  0303   GLU  --  122* 120* 114*   NA  --  145 143 142   K  --  4.2 4.8 4.9   CL  --  117* 112* 111*   CO2  --  16* 20* 19*   BUN  --  30* 23* 20   CREATININE  --  3.0* 2.6* 2.4*   CALCIUM  --  8.8 8.8 8.4*   MG 2.3 2.2  --  1.9   , CMP:   Recent Labs   Lab 12/09/23  1725 " 12/09/23  2101 12/10/23  0002 12/10/23  0303   * 145 143 142   K 4.5 4.2 4.8 4.9   * 117* 112* 111*   CO2 21* 16* 20* 19*   * 122* 120* 114*   BUN 33* 30* 23* 20   CREATININE 3.3* 3.0* 2.6* 2.4*   CALCIUM 9.7 8.8 8.8 8.4*   PROT 4.4*  --  5.0* 4.9*   ALBUMIN 2.6* 2.9* 3.1* 3.0*   BILITOT 4.7*  --  5.2* 5.1*   ALKPHOS 43*  --  52* 54*   *  --  390* 407*   *  --  226* 267*   ANIONGAP 10 12 11 12   , CBC:   Recent Labs   Lab 12/09/23  1033 12/09/23  1225 12/09/23  1846 12/09/23  1852 12/10/23  0303 12/10/23  0307 12/10/23  1103   WBC 17.62*  --  21.54*  --  19.96*  --   --    HGB 12.3  --  11.9*  --  11.0*  --   --    HCT 35.2*   < > 33.2*   < > 32.3*   < > 28*   PLT 35*  --  37*  --  30*  --   --     < > = values in this interval not displayed.   , and INR:   Recent Labs   Lab 12/09/23  1033 12/09/23  1846 12/10/23  0303   INR 1.2 1.1 1.1

## 2023-12-10 NOTE — SUBJECTIVE & OBJECTIVE
Interval History/Significant Events: Started on CRRT around 9 PM. Started going into junctional rhythm, BP dropping MAPs to 50s intermittently, with HR maintaining in high 90s, low 100s. EKGs showing junctional rhythm, a fib. Synchronized cardioversion with 200J. Converted to sinus momentarily, continued in junctional rhythm overnight. HR now in 70s. CRRT with . Epi off, levo 0.08, vaso 0.04.    Follow-up For: Procedure(s) (LRB):  Replacement-valve-aortic (N/A)  REPLACEMENT, PULMONARY VALVE (N/A)    Post-Operative Day: 2 Days Post-Op    Objective:     Vital Signs (Most Recent):  Temp: 98.8 °F (37.1 °C) (12/10/23 0706)  Pulse: 81 (12/10/23 0706)  Resp: (!) 25 (12/10/23 0706)  BP: (!) 108/55 (12/10/23 0706)  SpO2: 96 % (12/10/23 0706) Vital Signs (24h Range):  Temp:  [91.8 °F (33.2 °C)-99.1 °F (37.3 °C)] 98.8 °F (37.1 °C)  Pulse:  [] 81  Resp:  [20-43] 25  SpO2:  [82 %-100 %] 96 %  BP: ()/(54-72) 108/55  Arterial Line BP: ()/(49-73) 119/62     Weight: (!) 143.8 kg (317 lb 0.3 oz)  Body mass index is 52.76 kg/m².      Intake/Output Summary (Last 24 hours) at 12/10/2023 0719  Last data filed at 12/10/2023 0600  Gross per 24 hour   Intake 4611.9 ml   Output 3271 ml   Net 1340.9 ml          Physical Exam  Vitals and nursing note reviewed.   Constitutional:       Appearance: She is obese.   HENT:      Head: Normocephalic and atraumatic.   Cardiovascular:      Rate and Rhythm: Regular rhythm. Tachycardia present.   Pulmonary:      Comments: Patient is intubated. Symmetrical chest rise.    Vent Mode: A/C  Oxygen Concentration (%):  [] 70  Resp Rate Total:  [22 br/min-34 br/min] 23 br/min  Vt Set:  [500 mL] 500 mL  PEEP/CPAP:  [7 cmH20-8 cmH20] 8 cmH20  Mean Airway Pressure:  [14 ekR66-61 cmH20] 14 cmH20     Chest:      Comments: Three chest tubes in midline   Abdominal:      General: Abdomen is flat.      Palpations: Abdomen is soft.   Skin:     General: Skin is warm and dry.      Capillary  Refill: Capillary refill takes less than 2 seconds.            Vents:  Vent Mode: A/C (12/10/23 0706)  Set Rate: 18 BPM (12/10/23 0706)  Vt Set: 500 mL (12/10/23 0706)  PEEP/CPAP: 8 cmH20 (12/10/23 0706)  Oxygen Concentration (%): 50 (12/10/23 0706)  Peak Airway Pressure: 27 cmH20 (12/10/23 0706)  Plateau Pressure: 0 cmH20 (12/10/23 0706)  Total Ve: 8.74 L/m (12/10/23 0706)  Negative Inspiratory Force (cm H2O): 0 (12/10/23 0706)  F/VT Ratio<105 (RSBI): (!) 58.28 (12/10/23 0706)    Lines/Drains/Airways       Central Venous Catheter Line  Duration             Percutaneous Central Line Insertion/Assessment - Quad Lumen  12/08/23 0949 Internal Jugular Right 1 day    Trialysis (Dialysis) Catheter 12/09/23 1327 left internal jugular <1 day              Drain  Duration                  NG/OG Tube 12/08/23  2 days         Chest Tube 12/08/23 2059 Tube - 3 Mediastinal 24 Fr. 1 day         Urethral Catheter 12/08/23 0854 Temperature probe;Silicone;Non-latex 14 Fr. 1 day         Y Chest Tube 1 and 2 12/08/23 2058 1 Right Pleural 24 Fr. 2 Left Pleural 24 Fr. 1 day              Airway  Duration                  Airway - Non-Surgical 12/08/23 0738 1 day              Arterial Line  Duration             Arterial Line 12/09/23 0809 Left Radial <1 day              Peripheral Intravenous Line  Duration                  Peripheral IV - Single Lumen 12/08/23 0644 20 G Left Hand 2 days         Peripheral IV - Single Lumen 12/08/23 0747 16 G Right Forearm 1 day                    Significant Labs:    CBC/Anemia Profile:  Recent Labs   Lab 12/09/23  1033 12/09/23  1225 12/09/23  1846 12/09/23  1852 12/10/23  0303 12/10/23  0307 12/10/23  0507 12/10/23  0706   WBC 17.62*  --  21.54*  --  19.96*  --   --   --    HGB 12.3  --  11.9*  --  11.0*  --   --   --    HCT 35.2*   < > 33.2*   < > 32.3* 31* 32* 27*   PLT 35*  --  37*  --  30*  --   --   --    MCV 84  --  83  --  85  --   --   --    RDW 14.6*  --  14.7*  --  15.1*  --   --   --     < >  = values in this interval not displayed.        Chemistries:  Recent Labs   Lab 12/09/23  1725 12/09/23  1730 12/09/23  2101 12/10/23  0002 12/10/23  0303   *  --  145 143 142   K 4.5  --  4.2 4.8 4.9   *  --  117* 112* 111*   CO2 21*  --  16* 20* 19*   BUN 33*  --  30* 23* 20   CREATININE 3.3*  --  3.0* 2.6* 2.4*   CALCIUM 9.7  --  8.8 8.8 8.4*   ALBUMIN 2.6*  --  2.9* 3.1* 3.0*   PROT 4.4*  --   --  5.0* 4.9*   BILITOT 4.7*  --   --  5.2* 5.1*   ALKPHOS 43*  --   --  52* 54*   *  --   --  226* 267*   *  --   --  390* 407*   MG  --  2.3 2.2  --  1.9   PHOS  --  3.6 3.5  --  2.6*           Significant Imaging:  I have reviewed all pertinent imaging results/findings within the past 24 hours.

## 2023-12-10 NOTE — PROGRESS NOTES
Congenital Cardiac Surgery Note:     Ms. Megan Cook underwent re-replacement of her aortic valve and re-replacement of her RV-PA conduit/pulmonary valve on 12-8-2023.  The case was complicated by prolonged bypass time due to need to redo the RV to PA conduit due to distal obstruction.       Ultimately, with normal biventricular function and well function 25 mechanical aortic valve and 29mm bioprosthetic pulmonary valve.     In there interim since yesterday, CRRT was started, she had some junctional rhythm overnight that impacted her hemodynamics but is back in sinus rhythm this morning.    She was started on moderate dose leveophed 0.06mcg/kg/min when the junctional rhythm was impacting her blood pressure and is on 0.04 vasopressin.    Her blood pressures is good this morning, 110s over 60s with MAPs 75-80 while I was there.    She remains sedated but follows commands.    Anticoagulation never started because of Thrombocytopenia.        Vitals:    12/10/23 0727 12/10/23 0730 12/10/23 0745 12/10/23 0800   BP:    (!) 111/56   BP Location:    Right arm   Patient Position:    Lying   Pulse: 75 77 73 74   Resp: (!) 25 (!) 25 (!) 24 (!) 25   Temp: 99 °F (37.2 °C) 99 °F (37.2 °C) 99 °F (37.2 °C) 99 °F (37.2 °C)   TempSrc:    Core Bladder   SpO2: (!) 92% (!) 93% 95% 98%   Weight:       Height:             Physical Exam:  General: sedated and intubated  CV: normal rate and regular, sinus rhythm based on telemetry, strong pedal pulses  Resp: mechanically ventilated  Neuro: sedated but moving  Drains: Serosanguinous drainage, low volume overnight  Incision: dressed    Imaging:  CXR: poor film quality but no substantial effusion     Assessment and Plan:   Discussed her care with Dr. Zhu, and ICU team.   Good hemodynamics this morning on the current dose of levo and vaso, they may begin weaning the levo some later today.    Off inotropes.     Continuing CRRT  PaO2 improved, will be some time before attempts at extubation  as need to get volume off  Will continue to monitor thrombocytopenia and discuss anticoagulation plan daily. Holding heparin for now.  Likely start aspirin.      Ultimately, anticoagulation plan will be for warfarin with an INR target of 2.5(goal range 2-3) and daily aspirin.  Will bridge with heparin in the interim once safe from platelet standpoint.    No evidence of bleeding.  Liver enzymes still trending up but rate of increase is low on last labs.    Continue supportive care        I appreciate the assistance of the ICU team in her care.  Alexandru Campbell MD

## 2023-12-10 NOTE — SUBJECTIVE & OBJECTIVE
"Interval HPI:   Overnight events: Remains in SICU. POD 2. BG well controlled overnight on IV intensive insulin protocol with infusion rates ranging from 1.5-2.1 u/hr. Creatinine 2.4; started on CRRT. No diet orders on file    Eating:   NPO  Nausea: No  Hypoglycemia and intervention: No  Fever: No  TPN and/or TF: No  If yes, type of TF/TPN and rate: n/a    BP (!) 111/56 (BP Location: Right arm, Patient Position: Lying)   Pulse 74   Temp 99 °F (37.2 °C) (Core Bladder)   Resp (!) 25   Ht 5' 5" (1.651 m)   Wt (!) 143.8 kg (317 lb 0.3 oz)   LMP 11/08/2023 (Approximate)   SpO2 98%   Breastfeeding No   BMI 52.76 kg/m²     Labs Reviewed and Include    Recent Labs   Lab 12/10/23  0303   *   CALCIUM 8.4*   ALBUMIN 3.0*   PROT 4.9*      K 4.9   CO2 19*   *   BUN 20   CREATININE 2.4*   ALKPHOS 54*   *   *   BILITOT 5.1*     Lab Results   Component Value Date    WBC 19.96 (H) 12/10/2023    HGB 11.0 (L) 12/10/2023    HCT 27 (L) 12/10/2023    MCV 85 12/10/2023    PLT 30 (LL) 12/10/2023     No results for input(s): "TSH", "FREET4" in the last 168 hours.  Lab Results   Component Value Date    HGBA1C 5.5 11/14/2022       Nutritional status:   Body mass index is 52.76 kg/m².  Lab Results   Component Value Date    ALBUMIN 3.0 (L) 12/10/2023    ALBUMIN 3.1 (L) 12/10/2023    ALBUMIN 2.9 (L) 12/09/2023     No results found for: "PREALBUMIN"    Estimated Creatinine Clearance: 46.5 mL/min (A) (based on SCr of 2.4 mg/dL (H)).    Accu-Checks  Recent Labs     12/10/23  0002 12/10/23  0104 12/10/23  0203 12/10/23  0305 12/10/23  0401 12/10/23  0505 12/10/23  0601 12/10/23  0710 12/10/23  0747 12/10/23  0831   POCTGLUCOSE 119* 109 117* 115* 113* 105 124* 96 90 106       Current Medications and/or Treatments Impacting Glycemic Control  Immunotherapy:    Immunosuppressants       None          Steroids:   Hormones (From admission, onward)      Start     Stop Route Frequency Ordered    12/08/23 2246  " vasopressin (PITRESSIN) 1 Units/mL in dextrose 5 % (D5W) 100 mL infusion         -- IV Continuous 12/08/23 2247 12/08/23 2232  vasopressin (PITRESSIN) 20 unit/mL injection        Note to Pharmacy: Created by cabinet override    12/09/23 1044   12/08/23 2232          Pressors:    Autonomic Drugs (From admission, onward)      Start     Stop Route Frequency Ordered    12/10/23 0845  NORepinephrine 4 mg in dextrose 5% 250 mL infusion (premix)        Question Answer Comment   Begin at (in mcg/kg/min): 0.02    Titrate by: (in mcg/kg/min) 0.02    Titrate interval: (in minutes) 5    Titrate to maintain: (MAP or SBP) MAP    Greater than: (in mmHg) 65    Maximum dose: (in mcg/kg/min) 0.1        -- IV Continuous 12/10/23 0840    12/08/23 2253  EPINEPHrine 5 mg in dextrose 5% 250 mL infusion (premix)        Question Answer Comment   Begin at (in mcg/kg/min): 0.04    Titrate by: (in mcg/kg/min) 0.02    Titrate interval: (in minutes) 5    Titrate to maintain: (SBP or MAP or Cardiac Index) MAP    Greater than: (in mmHg) 65    Maximum dose: (in mcg/kg/min) 2        -- IV Continuous 12/08/23 2253 12/08/23 2232  EPINEPHrine (ADRENALIN) 1 mg/mL injection        Note to Pharmacy: Created by cabinet override    12/09/23 1044   12/08/23 2232          Hyperglycemia/Diabetes Medications:   Antihyperglycemics (From admission, onward)      Start     Stop Route Frequency Ordered    12/08/23 2230  insulin regular in 0.9 % NaCl 100 unit/100 mL (1 unit/mL) infusion        Question Answer Comment   Insulin rate changes (DO NOT MODIFY ANSWER) [email or web address] [email or web address]    Enter initial dose (Units/hr): 1        -- IV Continuous 12/08/23 2222

## 2023-12-10 NOTE — PROGRESS NOTES
12/10/23 0150 12/10/23 0207   Treatment   Treatment Type  --  SLED   Treatment Status Clotting;Blood returned Restart   Dialysis Machine Number  --  [id number]   Dialyzer Time (hours) 5.53 0   BVP (Liters) 49.6 L 0 L   Solutions Labeled and Current   --  Yes   Access  --  Temporary Cath;Left;IJ   Catheter Dressing Intact   --  Yes   Alarms Engaged  --  Yes   CRRT Comments Pt rinsed back by ICU RN SLED restarted

## 2023-12-10 NOTE — PROGRESS NOTES
Pharmacokinetic Assessment Follow Up: IV Vancomycin    Vancomycin serum concentration assessment(s):    The random level was drawn correctly and can be used to guide therapy at this time. The measurement is above the desired definitive target range of 10 to 20 mcg/mL.  - The random level was drawn ~24 hours after previous dose and resulted at 24.5.    Vancomycin Regimen Plan:    Will not re-dose now given level >20.  - Re-dose when the random level is less than 20 mcg/mL, next level to be drawn at 0500 with AM labs on 12/10.   - Ms. Cook has an TINO. Nephrology is following. Starting continuous SLED for volume removal. Started ~2000 tonight.   - Will continue pulse dosing in the setting of TINO requiring RRT.    Drug levels (last 3 results):  Recent Labs   Lab Result Units 12/09/23  2101   Vancomycin, Random ug/mL 24.5       Pharmacy will continue to follow and monitor vancomycin.    Please contact pharmacy at extension [phone] for questions regarding this assessment.    Thank you for the consult,   Mary Muse       Patient brief summary:  Megan Cook is a 38 y.o. female initiated on antimicrobial therapy with IV Vancomycin for  surgical ppx    The patient's current regimen is pulse dosing in the setting of TINO requiring RRT    Actual Body Weight:   143.8 kg    Renal Function:   Estimated Creatinine Clearance: 37.2 mL/min (A) (based on SCr of 3 mg/dL (H)).     Dialysis Method (if applicable):  SLED - currently continuous

## 2023-12-10 NOTE — PROGRESS NOTES
12/09/23 2011   Treatment   Treatment Type SLED   Treatment Status New start   Dialysis Machine Number [id number]   Dialyzer Time (hours) 0   BVP (Liters) 0 L   Solutions Labeled and Current  Yes   Access Temporary Cath;Left;IJ   Catheter Dressing Intact  Yes   Alarms Engaged Yes   CRRT Comments SLED started per MD orders

## 2023-12-10 NOTE — ASSESSMENT & PLAN NOTE
-s/p aortic and pulmonary valve replacement on 12/8.    -currently on the ventilator with propofol infusion and on CRRT for acute renal failure and needing vasoactive support with vaso 0.04 and levo 0.1.    -On the telemetry she was found to have accelerated junctional rhythm competing with sinus rhythm and electrophysiology was consulted.   -has not had any bradycardic events and her ventricular rate stays in 70s and 80s regardless of sinus or junctional rhythm.    -Her EF is 60% based on echo from 3 days ago.   -Electrophysiology was consulted for the new finding of junctional rhythm.    Recommendations  -no interventions needed from EP standpoint, junctional rhythm is likely sedation related  -wean sedation and tolerated

## 2023-12-10 NOTE — PLAN OF CARE
Significant events: Arterial line exchanged, Trialysis placed. Milrinone, lasix, and heparin stopped. Epi requirements increased this PM. 0 UOP all shift.   Vitals/Respiratory:  Mechanically ventilated, FiO2 70%, Peep 8, Rate 18.      O2: >93%.    HR: 's NS/ST with PVCs.  MAP: >65.  CVP:  12-14. Temperature max:  98.8 F.   Infusions:  Epinephrine, Vasopressin, Propofol, and Insulin.   UOP:  0/shift via pitts catheter.       Last Bowel Movement: BARBIE. Pt intubated.   Chest Tubes: 220 cc/shift.  Neuro: Pupils equal and reactive. Follows commands and moves all extremities purposefully during sedation vacation.     Diet:  NPO.   Labs:  Q6 hr CMP.  Q2 hr ABG.       Accuckecks:    Q1 hrs     Plan:  CRRT overnight. Continue ICU care.  Plan of care reviewed with patient and family, all questions answered.     Skin:  Alterned skin integrety found to upper back and buttocks, wound care consulted. Turned Q2 hrs with wedge. Waffle mattress, sacral foam, and heel boots in use.

## 2023-12-10 NOTE — ASSESSMENT & PLAN NOTE
38-year-old, 127 kg, woman with history of rheumatic heart disease and Ross procedure and mitral valve repair(including ring annuloplasty) in 2000 followed by repair of autograft aneurysm repair and aortic valve replacement with 23mm CE bioprothesis in 2012 who presents for aortic and pulmonary valve replacement on 12/8      Neuro/Psych:   -- Sedation: propofol   -- Pain: Fentanyl pushes while intubated. Tylenol 650 mg PRN, Morphine 2 mg PRN             Cards:   History of rheumatic fever with rheumatic heart disease . S/p Ross procedure and mitral valve repair(including ring annuloplasty) in 2000   Now s/p aortic and pulmonary valve replacement with Dr. Campbell on 12/8  -- HDS  -- MAPS >65  -- SBP   -- Pressors to maintain blood pressure goals: epi off, vaso 0.04, levo 0.08  -- wean pressors as tolerated      Pulm:   -- Intubated  -- Goal O2 sat > 90%  -- Wean as able  -- Two pleural and one mediastinal chest tube to water seal       Renal:  -- Keep pitts for strict I/O  -- BUN/Cr   -- d/c mIVF  Recent Labs   Lab 12/09/23  2101 12/10/23  0002 12/10/23  0303   BUN 30* 23* 20   CREATININE 3.0* 2.6* 2.4*     -- Urine output: anuric      FEN / GI:   -- Net +9.37L since admission, net +1.5L/24h  -- Replace lytes as needed  -- Nutrition: NPO  -- Docusate   -- Famotidine       ID:   -- Tm: afebrile; WBC 12.03 preop. 18.7 post op   Recent Labs   Lab 12/09/23  1033 12/09/23  1846 12/10/23  0303   WBC 17.62* 21.54* 19.96*     -- Vancomycin two doses in OR       Heme/Onc:  -- H/H stable, 12.6 preop. 13.8 post op.   -- Daily CBC  -- holding heparin for platelets 30  -- Received 8 units of pRBC in the OR   Recent Labs   Lab 12/09/23  1033 12/09/23  1725 12/09/23  1846 12/10/23  0303   HGB 12.3  --  11.9* 11.0*   PLT 35*  --  37* 30*   APTT 28.4 24.4  --  29.9   INR 1.2  --  1.1 1.1        Endo:   -- Gluc goal 140-180  -- POCT 152      PPx:   Feeding: NPO  Analgesia/Sedation: tylenol, morphine, fentanyl  /Propofol  Thromboembolic prevention: heparin held for plts 30  HOB >30: Yes  Stress Ulcer ppx: Famotidine   Glucose control: Critical care goal 140-180 g/dl   Bowel reg: docusate  Invasive Lines/Drains/Airway: PIV x2, [place], Central line, Nicolas, Chest tube x3; L IJ trialysis  Deescalation: dc'ed         Dispo/Code Status/Palliative:   -- SICU / Full Code

## 2023-12-10 NOTE — NURSING
1945: Pt intermittently dropping pressures to MAPs in the mid 50s, Epi  titrated up to 0.08mcg/kg/min and Vaso 0.04 units/min currently running.   EKG changes noted corresponding with decreases in MAPs. STAT EKG ordered. Anthony MARY notified of findings. Amio gtt and bolus initially ordered but cancelled.     2100: Levo ordered in order to wean epi off. order placed and implemented.     2115: Per Dr. Cruz and Dr. Zhu, cardioversion ordered. cardioversion completed, 200 joules x1. 50 mcg fent given prior to shock. No changes in rhythm noted.     2120: pt remains in an accelerated junctional rhythm. Per German MARY no other orders at this time.     Gtts currently infusing    Levo @ 0.1 mcg/kg/min  Epi @ 0.03 mcg/kg/min  Vaso @ 0.04 unit/min  Prop @ 35 mcg/kg/min  Insulin @ 2.1 units/ hr

## 2023-12-10 NOTE — PROGRESS NOTES
12/10/23 0056   Treatment   Treatment Type SLED   Treatment Status Daily equipment check   Dialysis Machine Number [id number]   Dialyzer Time (hours) 4.45   BVP (Liters) 41.9 L   Solutions Labeled and Current  Yes   Access Temporary Cath;Left;IJ   Catheter Dressing Intact  Yes   Alarms Engaged Yes   CRRT Comments Daily check done

## 2023-12-10 NOTE — ASSESSMENT & PLAN NOTE
Aute kidney injury secondary to volume overload  Scr went up to 2.7 from 1.7  (Baseline 0.4-1)  Uop yesterday 2L with 160 mg lasix and 500 mg Diuril  X ray yesterday :slightly increased bilateral middle and lower lung zone predominant airspace opacities.  Increased trace left effusion.      Plan  Total intake in 12 hr shift is 4.7 L , UOP ~0, total output 3.2 L, Net Positive 1.5  9 L positive since admission  Will continue SLED for Volume removal Net Goal negative 1L  Renal panel daily  Strict input output

## 2023-12-10 NOTE — PLAN OF CARE
Significant events: Nicolas catheter d/c. CRRT clotted off x1. Tube feedings started at 10 cc/hr.   Vitals/Respiratory:  Mechanically ventilated, FiO2 40%, Peep 8, Rate 18.      O2: >92%.    HR: 80-90's NS/junctional.  MAP: >65.  CVP:  14-12. Temperature max:  99.5 F.   Infusions:  Norepinephrine, Vasopressin, Propofol.   UOP:  0/shift.       Last Bowel Movement: BARBIE. Pt intubated.   Chest Tubes: 70 cc/shift.  Neuro: Pupils equal and reactive. Follows commands and moves all extremities purposefully during sedation vacation.     Diet:  NPO. Tube feedings started at 10 cc/hr.   Labs:  Q6 hr Renal and Mag  Q2 hr ABG.       Accuckecks:    Every 4 hours      Plan:  CRRT overnight. Continue ICU care.  Plan of care reviewed with patient and family, all questions answered.      Skin:  Turned Q2 hrs with wedge. Waffle mattress, sacral foam, and heel boots in use. No new skin breakdown noted.

## 2023-12-10 NOTE — PT/OT/SLP PROGRESS
Occupational Therapy      Patient Name:  Megan Cook   MRN:  [id number]    Patient not seen today secondary to patient intubated/on CRRT + pressor support. Will follow-up per schedule as patient appropriate to participate.    12/10/2023

## 2023-12-10 NOTE — CONSULTS
RD consulted for pt intubated/sedated. See full RD note 12/10.     Recommendations     1. Once extubated, advance PO diet order to Cardiac as tolerated.   2. If PO intake poor >48 hrs, add Boost Plus ONS BID to supplement intake.   3. If pt remains intubated >72 hrs, begin enteral feeds with Peptamen Intense VHP.  4. RD to monitor & follow-up.     Goals: Meet % EEN, EPN by RD f/u date  Nutrition Goal Status: new  Communication of RD Recs: reviewed with RN

## 2023-12-11 LAB
ALBUMIN SERPL BCP-MCNC: 2.2 G/DL (ref 3.5–5.2)
ALBUMIN SERPL BCP-MCNC: 2.2 G/DL (ref 3.5–5.2)
ALBUMIN SERPL BCP-MCNC: 2.3 G/DL (ref 3.5–5.2)
ALBUMIN SERPL BCP-MCNC: 2.4 G/DL (ref 3.5–5.2)
ALLENS TEST: ABNORMAL
ALLENS TEST: NORMAL
ALP SERPL-CCNC: 70 U/L (ref 55–135)
ALT SERPL W/O P-5'-P-CCNC: 196 U/L (ref 10–44)
ANION GAP SERPL CALC-SCNC: 7 MMOL/L (ref 8–16)
ANION GAP SERPL CALC-SCNC: 9 MMOL/L (ref 8–16)
AST SERPL-CCNC: 134 U/L (ref 10–40)
BILIRUB SERPL-MCNC: 5.5 MG/DL (ref 0.1–1)
BLD PROD TYP BPU: NORMAL
BLOOD UNIT EXPIRATION DATE: NORMAL
BLOOD UNIT TYPE CODE: 600
BLOOD UNIT TYPE CODE: 600
BLOOD UNIT TYPE CODE: 6200
BLOOD UNIT TYPE: NORMAL
BUN SERPL-MCNC: 10 MG/DL (ref 6–20)
BUN SERPL-MCNC: 7 MG/DL (ref 6–20)
BUN SERPL-MCNC: 7 MG/DL (ref 6–20)
BUN SERPL-MCNC: 8 MG/DL (ref 6–20)
CA-I BLDV-SCNC: 1.09 MMOL/L (ref 1.06–1.42)
CA-I BLDV-SCNC: 1.09 MMOL/L (ref 1.06–1.42)
CALCIUM SERPL-MCNC: 8.1 MG/DL (ref 8.7–10.5)
CALCIUM SERPL-MCNC: 8.6 MG/DL (ref 8.7–10.5)
CALCIUM SERPL-MCNC: 8.8 MG/DL (ref 8.7–10.5)
CALCIUM SERPL-MCNC: 9.1 MG/DL (ref 8.7–10.5)
CHLORIDE SERPL-SCNC: 102 MMOL/L (ref 95–110)
CHLORIDE SERPL-SCNC: 103 MMOL/L (ref 95–110)
CHLORIDE SERPL-SCNC: 104 MMOL/L (ref 95–110)
CHLORIDE SERPL-SCNC: 107 MMOL/L (ref 95–110)
CO2 SERPL-SCNC: 24 MMOL/L (ref 23–29)
CO2 SERPL-SCNC: 24 MMOL/L (ref 23–29)
CO2 SERPL-SCNC: 25 MMOL/L (ref 23–29)
CO2 SERPL-SCNC: 28 MMOL/L (ref 23–29)
CODING SYSTEM: NORMAL
CREAT SERPL-MCNC: 1.2 MG/DL (ref 0.5–1.4)
CREAT SERPL-MCNC: 1.6 MG/DL (ref 0.5–1.4)
CROSSMATCH INTERPRETATION: NORMAL
DISPENSE STATUS: NORMAL
ERYTHROCYTE [DISTWIDTH] IN BLOOD BY AUTOMATED COUNT: 15.4 % (ref 11.5–14.5)
EST. GFR  (NO RACE VARIABLE): 42.3 ML/MIN/1.73 M^2
EST. GFR  (NO RACE VARIABLE): 59.8 ML/MIN/1.73 M^2
FIBRINOGEN PPP-MCNC: 559 MG/DL (ref 182–400)
FIBRINOGEN PPP-MCNC: 599 MG/DL (ref 182–400)
FIBRINOGEN PPP-MCNC: 699 MG/DL (ref 182–400)
FIBRINOGEN PPP-MCNC: 763 MG/DL (ref 182–400)
FIBRINOGEN PPP-MCNC: >800 MG/DL (ref 182–400)
GLUCOSE SERPL-MCNC: 107 MG/DL (ref 70–110)
GLUCOSE SERPL-MCNC: 107 MG/DL (ref 70–110)
GLUCOSE SERPL-MCNC: 115 MG/DL (ref 70–110)
GLUCOSE SERPL-MCNC: 123 MG/DL (ref 70–110)
HCO3 UR-SCNC: 25.8 MMOL/L (ref 24–28)
HCT VFR BLD AUTO: 28 % (ref 37–48.5)
HCT VFR BLD CALC: 32 %PCV (ref 36–54)
HGB BLD-MCNC: 9.6 G/DL (ref 12–16)
LDH SERPL L TO P-CCNC: 0.85 MMOL/L (ref 0.36–1.25)
MAGNESIUM SERPL-MCNC: 1.9 MG/DL (ref 1.6–2.6)
MAGNESIUM SERPL-MCNC: 1.9 MG/DL (ref 1.6–2.6)
MAGNESIUM SERPL-MCNC: 2 MG/DL (ref 1.6–2.6)
MCH RBC QN AUTO: 30.1 PG (ref 27–31)
MCHC RBC AUTO-ENTMCNC: 34.3 G/DL (ref 32–36)
MCV RBC AUTO: 88 FL (ref 82–98)
PCO2 BLDA: 39 MMHG (ref 35–45)
PF4 HEPARIN CMPLX AB SER QL: 0.1 OD (ref 0–0.4)
PH SMN: 7.43 [PH] (ref 7.35–7.45)
PHOSPHATE SERPL-MCNC: 1.8 MG/DL (ref 2.7–4.5)
PHOSPHATE SERPL-MCNC: 2.1 MG/DL (ref 2.7–4.5)
PHOSPHATE SERPL-MCNC: 2.9 MG/DL (ref 2.7–4.5)
PLATELET # BLD AUTO: 27 K/UL (ref 150–450)
PMV BLD AUTO: ABNORMAL FL (ref 9.2–12.9)
PO2 BLDA: 111 MMHG (ref 80–100)
POC BE: 1 MMOL/L
POC IONIZED CALCIUM: 1.08 MMOL/L (ref 1.06–1.42)
POC SATURATED O2: 98 % (ref 95–100)
POC TCO2: 27 MMOL/L (ref 23–27)
POCT GLUCOSE: 107 MG/DL (ref 70–110)
POCT GLUCOSE: 109 MG/DL (ref 70–110)
POCT GLUCOSE: 111 MG/DL (ref 70–110)
POCT GLUCOSE: 113 MG/DL (ref 70–110)
POCT GLUCOSE: 132 MG/DL (ref 70–110)
POCT GLUCOSE: 136 MG/DL (ref 70–110)
POTASSIUM BLD-SCNC: 5.6 MMOL/L (ref 3.5–5.1)
POTASSIUM SERPL-SCNC: 4.2 MMOL/L (ref 3.5–5.1)
POTASSIUM SERPL-SCNC: 4.4 MMOL/L (ref 3.5–5.1)
POTASSIUM SERPL-SCNC: 4.5 MMOL/L (ref 3.5–5.1)
POTASSIUM SERPL-SCNC: 5.2 MMOL/L (ref 3.5–5.1)
PROT SERPL-MCNC: 4.9 G/DL (ref 6–8.4)
RBC # BLD AUTO: 3.19 M/UL (ref 4–5.4)
SAMPLE: ABNORMAL
SAMPLE: NORMAL
SITE: ABNORMAL
SITE: NORMAL
SODIUM BLD-SCNC: 134 MMOL/L (ref 136–145)
SODIUM SERPL-SCNC: 136 MMOL/L (ref 136–145)
SODIUM SERPL-SCNC: 136 MMOL/L (ref 136–145)
SODIUM SERPL-SCNC: 137 MMOL/L (ref 136–145)
SODIUM SERPL-SCNC: 138 MMOL/L (ref 136–145)
TRANS ERYTHROCYTES VOL PATIENT: NORMAL ML
WBC # BLD AUTO: 14.05 K/UL (ref 3.9–12.7)

## 2023-12-11 PROCEDURE — 94761 N-INVAS EAR/PLS OXIMETRY MLT: CPT

## 2023-12-11 PROCEDURE — 63600175 PHARM REV CODE 636 W HCPCS: Performed by: PHYSICIAN ASSISTANT

## 2023-12-11 PROCEDURE — 83735 ASSAY OF MAGNESIUM: CPT | Mod: 91 | Performed by: NURSE PRACTITIONER

## 2023-12-11 PROCEDURE — 85384 FIBRINOGEN ACTIVITY: CPT | Mod: 91 | Performed by: PHYSICIAN ASSISTANT

## 2023-12-11 PROCEDURE — 36620 INSERTION CATHETER ARTERY: CPT | Mod: 59,,, | Performed by: INTERNAL MEDICINE

## 2023-12-11 PROCEDURE — 82330 ASSAY OF CALCIUM: CPT | Performed by: THORACIC SURGERY (CARDIOTHORACIC VASCULAR SURGERY)

## 2023-12-11 PROCEDURE — 85014 HEMATOCRIT: CPT

## 2023-12-11 PROCEDURE — 84132 ASSAY OF SERUM POTASSIUM: CPT

## 2023-12-11 PROCEDURE — 90945 PR DIALYSIS, NOT HEMO, 1 EVAL: ICD-10-PCS | Mod: ,,, | Performed by: NURSE PRACTITIONER

## 2023-12-11 PROCEDURE — 99291 PR CRITICAL CARE, E/M 30-74 MINUTES: ICD-10-PCS | Mod: ,,, | Performed by: INTERNAL MEDICINE

## 2023-12-11 PROCEDURE — 84295 ASSAY OF SERUM SODIUM: CPT

## 2023-12-11 PROCEDURE — 51798 US URINE CAPACITY MEASURE: CPT

## 2023-12-11 PROCEDURE — 63600175 PHARM REV CODE 636 W HCPCS: Performed by: STUDENT IN AN ORGANIZED HEALTH CARE EDUCATION/TRAINING PROGRAM

## 2023-12-11 PROCEDURE — 63600175 PHARM REV CODE 636 W HCPCS

## 2023-12-11 PROCEDURE — C9113 INJ PANTOPRAZOLE SODIUM, VIA: HCPCS | Performed by: STUDENT IN AN ORGANIZED HEALTH CARE EDUCATION/TRAINING PROGRAM

## 2023-12-11 PROCEDURE — 27100171 HC OXYGEN HIGH FLOW UP TO 24 HOURS

## 2023-12-11 PROCEDURE — 63600175 PHARM REV CODE 636 W HCPCS: Performed by: NURSE PRACTITIONER

## 2023-12-11 PROCEDURE — 36620 INSERTION CATHETER ARTERY: CPT

## 2023-12-11 PROCEDURE — 85027 COMPLETE CBC AUTOMATED: CPT | Performed by: PHYSICIAN ASSISTANT

## 2023-12-11 PROCEDURE — 80069 RENAL FUNCTION PANEL: CPT | Performed by: THORACIC SURGERY (CARDIOTHORACIC VASCULAR SURGERY)

## 2023-12-11 PROCEDURE — 25000003 PHARM REV CODE 250: Performed by: STUDENT IN AN ORGANIZED HEALTH CARE EDUCATION/TRAINING PROGRAM

## 2023-12-11 PROCEDURE — 99900026 HC AIRWAY MAINTENANCE (STAT)

## 2023-12-11 PROCEDURE — 25000003 PHARM REV CODE 250

## 2023-12-11 PROCEDURE — 99900035 HC TECH TIME PER 15 MIN (STAT)

## 2023-12-11 PROCEDURE — 37799 UNLISTED PX VASCULAR SURGERY: CPT

## 2023-12-11 PROCEDURE — 90945 DIALYSIS ONE EVALUATION: CPT

## 2023-12-11 PROCEDURE — 82803 BLOOD GASES ANY COMBINATION: CPT

## 2023-12-11 PROCEDURE — 82330 ASSAY OF CALCIUM: CPT

## 2023-12-11 PROCEDURE — 25000003 PHARM REV CODE 250: Performed by: NURSE PRACTITIONER

## 2023-12-11 PROCEDURE — 80069 RENAL FUNCTION PANEL: CPT | Mod: 91 | Performed by: NURSE PRACTITIONER

## 2023-12-11 PROCEDURE — 83735 ASSAY OF MAGNESIUM: CPT | Mod: 91

## 2023-12-11 PROCEDURE — 36620 R RADIAL ARTERIAL LINE: ICD-10-PCS | Mod: 59,,, | Performed by: INTERNAL MEDICINE

## 2023-12-11 PROCEDURE — 99291 CRITICAL CARE FIRST HOUR: CPT | Mod: ,,, | Performed by: INTERNAL MEDICINE

## 2023-12-11 PROCEDURE — 94003 VENT MGMT INPAT SUBQ DAY: CPT

## 2023-12-11 PROCEDURE — 82565 ASSAY OF CREATININE: CPT

## 2023-12-11 PROCEDURE — 83605 ASSAY OF LACTIC ACID: CPT

## 2023-12-11 PROCEDURE — 90945 DIALYSIS ONE EVALUATION: CPT | Mod: ,,, | Performed by: NURSE PRACTITIONER

## 2023-12-11 PROCEDURE — 20000000 HC ICU ROOM

## 2023-12-11 PROCEDURE — 82800 BLOOD PH: CPT

## 2023-12-11 PROCEDURE — 25000003 PHARM REV CODE 250: Performed by: INTERNAL MEDICINE

## 2023-12-11 PROCEDURE — 80053 COMPREHEN METABOLIC PANEL: CPT | Performed by: THORACIC SURGERY (CARDIOTHORACIC VASCULAR SURGERY)

## 2023-12-11 PROCEDURE — 80100008 HC CRRT DAILY MAINTENANCE

## 2023-12-11 RX ORDER — ACETAMINOPHEN 650 MG/20.3ML
650 LIQUID ORAL EVERY 8 HOURS
Status: DISCONTINUED | OUTPATIENT
Start: 2023-12-11 | End: 2023-12-14

## 2023-12-11 RX ORDER — MAGNESIUM SULFATE HEPTAHYDRATE 40 MG/ML
2 INJECTION, SOLUTION INTRAVENOUS
Status: DISCONTINUED | OUTPATIENT
Start: 2023-12-11 | End: 2023-12-12

## 2023-12-11 RX ORDER — ACETAMINOPHEN 650 MG/20.3ML
1000 LIQUID ORAL EVERY 8 HOURS
Status: DISCONTINUED | OUTPATIENT
Start: 2023-12-11 | End: 2023-12-11

## 2023-12-11 RX ADMIN — PROPOFOL 40 MCG/KG/MIN: 10 INJECTION, EMULSION INTRAVENOUS at 12:12

## 2023-12-11 RX ADMIN — MUPIROCIN: 20 OINTMENT TOPICAL at 08:12

## 2023-12-11 RX ADMIN — Medication: at 08:12

## 2023-12-11 RX ADMIN — CALCIUM GLUCONATE: 98 INJECTION, SOLUTION INTRAVENOUS at 03:12

## 2023-12-11 RX ADMIN — PROPOFOL 35 MCG/KG/MIN: 10 INJECTION, EMULSION INTRAVENOUS at 04:12

## 2023-12-11 RX ADMIN — OXYCODONE HYDROCHLORIDE 5 MG: 5 TABLET ORAL at 07:12

## 2023-12-11 RX ADMIN — PROPOFOL 40 MCG/KG/MIN: 10 INJECTION, EMULSION INTRAVENOUS at 06:12

## 2023-12-11 RX ADMIN — PROPOFOL 20 MCG/KG/MIN: 10 INJECTION, EMULSION INTRAVENOUS at 11:12

## 2023-12-11 RX ADMIN — PROPOFOL 30 MCG/KG/MIN: 10 INJECTION, EMULSION INTRAVENOUS at 08:12

## 2023-12-11 RX ADMIN — CALCIUM GLUCONATE: 98 INJECTION, SOLUTION INTRAVENOUS at 02:12

## 2023-12-11 RX ADMIN — SENNOSIDES AND DOCUSATE SODIUM 1 TABLET: 8.6; 5 TABLET ORAL at 08:12

## 2023-12-11 RX ADMIN — DEXTROSE MONOHYDRATE, SODIUM CITRATE, AND CITRIC ACID MONOHYDRATE: 2.45; 2.2; .8 INJECTION, SOLUTION INTRAVENOUS at 02:12

## 2023-12-11 RX ADMIN — DOCUSATE SODIUM 100 MG: 50 LIQUID ORAL at 08:12

## 2023-12-11 RX ADMIN — ACETAMINOPHEN 650 MG: 650 SOLUTION ORAL at 09:12

## 2023-12-11 RX ADMIN — CALCIUM GLUCONATE: 98 INJECTION, SOLUTION INTRAVENOUS at 11:12

## 2023-12-11 RX ADMIN — SENNOSIDES AND DOCUSATE SODIUM 1 TABLET: 8.6; 5 TABLET ORAL at 09:12

## 2023-12-11 RX ADMIN — CALCIUM GLUCONATE: 98 INJECTION, SOLUTION INTRAVENOUS at 07:12

## 2023-12-11 RX ADMIN — MAGNESIUM SULFATE HEPTAHYDRATE 2 G: 40 INJECTION, SOLUTION INTRAVENOUS at 10:12

## 2023-12-11 RX ADMIN — PANTOPRAZOLE SODIUM 40 MG: 40 INJECTION, POWDER, FOR SOLUTION INTRAVENOUS at 08:12

## 2023-12-11 RX ADMIN — SODIUM PHOSPHATE, MONOBASIC, MONOHYDRATE AND SODIUM PHOSPHATE, DIBASIC, ANHYDROUS 39.99 MMOL: 142; 276 INJECTION, SOLUTION INTRAVENOUS at 04:12

## 2023-12-11 RX ADMIN — MAGNESIUM SULFATE HEPTAHYDRATE 2 G: 40 INJECTION, SOLUTION INTRAVENOUS at 04:12

## 2023-12-11 RX ADMIN — ACETAMINOPHEN 650 MG: 650 SOLUTION ORAL at 02:12

## 2023-12-11 NOTE — PROGRESS NOTES
[facility]  Nephrology  Progress Note    Patient Name: Megan Cook  MRN: [id number]  Admission Date: 12/8/2023  Hospital Length of Stay: 3 days  Attending Provider: Alexandru Campbell MD   Primary Care Physician: Adelaida Disla NP  Principal Problem:Rheumatic aortic stenosis    Subjective:     HPI: HPI:  Ms. 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        The patient presents to the SICU s/p AVR and PVR with Dr. Campbell on 12/08/2023. During the procedure the patient was on bypass for 398 minutes and clamped for 138 minutes.      On admission, they are intubated, sedated with Precedex, and in stable condition. Inotropic and vasopressor requirements upon admission are 0.05 of Epinephrine, and 0.1 Vasopressin, 0.25 Milrinone to maintain blood pressure at a MAP 65 and SBP < 120. Central access includes a RIJ CVC, arterial access includes a left radial arterial line. They also have 2 pleural chest tubed and 1 mediastinal chest tube with appropriate output.     Intraoperatively, they received 2,000 mL of NS, 4,965 mL of PRBCs, 441 mL FFP, 1,077mL Platelets, and 475 mL Cryoprecipitate. Urine output intraoperatively was 1,835cc.      Immediate post-operative plans include hemodynamic stabilization and weaning of cardiac and respiratory support. Plan to wean vasopressors and inotropes as tolerated, also wean ventilator support with goal of early extubation, and closely monitor fluid status with strict Is/Os and continued fluid resuscitation as needed.    Interval History:   Clotted CRRT overnight, on citrate for regional anticoagulation.  Negative ~ 400 ml/24h.  Minimal  hourly intake.  Hypervolemic on exam.    Review of patient's allergies indicates:   Allergen Reactions    Cephalexin Other (See Comments)     She gets mouth sores.     Penicillin g Other (See Comments)     Causes mouth sores.      Current Facility-Administered Medications   Medication Frequency    0.9%  NaCl infusion PRN    acetaminophen oral solution 650 mg Q8H PRN    acetaminophen oral solution 650 mg Q8H    balsam peru-castor oiL Oint BID    calcium gluconate 1 g in NS IVPB (premixed) PRN    calcium gluconate 1 g in NS IVPB (premixed) PRN    calcium gluconate 1 g in NS IVPB (premixed) PRN    calcium gluconate 3 g in dextrose 5 % (D5W) 100 mL infusion Continuous    dexmedetomidine (PRECEDEX) 400mcg/100mL 0.9% NaCL infusion Continuous    dextrose 10% bolus 125 mL 125 mL PRN    dextrose 10% bolus 250 mL 250 mL PRN    dextrose-sod citrate-citric ac 2.45-2.2 gram- 800 mg/100 mL Soln Continuous    docusate 50 mg/5 mL liquid 100 mg Daily    glucagon (human recombinant) injection 1 mg PRN    insulin aspart U-100 pen 0-10 Units Q4H PRN    magnesium sulfate 2g in water 50mL IVPB (premix) PRN    magnesium sulfate 2g in water 50mL IVPB (premix) PRN    mupirocin 2 % ointment BID    NORepinephrine 8 mg in dextrose 5% 250 mL infusion Continuous    ondansetron injection 4 mg Q12H PRN    oxyCODONE immediate release tablet 5 mg Q6H PRN    pantoprazole injection 40 mg Daily    propofol (DIPRIVAN) 10 mg/mL infusion Continuous    senna-docusate 8.6-50 mg per tablet 1 tablet BID    vasopressin (PITRESSIN) 1 Units/mL in dextrose 5 % (D5W) 100 mL infusion Continuous       Objective:     Vital Signs (Most Recent):  Temp: 98.2 °F (36.8 °C) (12/11/23 1100)  Pulse: 96 (12/11/23 1330)  Resp: (!) 23 (12/11/23 1330)  BP: 112/60 (12/11/23 1300)  SpO2: 97 % (12/11/23 1330) Vital Signs (24h Range):  Temp:  [98.2 °F (36.8 °C)-99.5 °F (37.5 °C)] 98.2 °F (36.8 °C)  Pulse:  [] 96  Resp:  [7-31] 23  SpO2:  [91 %-99 %] 97 %  BP: ()/(51-68)  112/60  Arterial Line BP: ()/() 107/54     Weight: (!) 145 kg (319 lb 10.7 oz) (12/11/23 0406)  Body mass index is 53.2 kg/m².  Body surface area is 2.58 meters squared.    I/O last 3 completed shifts:  In: 9667.1 [I.V.:6987.4; NG/GT:320; IV Piggyback:2359.7]  Out: 9517 [Drains:50; Other:9050; Chest Tube:417]     Physical Exam  Vitals and nursing note reviewed.   Constitutional:       Appearance: She is obese.      Interventions: She is sedated and intubated.   HENT:      Head: Normocephalic and atraumatic.      Mouth/Throat:      Mouth: Mucous membranes are moist.   Cardiovascular:      Rate and Rhythm: Normal rate and regular rhythm.      Pulses: Normal pulses.   Pulmonary:      Effort: Pulmonary effort is normal. No respiratory distress. She is intubated.      Breath sounds: Normal breath sounds. No wheezing or rales.      Comments: Patient is intubated. Symmetrical chest rise.    Vent Mode: A/C  Oxygen Concentration (%):  [] 70  Resp Rate Total:  [22 br/min-34 br/min] 23 br/min  Vt Set:  [500 mL] 500 mL  PEEP/CPAP:  [7 cmH20-8 cmH20] 8 cmH20  Mean Airway Pressure:  [14 fgC11-65 cmH20] 14 cmH20     Chest:      Comments: Three chest tubes in midline   Abdominal:      General: Abdomen is flat.      Palpations: Abdomen is soft.   Musculoskeletal:         General: Swelling present. Normal range of motion.      Right lower leg: Edema present.      Left lower leg: Edema present.   Skin:     General: Skin is warm and dry.   Neurological:      General: No focal deficit present.   Psychiatric:         Mood and Affect: Mood normal.         Behavior: Behavior normal.          Significant Labs:  CBC:   Recent Labs   Lab 12/11/23  0302   WBC 14.05*   RBC 3.19*   HGB 9.6*   HCT 28.0*   PLT 27*   MCV 88   MCH 30.1   MCHC 34.3     CMP:   Recent Labs   Lab 12/11/23  1017   *   CALCIUM 8.8   ALBUMIN 2.2*   PROT 4.9*      K 4.5   CO2 28      BUN 7   CREATININE 1.2   ALKPHOS 70   *   AST  134*   BILITOT 5.5*        Assessment/Plan:     Renal/  TINO (acute kidney injury)  Aute kidney injury secondary to iATN from hypotensive episodes/hemodynamic instability during OR  (Baseline 0.4-1)  SLED started 12/9 for oliguric TINO    Plan  Will continue SLED with citrate/calcium for regional anticoagulation  -minimal hour intake, -450 cc/hr as tolerated  -strict I/O's  -RFP q 8 hours with iCA        Denis Correa NP  Nephrology  [facility]

## 2023-12-11 NOTE — PROGRESS NOTES
12/11/23 0018   Treatment   Treatment Type SLED   Treatment Status Daily equipment check   Dialysis Machine Number [id number]   Dialyzer Time (hours) 17.34   BVP (Liters) 151.2 L   Solutions Labeled and Current  Yes   Access Temporary Cath;Right;IJ   Catheter Dressing Intact  Yes   Alarms Engaged Yes   CRRT Comments daily check done

## 2023-12-11 NOTE — PT/OT/SLP PROGRESS
Occupational Therapy      Patient Name:  Megan Cook   MRN:  [id number]    Patient not seen today secondary to pt remains intubated on CRRT with pressor support  . Will follow-up 12/12.    12/11/2023

## 2023-12-11 NOTE — PLAN OF CARE
"      SICU PLAN OF CARE NOTE    Dx: Accelerated junctional rhythm    Shift Events: Weaned pressors as tolerated. CRRT bath changed to 3K.     Goals of Care: MAP>65    Neuro: Sedated, Follows Commands, and Moves All Extremities    Cardiac: NSR (70-80s), CVP 10/11/8    Vital Signs: /62   Pulse 82   Temp 99.1 °F (37.3 °C) (Oral)   Resp (!) 23   Ht 5' 5" (1.651 m)   Wt (!) 143.8 kg (317 lb 0.3 oz)   LMP 11/08/2023 (Approximate)   SpO2 95%   Breastfeeding No   BMI 52.76 kg/m²     Respiratory: Ventilator ACVC 40% /8 Peep    Diet: NPO and Tube Feeds    Gtts: Propfol, Norepinephrine, and Vasopressin    Urine Output: Anuric     Drains: Chest Tube, total output 120 cc /  shift    CRRT SLED  UF goal 200-500    Restraints checked Q2, no skin breakdown noted. Pt repositioned as needed.       Labs/Accuchecks: Accuchecks Q4, Q8 Renal labs.    Skin: No new skin breakdown noted.Specialty bed plugged in and working. Waffle mattress inflated and in use.  Pt turned Q2hr. Foams and heel boots in place for addition breakdown prevention.     PoC reviewed with pt/family. All questions/concerns addressed. VSS. See flowsheets for full assessment.        "

## 2023-12-11 NOTE — PROGRESS NOTES
[facility]  Critical Care - Surgery  Progress Note    Patient Name: Megan Cook  MRN: [id number]  Admission Date: 12/8/2023  Hospital Length of Stay: 3 days  Code Status: Full Code  Attending Provider: Alexandru Campbell MD  Primary Care Provider: Adelaida Disla NP   Principal Problem: Rheumatic aortic stenosis    Subjective:     Hospital/ICU Course:  No notes on file    Interval History/Significant Events: EP consulted for junctional rhythm likely due to loss of atrial kick. No specific therapy indicated. On levo 0.03, vaso 0.04    Follow-up For: Procedure(s) (LRB):  Replacement-valve-aortic (N/A)  REPLACEMENT, PULMONARY VALVE (N/A)    Post-Operative Day: 3 Days Post-Op    Objective:     Vital Signs (Most Recent):  Temp: 99.1 °F (37.3 °C) (12/11/23 0315)  Pulse: 79 (12/11/23 0515)  Resp: (!) 22 (12/11/23 0515)  BP: 110/60 (12/11/23 0500)  SpO2: 95 % (12/11/23 0515) Vital Signs (24h Range):  Temp:  [98.3 °F (36.8 °C)-99.5 °F (37.5 °C)] 99.1 °F (37.3 °C)  Pulse:  [73-94] 79  Resp:  [7-31] 22  SpO2:  [91 %-99 %] 95 %  BP: ()/(53-73) 110/60  Arterial Line BP: ()/(50-68) 113/63     Weight: (!) 145 kg (319 lb 10.7 oz)  Body mass index is 53.2 kg/m².      Intake/Output Summary (Last 24 hours) at 12/11/2023 0540  Last data filed at 12/11/2023 0500  Gross per 24 hour   Intake 6283.76 ml   Output 6398 ml   Net -114.24 ml          Physical Exam  Vitals and nursing note reviewed.   Constitutional:       Appearance: She is obese.   HENT:      Head: Normocephalic and atraumatic.   Cardiovascular:      Rate and Rhythm: Regular rhythm. Tachycardia present.   Pulmonary:      Comments: Patient is intubated. Symmetrical chest rise.    Vent Mode: A/C  Oxygen Concentration (%):  [] 70  Resp Rate Total:  [22 br/min-34 br/min] 23 br/min  Vt Set:  [500 mL] 500 mL  PEEP/CPAP:  [7 cmH20-8 cmH20] 8 cmH20  Mean Airway Pressure:  [14 frX60-19 cmH20] 14 cmH20     Chest:      Comments: Three  chest tubes in midline   Abdominal:      General: Abdomen is flat.      Palpations: Abdomen is soft.   Skin:     General: Skin is warm and dry.      Capillary Refill: Capillary refill takes less than 2 seconds.            Vents:  Vent Mode: A/C (12/11/23 0438)  Set Rate: 18 BPM (12/11/23 0438)  Vt Set: 400 mL (12/11/23 0438)  PEEP/CPAP: 8 cmH20 (12/11/23 0438)  Oxygen Concentration (%): 40 (12/11/23 0515)  Peak Airway Pressure: 25 cmH20 (12/11/23 0438)  Plateau Pressure: 0 cmH20 (12/11/23 0438)  Total Ve: 7.73 L/m (12/11/23 0438)  Negative Inspiratory Force (cm H2O): 0 (12/11/23 0438)  F/VT Ratio<105 (RSBI): (!) 65.71 (12/11/23 0438)    Lines/Drains/Airways       Central Venous Catheter Line  Duration             Percutaneous Central Line Insertion/Assessment - Quad Lumen  12/08/23 0949 Internal Jugular Right 2 days    Trialysis (Dialysis) Catheter 12/09/23 1327 left internal jugular 1 day              Drain  Duration                  NG/OG Tube 12/08/23  3 days         Chest Tube 12/08/23 2059 Tube - 3 Mediastinal 24 Fr. 2 days         Y Chest Tube 1 and 2 12/08/23 2058 1 Right Pleural 24 Fr. 2 Left Pleural 24 Fr. 2 days              Airway  Duration                  Airway - Non-Surgical 12/08/23 0738 2 days              Arterial Line  Duration             Arterial Line 12/09/23 0809 Left Radial 1 day              Peripheral Intravenous Line  Duration                  Peripheral IV - Single Lumen 12/08/23 0644 20 G Left Hand 2 days         Peripheral IV - Single Lumen 12/08/23 0747 16 G Right Forearm 2 days                    Significant Labs:    CBC/Anemia Profile:  Recent Labs   Lab 12/10/23  0303 12/10/23  0307 12/10/23  1720 12/10/23  1912 12/11/23  0255 12/11/23  0302   WBC 19.96*  --  15.42*  --   --  14.05*   HGB 11.0*  --  9.7*  --   --  9.6*   HCT 32.3*   < > 27.6* 25* 32* 28.0*   PLT 30*  --  29*  --   --  27*   MCV 85  --  85  --   --  88   RDW 15.1*  --  15.8*  --   --  15.4*    < > = values in this  interval not displayed.        Chemistries:  Recent Labs   Lab 12/10/23  0002 12/10/23  0303 12/10/23  1203 12/10/23  1410 12/10/23  2220 12/11/23  0302    142 138 138 137 136   K 4.8 4.9 5.3* 5.2* 5.2* 5.2*   * 111* 105 107 104 103   CO2 20* 19* 21* 16* 21* 24   BUN 23* 20 10 11 12 10   CREATININE 2.6* 2.4* 1.6* 1.9* 1.9* 1.6*   CALCIUM 8.8 8.4* 7.9* 7.5* 8.1* 8.1*   ALBUMIN 3.1* 3.0* 2.8* 2.6* 2.4* 2.4*   PROT 5.0* 4.9* 5.1*  --   --   --    BILITOT 5.2* 5.1* 5.4*  --   --   --    ALKPHOS 52* 54* 66  --   --   --    * 267* 304*  --   --   --    * 407* 364*  --   --   --    MG  --  1.9  --  1.8 2.1 1.9   PHOS  --  2.6*  --  2.0* 3.3 2.9           Significant Imaging:  I have reviewed all pertinent imaging results/findings within the past 24 hours.  Assessment/Plan:     Cardiac/Vascular  * Rheumatic aortic stenosis  38-year-old, 127 kg, woman with history of rheumatic heart disease and Ross procedure and mitral valve repair(including ring annuloplasty) in 2000 followed by repair of autograft aneurysm repair and aortic valve replacement with 23mm CE bioprothesis in 2012 who presents for aortic and pulmonary valve replacement on 12/8      Neuro/Psych:   -- Sedation: propofol   -- Pain: Fentanyl pushes while intubated. Tylenol 650 mg PRN, Morphine 2 mg PRN, oxy 5 Q6 PRN             Cards:   History of rheumatic fever with rheumatic heart disease . S/p Ross procedure and mitral valve repair(including ring annuloplasty) in 2000   Now s/p aortic and pulmonary valve replacement with Dr. Campbell on 12/8  -- HDS  -- MAPS >65  -- SBP   -- Pressors to maintain blood pressure goals: epi off, vaso 0.04, levo 0.03  -- wean pressors as tolerated      Pulm:   -- Intubated  -- Goal O2 sat > 90%  -- Wean as able  -- Two pleural and one mediastinal chest tube to water seal       Renal:  -- Nicolas removed, anuric  -- CRRT, increasing UF to 500/hr  -- BUN/Cr   Recent Labs   Lab 12/10/23  1410 12/10/23  3568  12/11/23  0302   BUN 11 12 10   CREATININE 1.9* 1.9* 1.6*     -- Urine output: anuric      FEN / GI:   -- Net +9.35L since admission, net -24ccL/24h  -- Replace lytes as needed  -- Nutrition: NPO  -- Docusate   -- Famotidine       ID:   -- Tm: afebrile; WBC 12.03 preop. 18.7 post op   Recent Labs   Lab 12/10/23  0303 12/10/23  1720 12/11/23  0302   WBC 19.96* 15.42* 14.05*     -- Vancomycin two doses in OR       Heme/Onc:  -- H/H stable, 12.6 preop. 13.8 post op.   -- Daily CBC  -- holding heparin for platelets 30  -- Received 8 units of pRBC in the OR   Recent Labs   Lab 12/09/23  1033 12/09/23  1725 12/09/23  1846 12/10/23  0303 12/10/23  1720 12/11/23  0302   HGB 12.3  --  11.9* 11.0* 9.7* 9.6*   PLT 35*  --  37* 30* 29* 27*   APTT 28.4 24.4  --  29.9  --   --    INR 1.2  --  1.1 1.1  --   --         Endo:   -- Gluc goal 140-180  -- POCT 152      PPx:   Feeding: NPO  Analgesia/Sedation: tylenol, morphine, fentanyl /Propofol  Thromboembolic prevention: heparin held for plts <30  HOB >30: Yes  Stress Ulcer ppx: Famotidine   Glucose control: Critical care goal 140-180 g/dl   Bowel reg: docusate  Invasive Lines/Drains/Airway: PIV x2, Sue, Central line, Chest tube x3; L IJ trialysis  Deescalation: dc'ed         Dispo/Code Status/Palliative:   -- SICU / Full Code           Critical care was time spent personally by me on the following activities: development of treatment plan with patient or surrogate and bedside caregivers, discussions with consultants, evaluation of patient's response to treatment, examination of patient, ordering and performing treatments and interventions, ordering and review of laboratory studies, ordering and review of radiographic studies, pulse oximetry, re-evaluation of patient's condition.  This critical care time did not overlap with that of any other provider or involve time for any procedures.     Gloria Cruz MD  Critical Care - Surgery  [facility]

## 2023-12-11 NOTE — ASSESSMENT & PLAN NOTE
Aute kidney injury secondary to iATN from hypotensive episodes/hemodynamic instability during OR  (Baseline 0.4-1)  SLED started 12/9 for oliguric TINO    Plan  Will continue SLED with citrate/calcium for regional anticoagulation  -minimal hour intake, -450 cc/hr as tolerated  -strict I/O's  -RFP q 8 hours with iCA

## 2023-12-11 NOTE — PLAN OF CARE
RD message for goal rate of TF. See full RD note 12/10.      Recommendations     1. TF REC: Peptamen Intense VHP w/ Goal Rate of 70ml/hr to provide 1680 kcals, 155 g PRO, 1411 ml fluid w/ additional FWF per MD    2. If alternative TF formula needed: Impact Peptide 1.5 @ 50ml/hr to provide 1800 kcals, 113 g PRO, 924 ml fluid with additional FWF per MD    3. Once extubated, advance PO diet order to Cardiac as tolerated.     4. RD to monitor & follow-up.     Goals: Meet % EEN, EPN by RD f/u date  Nutrition Goal Status: new  Communication of RD Recs: reviewed with RN

## 2023-12-11 NOTE — PROGRESS NOTES
Congenital Cardiac Surgery Note:  Ms. Megan Cook underwent re-replacement of her aortic valve and re-replacement of her RV-PA conduit/pulmonary valve on 12-8-2023.  The case was complicated by prolonged bypass time due to need to redo the RV to PA conduit due to distal obstruction.       Ultimately, with normal biventricular function and well functioning 25 mechanical aortic valve and 29mm bioprosthetic pulmonary valve.       Interval Events:  Acute Renal Failure has been biggest issue post-op.  Remains on CRRT   Remains off inotropes.    Pressor requirement continues to improve.  Levophed at half the dose it was yesterday morning(0.03 today) and still on 0.04 Vaso. Maps 80 while I was at the bedside.          Vitals:    12/11/23 0730 12/11/23 0745 12/11/23 0800 12/11/23 0816   BP:       BP Location:       Patient Position:       Pulse: 77 80 81 83   Resp: (!) 24 (!) 26 (!) 22 (!) 21   Temp:       TempSrc:       SpO2: 96% 98% 97% 98%   Weight:       Height:             Physical Exam:  General: sedated and intubated  CV: normal rate and regular, normal pedal pulses  Resp: mechanically ventilated  Neuro: sedated but move all extremities and follows commands  Drains: Serous output, small air leak from pleural drains  Incision: dressed       Imaging:  CXR: Low lung volumes, no large effusion      Assessment and Plan:  Discussed her care with Dr. Zhu, and ICU team.  Good hemodynamics this morning on decreasing dose of levo and still on same dose of vaso.  Planning to continue to wean levo today.    Plan for more aggressive volume removal with CRRT    Holding anticoagulation and aspirin for thrombocytopenia and will assess anticoagulation plan daily.   Reportedly TEG yesterday indicated some coagulopathy and they will check again today.  No evidence of bleeding.  Chest tube output low volume and serous.  She is on a PPI for GI prophylaxis.       Ultimately, anticoagulation plan will be for warfarin with an INR  target of 2.5(goal range 2-3) and daily aspirin.  Will bridge with heparin in the interim once safe from platelet standpoint.     Liver enzymes plateaud yesterday and AST trended down.      Continue supportive care     I appreciate the assistance of the ICU team in her care.     Alexandru Campbell MD

## 2023-12-11 NOTE — CARE UPDATE
BG goal 140-180    -A1C   Lab Results   Component Value Date    HGBA1C 5.5 11/14/2022       -HOME REGIMEN: none; no hx of DM    -INPATIENT REGIMEN: Novolog Correction Scale     -GLUCOSE TREND FOR THE PAST 24HRS: 107-136    -NO HYPOGYCEMIAS NOTED       -Diet: No diet orders on file      -Steroids - n/a    -Tube Feeds - Peptamen Intense at 20 ml/hr       Remains in SICU. POD 3. Remains intubated and on levo and vaso. BG well controlled without prn SQ insulin requirements.     Plan:  -Continue Moderate Dose Correction Scale  -BG monitoring q 4 hrs while NPO    Discharge planning: TBD    Endocrine to continue to follow    ** Please call Endocrine for any BG related issues **

## 2023-12-11 NOTE — SUBJECTIVE & OBJECTIVE
Interval History:   Clotted CRRT overnight, on citrate for regional anticoagulation.  Negative ~ 400 ml/24h.  Minimal hourly intake.  Hypervolemic on exam.    Review of patient's allergies indicates:   Allergen Reactions    Cephalexin Other (See Comments)     She gets mouth sores.     Penicillin g Other (See Comments)     Causes mouth sores.      Current Facility-Administered Medications   Medication Frequency    0.9%  NaCl infusion PRN    acetaminophen oral solution 650 mg Q8H PRN    acetaminophen oral solution 650 mg Q8H    balsam peru-castor oiL Oint BID    calcium gluconate 1 g in NS IVPB (premixed) PRN    calcium gluconate 1 g in NS IVPB (premixed) PRN    calcium gluconate 1 g in NS IVPB (premixed) PRN    calcium gluconate 3 g in dextrose 5 % (D5W) 100 mL infusion Continuous    dexmedetomidine (PRECEDEX) 400mcg/100mL 0.9% NaCL infusion Continuous    dextrose 10% bolus 125 mL 125 mL PRN    dextrose 10% bolus 250 mL 250 mL PRN    dextrose-sod citrate-citric ac 2.45-2.2 gram- 800 mg/100 mL Soln Continuous    docusate 50 mg/5 mL liquid 100 mg Daily    glucagon (human recombinant) injection 1 mg PRN    insulin aspart U-100 pen 0-10 Units Q4H PRN    magnesium sulfate 2g in water 50mL IVPB (premix) PRN    magnesium sulfate 2g in water 50mL IVPB (premix) PRN    mupirocin 2 % ointment BID    NORepinephrine 8 mg in dextrose 5% 250 mL infusion Continuous    ondansetron injection 4 mg Q12H PRN    oxyCODONE immediate release tablet 5 mg Q6H PRN    pantoprazole injection 40 mg Daily    propofol (DIPRIVAN) 10 mg/mL infusion Continuous    senna-docusate 8.6-50 mg per tablet 1 tablet BID    vasopressin (PITRESSIN) 1 Units/mL in dextrose 5 % (D5W) 100 mL infusion Continuous       Objective:     Vital Signs (Most Recent):  Temp: 98.2 °F (36.8 °C) (12/11/23 1100)  Pulse: 96 (12/11/23 1330)  Resp: (!) 23 (12/11/23 1330)  BP: 112/60 (12/11/23 1300)  SpO2: 97 % (12/11/23 1330) Vital Signs (24h Range):  Temp:  [98.2 °F (36.8 °C)-99.5  °F (37.5 °C)] 98.2 °F (36.8 °C)  Pulse:  [] 96  Resp:  [7-31] 23  SpO2:  [91 %-99 %] 97 %  BP: ()/(51-68) 112/60  Arterial Line BP: ()/() 107/54     Weight: (!) 145 kg (319 lb 10.7 oz) (12/11/23 0406)  Body mass index is 53.2 kg/m².  Body surface area is 2.58 meters squared.    I/O last 3 completed shifts:  In: 9667.1 [I.V.:6987.4; NG/GT:320; IV Piggyback:2359.7]  Out: 9517 [Drains:50; Other:9050; Chest Tube:417]     Physical Exam  Vitals and nursing note reviewed.   Constitutional:       Appearance: She is obese.      Interventions: She is sedated and intubated.   HENT:      Head: Normocephalic and atraumatic.      Mouth/Throat:      Mouth: Mucous membranes are moist.   Cardiovascular:      Rate and Rhythm: Normal rate and regular rhythm.      Pulses: Normal pulses.   Pulmonary:      Effort: Pulmonary effort is normal. No respiratory distress. She is intubated.      Breath sounds: Normal breath sounds. No wheezing or rales.      Comments: Patient is intubated. Symmetrical chest rise.    Vent Mode: A/C  Oxygen Concentration (%):  [] 70  Resp Rate Total:  [22 br/min-34 br/min] 23 br/min  Vt Set:  [500 mL] 500 mL  PEEP/CPAP:  [7 cmH20-8 cmH20] 8 cmH20  Mean Airway Pressure:  [14 wxX40-22 cmH20] 14 cmH20     Chest:      Comments: Three chest tubes in midline   Abdominal:      General: Abdomen is flat.      Palpations: Abdomen is soft.   Musculoskeletal:         General: Swelling present. Normal range of motion.      Right lower leg: Edema present.      Left lower leg: Edema present.   Skin:     General: Skin is warm and dry.   Neurological:      General: No focal deficit present.   Psychiatric:         Mood and Affect: Mood normal.         Behavior: Behavior normal.          Significant Labs:  CBC:   Recent Labs   Lab 12/11/23  0302   WBC 14.05*   RBC 3.19*   HGB 9.6*   HCT 28.0*   PLT 27*   MCV 88   MCH 30.1   MCHC 34.3     CMP:   Recent Labs   Lab 12/11/23  1017   *   CALCIUM 8.8    ALBUMIN 2.2*   PROT 4.9*      K 4.5   CO2 28      BUN 7   CREATININE 1.2   ALKPHOS 70   *   *   BILITOT 5.5*

## 2023-12-11 NOTE — NURSING
SICU PLAN OF CARE NOTE    Dx: Rheumatic aortic stenosis    Shift Events: VSS, No acute events during shift. Patient net negative 3.1L x12 hrs, goal is to remove as much fluid as possible as long as patient tolerates. CTS team okay with restarting low dose levo in order to continue to remove volume off of patient. Pt running on continuous CRRT. L radial A line placed today, TF advanced to 30cc/hr. TF goal 70 cc/hr but RD ok with advancing to 40cc/hr for now.     Gtts: propofol, levo    Neuro: Sedated, Follows Commands, and Moves All Extremities    Cardiac: NSR 80s-90s    Respiratory: Ventilator ACVC 40% 8 PEEP    GI: Tube Feeds @30cc/hr     : Anuric 0 cc/shift    Drains: Chest Tube, total output 120 cc /  shift  CRRT continuous    Labs/Accuchecks: accuchecks Q4, RFP Q8    Skin: No new skin breakdown noted during shift, pt turned and repositioned throughout shift. Pillows in use, foams in place, SCDs and green booties on. Waffle mattress inflated, bariatric bed ordered. Bed plugged in,wheels locked, call light in reach. Significant other at bedside today, updated on plan of care and verbalizes understanding.

## 2023-12-11 NOTE — PLAN OF CARE
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 No    Discharge Plan A: Home with family, Home  Discharge Plan B: Home Health      [id number] - [place], [place] - [address]  [address]  [facility] [address]  Phone: [phone] Fax: [phone]      Initial Assessment (most recent)       Adult Discharge Assessment - 12/11/23 1141          Discharge Assessment    Assessment Type Discharge Planning Assessment     Confirmed/corrected address, phone number and insurance Yes     Confirmed Demographics Correct on Facesheet     Source of Information family     Communicated CHRISTINE with patient/caregiver Date not available/Unable to determine     People in Home significant other     Do you expect to return to your current living situation? Yes     Do you have help at home or someone to help you manage your care at home? No     Prior to hospitilization cognitive status: No Deficits     Current cognitive status: Coma/Sedated/Intubated     Home Layout Bathroom on 2nd floor;Bedroom on 2nd floor     Equipment Currently Used at Home none     Readmission within 30 days? No     Patient currently being followed by outpatient case management? Unable to determine (comments)     Do you currently have service(s) that help you manage your care at  home? No     Do you take prescription medications? Yes     Do you have prescription coverage? Yes     Do you have any problems affording any of your prescribed medications? No     Is the patient taking medications as prescribed? yes     Who is going to help you get home at discharge? BLANCA LEIJA      How do you get to doctors appointments? car, drives self;family or friend will provide     Are you on dialysis? No     Do you take coumadin? No     Discharge Plan A Home with family;Home     Discharge Plan B Home Health     DME Needed Upon Discharge  none     Discharge Plan discussed with: Spouse/sig other     Transition of Care Barriers None        Physical Activity    On average, how many days per week do you engage in moderate to strenuous exercise (like a brisk walk)? 2 days     On average, how many minutes do you engage in exercise at this level? 20 min        Financial Resource Strain    How hard is it for you to pay for the very basics like food, housing, medical care, and heating? Not hard at all        Housing Stability    In the last 12 months, was there a time when you were not able to pay the mortgage or rent on time? No     In the last 12 months, was there a time when you did not have a steady place to sleep or slept in a shelter (including now)? No        Transportation Needs    In the past 12 months, has lack of transportation kept you from medical appointments or from getting medications? No     In the past 12 months, has lack of transportation kept you from meetings, work, or from getting things needed for daily living? No        Food Insecurity    Within the past 12 months, you worried that your food would run out before you got the money to buy more. Never true     Within the past 12 months, the food you bought just didn't last and you didn't have money to get more. Never true        Stress    Do you feel stress - tense, restless, nervous, or anxious, or unable to sleep at night because  your mind is troubled all the time - these days? Patient refused        Social Connections    In a typical week, how many times do you talk on the phone with family, friends, or neighbors? Once a week     How often do you get together with friends or relatives? Once a week     How often do you attend Buddhist or Caodaism services? Never     Do you belong to any clubs or organizations such as Buddhist groups, unions, fraternal or athletic groups, or school groups? No     How often do you attend meetings of the clubs or organizations you belong to? Never     Are you , , , , never , or living with a partner?         Alcohol Use    Q1: How often do you have a drink containing alcohol? Patient refused     Q2: How many drinks containing alcohol do you have on a typical day when you are drinking? Patient refused     Q3: How often do you have six or more drinks on one occasion? Patient refused                   Discharge Plan A HOME and Plan B HOME HEALTH  have been determined by review of patient's clinical status, future medical and therapeutic needs, and coverage/benefits for post-acute care in coordination with multidisciplinary team members.    Patient lives in a 2 story house with her S.O. she is not on dialysis and does not take coumadin she has a ride home

## 2023-12-11 NOTE — ASSESSMENT & PLAN NOTE
38-year-old, 127 kg, woman with history of rheumatic heart disease and Ross procedure and mitral valve repair(including ring annuloplasty) in 2000 followed by repair of autograft aneurysm repair and aortic valve replacement with 23mm CE bioprothesis in 2012 who presents for aortic and pulmonary valve replacement on 12/8      Neuro/Psych:   -- Sedation: propofol   -- Pain: Fentanyl pushes while intubated. Tylenol 650 mg PRN, Morphine 2 mg PRN, oxy 5 Q6 PRN             Cards:   History of rheumatic fever with rheumatic heart disease . S/p Ross procedure and mitral valve repair(including ring annuloplasty) in 2000   Now s/p aortic and pulmonary valve replacement with Dr. Campbell on 12/8  -- HDS  -- MAPS >65  -- SBP   -- Pressors to maintain blood pressure goals: epi off, vaso **, levo **  -- wean pressors as tolerated      Pulm:   -- Intubated  -- Goal O2 sat > 90%  -- Wean as able  -- Two pleural and one mediastinal chest tube to water seal       Renal:  -- Nicolas removed, anuric  -- CRRT  -- BUN/Cr   Recent Labs   Lab 12/10/23  1410 12/10/23  2220 12/11/23  0302   BUN 11 12 10   CREATININE 1.9* 1.9* 1.6*     -- Urine output: anuric      FEN / GI:   -- Net +9.35L since admission, net -24ccL/24h  -- Replace lytes as needed  -- Nutrition: NPO  -- Docusate   -- Famotidine       ID:   -- Tm: afebrile; WBC 12.03 preop. 18.7 post op   Recent Labs   Lab 12/10/23  0303 12/10/23  1720 12/11/23  0302   WBC 19.96* 15.42* 14.05*     -- Vancomycin two doses in OR       Heme/Onc:  -- H/H stable, 12.6 preop. 13.8 post op.   -- Daily CBC  -- holding heparin for platelets 30  -- Received 8 units of pRBC in the OR   Recent Labs   Lab 12/09/23  1033 12/09/23  1725 12/09/23  1846 12/10/23  0303 12/10/23  1720 12/11/23  0302   HGB 12.3  --  11.9* 11.0* 9.7* 9.6*   PLT 35*  --  37* 30* 29* 27*   APTT 28.4 24.4  --  29.9  --   --    INR 1.2  --  1.1 1.1  --   --         Endo:   -- Gluc goal 140-180  -- POCT 152      PPx:   Feeding:  NPO  Analgesia/Sedation: tylenol, morphine, fentanyl /Propofol  Thromboembolic prevention: heparin held for plts <30  HOB >30: Yes  Stress Ulcer ppx: Famotidine   Glucose control: Critical care goal 140-180 g/dl   Bowel reg: docusate  Invasive Lines/Drains/Airway: PIV x2, [place], Central line, Chest tube x3; L IJ trialysis  Deescalation: dc'ed         Dispo/Code Status/Palliative:   -- SICU / Full Code

## 2023-12-11 NOTE — PROGRESS NOTES
12/10/23 1810   Treatment   Treatment Type SLED   Treatment Status Clotting;Restart   Dialysis Machine Number [id number]   Solutions Labeled and Current  Yes   Access Temporary Cath;IJ;Left   Catheter Dressing Intact  Yes   Alarms Engaged Yes   CRRT Comments Tx     Report given to primary nurse.

## 2023-12-11 NOTE — PROCEDURES
"Megan Cook is a 38 y.o. female patient.    Temp: 98.2 °F (36.8 °C) (23 1100)  Pulse: 96 (23 1330)  Resp: (!) 23 (23 1330)  BP: 112/60 (23 1300)  SpO2: 97 % (23 1330)  Weight: (!) 145 kg (319 lb 10.7 oz) (23 0406)  Height: 5' 5" (165.1 cm) (12/10/23 0706)       R Radial Arterial Line    Date/Time: 2023 2:16 PM  Location procedure was performed: [facility] CRITICAL CARE SURGERY    Performed by: Juan Dixon MD  Authorized by: Juan Dixon MD  Consent Done: Yes  Consent: Verbal consent obtained.  Risks and benefits: risks, benefits and alternatives were discussed  Patient understanding: patient states understanding of the procedure being performed  Patient consent: the patient's understanding of the procedure matches consent given  Procedure consent: procedure consent matches procedure scheduled  Relevant documents: relevant documents present and verified  Test results: test results available and properly labeled  Site marked: the operative site was marked  Imaging studies: imaging studies available  Patient identity confirmed: , MRN, name and provided demographic data  Time out: Immediately prior to procedure a "time out" was called to verify the correct patient, procedure, equipment, support staff and site/side marked as required.  Preparation: Patient was prepped and draped in the usual sterile fashion.  Indications: multiple ABGs and hemodynamic monitoring  Location: right radial  Anesthesia: see MAR for details    Patient sedated: yes  Sedatives: see MAR for details  Analgesia: see MAR for details  Needle gauge: 4F.  Number of attempts: 1  Complications: No  Estimated blood loss (mL): 3  Post-procedure: line sutured and dressing applied  Post-procedure CMS: normal  Patient tolerance: Patient tolerated the procedure well with no immediate complications          2023    "

## 2023-12-11 NOTE — SUBJECTIVE & OBJECTIVE
Interval History/Significant Events: EP consulted for junctional rhythm likely due to loss of atrial kick. No specific therapy indicated. On levo **, vaso **    Follow-up For: Procedure(s) (LRB):  Replacement-valve-aortic (N/A)  REPLACEMENT, PULMONARY VALVE (N/A)    Post-Operative Day: 3 Days Post-Op    Objective:     Vital Signs (Most Recent):  Temp: 99.1 °F (37.3 °C) (12/11/23 0315)  Pulse: 79 (12/11/23 0515)  Resp: (!) 22 (12/11/23 0515)  BP: 110/60 (12/11/23 0500)  SpO2: 95 % (12/11/23 0515) Vital Signs (24h Range):  Temp:  [98.3 °F (36.8 °C)-99.5 °F (37.5 °C)] 99.1 °F (37.3 °C)  Pulse:  [73-94] 79  Resp:  [7-31] 22  SpO2:  [91 %-99 %] 95 %  BP: ()/(53-73) 110/60  Arterial Line BP: ()/(50-68) 113/63     Weight: (!) 145 kg (319 lb 10.7 oz)  Body mass index is 53.2 kg/m².      Intake/Output Summary (Last 24 hours) at 12/11/2023 0540  Last data filed at 12/11/2023 0500  Gross per 24 hour   Intake 6283.76 ml   Output 6398 ml   Net -114.24 ml          Physical Exam  Vitals and nursing note reviewed.   Constitutional:       Appearance: She is obese.   HENT:      Head: Normocephalic and atraumatic.   Cardiovascular:      Rate and Rhythm: Regular rhythm. Tachycardia present.   Pulmonary:      Comments: Patient is intubated. Symmetrical chest rise.    Vent Mode: A/C  Oxygen Concentration (%):  [] 70  Resp Rate Total:  [22 br/min-34 br/min] 23 br/min  Vt Set:  [500 mL] 500 mL  PEEP/CPAP:  [7 cmH20-8 cmH20] 8 cmH20  Mean Airway Pressure:  [14 iwU99-05 cmH20] 14 cmH20     Chest:      Comments: Three chest tubes in midline   Abdominal:      General: Abdomen is flat.      Palpations: Abdomen is soft.   Skin:     General: Skin is warm and dry.      Capillary Refill: Capillary refill takes less than 2 seconds.            Vents:  Vent Mode: A/C (12/11/23 0438)  Set Rate: 18 BPM (12/11/23 0438)  Vt Set: 400 mL (12/11/23 0438)  PEEP/CPAP: 8 cmH20 (12/11/23 0438)  Oxygen Concentration (%): 40 (12/11/23 0515)  Peak  Airway Pressure: 25 cmH20 (12/11/23 0438)  Plateau Pressure: 0 cmH20 (12/11/23 0438)  Total Ve: 7.73 L/m (12/11/23 0438)  Negative Inspiratory Force (cm H2O): 0 (12/11/23 0438)  F/VT Ratio<105 (RSBI): (!) 65.71 (12/11/23 0438)    Lines/Drains/Airways       Central Venous Catheter Line  Duration             Percutaneous Central Line Insertion/Assessment - Quad Lumen  12/08/23 0949 Internal Jugular Right 2 days    Trialysis (Dialysis) Catheter 12/09/23 1327 left internal jugular 1 day              Drain  Duration                  NG/OG Tube 12/08/23  3 days         Chest Tube 12/08/23 2059 Tube - 3 Mediastinal 24 Fr. 2 days         Y Chest Tube 1 and 2 12/08/23 2058 1 Right Pleural 24 Fr. 2 Left Pleural 24 Fr. 2 days              Airway  Duration                  Airway - Non-Surgical 12/08/23 0738 2 days              Arterial Line  Duration             Arterial Line 12/09/23 0809 Left Radial 1 day              Peripheral Intravenous Line  Duration                  Peripheral IV - Single Lumen 12/08/23 0644 20 G Left Hand 2 days         Peripheral IV - Single Lumen 12/08/23 0747 16 G Right Forearm 2 days                    Significant Labs:    CBC/Anemia Profile:  Recent Labs   Lab 12/10/23  0303 12/10/23  0307 12/10/23  1720 12/10/23  1912 12/11/23  0255 12/11/23 0302   WBC 19.96*  --  15.42*  --   --  14.05*   HGB 11.0*  --  9.7*  --   --  9.6*   HCT 32.3*   < > 27.6* 25* 32* 28.0*   PLT 30*  --  29*  --   --  27*   MCV 85  --  85  --   --  88   RDW 15.1*  --  15.8*  --   --  15.4*    < > = values in this interval not displayed.        Chemistries:  Recent Labs   Lab 12/10/23  0002 12/10/23  0303 12/10/23  1203 12/10/23  1410 12/10/23  2220 12/11/23  0302    142 138 138 137 136   K 4.8 4.9 5.3* 5.2* 5.2* 5.2*   * 111* 105 107 104 103   CO2 20* 19* 21* 16* 21* 24   BUN 23* 20 10 11 12 10   CREATININE 2.6* 2.4* 1.6* 1.9* 1.9* 1.6*   CALCIUM 8.8 8.4* 7.9* 7.5* 8.1* 8.1*   ALBUMIN 3.1* 3.0* 2.8* 2.6* 2.4*  2.4*   PROT 5.0* 4.9* 5.1*  --   --   --    BILITOT 5.2* 5.1* 5.4*  --   --   --    ALKPHOS 52* 54* 66  --   --   --    * 267* 304*  --   --   --    * 407* 364*  --   --   --    MG  --  1.9  --  1.8 2.1 1.9   PHOS  --  2.6*  --  2.0* 3.3 2.9           Significant Imaging:  I have reviewed all pertinent imaging results/findings within the past 24 hours.

## 2023-12-11 NOTE — PT/OT/SLP PROGRESS
Physical Therapy Missed Treatment Note      Patient Name:  Megan Cook   MRN:  [id number]  Admitting Diagnosis:  Rheumatic aortic stenosis   Recent Surgery: Procedure(s) (LRB):  Replacement-valve-aortic (N/A)  REPLACEMENT, PULMONARY VALVE (N/A) 3 Days Post-Op  Admit Date: 12/8/2023  Length of Stay: 3 days    Patient not seen today due to Other (Comment) (pt remains intubated/sedated). Megan Cook's plan of care and PT goals reviewed on this date and remain appropriate. Will follow-up for progressive mobility pending continued medical stability and patient participation.    Jessenia Lucas PT, DPT  12/11/2023  Pager: [phone]

## 2023-12-12 LAB
ABO + RH BLD: NORMAL
ALBUMIN SERPL BCP-MCNC: 2.1 G/DL (ref 3.5–5.2)
ALLENS TEST: ABNORMAL
ALLENS TEST: ABNORMAL
ALP SERPL-CCNC: 84 U/L (ref 55–135)
ALT SERPL W/O P-5'-P-CCNC: 150 U/L (ref 10–44)
ANION GAP SERPL CALC-SCNC: 10 MMOL/L (ref 8–16)
ANION GAP SERPL CALC-SCNC: 10 MMOL/L (ref 8–16)
ANION GAP SERPL CALC-SCNC: 12 MMOL/L (ref 8–16)
ANION GAP SERPL CALC-SCNC: 8 MMOL/L (ref 8–16)
APTT PPP: 27.4 SEC (ref 21–32)
APTT PPP: 29.2 SEC (ref 21–32)
AST SERPL-CCNC: 83 U/L (ref 10–40)
BASOPHILS # BLD AUTO: 0.01 K/UL (ref 0–0.2)
BASOPHILS # BLD AUTO: 0.06 K/UL (ref 0–0.2)
BASOPHILS NFR BLD: 0.1 % (ref 0–1.9)
BASOPHILS NFR BLD: 0.4 % (ref 0–1.9)
BILIRUB SERPL-MCNC: 5.7 MG/DL (ref 0.1–1)
BLD GP AB SCN CELLS X3 SERPL QL: NORMAL
BUN SERPL-MCNC: 6 MG/DL (ref 6–20)
BUN SERPL-MCNC: 7 MG/DL (ref 6–20)
BUN SERPL-MCNC: 8 MG/DL (ref 6–20)
BUN SERPL-MCNC: 9 MG/DL (ref 6–20)
CA-I BLDV-SCNC: 0.86 MMOL/L (ref 1.06–1.42)
CA-I BLDV-SCNC: 0.91 MMOL/L (ref 1.06–1.42)
CA-I BLDV-SCNC: 1.04 MMOL/L (ref 1.06–1.42)
CA-I BLDV-SCNC: 1.04 MMOL/L (ref 1.06–1.42)
CALCIUM SERPL-MCNC: 7.5 MG/DL (ref 8.7–10.5)
CALCIUM SERPL-MCNC: 7.8 MG/DL (ref 8.7–10.5)
CALCIUM SERPL-MCNC: 8 MG/DL (ref 8.7–10.5)
CALCIUM SERPL-MCNC: 8.1 MG/DL (ref 8.7–10.5)
CHLORIDE SERPL-SCNC: 103 MMOL/L (ref 95–110)
CHLORIDE SERPL-SCNC: 106 MMOL/L (ref 95–110)
CHLORIDE SERPL-SCNC: 107 MMOL/L (ref 95–110)
CHLORIDE SERPL-SCNC: 97 MMOL/L (ref 95–110)
CO2 SERPL-SCNC: 22 MMOL/L (ref 23–29)
CO2 SERPL-SCNC: 23 MMOL/L (ref 23–29)
CO2 SERPL-SCNC: 23 MMOL/L (ref 23–29)
CO2 SERPL-SCNC: 27 MMOL/L (ref 23–29)
CREAT SERPL-MCNC: 0.9 MG/DL (ref 0.5–1.4)
CREAT SERPL-MCNC: 1 MG/DL (ref 0.5–1.4)
CREAT SERPL-MCNC: 1.3 MG/DL (ref 0.5–1.4)
CREAT SERPL-MCNC: 1.3 MG/DL (ref 0.5–1.4)
DELSYS: ABNORMAL
DIFFERENTIAL METHOD: ABNORMAL
DIFFERENTIAL METHOD: ABNORMAL
EOSINOPHIL # BLD AUTO: 0.2 K/UL (ref 0–0.5)
EOSINOPHIL # BLD AUTO: 0.4 K/UL (ref 0–0.5)
EOSINOPHIL NFR BLD: 1.7 % (ref 0–8)
EOSINOPHIL NFR BLD: 2.5 % (ref 0–8)
ERYTHROCYTE [DISTWIDTH] IN BLOOD BY AUTOMATED COUNT: 15.8 % (ref 11.5–14.5)
ERYTHROCYTE [DISTWIDTH] IN BLOOD BY AUTOMATED COUNT: 15.9 % (ref 11.5–14.5)
EST. GFR  (NO RACE VARIABLE): 54.3 ML/MIN/1.73 M^2
EST. GFR  (NO RACE VARIABLE): 54.3 ML/MIN/1.73 M^2
EST. GFR  (NO RACE VARIABLE): >60 ML/MIN/1.73 M^2
EST. GFR  (NO RACE VARIABLE): >60 ML/MIN/1.73 M^2
FIO2: 40
GLUCOSE SERPL-MCNC: 102 MG/DL (ref 70–110)
GLUCOSE SERPL-MCNC: 103 MG/DL (ref 70–110)
GLUCOSE SERPL-MCNC: 109 MG/DL (ref 70–110)
GLUCOSE SERPL-MCNC: 110 MG/DL (ref 70–110)
HCO3 UR-SCNC: 24.3 MMOL/L (ref 24–28)
HCO3 UR-SCNC: 31.5 MMOL/L (ref 24–28)
HCT VFR BLD AUTO: 25.8 % (ref 37–48.5)
HCT VFR BLD AUTO: 29.4 % (ref 37–48.5)
HCT VFR BLD CALC: 25 %PCV (ref 36–54)
HGB BLD-MCNC: 10 G/DL (ref 12–16)
HGB BLD-MCNC: 9 G/DL (ref 12–16)
IMM GRANULOCYTES # BLD AUTO: 0.04 K/UL (ref 0–0.04)
IMM GRANULOCYTES # BLD AUTO: 0.16 K/UL (ref 0–0.04)
IMM GRANULOCYTES NFR BLD AUTO: 0.5 % (ref 0–0.5)
IMM GRANULOCYTES NFR BLD AUTO: 1 % (ref 0–0.5)
INR PPP: 1.1 (ref 0.8–1.2)
LYMPHOCYTES # BLD AUTO: 0.8 K/UL (ref 1–4.8)
LYMPHOCYTES # BLD AUTO: 1.1 K/UL (ref 1–4.8)
LYMPHOCYTES NFR BLD: 7.2 % (ref 18–48)
LYMPHOCYTES NFR BLD: 9.8 % (ref 18–48)
MAGNESIUM SERPL-MCNC: 1.6 MG/DL (ref 1.6–2.6)
MAGNESIUM SERPL-MCNC: 2.2 MG/DL (ref 1.6–2.6)
MAGNESIUM SERPL-MCNC: 2.8 MG/DL (ref 1.6–2.6)
MCH RBC QN AUTO: 29.5 PG (ref 27–31)
MCH RBC QN AUTO: 30.8 PG (ref 27–31)
MCHC RBC AUTO-ENTMCNC: 34 G/DL (ref 32–36)
MCHC RBC AUTO-ENTMCNC: 34.9 G/DL (ref 32–36)
MCV RBC AUTO: 85 FL (ref 82–98)
MCV RBC AUTO: 91 FL (ref 82–98)
MODE: ABNORMAL
MONOCYTES # BLD AUTO: 0.4 K/UL (ref 0.3–1)
MONOCYTES # BLD AUTO: 1.2 K/UL (ref 0.3–1)
MONOCYTES NFR BLD: 4.1 % (ref 4–15)
MONOCYTES NFR BLD: 7.7 % (ref 4–15)
NEUTROPHILS # BLD AUTO: 12.9 K/UL (ref 1.8–7.7)
NEUTROPHILS # BLD AUTO: 7.2 K/UL (ref 1.8–7.7)
NEUTROPHILS NFR BLD: 81.2 % (ref 38–73)
NEUTROPHILS NFR BLD: 83.8 % (ref 38–73)
NRBC BLD-RTO: 0 /100 WBC
NRBC BLD-RTO: 0 /100 WBC
PCO2 BLDA: 39.5 MMHG (ref 35–45)
PCO2 BLDA: 43.1 MMHG (ref 35–45)
PEEP: 6
PH SMN: 7.4 [PH] (ref 7.35–7.45)
PH SMN: 7.47 [PH] (ref 7.35–7.45)
PHOSPHATE SERPL-MCNC: 1.7 MG/DL (ref 2.7–4.5)
PHOSPHATE SERPL-MCNC: 2.9 MG/DL (ref 2.7–4.5)
PHOSPHATE SERPL-MCNC: 3 MG/DL (ref 2.7–4.5)
PLATELET # BLD AUTO: 35 K/UL (ref 150–450)
PLATELET # BLD AUTO: 61 K/UL (ref 150–450)
PLATELET BLD QL SMEAR: ABNORMAL
PMV BLD AUTO: 12.5 FL (ref 9.2–12.9)
PMV BLD AUTO: ABNORMAL FL (ref 9.2–12.9)
PO2 BLDA: 173 MMHG (ref 80–100)
PO2 BLDA: 247 MMHG (ref 80–100)
POC BE: -1 MMOL/L
POC BE: 8 MMOL/L
POC IONIZED CALCIUM: 1.08 MMOL/L (ref 1.06–1.42)
POC SATURATED O2: 100 % (ref 95–100)
POC SATURATED O2: 100 % (ref 95–100)
POC TCO2: 25 MMOL/L (ref 23–27)
POC TCO2: 33 MMOL/L (ref 23–27)
POCT GLUCOSE: 105 MG/DL (ref 70–110)
POCT GLUCOSE: 105 MG/DL (ref 70–110)
POCT GLUCOSE: 137 MG/DL (ref 70–110)
POCT GLUCOSE: 95 MG/DL (ref 70–110)
POTASSIUM BLD-SCNC: 4 MMOL/L (ref 3.5–5.1)
POTASSIUM SERPL-SCNC: 3.7 MMOL/L (ref 3.5–5.1)
POTASSIUM SERPL-SCNC: 3.9 MMOL/L (ref 3.5–5.1)
POTASSIUM SERPL-SCNC: 4 MMOL/L (ref 3.5–5.1)
POTASSIUM SERPL-SCNC: 4 MMOL/L (ref 3.5–5.1)
PROT SERPL-MCNC: 5.2 G/DL (ref 6–8.4)
PROTHROMBIN TIME: 11.5 SEC (ref 9–12.5)
PS: 20
RBC # BLD AUTO: 3.05 M/UL (ref 4–5.4)
RBC # BLD AUTO: 3.25 M/UL (ref 4–5.4)
SAMPLE: ABNORMAL
SAMPLE: ABNORMAL
SITE: ABNORMAL
SITE: ABNORMAL
SODIUM BLD-SCNC: 137 MMOL/L (ref 136–145)
SODIUM SERPL-SCNC: 135 MMOL/L (ref 136–145)
SODIUM SERPL-SCNC: 136 MMOL/L (ref 136–145)
SODIUM SERPL-SCNC: 137 MMOL/L (ref 136–145)
SODIUM SERPL-SCNC: 140 MMOL/L (ref 136–145)
SPECIMEN OUTDATE: NORMAL
TRIGL SERPL-MCNC: 191 MG/DL (ref 30–150)
WBC # BLD AUTO: 15.84 K/UL (ref 3.9–12.7)
WBC # BLD AUTO: 8.61 K/UL (ref 3.9–12.7)

## 2023-12-12 PROCEDURE — 27100171 HC OXYGEN HIGH FLOW UP TO 24 HOURS

## 2023-12-12 PROCEDURE — 84295 ASSAY OF SERUM SODIUM: CPT

## 2023-12-12 PROCEDURE — 80069 RENAL FUNCTION PANEL: CPT | Mod: 91 | Performed by: NURSE PRACTITIONER

## 2023-12-12 PROCEDURE — 82330 ASSAY OF CALCIUM: CPT | Mod: 91 | Performed by: NURSE PRACTITIONER

## 2023-12-12 PROCEDURE — 82800 BLOOD PH: CPT

## 2023-12-12 PROCEDURE — 82565 ASSAY OF CREATININE: CPT

## 2023-12-12 PROCEDURE — 82803 BLOOD GASES ANY COMBINATION: CPT

## 2023-12-12 PROCEDURE — 82330 ASSAY OF CALCIUM: CPT | Performed by: THORACIC SURGERY (CARDIOTHORACIC VASCULAR SURGERY)

## 2023-12-12 PROCEDURE — 63600175 PHARM REV CODE 636 W HCPCS: Performed by: INTERNAL MEDICINE

## 2023-12-12 PROCEDURE — 25000003 PHARM REV CODE 250: Performed by: NURSE PRACTITIONER

## 2023-12-12 PROCEDURE — 85025 COMPLETE CBC W/AUTO DIFF WBC: CPT | Mod: 91 | Performed by: INTERNAL MEDICINE

## 2023-12-12 PROCEDURE — 27000923 HC TRIALYSIS CATHETER, ANY SIZE

## 2023-12-12 PROCEDURE — 80100008 HC CRRT DAILY MAINTENANCE

## 2023-12-12 PROCEDURE — 63600175 PHARM REV CODE 636 W HCPCS: Performed by: PHYSICIAN ASSISTANT

## 2023-12-12 PROCEDURE — 99291 PR CRITICAL CARE, E/M 30-74 MINUTES: ICD-10-PCS | Mod: ,,, | Performed by: INTERNAL MEDICINE

## 2023-12-12 PROCEDURE — 99291 CRITICAL CARE FIRST HOUR: CPT | Mod: ,,, | Performed by: INTERNAL MEDICINE

## 2023-12-12 PROCEDURE — 82330 ASSAY OF CALCIUM: CPT

## 2023-12-12 PROCEDURE — 99900026 HC AIRWAY MAINTENANCE (STAT)

## 2023-12-12 PROCEDURE — 25000003 PHARM REV CODE 250: Performed by: STUDENT IN AN ORGANIZED HEALTH CARE EDUCATION/TRAINING PROGRAM

## 2023-12-12 PROCEDURE — 85730 THROMBOPLASTIN TIME PARTIAL: CPT | Mod: 91 | Performed by: INTERNAL MEDICINE

## 2023-12-12 PROCEDURE — 80053 COMPREHEN METABOLIC PANEL: CPT | Performed by: INTERNAL MEDICINE

## 2023-12-12 PROCEDURE — 25000003 PHARM REV CODE 250: Performed by: INTERNAL MEDICINE

## 2023-12-12 PROCEDURE — 87205 SMEAR GRAM STAIN: CPT | Performed by: STUDENT IN AN ORGANIZED HEALTH CARE EDUCATION/TRAINING PROGRAM

## 2023-12-12 PROCEDURE — 90945 PR DIALYSIS, NOT HEMO, 1 EVAL: ICD-10-PCS | Mod: ,,, | Performed by: NURSE PRACTITIONER

## 2023-12-12 PROCEDURE — 83735 ASSAY OF MAGNESIUM: CPT | Performed by: NURSE PRACTITIONER

## 2023-12-12 PROCEDURE — 87070 CULTURE OTHR SPECIMN AEROBIC: CPT | Performed by: STUDENT IN AN ORGANIZED HEALTH CARE EDUCATION/TRAINING PROGRAM

## 2023-12-12 PROCEDURE — 63600175 PHARM REV CODE 636 W HCPCS: Performed by: STUDENT IN AN ORGANIZED HEALTH CARE EDUCATION/TRAINING PROGRAM

## 2023-12-12 PROCEDURE — 37799 UNLISTED PX VASCULAR SURGERY: CPT

## 2023-12-12 PROCEDURE — 90945 DIALYSIS ONE EVALUATION: CPT

## 2023-12-12 PROCEDURE — 20000000 HC ICU ROOM

## 2023-12-12 PROCEDURE — 94761 N-INVAS EAR/PLS OXIMETRY MLT: CPT | Mod: XB

## 2023-12-12 PROCEDURE — 63600175 PHARM REV CODE 636 W HCPCS: Performed by: NURSE PRACTITIONER

## 2023-12-12 PROCEDURE — 85025 COMPLETE CBC W/AUTO DIFF WBC: CPT

## 2023-12-12 PROCEDURE — 99900035 HC TECH TIME PER 15 MIN (STAT)

## 2023-12-12 PROCEDURE — 84478 ASSAY OF TRIGLYCERIDES: CPT | Performed by: INTERNAL MEDICINE

## 2023-12-12 PROCEDURE — 85730 THROMBOPLASTIN TIME PARTIAL: CPT | Performed by: THORACIC SURGERY (CARDIOTHORACIC VASCULAR SURGERY)

## 2023-12-12 PROCEDURE — 90945 DIALYSIS ONE EVALUATION: CPT | Mod: ,,, | Performed by: NURSE PRACTITIONER

## 2023-12-12 PROCEDURE — 86850 RBC ANTIBODY SCREEN: CPT

## 2023-12-12 PROCEDURE — 85610 PROTHROMBIN TIME: CPT | Performed by: THORACIC SURGERY (CARDIOTHORACIC VASCULAR SURGERY)

## 2023-12-12 PROCEDURE — 94003 VENT MGMT INPAT SUBQ DAY: CPT

## 2023-12-12 PROCEDURE — 83735 ASSAY OF MAGNESIUM: CPT | Mod: 91 | Performed by: NURSE PRACTITIONER

## 2023-12-12 PROCEDURE — 85014 HEMATOCRIT: CPT

## 2023-12-12 PROCEDURE — C9113 INJ PANTOPRAZOLE SODIUM, VIA: HCPCS | Performed by: STUDENT IN AN ORGANIZED HEALTH CARE EDUCATION/TRAINING PROGRAM

## 2023-12-12 PROCEDURE — 84132 ASSAY OF SERUM POTASSIUM: CPT

## 2023-12-12 PROCEDURE — 85002 BLEEDING TIME TEST: CPT

## 2023-12-12 RX ORDER — MAGNESIUM SULFATE HEPTAHYDRATE 40 MG/ML
2 INJECTION, SOLUTION INTRAVENOUS
Status: DISCONTINUED | OUTPATIENT
Start: 2023-12-12 | End: 2023-12-13

## 2023-12-12 RX ORDER — HEPARIN SODIUM,PORCINE/D5W 25000/250
0-40 INTRAVENOUS SOLUTION INTRAVENOUS CONTINUOUS
Status: DISCONTINUED | OUTPATIENT
Start: 2023-12-12 | End: 2023-12-13

## 2023-12-12 RX ORDER — POLYETHYLENE GLYCOL 3350 17 G/17G
17 POWDER, FOR SOLUTION ORAL DAILY
Status: DISCONTINUED | OUTPATIENT
Start: 2023-12-13 | End: 2023-12-22 | Stop reason: HOSPADM

## 2023-12-12 RX ORDER — HEPARIN SODIUM 10000 [USP'U]/100ML
500 INJECTION, SOLUTION INTRAVENOUS CONTINUOUS
Status: DISCONTINUED | OUTPATIENT
Start: 2023-12-12 | End: 2023-12-12

## 2023-12-12 RX ADMIN — PROPOFOL 20 MCG/KG/MIN: 10 INJECTION, EMULSION INTRAVENOUS at 07:12

## 2023-12-12 RX ADMIN — MUPIROCIN: 20 OINTMENT TOPICAL at 08:12

## 2023-12-12 RX ADMIN — ACETAMINOPHEN 650 MG: 650 SOLUTION ORAL at 09:12

## 2023-12-12 RX ADMIN — CALCIUM GLUCONATE: 98 INJECTION, SOLUTION INTRAVENOUS at 08:12

## 2023-12-12 RX ADMIN — CALCIUM GLUCONATE: 98 INJECTION, SOLUTION INTRAVENOUS at 02:12

## 2023-12-12 RX ADMIN — SODIUM PHOSPHATE, MONOBASIC, MONOHYDRATE AND SODIUM PHOSPHATE, DIBASIC, ANHYDROUS 30 MMOL: 142; 276 INJECTION, SOLUTION INTRAVENOUS at 12:12

## 2023-12-12 RX ADMIN — CALCIUM GLUCONATE: 98 INJECTION, SOLUTION INTRAVENOUS at 01:12

## 2023-12-12 RX ADMIN — DEXTROSE MONOHYDRATE, SODIUM CITRATE, AND CITRIC ACID MONOHYDRATE: 2.45; 2.2; .8 INJECTION, SOLUTION INTRAVENOUS at 09:12

## 2023-12-12 RX ADMIN — MAGNESIUM SULFATE HEPTAHYDRATE 2 G: 40 INJECTION, SOLUTION INTRAVENOUS at 08:12

## 2023-12-12 RX ADMIN — HEPARIN SODIUM 12 UNITS/KG/HR: 10000 INJECTION, SOLUTION INTRAVENOUS at 10:12

## 2023-12-12 RX ADMIN — PROPOFOL 20 MCG/KG/MIN: 10 INJECTION, EMULSION INTRAVENOUS at 01:12

## 2023-12-12 RX ADMIN — SODIUM PHOSPHATE, MONOBASIC, MONOHYDRATE AND SODIUM PHOSPHATE, DIBASIC, ANHYDROUS 39.99 MMOL: 142; 276 INJECTION, SOLUTION INTRAVENOUS at 07:12

## 2023-12-12 RX ADMIN — Medication: at 08:12

## 2023-12-12 RX ADMIN — OXYCODONE HYDROCHLORIDE 5 MG: 5 TABLET ORAL at 08:12

## 2023-12-12 RX ADMIN — SENNOSIDES AND DOCUSATE SODIUM 1 TABLET: 8.6; 5 TABLET ORAL at 08:12

## 2023-12-12 RX ADMIN — PROPOFOL 15 MCG/KG/MIN: 10 INJECTION, EMULSION INTRAVENOUS at 01:12

## 2023-12-12 RX ADMIN — ACETAMINOPHEN 650 MG: 650 SOLUTION ORAL at 05:12

## 2023-12-12 RX ADMIN — PANTOPRAZOLE SODIUM 40 MG: 40 INJECTION, POWDER, FOR SOLUTION INTRAVENOUS at 08:12

## 2023-12-12 RX ADMIN — DEXTROSE MONOHYDRATE, SODIUM CITRATE, AND CITRIC ACID MONOHYDRATE: 2.45; 2.2; .8 INJECTION, SOLUTION INTRAVENOUS at 02:12

## 2023-12-12 RX ADMIN — MAGNESIUM SULFATE HEPTAHYDRATE 2 G: 40 INJECTION, SOLUTION INTRAVENOUS at 07:12

## 2023-12-12 RX ADMIN — HEPARIN SODIUM 500 UNITS/HR: 10000 INJECTION, SOLUTION INTRAVENOUS at 11:12

## 2023-12-12 NOTE — PROGRESS NOTES
12/12/23 0200 12/12/23 0250   Treatment   Treatment Status Clotting;Blood returned Restart;Equipment change   Dialysis Machine Number  --  [id number]   Dialyzer Time (hours) 43.06 0   BVP (Liters) 371.2 L 0 L   Solutions Labeled and Current   --  Yes   Access  --  Temporary Cath;Left;IJ   Catheter Dressing Intact   --  Yes   Alarms Engaged  --  Yes   CRRT Comments Pt rinsed back by ICU RN SLED restarted

## 2023-12-12 NOTE — SUBJECTIVE & OBJECTIVE
Interval History/Significant Events: On levo 0.01. Net (-) 6L/24h. Remains intubated. Anuric.     Follow-up For: Procedure(s) (LRB):  Replacement-valve-aortic (N/A)  REPLACEMENT, PULMONARY VALVE (N/A)    Post-Operative Day: 4 Days Post-Op    Objective:     Vital Signs (Most Recent):  Temp: 98.7 °F (37.1 °C) (12/12/23 0315)  Pulse: 89 (12/12/23 0534)  Resp: (!) 24 (12/12/23 0534)  BP: (!) 99/59 (12/12/23 0500)  SpO2: 97 % (12/12/23 0534) Vital Signs (24h Range):  Temp:  [98.2 °F (36.8 °C)-99.1 °F (37.3 °C)] 98.7 °F (37.1 °C)  Pulse:  [] 89  Resp:  [18-28] 24  SpO2:  [94 %-99 %] 97 %  BP: ()/(50-68) 99/59  Arterial Line BP: ()/() 120/58     Weight: (!) 143.3 kg (315 lb 14.7 oz)  Body mass index is 52.57 kg/m².      Intake/Output Summary (Last 24 hours) at 12/12/2023 0552  Last data filed at 12/12/2023 0500  Gross per 24 hour   Intake 6302.84 ml   Output 74384 ml   Net -6512.16 ml          Physical Exam  Vitals and nursing note reviewed.   Constitutional:       Appearance: She is obese.   HENT:      Head: Normocephalic and atraumatic.   Cardiovascular:      Rate and Rhythm: Regular rhythm. Tachycardia present.   Pulmonary:      Comments: Patient is intubated. Symmetrical chest rise.    Vent Mode: A/C  Oxygen Concentration (%):  [] 70  Resp Rate Total:  [22 br/min-34 br/min] 23 br/min  Vt Set:  [500 mL] 500 mL  PEEP/CPAP:  [7 cmH20-8 cmH20] 8 cmH20  Mean Airway Pressure:  [14 axY09-33 cmH20] 14 cmH20     Chest:      Comments: Three chest tubes in midline   Abdominal:      General: Abdomen is flat.      Palpations: Abdomen is soft.   Skin:     General: Skin is warm and dry.      Capillary Refill: Capillary refill takes less than 2 seconds.            Vents:  Vent Mode: A/C (12/12/23 0534)  Set Rate: 18 BPM (12/12/23 0534)  Vt Set: 400 mL (12/12/23 0534)  PEEP/CPAP: 5 cmH20 (12/12/23 0534)  Oxygen Concentration (%): 40 (12/12/23 0534)  Peak Airway Pressure: 20 cmH20 (12/12/23 0534)  Plateau  Pressure: 0 cmH20 (12/12/23 0534)  Total Ve: 8.65 L/m (12/12/23 0534)  Negative Inspiratory Force (cm H2O): 0 (12/12/23 0534)  F/VT Ratio<105 (RSBI): (!) 68.97 (12/12/23 0534)    Lines/Drains/Airways       Central Venous Catheter Line  Duration             Percutaneous Central Line Insertion/Assessment - Quad Lumen  12/08/23 0949 Internal Jugular Right 3 days    Trialysis (Dialysis) Catheter 12/09/23 1327 left internal jugular 2 days              Drain  Duration                  NG/OG Tube 12/08/23  4 days         Chest Tube 12/08/23 2059 Tube - 3 Mediastinal 24 Fr. 3 days         Y Chest Tube 1 and 2 12/08/23 2058 1 Right Pleural 24 Fr. 2 Left Pleural 24 Fr. 3 days              Airway  Duration                  Airway - Non-Surgical 12/08/23 0738 3 days              Arterial Line  Duration             Arterial Line 12/11/23 1100 Right Radial <1 day              Peripheral Intravenous Line  Duration                  Peripheral IV - Single Lumen 12/08/23 0747 16 G Right Forearm 3 days                    Significant Labs:    CBC/Anemia Profile:  Recent Labs   Lab 12/10/23  1720 12/10/23  1912 12/11/23  0302 12/12/23  0307 12/12/23  0307   WBC 15.42*  --  14.05* 8.61  --    HGB 9.7*  --  9.6* 9.0*  --    HCT 27.6*   < > 28.0* 25.8* 25*   PLT 29*  --  27* 35*  --    MCV 85  --  88 85  --    RDW 15.8*  --  15.4* 15.9*  --     < > = values in this interval not displayed.        Chemistries:  Recent Labs   Lab 12/10/23  1203 12/10/23  1410 12/11/23  1017 12/11/23  1350 12/11/23  2150 12/12/23  0304      < > 137 136 138 137  140   K 5.3*   < > 4.5 4.4 4.2 4.0  4.0      < > 102 104 107 106  107   CO2 21*   < > 28 25 24 23 23   BUN 10   < > 7 8 7 9  8   CREATININE 1.6*   < > 1.2 1.2 1.2 1.3  1.3   CALCIUM 7.9*   < > 8.8 9.1 8.6* 8.1*  8.0*   ALBUMIN 2.8*   < > 2.2* 2.2* 2.3* 2.1*  2.1*   PROT 5.1*  --  4.9*  --   --  5.2*   BILITOT 5.4*  --  5.5*  --   --  5.7*   ALKPHOS 66  --  70  --   --  84   *   --  196*  --   --  150*   *  --  134*  --   --  83*   MG  --    < >  --  2.0 1.9 2.2   PHOS  --    < >  --  1.8* 2.1* 3.0    < > = values in this interval not displayed.           Significant Imaging:  I have reviewed all pertinent imaging results/findings within the past 24 hours.

## 2023-12-12 NOTE — PHYSICIAN QUERY
PT Name: Megan Cook  MR #: [id number]    DOCUMENTATION CLARIFICATION     CDS/: Florinda Brown RN               Contact information: [email or web address]     This form is a permanent document in the medical record.     Query Date: December 12, 2023    By submitting this query, we are merely seeking further clarification of documentation. Please utilize your independent clinical judgment when addressing the question(s) below.    The Medical Record contains the following:   Indicators Supporting Clinical Findings Location in Medical Record   x Atrial Fibrillation - overnight had some PVCs, intermittent afib. Cardioverted, then went into a fast junctional rhythm. This morning sinus rhythm  EKGs showing junctional rhythm, a fib. Synchronized cardioversion with 200J. Converted to sinus momentarily, continued in junctional rhythm overnight. HR now in 70s   CCS PN 12/10   x EKG Results EKG:  Sinus rhythm with occasional Premature ventricular complexes   Nonspecific ST abnormality   Prolonged QT   Abnormal ECG     Junctional vs Atrial fibrillation with premature ventricular or aberrantly   conducted complexes   Low voltage QRS   Abnormal ECG     Probably Accelerated Junctional rhythm b ut cannot exclude AF   Low voltage QRS   Abnormal ECG     Normal sinus rhythm   Normal ECG     Normal sinus rhythm   Prolonged QT   Abnormal ECG  EKG 12/8            EKG 12/9 2001          EKG 12/9 2131        EKG 12/7/23  (Previous encounter)    EKG 5/4/23  (Previous encounter)        Medication     x Treatment Synchronized cardioversion with 200J.    CCS PN 12/10   x Other Ms. Megan Cook is a 38-year-old, 127 kg, woman with history of rheumatic heart disease and Ross procedure and mitral valve repair(including ring annuloplasty) in 2000 followed by repair  of autograft aneurysm repair and aortic valve replacement with 23mm CE bioprothesis in 2012 who presents for pre-operative evaluation for aortic and pulmonary valve  replacement.   H&P 12/9               The clinical guidelines noted are only a system guideline. It does not replace the provider's clinical judgment.    Provider, please specify the type of Atrial Fibrillation (AF):    [ x ] Paroxysmal - defined as AF that terminates spontaneously or with intervention within < 7 days of onset, episodes may recur with variable frequency     [  ] Other cardiac diagnosis (please specify): ____________           Please document in your progress notes daily for the duration of treatment until resolved, and include in your discharge summary.    Reference:  JESSICA Nelson MD. (2020, September 14). Overview of atrial fibrillation (SURYA Noriega MD & JOAN Jha MD, Eds.). Retrieved October 22, 2020, from https://[email or web address]/contents/jlgwwnqd-qy-glulth-fibrillation?search=atrial%20fibrillation&source=search_result&selectedTitle=1~150&usage_type=default&display_rank=1    Form No. 34773

## 2023-12-12 NOTE — PROGRESS NOTES
[facility]  Nephrology  Progress Note    Patient Name: Megan Cook  MRN: [id number]  Admission Date: 12/8/2023  Hospital Length of Stay: 4 days  Attending Provider: Alexandru Campbell MD   Primary Care Physician: Adelaida Disla NP  Principal Problem:Rheumatic aortic stenosis    Subjective:     Interval History:   Seen on RRT. UFR adjusted to 600-650 mL/hr (ordered 450 mL/hr). Patient net negative 6.5 L/24 hrs. Minimal hourly intake.   Electrolytes non emergent. Remain on Levophed at 0.02 mcg.     Review of patient's allergies indicates:   Allergen Reactions    Cephalexin Other (See Comments)     She gets mouth sores.     Penicillin g Other (See Comments)     Causes mouth sores.      Current Facility-Administered Medications   Medication Frequency    0.9%  NaCl infusion PRN    acetaminophen oral solution 650 mg Q8H PRN    acetaminophen oral solution 650 mg Q8H    balsam peru-castor oiL Oint BID    calcium gluconate 1 g in NS IVPB (premixed) PRN    calcium gluconate 1 g in NS IVPB (premixed) PRN    calcium gluconate 1 g in NS IVPB (premixed) PRN    calcium gluconate 3 g in dextrose 5 % (D5W) 100 mL infusion Continuous    dexmedetomidine (PRECEDEX) 400mcg/100mL 0.9% NaCL infusion Continuous    dextrose 10% bolus 125 mL 125 mL PRN    dextrose 10% bolus 250 mL 250 mL PRN    dextrose-sod citrate-citric ac 2.45-2.2 gram- 800 mg/100 mL Soln Continuous    docusate 50 mg/5 mL liquid 100 mg Daily    glucagon (human recombinant) injection 1 mg PRN    heparin 25,000 units in dextrose 5% 250 mL (100 units/mL) infusion (heparin infusion - NO NOMOGRAM) Continuous    insulin aspart U-100 pen 0-10 Units Q4H PRN    magnesium sulfate 2g in water 50mL IVPB (premix) PRN    magnesium sulfate 2g in water 50mL IVPB (premix) PRN    magnesium sulfate 2g in water 50mL IVPB (premix) PRN    mupirocin 2 % ointment BID    NORepinephrine 8 mg in dextrose 5% 250 mL infusion Continuous    ondansetron injection 4 mg  Q12H PRN    oxyCODONE immediate release tablet 5 mg Q6H PRN    pantoprazole injection 40 mg Daily    propofol (DIPRIVAN) 10 mg/mL infusion Continuous    senna-docusate 8.6-50 mg per tablet 1 tablet BID    sodium phosphate 20.01 mmol in dextrose 5 % (D5W) 250 mL IVPB PRN    sodium phosphate 30 mmol in dextrose 5 % (D5W) 250 mL IVPB PRN    sodium phosphate 39.99 mmol in dextrose 5 % (D5W) 250 mL IVPB PRN       Objective:     Vital Signs (Most Recent):  Temp: 98.6 °F (37 °C) (12/12/23 1100)  Pulse: 85 (12/12/23 1430)  Resp: 16 (12/12/23 1430)  BP: (!) 101/51 (12/12/23 0800)  SpO2: 100 % (12/12/23 1430) Vital Signs (24h Range):  Temp:  [98.6 °F (37 °C)-99.1 °F (37.3 °C)] 98.6 °F (37 °C)  Pulse:  [] 85  Resp:  [16-33] 16  SpO2:  [95 %-100 %] 100 %  BP: ()/(50-63) 101/51  Arterial Line BP: ()/(44-65) 104/50     Weight: (!) 143.3 kg (315 lb 14.7 oz) (12/12/23 0500)  Body mass index is 52.57 kg/m².  Body surface area is 2.56 meters squared.    I/O last 3 completed shifts:  In: 9456 [I.V.:1994.2; NG/GT:930; IV Piggyback:6531.8]  Out: 78011 [Drains:115; Other:74340; Chest Tube:300]     Physical Exam  Vitals and nursing note reviewed.   Constitutional:       Appearance: She is obese.      Interventions: She is sedated and intubated.   HENT:      Head: Normocephalic and atraumatic.      Mouth/Throat:      Mouth: Mucous membranes are moist.   Cardiovascular:      Rate and Rhythm: Normal rate and regular rhythm.      Pulses: Normal pulses.   Pulmonary:      Effort: Pulmonary effort is normal. No respiratory distress. She is intubated.      Breath sounds: Normal breath sounds. No wheezing or rales.      Comments: Patient is intubated. Symmetrical chest rise.    Vent Mode: A/C  Oxygen Concentration (%):  [] 70  Resp Rate Total:  [22 br/min-34 br/min] 23 br/min  Vt Set:  [500 mL] 500 mL  PEEP/CPAP:  [7 cmH20-8 cmH20] 8 cmH20  Mean Airway Pressure:  [14 xiP59-85 cmH20] 14 cmH20     Chest:      Comments: Three  chest tubes in midline   Abdominal:      General: Abdomen is flat.      Palpations: Abdomen is soft.   Musculoskeletal:         General: Swelling present. Normal range of motion.      Right lower leg: Edema present.      Left lower leg: Edema present.   Skin:     General: Skin is warm and dry.   Neurological:      General: No focal deficit present.   Psychiatric:         Mood and Affect: Mood normal.         Behavior: Behavior normal.          Significant Labs:  CBC:   Recent Labs   Lab 12/12/23 0307 12/12/23  0307   WBC 8.61  --    RBC 3.05*  --    HGB 9.0*  --    HCT 25.8* 25*   PLT 35*  --    MCV 85  --    MCH 29.5  --    MCHC 34.9  --      CMP:   Recent Labs   Lab 12/12/23  0304     103   CALCIUM 8.1*  8.0*   ALBUMIN 2.1*  2.1*   PROT 5.2*     140   K 4.0  4.0   CO2 23  23     107   BUN 9  8   CREATININE 1.3  1.3   ALKPHOS 84   *   AST 83*   BILITOT 5.7*     All labs within the past 24 hours have been reviewed.   Assessment/Plan:     Cardiac/Vascular  * Rheumatic aortic stenosis  - defer to primary team     Renal/  TINO (acute kidney injury)  Aute kidney injury secondary to iATN from hypotensive episodes/hemodynamic instability during OR  (Baseline 0.4-1)  SLED started 12/9 for oliguric TINO    Plan  -Will continue SLED with citrate/calcium for regional anticoagulation  -minimal hour intake, -450 cc/hr as tolerated, please call Nephrology prior to adjusting UFR on RRT machine.   -strict I/O's  -RFP q 8 hours with iCA          Thank you for your consult. I will follow-up with patient. Please contact us if you have any additional questions.    Tiffany Joyce DNP, FNP-C  Nephrology  [facility]

## 2023-12-12 NOTE — PT/OT/SLP PROGRESS
Physical Therapy Missed Treatment Note      Patient Name:  Megan Cook   MRN:  [id number]  Admitting Diagnosis:  Rheumatic aortic stenosis   Recent Surgery: Procedure(s) (LRB):  Replacement-valve-aortic (N/A)  REPLACEMENT, PULMONARY VALVE (N/A) 4 Days Post-Op  Admit Date: 12/8/2023  Length of Stay: 4 days    Patient not seen today due to Other (Comment) (intubated, on CRRT + pressor support). Megan Cook's plan of care and PT goals reviewed on this date and remain appropriate. Will follow-up for progressive mobility pending continued medical stability and patient participation.    Jessenia Lucas, PT, DPT  12/12/2023  Pager: [phone]

## 2023-12-12 NOTE — CARE UPDATE
BG goal 140-180    -A1C   Lab Results   Component Value Date    HGBA1C 5.5 11/14/2022       -HOME REGIMEN: none; no hx of DM    -INPATIENT REGIMEN: Novolog Correction Scale     -GLUCOSE TREND FOR THE PAST 24HRS: 107-136    -NO HYPOGYCEMIAS NOTED       -Diet: No diet orders on file      -Steroids - n/a    -Tube Feeds - Peptamen Intense at 40 ml/hr       Remains in SICU. POD 4. Remains intubated and on levo.  BG well controlled without prn SQ insulin requirements.     Plan:  -Continue Moderate Dose Correction Scale  -BG monitoring q 4 hrs while NPO    Discharge planning: TBD    Endocrine to continue to follow    ** Please call Endocrine for any BG related issues **

## 2023-12-12 NOTE — PT/OT/SLP PROGRESS
Physical Therapy Missed Treatment Note      Patient Name:  Megan Cook   MRN:  [id number]  Admitting Diagnosis:  Rheumatic aortic stenosis   Recent Surgery: Procedure(s) (LRB):  Replacement-valve-aortic (N/A)  REPLACEMENT, PULMONARY VALVE (N/A) 4 Days Post-Op  Admit Date: 12/8/2023  Length of Stay: 4 days    Patient not seen today due to intubated, on CRRT + pressor support. Megan Cook's plan of care and PT goals reviewed on this date and remain appropriate. Will follow-up for progressive mobility pending continued medical stability and patient participation.    Jessenia Lucas, PT, DPT  12/12/2023  Pager: [phone]

## 2023-12-12 NOTE — PLAN OF CARE
"      SICU PLAN OF CARE NOTE    Dx: Rheumatic aortic stenosis    Shift Events: NAEON. CRRT clotted off. Pt net negative ~3.4L for shift. Goal to remove fluid as pt tolerated.     Goals of Care: MAP>65, removed fluid as tolerated    Neuro: Sedated, Follows Commands, and Moves All Extremities    Cardiac: NSR 80-90s, CVP 9-10    Vital Signs: BP (!) 94/53 (BP Location: Right arm, Patient Position: Lying)   Pulse 96   Temp 99.1 °F (37.3 °C) (Oral)   Resp (!) 24   Ht 5' 5" (1.651 m)   Wt (!) 145 kg (319 lb 10.7 oz)   LMP 11/08/2023 (Approximate)   SpO2 96%   Breastfeeding No   BMI 53.20 kg/m²     Respiratory: Ventilator 40% /5 PEEP    Diet: Tube Feeds @ 50cc/hr    Gtts: Propfol and Norepinephrine    Urine Output: Anuric     Drains: Chest Tube, total output 60 cc /  shift    Restraints checked Q2, no skin breakdown noted. Pt repositioned as needed.     CRRT SLED continuous   UF goal 400-450    Labs/Accuchecks: Daily Labs, Q8 Renal Labs, Accuchecks Q4.    Skin: No new skin breakdown noted.Specialty bed plugged in and working. Waffle mattress inflated and in use  Pt turned and repositioned throughout shift. Foams placed to heels and sacrum for addition breakdown prevention. Heel boots in place    PoC reviewed with pt/family. All questions/concerns addressed. VSS. See flowsheets for full assessment.        "

## 2023-12-12 NOTE — PROGRESS NOTES
[facility]  Critical Care - Surgery  Progress Note    Patient Name: Megan Cook  MRN: [id number]  Admission Date: 12/8/2023  Hospital Length of Stay: 4 days  Code Status: Full Code  Attending Provider: Alexandru Campbell MD  Primary Care Provider: Adelaida Disla NP   Principal Problem: Rheumatic aortic stenosis    Subjective:     Hospital/ICU Course:  No notes on file    Interval History/Significant Events: On levo 0.01. Net (-) 6L/24h. Remains intubated. Anuric.     Follow-up For: Procedure(s) (LRB):  Replacement-valve-aortic (N/A)  REPLACEMENT, PULMONARY VALVE (N/A)    Post-Operative Day: 4 Days Post-Op    Objective:     Vital Signs (Most Recent):  Temp: 98.7 °F (37.1 °C) (12/12/23 0315)  Pulse: 89 (12/12/23 0534)  Resp: (!) 24 (12/12/23 0534)  BP: (!) 99/59 (12/12/23 0500)  SpO2: 97 % (12/12/23 0534) Vital Signs (24h Range):  Temp:  [98.2 °F (36.8 °C)-99.1 °F (37.3 °C)] 98.7 °F (37.1 °C)  Pulse:  [] 89  Resp:  [18-28] 24  SpO2:  [94 %-99 %] 97 %  BP: ()/(50-68) 99/59  Arterial Line BP: ()/() 120/58     Weight: (!) 143.3 kg (315 lb 14.7 oz)  Body mass index is 52.57 kg/m².      Intake/Output Summary (Last 24 hours) at 12/12/2023 0552  Last data filed at 12/12/2023 0500  Gross per 24 hour   Intake 6302.84 ml   Output 29485 ml   Net -6512.16 ml          Physical Exam  Vitals and nursing note reviewed.   Constitutional:       Appearance: She is obese.   HENT:      Head: Normocephalic and atraumatic.   Cardiovascular:      Rate and Rhythm: Regular rhythm. Tachycardia present.   Pulmonary:      Comments: Patient is intubated. Symmetrical chest rise.    Vent Mode: A/C  Oxygen Concentration (%):  [] 70  Resp Rate Total:  [22 br/min-34 br/min] 23 br/min  Vt Set:  [500 mL] 500 mL  PEEP/CPAP:  [7 cmH20-8 cmH20] 8 cmH20  Mean Airway Pressure:  [14 cdI63-26 cmH20] 14 cmH20     Chest:      Comments: Three chest tubes in midline   Abdominal:      General: Abdomen is  flat.      Palpations: Abdomen is soft.   Skin:     General: Skin is warm and dry.      Capillary Refill: Capillary refill takes less than 2 seconds.            Vents:  Vent Mode: A/C (12/12/23 0534)  Set Rate: 18 BPM (12/12/23 0534)  Vt Set: 400 mL (12/12/23 0534)  PEEP/CPAP: 5 cmH20 (12/12/23 0534)  Oxygen Concentration (%): 40 (12/12/23 0534)  Peak Airway Pressure: 20 cmH20 (12/12/23 0534)  Plateau Pressure: 0 cmH20 (12/12/23 0534)  Total Ve: 8.65 L/m (12/12/23 0534)  Negative Inspiratory Force (cm H2O): 0 (12/12/23 0534)  F/VT Ratio<105 (RSBI): (!) 68.97 (12/12/23 0534)    Lines/Drains/Airways       Central Venous Catheter Line  Duration             Percutaneous Central Line Insertion/Assessment - Quad Lumen  12/08/23 0949 Internal Jugular Right 3 days    Trialysis (Dialysis) Catheter 12/09/23 1327 left internal jugular 2 days              Drain  Duration                  NG/OG Tube 12/08/23  4 days         Chest Tube 12/08/23 2059 Tube - 3 Mediastinal 24 Fr. 3 days         Y Chest Tube 1 and 2 12/08/23 2058 1 Right Pleural 24 Fr. 2 Left Pleural 24 Fr. 3 days              Airway  Duration                  Airway - Non-Surgical 12/08/23 0738 3 days              Arterial Line  Duration             Arterial Line 12/11/23 1100 Right Radial <1 day              Peripheral Intravenous Line  Duration                  Peripheral IV - Single Lumen 12/08/23 0747 16 G Right Forearm 3 days                    Significant Labs:    CBC/Anemia Profile:  Recent Labs   Lab 12/10/23  1720 12/10/23  1912 12/11/23  0302 12/12/23  0307 12/12/23  0307   WBC 15.42*  --  14.05* 8.61  --    HGB 9.7*  --  9.6* 9.0*  --    HCT 27.6*   < > 28.0* 25.8* 25*   PLT 29*  --  27* 35*  --    MCV 85  --  88 85  --    RDW 15.8*  --  15.4* 15.9*  --     < > = values in this interval not displayed.        Chemistries:  Recent Labs   Lab 12/10/23  1203 12/10/23  1410 12/11/23  1017 12/11/23  1350 12/11/23  2150 12/12/23  0304      < > 137 136 138  137  140   K 5.3*   < > 4.5 4.4 4.2 4.0  4.0      < > 102 104 107 106  107   CO2 21*   < > 28 25 24 23  23   BUN 10   < > 7 8 7 9  8   CREATININE 1.6*   < > 1.2 1.2 1.2 1.3  1.3   CALCIUM 7.9*   < > 8.8 9.1 8.6* 8.1*  8.0*   ALBUMIN 2.8*   < > 2.2* 2.2* 2.3* 2.1*  2.1*   PROT 5.1*  --  4.9*  --   --  5.2*   BILITOT 5.4*  --  5.5*  --   --  5.7*   ALKPHOS 66  --  70  --   --  84   *  --  196*  --   --  150*   *  --  134*  --   --  83*   MG  --    < >  --  2.0 1.9 2.2   PHOS  --    < >  --  1.8* 2.1* 3.0    < > = values in this interval not displayed.           Significant Imaging:  I have reviewed all pertinent imaging results/findings within the past 24 hours.  Assessment/Plan:     Cardiac/Vascular  * Rheumatic aortic stenosis  38-year-old, 127 kg, woman with history of rheumatic heart disease and Ross procedure and mitral valve repair(including ring annuloplasty) in 2000 followed by repair of autograft aneurysm repair and aortic valve replacement with 23mm CE bioprothesis in 2012 who presents for aortic and pulmonary valve replacement on 12/8      Neuro/Psych:   -- Sedation: propofol   -- Pain: Fentanyl pushes while intubated. Tylenol 650 mg PRN, Morphine 2 mg PRN, oxy 5 Q6 PRN             Cards:   History of rheumatic fever with rheumatic heart disease . S/p Ross procedure and mitral valve repair(including ring annuloplasty) in 2000   Now s/p aortic and pulmonary valve replacement with Dr. Campbell on 12/8  -- HDS  -- MAPS >65  -- SBP   -- Pressors epi off, vaso off, levo 0.02  -- wean pressors as tolerated      Pulm:   -- Intubated  -- Goal O2 sat > 90%  -- Wean as able  -- SBT this AM  -- Two pleural and one mediastinal chest tube to water seal  - CT #3: 30/24h, CT #1&2: 150/24h      Renal:  -- Nicolas removed, anuric  -- CRRT, /hr  -- BUN/Cr   Recent Labs   Lab 12/11/23  1350 12/11/23  2150 12/12/23  0304   BUN 8 7 9  8   CREATININE 1.2 1.2 1.3  1.3     -- Urine output:  anuric      FEN / GI:   -- Net +2.86L since admission, net -6.13L/24h  -- Replace lytes as needed  -- Nutrition: NPO  -- Docusate   -- Famotidine       ID:   -- Tm: afebrile; WBC 12.03 preop. 18.7 post op   Recent Labs   Lab 12/10/23  1720 12/11/23  0302 12/12/23  0307   WBC 15.42* 14.05* 8.61     -- Vancomycin two doses in OR       Heme/Onc:  -- H/H stable, 12.6 preop. 13.8 post op.   -- Daily CBC  -- holding heparin for platelets 30  -- Received 8 units of pRBC in the OR   Recent Labs   Lab 12/09/23  1725 12/09/23  1846 12/10/23  0303 12/10/23  1720 12/11/23  0302 12/12/23  0304 12/12/23  0307   HGB  --  11.9* 11.0* 9.7* 9.6*  --  9.0*   PLT  --  37* 30* 29* 27*  --  35*   APTT 24.4  --  29.9  --   --  29.2  --    INR  --  1.1 1.1  --   --  1.1  --         Endo:   -- Gluc goal 140-180  -- POCT 152      PPx:   Feeding: NPO  Analgesia/Sedation: tylenol, morphine, fentanyl /Propofol  Thromboembolic prevention: heparin held for plts <30  HOB >30: Yes  Stress Ulcer ppx: Famotidine   Glucose control: Critical care goal 140-180 g/dl   Bowel reg: docusate  Invasive Lines/Drains/Airway: PIV x2, Sue, Central line, Chest tube x3; L IJ trialysis  Deescalation: dc'ed         Dispo/Code Status/Palliative:   -- SICU / Full Code           Critical care was time spent personally by me on the following activities: development of treatment plan with patient or surrogate and bedside caregivers, discussions with consultants, evaluation of patient's response to treatment, examination of patient, ordering and performing treatments and interventions, ordering and review of laboratory studies, ordering and review of radiographic studies, pulse oximetry, re-evaluation of patient's condition.  This critical care time did not overlap with that of any other provider or involve time for any procedures.     Gloria Cruz MD  Critical Care - Surgery  [facility]

## 2023-12-12 NOTE — ASSESSMENT & PLAN NOTE
Aute kidney injury secondary to iATN from hypotensive episodes/hemodynamic instability during OR  (Baseline 0.4-1)  SLED started 12/9 for oliguric TINO    Plan  -Will continue SLED with citrate/calcium for regional anticoagulation  -minimal hour intake, -450 cc/hr as tolerated, please call Nephrology prior to adjusting UFR on RRT machine.   -strict I/O's  -RFP q 8 hours with iCA

## 2023-12-12 NOTE — ASSESSMENT & PLAN NOTE
38-year-old, 127 kg, woman with history of rheumatic heart disease and Ross procedure and mitral valve repair(including ring annuloplasty) in 2000 followed by repair of autograft aneurysm repair and aortic valve replacement with 23mm CE bioprothesis in 2012 who presents for aortic and pulmonary valve replacement on 12/8      Neuro/Psych:   -- Sedation: propofol   -- Pain: Fentanyl pushes while intubated. Tylenol 650 mg PRN, Morphine 2 mg PRN, oxy 5 Q6 PRN             Cards:   History of rheumatic fever with rheumatic heart disease . S/p Ross procedure and mitral valve repair(including ring annuloplasty) in 2000   Now s/p aortic and pulmonary valve replacement with Dr. Campbell on 12/8  -- HDS  -- MAPS >65  -- SBP   -- Pressors to maintain blood pressure goals: epi off, vaso off, levo 0.01  -- wean pressors as tolerated      Pulm:   -- Intubated  -- Goal O2 sat > 90%  -- Wean as able  -- trial SBT this AM  -- Two pleural and one mediastinal chest tube to water seal       Renal:  -- Nicolas removed, anuric  -- CRRT, /hr  -- BUN/Cr   Recent Labs   Lab 12/11/23  1350 12/11/23  2150 12/12/23  0304   BUN 8 7 9  8   CREATININE 1.2 1.2 1.3  1.3     -- Urine output: anuric      FEN / GI:   -- Net +2.86L since admission, net -6.13L/24h  -- Replace lytes as needed  -- Nutrition: NPO  -- Docusate   -- Famotidine       ID:   -- Tm: afebrile; WBC 12.03 preop. 18.7 post op   Recent Labs   Lab 12/10/23  1720 12/11/23  0302 12/12/23  0307   WBC 15.42* 14.05* 8.61     -- Vancomycin two doses in OR       Heme/Onc:  -- H/H stable, 12.6 preop. 13.8 post op.   -- Daily CBC  -- holding heparin for platelets 30  -- Received 8 units of pRBC in the OR   Recent Labs   Lab 12/09/23  1725 12/09/23  1846 12/10/23  0303 12/10/23  1720 12/11/23  0302 12/12/23  0304 12/12/23  0307   HGB  --  11.9* 11.0* 9.7* 9.6*  --  9.0*   PLT  --  37* 30* 29* 27*  --  35*   APTT 24.4  --  29.9  --   --  29.2  --    INR  --  1.1 1.1  --   --  1.1  --          Endo:   -- Gluc goal 140-180  -- POCT 152      PPx:   Feeding: NPO  Analgesia/Sedation: tylenol, morphine, fentanyl /Propofol  Thromboembolic prevention: heparin held for plts <30  HOB >30: Yes  Stress Ulcer ppx: Famotidine   Glucose control: Critical care goal 140-180 g/dl   Bowel reg: docusate  Invasive Lines/Drains/Airway: PIV x2, [place], Central line, Chest tube x3; L IJ trialysis  Deescalation: dc'ed         Dispo/Code Status/Palliative:   -- SICU / Full Code

## 2023-12-12 NOTE — PT/OT/SLP PROGRESS
Occupational Therapy      Patient Name:  Megan Cook   MRN:  [id number]    Patient not seen today due to intubated, on CRRT + pressor support. Will follow-up for progressive mobility pending continued medical stability and patient participation.     12/12/2023

## 2023-12-12 NOTE — PROGRESS NOTES
Congenital Cardiac Surgery Note:    Ms. Megan Cook underwent re-replacement of her aortic valve and re-replacement of her RV-PA conduit/pulmonary valve on 12-8-2023.       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     Post-op course complicated by Acute Renal Failure requiring CRRT.    Interval Events  Substantial volume removal yesterday, 6L.  Intermittently off pressors or on low dose Levophed.  Low dose resumed when I came by after increasing her sedation following a breathing trial.            Vitals:    12/12/23 0945 12/12/23 1000 12/12/23 1015 12/12/23 1030   BP:       BP Location:       Patient Position:       Pulse: 88 87 88 91   Resp: (!) 22 (!) 22 (!) 22 (!) 30   Temp:       TempSrc:       SpO2: 100% 100% 100% 99%   Weight:       Height:             Physical Exam:  General: sedated and intubated  CV: normal rate and regular, normal pedal pulses  Resp: mechanically ventilated  Neuro: sedated but moves all extremities and follows commands  Drains: Serous output, no air leak observed today  Incision: Dressed     Imaging:  CXR: low lung volumes, soma atelectasis, no significant right effusion, possible small left but film poor     Assessment and Plan:    Discussed her care with Dr. Zhu, and ICU team.  Great success with volume removal and with lower pressor requirement.  Off vaso and intermittently on low dose levophed.    Planning to remove similar amount today if tolerated.      Platelet Count remains low but improved.  TEG ok.    Plan to start low fixed dose of heparin today and will consider titration tomorrow if no evidence of bleeding.    No evidence of bleeding since surgery.  Chest tube output remains low volume and serous.  She is on a PPI for GI prophylaxis.      Ultimately, anticoagulation plan will be for warfarin with an INR target of 2.5(goal range 2-3) and daily aspirin.       Liver enzymes trending down well.     Vent settings have been weaned and she  did ok with breathing trial. Still planning for additional volume removal before attempts at extubation.      Continue supportive care     I appreciate the assistance of the ICU team in her care. Also appreciate the nephrology team for their input and assistant with renal replacement therapy.     Alexandru Campbell MD

## 2023-12-12 NOTE — CONSULTS
[facility] - Surgical Intensive Care  Wound Care    Patient Name:  Megan Cook   MRN:  [id number]  Date: 12/12/2023  Diagnosis: Rheumatic aortic stenosis    History:     Past Medical History:   Diagnosis Date    Rheumatic fever with cardiac involvement        Social History     Socioeconomic History    Marital status: Single   Tobacco Use    Smoking status: Never    Smokeless tobacco: Never   Substance and Sexual Activity    Alcohol use: Yes     Alcohol/week: 1.0 standard drink of alcohol     Types: 1 Glasses of wine per week     Comment: social     Social Determinants of Health     Financial Resource Strain: Low Risk  (12/11/2023)    Overall Financial Resource Strain (CARDIA)     Difficulty of Paying Living Expenses: Not hard at all   Food Insecurity: No Food Insecurity (12/11/2023)    Hunger Vital Sign     Worried About Running Out of Food in the Last Year: Never true     Ran Out of Food in the Last Year: Never true   Transportation Needs: No Transportation Needs (12/11/2023)    PRAPARE - Transportation     Lack of Transportation (Medical): No     Lack of Transportation (Non-Medical): No   Physical Activity: Insufficiently Active (12/11/2023)    Exercise Vital Sign     Days of Exercise per Week: 2 days     Minutes of Exercise per Session: 20 min   Stress: Unknown (12/11/2023)    [place] [employer] - Occupational Stress Questionnaire     Feeling of Stress : Patient refused   Social Connections: Socially Isolated (12/11/2023)    Social Connection and Isolation Panel [NHANES]     Frequency of Communication with Friends and Family: Once a week     Frequency of Social Gatherings with Friends and Family: Once a week     Attends Holiness Services: Never     Active Member of Clubs or Organizations: No     Attends Club or Organization Meetings: Never     Marital Status:    Housing Stability: Unknown (12/11/2023)    Housing Stability Vital Sign     Unable to Pay for Housing in the Last Year: No      Unstable Housing in the Last Year: No       Precautions:     Allergies as of 10/10/2023 - Reviewed 10/09/2023   Allergen Reaction Noted    Cephalexin Other (See Comments) 10/17/2022    Penicillin g Other (See Comments) 10/17/2022       [facility] Assessment Details/Treatment     Patient seen for wound care consultation- placed by RN for R buttock/ back.    Reviewed chart for this encounter.   See Flow Sheet for findings.    Pt intubated- assistance x2 to turn and maintain position needed for care. R lip/ lower medial cheek with improvement- light purple/ pink area of discoloration- initially appeared deep purple- remains intact. Sacrum clear- border foam continued for PIP. R buttock with linear nonblanchable  pale purple tissue- BPCO applied to support healing with border foam cover dressing for added protection. Support bra in place- edges rolled- redness noted at rolled areas. Plan for border foams beneath as prevention. L breast with deep purple bruise-like discoloration- covered with border foam dressing for protection. B heels clear- border foam dressings in place with EHOB. Repositioned with foam wedge to assist with maintaining position. Low air loss surface in place- pending Kevon- Immerse to allow pt improved turn space and continue microclimate mgmt. Primary nurse present.    RECOMMENDATIONS:  -continue PIP interventions: sacrum/ B heels- border foam dressings.     R lip- continue BPCO as directed- twice daily.    R buttock- altered skin integrity: BPCO to wound twice daily; cover with 4x4 border foam dressing.    L breast/ chest/ torso beneath bra strap- altered skin integrity: border foam dressing to cover.    Discussed POC with patient's family present at bedside and primary nurse.   See EMR for orders.     Bedside nursing to continue care & monitoring.  Bedside nursing to maintain pressure injury prevention interventions.  Current documented Nathan score is 15 with a nutrition sub scale score of 3. IP  nutrition following- TF in progress.   12/12/23 1300   WOCN Assessment   WOCN Total Time (mins) 45   Visit Date 12/12/23   Visit Time 1300   Consult Type New   WOCN Speciality Wound   Intervention assessed;applied;chart review;orders   Teaching on-going  (pt/ family- skin/ wound assmt- recommendations.)   Skin Interventions   Dehiscence Prevention/Management   (support bra in place.)   Device Skin Pressure Protection absorbent pad utilized/changed;skin-to-device areas padded   Pressure Reduction Devices specialty bed utilized  (pending laith-immerse surface.)   Pressure Reduction Techniques frequent weight shift encouraged;heels elevated off bed   Skin Protection adhesive use limited   Positioning   Body Position turned   Head of Bed (HOB) Positioning HOB elevated   Positioning/Transfer Devices pillows;wedge   Pressure Injury Prevention    Check Moisture Management Pad Done   Sacral Foam Dressing Peel back sacral foam dressing, assess skin and reapply   Heel protection technique Foam dressing   Heel preventative measures Peel back dressing/boot, assess skin and reapply  (EHOB boots.)   Check Medical Devices Done        Altered Skin Integrity 12/09/23 0715 Right Buttocks Purple or maroon localized area of discolored intact skin or non-intact skin or a blood-filled blister.   Date First Assessed/Time First Assessed: 12/09/23 0715   Altered Skin Integrity Present on Admission - Did Patient arrive to the hospital with altered skin?: (c) suspected hospital acquired  Side: Right  Location: Buttocks  Description of Altered Skin...   Wound Image    Description of Altered Skin Integrity Purple or maroon localized area of discolored intact skin or non-intact skin or a blood-filled blister.   Dressing Appearance No dressing   Drainage Amount None   Appearance Maroon;Purple;Dry;Intact   Periwound Area Dry   Wound Length (cm) 5 cm   Wound Width (cm) 0.5 cm   Wound Depth (cm) 0 cm   Wound Volume (cm^3) 0 cm^3   Wound Surface Area  (cm^2) 2.5 cm^2   Care Applied:  (BPCO- covered with  border foam dressing.)   Dressing Applied;Foam;Silicone   Dressing Change Due 12/13/23        Altered Skin Integrity 12/09/23 1915 Right Lip   Date First Assessed/Time First Assessed: 12/09/23 1915   Altered Skin Integrity Present on Admission - Did Patient arrive to the hospital with altered skin?: yes  Side: Right  Location: Lip  Is this injury device related?: Yes   Dressing Appearance No dressing   Drainage Amount None   Appearance Intact;Purple  (light purple.)   Periwound Area Dry;Intact   Care   (-continue BPCO.)   Dressing Change Due 12/12/23        Incision/Site 12/08/23 2144 Chest   Date First Assessed/Time First Assessed: 12/08/23 2144   Location: Chest   Incision WDL   (borderedhydrocolloid island dressing in place.)   Periwound Area Dry;Intact     Photo taken for reference.   L lateral chest/ torso.     Will continue to follow as needed/ directed until discharge.    Jo Dias BSN, RN, CWOCN  12/12/2023

## 2023-12-12 NOTE — SUBJECTIVE & OBJECTIVE
Interval History:   Seen on RRT. UFR adjusted to 600-650 mL/hr (ordered 450 mL/hr). Patient net negative 6.5 L/24 hrs. Minimal hourly intake.   Electrolytes non emergent. Remain on Levophed at 0.02 mcg.     Review of patient's allergies indicates:   Allergen Reactions    Cephalexin Other (See Comments)     She gets mouth sores.     Penicillin g Other (See Comments)     Causes mouth sores.      Current Facility-Administered Medications   Medication Frequency    0.9%  NaCl infusion PRN    acetaminophen oral solution 650 mg Q8H PRN    acetaminophen oral solution 650 mg Q8H    balsam peru-castor oiL Oint BID    calcium gluconate 1 g in NS IVPB (premixed) PRN    calcium gluconate 1 g in NS IVPB (premixed) PRN    calcium gluconate 1 g in NS IVPB (premixed) PRN    calcium gluconate 3 g in dextrose 5 % (D5W) 100 mL infusion Continuous    dexmedetomidine (PRECEDEX) 400mcg/100mL 0.9% NaCL infusion Continuous    dextrose 10% bolus 125 mL 125 mL PRN    dextrose 10% bolus 250 mL 250 mL PRN    dextrose-sod citrate-citric ac 2.45-2.2 gram- 800 mg/100 mL Soln Continuous    docusate 50 mg/5 mL liquid 100 mg Daily    glucagon (human recombinant) injection 1 mg PRN    heparin 25,000 units in dextrose 5% 250 mL (100 units/mL) infusion (heparin infusion - NO NOMOGRAM) Continuous    insulin aspart U-100 pen 0-10 Units Q4H PRN    magnesium sulfate 2g in water 50mL IVPB (premix) PRN    magnesium sulfate 2g in water 50mL IVPB (premix) PRN    magnesium sulfate 2g in water 50mL IVPB (premix) PRN    mupirocin 2 % ointment BID    NORepinephrine 8 mg in dextrose 5% 250 mL infusion Continuous    ondansetron injection 4 mg Q12H PRN    oxyCODONE immediate release tablet 5 mg Q6H PRN    pantoprazole injection 40 mg Daily    propofol (DIPRIVAN) 10 mg/mL infusion Continuous    senna-docusate 8.6-50 mg per tablet 1 tablet BID    sodium phosphate 20.01 mmol in dextrose 5 % (D5W) 250 mL IVPB PRN    sodium phosphate 30 mmol in dextrose 5 % (D5W) 250 mL  IVPB PRN    sodium phosphate 39.99 mmol in dextrose 5 % (D5W) 250 mL IVPB PRN       Objective:     Vital Signs (Most Recent):  Temp: 98.6 °F (37 °C) (12/12/23 1100)  Pulse: 85 (12/12/23 1430)  Resp: 16 (12/12/23 1430)  BP: (!) 101/51 (12/12/23 0800)  SpO2: 100 % (12/12/23 1430) Vital Signs (24h Range):  Temp:  [98.6 °F (37 °C)-99.1 °F (37.3 °C)] 98.6 °F (37 °C)  Pulse:  [] 85  Resp:  [16-33] 16  SpO2:  [95 %-100 %] 100 %  BP: ()/(50-63) 101/51  Arterial Line BP: ()/(44-65) 104/50     Weight: (!) 143.3 kg (315 lb 14.7 oz) (12/12/23 0500)  Body mass index is 52.57 kg/m².  Body surface area is 2.56 meters squared.    I/O last 3 completed shifts:  In: 9456 [I.V.:1994.2; NG/GT:930; IV Piggyback:6531.8]  Out: 68803 [Drains:115; Other:80424; Chest Tube:300]     Physical Exam  Vitals and nursing note reviewed.   Constitutional:       Appearance: She is obese.      Interventions: She is sedated and intubated.   HENT:      Head: Normocephalic and atraumatic.      Mouth/Throat:      Mouth: Mucous membranes are moist.   Cardiovascular:      Rate and Rhythm: Normal rate and regular rhythm.      Pulses: Normal pulses.   Pulmonary:      Effort: Pulmonary effort is normal. No respiratory distress. She is intubated.      Breath sounds: Normal breath sounds. No wheezing or rales.      Comments: Patient is intubated. Symmetrical chest rise.    Vent Mode: A/C  Oxygen Concentration (%):  [] 70  Resp Rate Total:  [22 br/min-34 br/min] 23 br/min  Vt Set:  [500 mL] 500 mL  PEEP/CPAP:  [7 cmH20-8 cmH20] 8 cmH20  Mean Airway Pressure:  [14 aiZ12-94 cmH20] 14 cmH20     Chest:      Comments: Three chest tubes in midline   Abdominal:      General: Abdomen is flat.      Palpations: Abdomen is soft.   Musculoskeletal:         General: Swelling present. Normal range of motion.      Right lower leg: Edema present.      Left lower leg: Edema present.   Skin:     General: Skin is warm and dry.   Neurological:      General: No  focal deficit present.   Psychiatric:         Mood and Affect: Mood normal.         Behavior: Behavior normal.          Significant Labs:  CBC:   Recent Labs   Lab 12/12/23 0307 12/12/23 0307   WBC 8.61  --    RBC 3.05*  --    HGB 9.0*  --    HCT 25.8* 25*   PLT 35*  --    MCV 85  --    MCH 29.5  --    MCHC 34.9  --      CMP:   Recent Labs   Lab 12/12/23 0304     103   CALCIUM 8.1*  8.0*   ALBUMIN 2.1*  2.1*   PROT 5.2*     140   K 4.0  4.0   CO2 23  23     107   BUN 9  8   CREATININE 1.3  1.3   ALKPHOS 84   *   AST 83*   BILITOT 5.7*     All labs within the past 24 hours have been reviewed.

## 2023-12-12 NOTE — PROGRESS NOTES
12/12/23 0131   Treatment   Treatment Type SLED   Treatment Status Daily equipment check   Dialysis Machine Number [id number]   Dialyzer Time (hours) 42.48   BVP (Liters) 368.8 L   Solutions Labeled and Current  Yes   Access Temporary Cath;Left;IJ   Catheter Dressing Intact  Yes   Alarms Engaged Yes   CRRT Comments Daily check done

## 2023-12-13 LAB
ALBUMIN SERPL BCP-MCNC: 2.1 G/DL (ref 3.5–5.2)
ALBUMIN SERPL BCP-MCNC: 2.1 G/DL (ref 3.5–5.2)
ALBUMIN SERPL BCP-MCNC: 2.2 G/DL (ref 3.5–5.2)
ALBUMIN SERPL BCP-MCNC: 2.3 G/DL (ref 3.5–5.2)
ALLENS TEST: ABNORMAL
ALP SERPL-CCNC: 108 U/L (ref 55–135)
ALT SERPL W/O P-5'-P-CCNC: 125 U/L (ref 10–44)
ANION GAP SERPL CALC-SCNC: 11 MMOL/L (ref 8–16)
ANION GAP SERPL CALC-SCNC: 13 MMOL/L (ref 8–16)
ANION GAP SERPL CALC-SCNC: 13 MMOL/L (ref 8–16)
ANION GAP SERPL CALC-SCNC: 8 MMOL/L (ref 8–16)
APTT PPP: 28.3 SEC (ref 21–32)
APTT PPP: 29.5 SEC (ref 21–32)
APTT PPP: 30.8 SEC (ref 21–32)
AST SERPL-CCNC: 128 U/L (ref 10–40)
BASOPHILS # BLD AUTO: 0.04 K/UL (ref 0–0.2)
BASOPHILS NFR BLD: 0.3 % (ref 0–1.9)
BILIRUB SERPL-MCNC: 4.5 MG/DL (ref 0.1–1)
BUN SERPL-MCNC: 11 MG/DL (ref 6–20)
BUN SERPL-MCNC: 11 MG/DL (ref 6–20)
BUN SERPL-MCNC: 6 MG/DL (ref 6–20)
BUN SERPL-MCNC: 6 MG/DL (ref 6–20)
CA-I BLDV-SCNC: 0.89 MMOL/L (ref 1.06–1.42)
CA-I BLDV-SCNC: 1.01 MMOL/L (ref 1.06–1.42)
CA-I BLDV-SCNC: 1.03 MMOL/L (ref 1.06–1.42)
CALCIUM SERPL-MCNC: 7.7 MG/DL (ref 8.7–10.5)
CALCIUM SERPL-MCNC: 7.7 MG/DL (ref 8.7–10.5)
CALCIUM SERPL-MCNC: 8.5 MG/DL (ref 8.7–10.5)
CALCIUM SERPL-MCNC: 8.6 MG/DL (ref 8.7–10.5)
CHLORIDE SERPL-SCNC: 100 MMOL/L (ref 95–110)
CHLORIDE SERPL-SCNC: 104 MMOL/L (ref 95–110)
CO2 SERPL-SCNC: 18 MMOL/L (ref 23–29)
CO2 SERPL-SCNC: 18 MMOL/L (ref 23–29)
CO2 SERPL-SCNC: 22 MMOL/L (ref 23–29)
CO2 SERPL-SCNC: 22 MMOL/L (ref 23–29)
CREAT SERPL-MCNC: 0.8 MG/DL (ref 0.5–1.4)
CREAT SERPL-MCNC: 0.8 MG/DL (ref 0.5–1.4)
CREAT SERPL-MCNC: 1.2 MG/DL (ref 0.5–1.4)
CREAT SERPL-MCNC: 1.3 MG/DL (ref 0.5–1.4)
DIFFERENTIAL METHOD: ABNORMAL
EOSINOPHIL # BLD AUTO: 0.4 K/UL (ref 0–0.5)
EOSINOPHIL NFR BLD: 2.7 % (ref 0–8)
ERYTHROCYTE [DISTWIDTH] IN BLOOD BY AUTOMATED COUNT: 15.7 % (ref 11.5–14.5)
EST. GFR  (NO RACE VARIABLE): 54.3 ML/MIN/1.73 M^2
EST. GFR  (NO RACE VARIABLE): 59.8 ML/MIN/1.73 M^2
EST. GFR  (NO RACE VARIABLE): >60 ML/MIN/1.73 M^2
EST. GFR  (NO RACE VARIABLE): >60 ML/MIN/1.73 M^2
FINAL PATHOLOGIC DIAGNOSIS: NORMAL
GLUCOSE SERPL-MCNC: 107 MG/DL (ref 70–110)
GLUCOSE SERPL-MCNC: 107 MG/DL (ref 70–110)
GLUCOSE SERPL-MCNC: 90 MG/DL (ref 70–110)
GLUCOSE SERPL-MCNC: 97 MG/DL (ref 70–110)
GROSS: NORMAL
HCO3 UR-SCNC: 22.9 MMOL/L (ref 24–28)
HCT VFR BLD AUTO: 29 % (ref 37–48.5)
HCT VFR BLD CALC: 28 %PCV (ref 36–54)
HGB BLD-MCNC: 9.7 G/DL (ref 12–16)
IMM GRANULOCYTES # BLD AUTO: 0.2 K/UL (ref 0–0.04)
IMM GRANULOCYTES NFR BLD AUTO: 1.4 % (ref 0–0.5)
LYMPHOCYTES # BLD AUTO: 1.1 K/UL (ref 1–4.8)
LYMPHOCYTES NFR BLD: 7.2 % (ref 18–48)
Lab: NORMAL
MAGNESIUM SERPL-MCNC: 1.8 MG/DL (ref 1.6–2.6)
MAGNESIUM SERPL-MCNC: 1.8 MG/DL (ref 1.6–2.6)
MAGNESIUM SERPL-MCNC: 2.2 MG/DL (ref 1.6–2.6)
MCH RBC QN AUTO: 29.6 PG (ref 27–31)
MCHC RBC AUTO-ENTMCNC: 33.4 G/DL (ref 32–36)
MCV RBC AUTO: 88 FL (ref 82–98)
MONOCYTES # BLD AUTO: 1.2 K/UL (ref 0.3–1)
MONOCYTES NFR BLD: 7.9 % (ref 4–15)
NEUTROPHILS # BLD AUTO: 11.8 K/UL (ref 1.8–7.7)
NEUTROPHILS NFR BLD: 80.5 % (ref 38–73)
NRBC BLD-RTO: 0 /100 WBC
PCO2 BLDA: 36.7 MMHG (ref 35–45)
PH SMN: 7.4 [PH] (ref 7.35–7.45)
PHOSPHATE SERPL-MCNC: 1.9 MG/DL (ref 2.7–4.5)
PHOSPHATE SERPL-MCNC: 2 MG/DL (ref 2.7–4.5)
PHOSPHATE SERPL-MCNC: 2.1 MG/DL (ref 2.7–4.5)
PLATELET # BLD AUTO: 71 K/UL (ref 150–450)
PMV BLD AUTO: 12.6 FL (ref 9.2–12.9)
PO2 BLDA: 167 MMHG (ref 80–100)
POC BE: -2 MMOL/L
POC IONIZED CALCIUM: 0.99 MMOL/L (ref 1.06–1.42)
POC SATURATED O2: 100 % (ref 95–100)
POC TCO2: 24 MMOL/L (ref 23–27)
POCT GLUCOSE: 103 MG/DL (ref 70–110)
POCT GLUCOSE: 81 MG/DL (ref 70–110)
POCT GLUCOSE: 85 MG/DL (ref 70–110)
POCT GLUCOSE: 90 MG/DL (ref 70–110)
POTASSIUM BLD-SCNC: 4.1 MMOL/L (ref 3.5–5.1)
POTASSIUM SERPL-SCNC: 4.1 MMOL/L (ref 3.5–5.1)
POTASSIUM SERPL-SCNC: 4.1 MMOL/L (ref 3.5–5.1)
POTASSIUM SERPL-SCNC: 4.5 MMOL/L (ref 3.5–5.1)
POTASSIUM SERPL-SCNC: 4.7 MMOL/L (ref 3.5–5.1)
PROT SERPL-MCNC: 5.8 G/DL (ref 6–8.4)
RBC # BLD AUTO: 3.28 M/UL (ref 4–5.4)
SAMPLE: ABNORMAL
SITE: ABNORMAL
SODIUM BLD-SCNC: 135 MMOL/L (ref 136–145)
SODIUM SERPL-SCNC: 133 MMOL/L (ref 136–145)
SODIUM SERPL-SCNC: 134 MMOL/L (ref 136–145)
SODIUM SERPL-SCNC: 135 MMOL/L (ref 136–145)
SODIUM SERPL-SCNC: 135 MMOL/L (ref 136–145)
WBC # BLD AUTO: 14.66 K/UL (ref 3.9–12.7)

## 2023-12-13 PROCEDURE — 94761 N-INVAS EAR/PLS OXIMETRY MLT: CPT

## 2023-12-13 PROCEDURE — 80100008 HC CRRT DAILY MAINTENANCE

## 2023-12-13 PROCEDURE — 85730 THROMBOPLASTIN TIME PARTIAL: CPT | Performed by: INTERNAL MEDICINE

## 2023-12-13 PROCEDURE — 90945 DIALYSIS ONE EVALUATION: CPT

## 2023-12-13 PROCEDURE — 97530 THERAPEUTIC ACTIVITIES: CPT

## 2023-12-13 PROCEDURE — 27100171 HC OXYGEN HIGH FLOW UP TO 24 HOURS

## 2023-12-13 PROCEDURE — 85025 COMPLETE CBC W/AUTO DIFF WBC: CPT | Performed by: INTERNAL MEDICINE

## 2023-12-13 PROCEDURE — 85730 THROMBOPLASTIN TIME PARTIAL: CPT | Mod: 91 | Performed by: THORACIC SURGERY (CARDIOTHORACIC VASCULAR SURGERY)

## 2023-12-13 PROCEDURE — 97166 OT EVAL MOD COMPLEX 45 MIN: CPT

## 2023-12-13 PROCEDURE — 84100 ASSAY OF PHOSPHORUS: CPT | Performed by: NURSE PRACTITIONER

## 2023-12-13 PROCEDURE — 25000003 PHARM REV CODE 250: Performed by: NURSE PRACTITIONER

## 2023-12-13 PROCEDURE — 36620 INSERTION CATHETER ARTERY: CPT

## 2023-12-13 PROCEDURE — 82330 ASSAY OF CALCIUM: CPT | Mod: 91 | Performed by: NURSE PRACTITIONER

## 2023-12-13 PROCEDURE — 25000003 PHARM REV CODE 250: Performed by: INTERNAL MEDICINE

## 2023-12-13 PROCEDURE — 99900026 HC AIRWAY MAINTENANCE (STAT)

## 2023-12-13 PROCEDURE — 97535 SELF CARE MNGMENT TRAINING: CPT

## 2023-12-13 PROCEDURE — 94003 VENT MGMT INPAT SUBQ DAY: CPT

## 2023-12-13 PROCEDURE — 80053 COMPREHEN METABOLIC PANEL: CPT | Performed by: NURSE PRACTITIONER

## 2023-12-13 PROCEDURE — 99291 CRITICAL CARE FIRST HOUR: CPT | Mod: ,,, | Performed by: INTERNAL MEDICINE

## 2023-12-13 PROCEDURE — 20000000 HC ICU ROOM

## 2023-12-13 PROCEDURE — C9113 INJ PANTOPRAZOLE SODIUM, VIA: HCPCS | Performed by: STUDENT IN AN ORGANIZED HEALTH CARE EDUCATION/TRAINING PROGRAM

## 2023-12-13 PROCEDURE — 99900035 HC TECH TIME PER 15 MIN (STAT)

## 2023-12-13 PROCEDURE — 94799 UNLISTED PULMONARY SVC/PX: CPT

## 2023-12-13 PROCEDURE — 63600175 PHARM REV CODE 636 W HCPCS: Performed by: STUDENT IN AN ORGANIZED HEALTH CARE EDUCATION/TRAINING PROGRAM

## 2023-12-13 PROCEDURE — 94150 VITAL CAPACITY TEST: CPT

## 2023-12-13 PROCEDURE — 99291 PR CRITICAL CARE, E/M 30-74 MINUTES: ICD-10-PCS | Mod: ,,, | Performed by: INTERNAL MEDICINE

## 2023-12-13 PROCEDURE — 25000003 PHARM REV CODE 250

## 2023-12-13 PROCEDURE — 90945 DIALYSIS ONE EVALUATION: CPT | Mod: ,,, | Performed by: NURSE PRACTITIONER

## 2023-12-13 PROCEDURE — 63600175 PHARM REV CODE 636 W HCPCS

## 2023-12-13 PROCEDURE — 83735 ASSAY OF MAGNESIUM: CPT | Mod: 91 | Performed by: NURSE PRACTITIONER

## 2023-12-13 PROCEDURE — 27000923 HC TRIALYSIS CATHETER, ANY SIZE

## 2023-12-13 PROCEDURE — 90945 PR DIALYSIS, NOT HEMO, 1 EVAL: ICD-10-PCS | Mod: ,,, | Performed by: NURSE PRACTITIONER

## 2023-12-13 PROCEDURE — 63600175 PHARM REV CODE 636 W HCPCS: Performed by: NURSE PRACTITIONER

## 2023-12-13 PROCEDURE — 25000003 PHARM REV CODE 250: Performed by: STUDENT IN AN ORGANIZED HEALTH CARE EDUCATION/TRAINING PROGRAM

## 2023-12-13 PROCEDURE — 82330 ASSAY OF CALCIUM: CPT | Performed by: NURSE PRACTITIONER

## 2023-12-13 PROCEDURE — 97162 PT EVAL MOD COMPLEX 30 MIN: CPT

## 2023-12-13 PROCEDURE — 97110 THERAPEUTIC EXERCISES: CPT

## 2023-12-13 PROCEDURE — 94010 BREATHING CAPACITY TEST: CPT

## 2023-12-13 PROCEDURE — 97112 NEUROMUSCULAR REEDUCATION: CPT

## 2023-12-13 PROCEDURE — 63600175 PHARM REV CODE 636 W HCPCS: Performed by: INTERNAL MEDICINE

## 2023-12-13 RX ORDER — ACETAMINOPHEN 10 MG/ML
1000 INJECTION, SOLUTION INTRAVENOUS EVERY 8 HOURS
Status: DISCONTINUED | OUTPATIENT
Start: 2023-12-13 | End: 2023-12-13

## 2023-12-13 RX ORDER — HYDROCODONE BITARTRATE AND ACETAMINOPHEN 500; 5 MG/1; MG/1
TABLET ORAL CONTINUOUS
Status: DISCONTINUED | OUTPATIENT
Start: 2023-12-13 | End: 2023-12-13

## 2023-12-13 RX ORDER — NAPROXEN SODIUM 220 MG/1
81 TABLET, FILM COATED ORAL DAILY
Status: DISCONTINUED | OUTPATIENT
Start: 2023-12-13 | End: 2023-12-14

## 2023-12-13 RX ORDER — MAGNESIUM SULFATE HEPTAHYDRATE 40 MG/ML
2 INJECTION, SOLUTION INTRAVENOUS
Status: DISCONTINUED | OUTPATIENT
Start: 2023-12-13 | End: 2023-12-14

## 2023-12-13 RX ORDER — HEPARIN SODIUM,PORCINE/D5W 25000/250
0-40 INTRAVENOUS SOLUTION INTRAVENOUS CONTINUOUS
Status: DISCONTINUED | OUTPATIENT
Start: 2023-12-13 | End: 2023-12-14

## 2023-12-13 RX ORDER — ACETAMINOPHEN 10 MG/ML
1000 INJECTION, SOLUTION INTRAVENOUS EVERY 8 HOURS
Status: COMPLETED | OUTPATIENT
Start: 2023-12-13 | End: 2023-12-13

## 2023-12-13 RX ADMIN — MUPIROCIN: 20 OINTMENT TOPICAL at 09:12

## 2023-12-13 RX ADMIN — DEXTROSE MONOHYDRATE, SODIUM CITRATE, AND CITRIC ACID MONOHYDRATE: 2.45; 2.2; .8 INJECTION, SOLUTION INTRAVENOUS at 06:12

## 2023-12-13 RX ADMIN — SODIUM PHOSPHATE, MONOBASIC, MONOHYDRATE AND SODIUM PHOSPHATE, DIBASIC, ANHYDROUS 30 MMOL: 142; 276 INJECTION, SOLUTION INTRAVENOUS at 09:12

## 2023-12-13 RX ADMIN — ACETAMINOPHEN 650 MG: 650 SOLUTION ORAL at 02:12

## 2023-12-13 RX ADMIN — Medication: at 10:12

## 2023-12-13 RX ADMIN — Medication: at 09:12

## 2023-12-13 RX ADMIN — DOCUSATE SODIUM 100 MG: 50 LIQUID ORAL at 09:12

## 2023-12-13 RX ADMIN — DEXTROSE MONOHYDRATE, SODIUM CITRATE, AND CITRIC ACID MONOHYDRATE: 2.45; 2.2; .8 INJECTION, SOLUTION INTRAVENOUS at 12:12

## 2023-12-13 RX ADMIN — PROPOFOL 20 MCG/KG/MIN: 10 INJECTION, EMULSION INTRAVENOUS at 12:12

## 2023-12-13 RX ADMIN — CALCIUM GLUCONATE: 98 INJECTION, SOLUTION INTRAVENOUS at 06:12

## 2023-12-13 RX ADMIN — ASPIRIN 81 MG: 81 TABLET, CHEWABLE ORAL at 09:12

## 2023-12-13 RX ADMIN — POLYETHYLENE GLYCOL 3350 17 G: 17 POWDER, FOR SOLUTION ORAL at 09:12

## 2023-12-13 RX ADMIN — ACETAMINOPHEN 1000 MG: 10 INJECTION, SOLUTION INTRAVENOUS at 10:12

## 2023-12-13 RX ADMIN — PANTOPRAZOLE SODIUM 40 MG: 40 INJECTION, POWDER, FOR SOLUTION INTRAVENOUS at 09:12

## 2023-12-13 RX ADMIN — DEXTROSE MONOHYDRATE, SODIUM CITRATE, AND CITRIC ACID MONOHYDRATE: 2.45; 2.2; .8 INJECTION, SOLUTION INTRAVENOUS at 10:12

## 2023-12-13 RX ADMIN — SENNOSIDES AND DOCUSATE SODIUM 1 TABLET: 8.6; 5 TABLET ORAL at 09:12

## 2023-12-13 RX ADMIN — HEPARIN SODIUM 12 UNITS/KG/HR: 10000 INJECTION, SOLUTION INTRAVENOUS at 08:12

## 2023-12-13 RX ADMIN — MAGNESIUM SULFATE HEPTAHYDRATE 2 G: 40 INJECTION, SOLUTION INTRAVENOUS at 04:12

## 2023-12-13 RX ADMIN — PROPOFOL 20 MCG/KG/MIN: 10 INJECTION, EMULSION INTRAVENOUS at 05:12

## 2023-12-13 RX ADMIN — DEXTROSE MONOHYDRATE, SODIUM CITRATE, AND CITRIC ACID MONOHYDRATE: 2.45; 2.2; .8 INJECTION, SOLUTION INTRAVENOUS at 04:12

## 2023-12-13 RX ADMIN — CALCIUM GLUCONATE: 98 INJECTION, SOLUTION INTRAVENOUS at 03:12

## 2023-12-13 RX ADMIN — ACETAMINOPHEN 650 MG: 650 SOLUTION ORAL at 05:12

## 2023-12-13 NOTE — SUBJECTIVE & OBJECTIVE
Interval History/Significant Events: NAEON, extubated today to AIRVO. Weaning as tolerated. Increase hep drip.    Follow-up For: Procedure(s) (LRB):  Replacement-valve-aortic (N/A)  REPLACEMENT, PULMONARY VALVE (N/A)    Post-Operative Day: 5 Days Post-Op    Objective:     Vital Signs (Most Recent):  Temp: 97.5 °F (36.4 °C) (12/13/23 0700)  Pulse: 97 (12/13/23 1445)  Resp: (!) 30 (12/13/23 1445)  BP: (!) 101/51 (12/12/23 0800)  SpO2: 96 % (12/13/23 1445) Vital Signs (24h Range):  Temp:  [97.5 °F (36.4 °C)-98 °F (36.7 °C)] 97.5 °F (36.4 °C)  Pulse:  [76-97] 97  Resp:  [15-38] 30  SpO2:  [95 %-100 %] 96 %  Arterial Line BP: ()/(44-69) 103/52     Weight: 127 kg (280 lb)  Body mass index is 46.59 kg/m².      Intake/Output Summary (Last 24 hours) at 12/13/2023 1507  Last data filed at 12/13/2023 1413  Gross per 24 hour   Intake 5883.1 ml   Output 8545 ml   Net -2661.9 ml          Physical Exam  Vitals and nursing note reviewed.   Constitutional:       Appearance: She is obese.   HENT:      Head: Normocephalic and atraumatic.   Cardiovascular:      Rate and Rhythm: Regular rhythm. Tachycardia present.   Pulmonary:      Effort: Pulmonary effort is normal.      Comments:      Chest:      Comments: Three chest tubes in midline   Abdominal:      General: Abdomen is flat.      Palpations: Abdomen is soft.   Musculoskeletal:         General: Swelling present.   Skin:     General: Skin is warm and dry.      Capillary Refill: Capillary refill takes less than 2 seconds.            Vents:  Vent Mode: Spont (12/13/23 1030)  Set Rate: 18 BPM (12/13/23 0747)  Vt Set: 400 mL (12/13/23 0747)  Pressure Support: 5 cmH20 (12/13/23 1030)  PEEP/CPAP: 5 cmH20 (12/13/23 1030)  Oxygen Concentration (%): 35 (12/13/23 1235)  Peak Airway Pressure: 11 cmH20 (12/13/23 1030)  Plateau Pressure: 0 cmH20 (12/13/23 1030)  Total Ve: 9.35 L/m (12/13/23 1030)  Negative Inspiratory Force (cm H2O): -38 (12/13/23 1030)  F/VT Ratio<105 (RSBI): (!) 73.96  (12/13/23 1030)    Lines/Drains/Airways       Central Venous Catheter Line  Duration             Percutaneous Central Line Insertion/Assessment - Quad Lumen  12/08/23 0949 Internal Jugular Right 5 days    Trialysis (Dialysis) Catheter 12/09/23 1327 left internal jugular 4 days              Drain  Duration                  NG/OG Tube 12/08/23  5 days         Chest Tube 12/08/23 2059 Tube - 3 Mediastinal 24 Fr. 4 days         Y Chest Tube 1 and 2 12/08/23 2058 1 Right Pleural 24 Fr. 2 Left Pleural 24 Fr. 4 days              Airway  Duration                  Airway - Non-Surgical 12/08/23 0738 5 days              Arterial Line  Duration             Arterial Line 12/11/23 1100 Right Radial 2 days                    Significant Labs:    CBC/Anemia Profile:  Recent Labs   Lab 12/12/23  0307 12/12/23  0307 12/12/23  1944 12/13/23  0407 12/13/23 0415   WBC 8.61  --  15.84*  --  14.66*   HGB 9.0*  --  10.0*  --  9.7*   HCT 25.8*   < > 29.4* 28* 29.0*   PLT 35*  --  61*  --  71*   MCV 85  --  91  --  88   RDW 15.9*  --  15.8*  --  15.7*    < > = values in this interval not displayed.        Chemistries:  Recent Labs   Lab 12/12/23  0304 12/12/23  1438 12/12/23  2112 12/13/23  0415 12/13/23  1417     140   < > 135* 135*  135* 133*   K 4.0  4.0   < > 3.9 4.1  4.1 4.7     107   < > 103 104  104 100   CO2 23  23   < > 22* 18*  18* 22*   BUN 9  8   < > 6 6  6 11   CREATININE 1.3  1.3   < > 0.9 0.8  0.8 1.3   CALCIUM 8.1*  8.0*   < > 7.5* 7.7*  7.7* 8.6*   ALBUMIN 2.1*  2.1*   < > 2.1* 2.1*  2.1* 2.3*   PROT 5.2*  --   --  5.8*  --    BILITOT 5.7*  --   --  4.5*  --    ALKPHOS 84  --   --  108  --    *  --   --  125*  --    AST 83*  --   --  128*  --    MG 2.2   < > 2.8* 1.8 1.8   PHOS 3.0   < > 2.9 1.9* 2.1*    < > = values in this interval not displayed.           Significant Imaging:  I have reviewed all pertinent imaging results/findings within the past 24 hours.

## 2023-12-13 NOTE — CARE UPDATE
BG goal 140-180    -A1C   Lab Results   Component Value Date    HGBA1C 5.5 11/14/2022       -HOME REGIMEN: none; no hx of DM    -INPATIENT REGIMEN: Novolog Correction Scale     -GLUCOSE TREND FOR THE PAST 24HRS:     -NO HYPOGYCEMIAS NOTED       -Diet: No diet orders on file      -Steroids - n/a    -Tube Feeds - Peptamen Intense at 40 ml/hr       Remains in SICU. POD 5. Extubated today. BG well controlled without prn SQ insulin requirements.     Plan:  -Discontinue Moderate Dose Correction Scale and BG monitoring q 4 hrs   -Recommend continuing to monitor BG with AM labs       Endocrine to sign off. Patient with no hx of DM and no insulin needs inpatient. Please reconsult if needed.

## 2023-12-13 NOTE — ASSESSMENT & PLAN NOTE
38-year-old, 127 kg, woman with history of rheumatic heart disease and Ross procedure and mitral valve repair(including ring annuloplasty) in 2000 followed by repair of autograft aneurysm repair and aortic valve replacement with 23mm CE bioprothesis in 2012 who presents for aortic and pulmonary valve replacement on 12/8      Neuro/Psych:   -- Sedation: n/a  -- Pain: Tylenol 650 mg PRN, Morphine 2 mg PRN, oxy 5 Q6 PRN             Cards:   History of rheumatic fever with rheumatic heart disease . S/p Ross procedure and mitral valve repair(including ring annuloplasty) in 2000   Now s/p aortic and pulmonary valve replacement with Dr. Campbell on 12/8  -- HDS  -- MAPS >65  -- SBP   -- Pressors to maintain blood pressure goals: epi off, vaso off, levo 0.01  -- wean pressors as tolerated      Pulm:   -- Extubated 12/13  -- Goal O2 sat > 90%  -- Wean as able  -- Two pleural and one mediastinal chest tube to water seal       Renal:  -- Nicolas removed, anuric  -- CRRT, /hr  -- BUN/Cr   Recent Labs   Lab 12/12/23 2112 12/13/23  0415 12/13/23  1417   BUN 6 6  6 11   CREATININE 0.9 0.8  0.8 1.3     -- Urine output: anuric      FEN / GI:   -- Replace lytes as needed  -- Nutrition: TF  -- Docusate   -- Famotidine       ID:   -- Tm: afebrile; WBC 12.03 preop. 18.7 post op   Recent Labs   Lab 12/12/23  0307 12/12/23 1944 12/13/23  0415   WBC 8.61 15.84* 14.66*     -- Vancomycin two doses in OR       Heme/Onc:  -- H/H stable, 12.6 preop. 13.8 post op.   -- Daily CBC  -- holding heparin for platelets 30  -- Received 8 units of pRBC in the OR   Recent Labs   Lab 12/09/23  1846 12/10/23  0303 12/10/23  1720 12/12/23  0304 12/12/23  0307 12/12/23  1944 12/13/23  0415 12/13/23  1417   HGB 11.9* 11.0*   < >  --  9.0* 10.0* 9.7*  --    PLT 37* 30*   < >  --  35* 61* 71*  --    APTT  --  29.9  --  29.2  --  27.4 30.8 28.3   INR 1.1 1.1  --  1.1  --   --   --   --     < > = values in this interval not displayed.        Endo:   --  Gluc goal 140-180  -- POCT 152      PPx:   Feeding: TF  Analgesia/Sedation: tylenol, morphine  Thromboembolic prevention: heparin held for plts <30  HOB >30: Yes  Stress Ulcer ppx: Famotidine   Glucose control: Critical care goal 140-180 g/dl   Bowel reg: docusate  Invasive Lines/Drains/Airway: PIV x2, Butler, Central line, Chest tube x3; L IJ trialysis  Deescalation: dc'ed         Dispo/Code Status/Palliative:   -- SICU / Full Code

## 2023-12-13 NOTE — ASSESSMENT & PLAN NOTE
Aute kidney injury secondary to iATN from hypotensive episodes/hemodynamic instability during OR  (Baseline 0.4-1)  SLED started 12/9 for oliguric TINO    Plan  -Team requesting time off machine for physical therapy, will terminate treatment at 1200 and restart nocturnally.   -Will continue 12 hr SLED with citrate/calcium for regional anticoagulation  -minimal hour intake, -450 cc/hr as tolerated, please call Nephrology prior to adjusting UFR on RRT machine.   -strict I/O's  -RFP q 8 hours with iCA

## 2023-12-13 NOTE — SUBJECTIVE & OBJECTIVE
Interval History:   Seen on RRT today. Tolerating UFR of 450 mL/hr. Patient negative 4.1 L/24 hrs. Electrolytes non emergent. Extubated this AM. No distress on exam.   Primary team requesting allow time for PT/OT.     Review of patient's allergies indicates:   Allergen Reactions    Cephalexin Other (See Comments)     She gets mouth sores.     Penicillin g Other (See Comments)     Causes mouth sores.      Current Facility-Administered Medications   Medication Frequency    0.9%  NaCl infusion (CRRT USE ONLY) Continuous    0.9%  NaCl infusion PRN    acetaminophen oral solution 650 mg Q8H PRN    acetaminophen oral solution 650 mg Q8H    aspirin chewable tablet 81 mg Daily    balsam peru-castor oiL Oint BID    calcium gluconate 1 g in NS IVPB (premixed) PRN    calcium gluconate 1 g in NS IVPB (premixed) PRN    calcium gluconate 1 g in NS IVPB (premixed) PRN    dexmedetomidine (PRECEDEX) 400mcg/100mL 0.9% NaCL infusion Continuous    dextrose 10% bolus 125 mL 125 mL PRN    dextrose 10% bolus 250 mL 250 mL PRN    docusate 50 mg/5 mL liquid 100 mg Daily    heparin 25,000 units in dextrose 5% 250 mL (100 units/mL) infusion LOW INTENSITY nomogram - [facility] Continuous    magnesium sulfate 2g in water 50mL IVPB (premix) PRN    magnesium sulfate 2g in water 50mL IVPB (premix) PRN    magnesium sulfate 2g in water 50mL IVPB (premix) PRN    NORepinephrine 8 mg in dextrose 5% 250 mL infusion Continuous    ondansetron injection 4 mg Q12H PRN    oxyCODONE immediate release tablet 5 mg Q6H PRN    pantoprazole injection 40 mg Daily    polyethylene glycol packet 17 g Daily    propofol (DIPRIVAN) 10 mg/mL infusion Continuous    senna-docusate 8.6-50 mg per tablet 1 tablet BID    sodium phosphate 20.01 mmol in dextrose 5 % (D5W) 250 mL IVPB PRN    sodium phosphate 30 mmol in dextrose 5 % (D5W) 250 mL IVPB PRN    sodium phosphate 39.99 mmol in dextrose 5 % (D5W) 250 mL IVPB PRN       Objective:     Vital Signs (Most Recent):  Temp: 97.5 °F (36.4  °C) (12/13/23 0700)  Pulse: 86 (12/13/23 1245)  Resp: (!) 38 (12/13/23 1245)  BP: (!) 101/51 (12/12/23 0800)  SpO2: 95 % (12/13/23 1245) Vital Signs (24h Range):  Temp:  [97.5 °F (36.4 °C)-98.4 °F (36.9 °C)] 97.5 °F (36.4 °C)  Pulse:  [76-90] 86  Resp:  [15-38] 38  SpO2:  [95 %-100 %] 95 %  Arterial Line BP: ()/(44-69) 101/53     Weight: 127 kg (280 lb) (12/13/23 0421)  Body mass index is 46.59 kg/m².  Body surface area is 2.41 meters squared.    I/O last 3 completed shifts:  In: 17482.4 [I.V.:1337.5; NG/GT:1390; IV Piggyback:7767.9]  Out: 27408 [Drains:45; Other:88961; Chest Tube:110]     Physical Exam  Vitals and nursing note reviewed.   Constitutional:       Appearance: She is obese.      Interventions: She is sedated and intubated.   HENT:      Head: Normocephalic and atraumatic.      Mouth/Throat:      Mouth: Mucous membranes are moist.   Cardiovascular:      Rate and Rhythm: Normal rate and regular rhythm.      Pulses: Normal pulses.   Pulmonary:      Effort: Pulmonary effort is normal. No respiratory distress. She is intubated.      Breath sounds: Normal breath sounds. No wheezing or rales.   Chest:      Comments: Three chest tubes in midline   Abdominal:      General: Abdomen is flat.      Palpations: Abdomen is soft.   Musculoskeletal:         General: Swelling present. Normal range of motion.      Right lower leg: Edema present.      Left lower leg: Edema present.   Skin:     General: Skin is warm and dry.   Neurological:      General: No focal deficit present.   Psychiatric:         Mood and Affect: Mood normal.         Behavior: Behavior normal.          Significant Labs:  CBC:   Recent Labs   Lab 12/13/23 0415   WBC 14.66*   RBC 3.28*   HGB 9.7*   HCT 29.0*   PLT 71*   MCV 88   MCH 29.6   MCHC 33.4     CMP:   Recent Labs   Lab 12/13/23 0415     107   CALCIUM 7.7*  7.7*   ALBUMIN 2.1*  2.1*   PROT 5.8*   *  135*   K 4.1  4.1   CO2 18*  18*     104   BUN 6  6    CREATININE 0.8  0.8   ALKPHOS 108   *   *   BILITOT 4.5*     All labs within the past 24 hours have been reviewed.

## 2023-12-13 NOTE — PROGRESS NOTES
"[facility]  Adult Nutrition  Progress Note    SUMMARY       Recommendations    1. Once extubated, advance PO diet order to Cardiac as tolerated.     2. If PO intake poor >48 hrs, add Boost Plus ONS BID to supplement intake.     3. If TF are medically warranted, recommend to continue Peptamen VHP at 70ml/hr to provide 1680 kcal, 155g PRO, and 1411ml FF- FWF per MD.    4. RD to monitor & follow-up.    Goals: Meet % EEN, EPN by RD f/u date  Nutrition Goal Status: progressing towards goal  Communication of RD Recs: reviewed with RN    Assessment and Plan    Nutrition Problem:  Inadequate energy intake     Related to (etiology):   Inability to consume sufficient energy      Signs and Symptoms (as evidenced by):   NPO     Interventions(treatment strategy):  Collaboration of nutrition care w/ other providers  EN vs ADAT     Nutrition Diagnosis Status:   Continues    Reason for Assessment    Reason For Assessment: RD follow-up  Diagnosis: other (see comments) (Aortic stenosis)  Relevant Medical History: Rheumatic heart disease  Interdisciplinary Rounds: did not attend  General Information Comments: RD f/u: pt was extubated this AM. Pt denies n/v/d/c. caregivers were present in the room. TF were turned off at the time. CBW is same as the admit wt. Edema still present (trace to mild). Awaiting speech eval for diet advancement. RD team to monitor and f/u.  Nutrition Discharge Planning: Pending clinical course    =  Nutrition/Diet History    Spiritual, Cultural Beliefs, Baptism Practices, Values that Affect Care: no  Factors Affecting Nutritional Intake: NPO    Anthropometrics    Temp: 97.5 °F (36.4 °C)  Height Method: Stated  Height: 5' 5" (165.1 cm)  Height (inches): 65 in  Weight Method: Bed Scale  Weight: 127 kg (280 lb)  Weight (lb): 280 lb  Ideal Body Weight (IBW), Female: 125 lb  % Ideal Body Weight, Female (lb): 253.62 %  BMI (Calculated): 46.6  BMI Grade: greater than 40 - morbid " obesity    Lab/Procedures/Meds    Pertinent Labs Reviewed: reviewed  Pertinent Labs Comments: Na: 133, GFR: 54.3, Ca: 8.6, phos: 2.1, PRO: 5.8, alb: 2.3, tbili: 4.5, AST: 128, ALT: 125  Pertinent Medications Reviewed: reviewed  Pertinent Medications Comments: Docusate, pantoprazole, polyethylene glycol, senna-docusate, Ca Gluc, dextrose-sod citrate-citric ac, heparin, nor-epi    Estimated/Assessed Needs    Weight Used For Calorie Calculations: 127 kg (279 lb 15.8 oz)  Energy Calorie Requirements (kcal): 1778 kcal  Energy Need Method: Kcal/kg (14 kcal/kg d/t BMI >40.0)      Protein Requirements: 114-142 g/d (2-2.5 g/kg)  Weight Used For Protein Calculations: 56.8 kg (125 lb 3.5 oz)       Estimated Fluid Requirement Method: other (see comments) (Per MD or 1 mL/kcal)  RDA Method (mL): 1778       Nutrition Prescription Ordered    Current Diet Order: NPO  Current Nutrition Support Formula Ordered: Peptamen Intense VHP  Current Nutrition Support Rate Ordered: 70 (ml)  Current Nutrition Support Frequency Ordered: ml/hr x 24hrs    Evaluation of Received Nutrient/Fluid Intake    Enteral Calories (kcal): 1688  Enteral Protein (gm): 155  Enteral (Free Water) Fluid (mL): 1411  I/O: -657ml since 12/5  Comments: LBM: PTA  % Intake of Estimated Energy Needs: 0 - 25 %  % Meal Intake: NPO    Nutrition Risk    Level of Risk/Frequency of Follow-up:  (1x/week)     Monitor and Evaluation    Food and Nutrient Intake: enteral nutrition intake, food and beverage intake, energy intake  Food and Nutrient Adminstration: diet order, enteral and parenteral nutrition administration  Physical Activity and Function: nutrition-related ADLs and IADLs  Anthropometric Measurements: weight, weight change  Biochemical Data, Medical Tests and Procedures: glucose/endocrine profile, inflammatory profile, lipid profile, gastrointestinal profile, electrolyte and renal panel  Nutrition-Focused Physical Findings: overall appearance     Nutrition  Follow-Up    RD Follow-up?: Yes

## 2023-12-13 NOTE — PROGRESS NOTES
[facility]  Critical Care - Surgery  Progress Note    Patient Name: Megan Cook  MRN: [id number]  Admission Date: 12/8/2023  Hospital Length of Stay: 5 days  Code Status: Full Code  Attending Provider: Alexandru Campbell MD  Primary Care Provider: Adelaida Disla NP   Principal Problem: Rheumatic aortic stenosis    Subjective:     Hospital/ICU Course:  No notes on file    Interval History/Significant Events: NAEON, extubated today to AIRVO. Weaning as tolerated. Increase hep drip.    Follow-up For: Procedure(s) (LRB):  Replacement-valve-aortic (N/A)  REPLACEMENT, PULMONARY VALVE (N/A)    Post-Operative Day: 5 Days Post-Op    Objective:     Vital Signs (Most Recent):  Temp: 97.5 °F (36.4 °C) (12/13/23 0700)  Pulse: 97 (12/13/23 1445)  Resp: (!) 30 (12/13/23 1445)  BP: (!) 101/51 (12/12/23 0800)  SpO2: 96 % (12/13/23 1445) Vital Signs (24h Range):  Temp:  [97.5 °F (36.4 °C)-98 °F (36.7 °C)] 97.5 °F (36.4 °C)  Pulse:  [76-97] 97  Resp:  [15-38] 30  SpO2:  [95 %-100 %] 96 %  Arterial Line BP: ()/(44-69) 103/52     Weight: 127 kg (280 lb)  Body mass index is 46.59 kg/m².      Intake/Output Summary (Last 24 hours) at 12/13/2023 1507  Last data filed at 12/13/2023 1413  Gross per 24 hour   Intake 5883.1 ml   Output 8545 ml   Net -2661.9 ml          Physical Exam  Vitals and nursing note reviewed.   Constitutional:       Appearance: She is obese.   HENT:      Head: Normocephalic and atraumatic.   Cardiovascular:      Rate and Rhythm: Regular rhythm. Tachycardia present.   Pulmonary:      Effort: Pulmonary effort is normal.      Comments:      Chest:      Comments: Three chest tubes in midline   Abdominal:      General: Abdomen is flat.      Palpations: Abdomen is soft.   Musculoskeletal:         General: Swelling present.   Skin:     General: Skin is warm and dry.      Capillary Refill: Capillary refill takes less than 2 seconds.            Vents:  Vent Mode: Spont (12/13/23 1030)  Set  Rate: 18 BPM (12/13/23 0747)  Vt Set: 400 mL (12/13/23 0747)  Pressure Support: 5 cmH20 (12/13/23 1030)  PEEP/CPAP: 5 cmH20 (12/13/23 1030)  Oxygen Concentration (%): 35 (12/13/23 1235)  Peak Airway Pressure: 11 cmH20 (12/13/23 1030)  Plateau Pressure: 0 cmH20 (12/13/23 1030)  Total Ve: 9.35 L/m (12/13/23 1030)  Negative Inspiratory Force (cm H2O): -38 (12/13/23 1030)  F/VT Ratio<105 (RSBI): (!) 73.96 (12/13/23 1030)    Lines/Drains/Airways       Central Venous Catheter Line  Duration             Percutaneous Central Line Insertion/Assessment - Quad Lumen  12/08/23 0949 Internal Jugular Right 5 days    Trialysis (Dialysis) Catheter 12/09/23 1327 left internal jugular 4 days              Drain  Duration                  NG/OG Tube 12/08/23  5 days         Chest Tube 12/08/23 2059 Tube - 3 Mediastinal 24 Fr. 4 days         Y Chest Tube 1 and 2 12/08/23 2058 1 Right Pleural 24 Fr. 2 Left Pleural 24 Fr. 4 days              Airway  Duration                  Airway - Non-Surgical 12/08/23 0738 5 days              Arterial Line  Duration             Arterial Line 12/11/23 1100 Right Radial 2 days                    Significant Labs:    CBC/Anemia Profile:  Recent Labs   Lab 12/12/23  0307 12/12/23  0307 12/12/23  1944 12/13/23  0407 12/13/23  0415   WBC 8.61  --  15.84*  --  14.66*   HGB 9.0*  --  10.0*  --  9.7*   HCT 25.8*   < > 29.4* 28* 29.0*   PLT 35*  --  61*  --  71*   MCV 85  --  91  --  88   RDW 15.9*  --  15.8*  --  15.7*    < > = values in this interval not displayed.        Chemistries:  Recent Labs   Lab 12/12/23  0304 12/12/23  1438 12/12/23  2112 12/13/23  0415 12/13/23  1417     140   < > 135* 135*  135* 133*   K 4.0  4.0   < > 3.9 4.1  4.1 4.7     107   < > 103 104  104 100   CO2 23  23   < > 22* 18*  18* 22*   BUN 9  8   < > 6 6  6 11   CREATININE 1.3  1.3   < > 0.9 0.8  0.8 1.3   CALCIUM 8.1*  8.0*   < > 7.5* 7.7*  7.7* 8.6*   ALBUMIN 2.1*  2.1*   < > 2.1* 2.1*  2.1* 2.3*    PROT 5.2*  --   --  5.8*  --    BILITOT 5.7*  --   --  4.5*  --    ALKPHOS 84  --   --  108  --    *  --   --  125*  --    AST 83*  --   --  128*  --    MG 2.2   < > 2.8* 1.8 1.8   PHOS 3.0   < > 2.9 1.9* 2.1*    < > = values in this interval not displayed.           Significant Imaging:  I have reviewed all pertinent imaging results/findings within the past 24 hours.  Assessment/Plan:     Cardiac/Vascular  * Rheumatic aortic stenosis  38-year-old, 127 kg, woman with history of rheumatic heart disease and Ross procedure and mitral valve repair(including ring annuloplasty) in 2000 followed by repair of autograft aneurysm repair and aortic valve replacement with 23mm CE bioprothesis in 2012 who presents for aortic and pulmonary valve replacement on 12/8      Neuro/Psych:   -- Sedation: n/a  -- Pain: Tylenol 650 mg PRN, Morphine 2 mg PRN, oxy 5 Q6 PRN             Cards:   History of rheumatic fever with rheumatic heart disease . S/p Ross procedure and mitral valve repair(including ring annuloplasty) in 2000   Now s/p aortic and pulmonary valve replacement with Dr. Campbell on 12/8  -- HDS  -- MAPS >65  -- SBP   -- Pressors to maintain blood pressure goals: epi off, vaso off, levo 0.01  -- wean pressors as tolerated      Pulm:   -- Extubated 12/13  -- Goal O2 sat > 90%  -- Wean as able  -- Two pleural and one mediastinal chest tube to water seal       Renal:  -- Nicolas removed, anuric  -- CRRT, /hr  -- BUN/Cr   Recent Labs   Lab 12/12/23  2112 12/13/23  0415 12/13/23  1417   BUN 6 6  6 11   CREATININE 0.9 0.8  0.8 1.3     -- Urine output: anuric      FEN / GI:   -- Replace lytes as needed  -- Nutrition: TF  -- Docusate   -- Famotidine       ID:   -- Tm: afebrile; WBC 12.03 preop. 18.7 post op   Recent Labs   Lab 12/12/23  0307 12/12/23  1944 12/13/23  0415   WBC 8.61 15.84* 14.66*     -- Vancomycin two doses in OR       Heme/Onc:  -- H/H stable, 12.6 preop. 13.8 post op.   -- Daily CBC  -- holding  heparin for platelets 30  -- Received 8 units of pRBC in the OR   Recent Labs   Lab 12/09/23  1846 12/10/23  0303 12/10/23  1720 12/12/23  0304 12/12/23  0307 12/12/23  1944 12/13/23  0415 12/13/23  1417   HGB 11.9* 11.0*   < >  --  9.0* 10.0* 9.7*  --    PLT 37* 30*   < >  --  35* 61* 71*  --    APTT  --  29.9  --  29.2  --  27.4 30.8 28.3   INR 1.1 1.1  --  1.1  --   --   --   --     < > = values in this interval not displayed.        Endo:   -- Gluc goal 140-180  -- POCT 152      PPx:   Feeding: TF  Analgesia/Sedation: tylenol, morphine  Thromboembolic prevention: heparin held for plts <30  HOB >30: Yes  Stress Ulcer ppx: Famotidine   Glucose control: Critical care goal 140-180 g/dl   Bowel reg: docusate  Invasive Lines/Drains/Airway: PIV x2, Dietrich, Central line, Chest tube x3; L IJ trialysis  Deescalation: dc'ed         Dispo/Code Status/Palliative:   -- SICU / Full Code             Juan Dixon MD  Critical Care - Surgery  [facility]

## 2023-12-13 NOTE — PLAN OF CARE
Evaluation completed, plan of care established.    Problem: Occupational Therapy  Goal: Occupational Therapy Goal  Description: Goals to be met by: 1/13/23    Patient will increase functional independence with ADLs by performing:    UE Dressing with Stand-by Assistance.  LE Dressing with Minimal Assistance.  Grooming while standing with Minimal Assistance.  Toileting from toilet with Minimal Assistance for hygiene and clothing management.   Supine to sit with Stand-by Assistance.  Step transfer with Minimal Assistance  Toilet transfer to toilet with Minimal Assistance.    Outcome: Ongoing, Progressing

## 2023-12-13 NOTE — PROGRESS NOTES
12/13/23 1413   Treatment   Dialyzer Time (hours) 33.18   BVP (Liters) 288.6 L   CRRT Comments blood was returned by primary RN at 1200.   CRRT Hourly Documentation   Total UF (Hourly Cleared) (mL) 24     Blood was returned at 1200 by primary RN as ordered. Machine disinfected at this time.

## 2023-12-13 NOTE — PLAN OF CARE
Recommendations     1. Once extubated, advance PO diet order to Cardiac as tolerated.      2. If PO intake poor >48 hrs, add Boost Plus ONS BID to supplement intake.      3. If TF are medically warranted, recommend to continue Peptamen VHP at 70ml/hr to provide 1680 kcal, 155g PRO, and 1411ml FF- FWF per MD.     4. RD to monitor & follow-up.     Goals: Meet % EEN, EPN by RD f/u date  Nutrition Goal Status: progressing towards goal  Communication of RD Recs: reviewed with RN

## 2023-12-13 NOTE — PROGRESS NOTES
12/13/23 0121   Treatment   Treatment Type SLED   Treatment Status Daily equipment check   Dialysis Machine Number [id number]   Dialyzer Time (hours) 22.18   BVP (Liters) 193.4 L   Solutions Labeled and Current  Yes   Access Temporary Cath;Left;IJ   Catheter Dressing Intact  Yes   Alarms Engaged Yes   CRRT Comments daily check done

## 2023-12-13 NOTE — CARE UPDATE
SICU Staff Addendum  Please see resident/bela note from 12/13/2023 for full details of the patients history of present illness / progress note for today. I have performed a substantial portion of the critical care visit.I have reviewed and concur with the BELA/resident's history, physical, assessment, and plan.  I have personally interviewed and examined the patient at bedside.  See below for any additional findings.    Reason for admission:  Rheumatic aortic stenosis  Present on Admission:   Rheumatic aortic stenosis   Pulmonary artery stenosis of peripheral branch at or beyond the hilar bifurcation   Hypervolemia   Oliguria   Anasarca   Accelerated junctional rhythm      Goals for Today:   - POD 5 s/p redo PVR and mAVR  - pt placed on SAT/SBT. Tolerated this well. Extubated to airvo with plans to wean o2 as able   - will do bedside speech evaluation and advance diet to clears  - platelet count has rebounded -- now on high intensity normogram without bolus for valve protection. Aspirin  - will leave chest tubes in place and try to get the patient out of bed prior to removal and in the setting of increasing her heparin drip. No airleak noted from the pleural chest tube today. Will do clamping trial prior to pleural chest tube removal maybe tomororw  - 128 kilos this morning. Dry weight 127 kilos. Plan for nocturnal dialysis tonight so that she can rehab during the day   - DC GI ppx now that she's extubated       45  minutes of critical care time was spent personally by me on the following activities: development of treatment plan with patient or surrogate and bedside caregivers, discussions with consultants, evaluation of patient's response to treatment, examination of patient, ordering and performing treatments and interventions, ordering and review of laboratory studies, ordering and review of radiographic studies, pulse oximetry, re-evaluation of patient's condition.  This critical care time did not overlap with  that of any other provider or involve time for any procedures.    Komal Zhu MD  Anesthesia Critical Care  Spectra [id number]

## 2023-12-13 NOTE — PROGRESS NOTES
"Congenital Cardiac Surgery Note:     Ms. Megan Cook underwent re-replacement of her aortic valve and re-replacement of her RV-PA conduit/pulmonary valve on 12-8-2023.       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      Post-op course complicated by Acute Renal Failure requiring CRRT.     Interval Events  Another 4L removed yesterday, off the low dose levophed since last night.  BP good this morning.   Weight today back to pre-op weight.    On breathing trial and tolerating.    Heparin infusion started yesterday  Plts improved        Vitals:    12/13/23 0730 12/13/23 0745 12/13/23 0747 12/13/23 0757   BP:       BP Location:       Patient Position:       Pulse: 90 87 86 87   Resp: 19 (!) 24 (!) 21 (!) 31   Temp:       TempSrc:       SpO2: 98% 98% 99% 95%   Weight:       Height:   5' 5" (1.651 m)          Physical Exam:  General: alert, intubated  CV: normal rate and regular, good pedal pulses  Resp: mechanical ventilation but on spontaneous trial and calm   Neuro: alert, follows commands, moves all extremities  Drains: Serous output, no air leak  Incision: dressed     Imaging:  CXR: Better quality film this morning, no significant right effusion, possible small left effusion and likely some atelectasis      Assessment and Plan:  Discussed her care with Dr. Zhu, and ICU team.  Continued success with volume removal and off low dose levophed.  No pressors at all since last night.    Planned continued volume removal though based on weight is at pre-op weight.  10L off last 48 hours.    They may discuss transitioning to some intermittent renal replacement therapy as early as later today.      Goal to extubate today but remains to be seen after she is on spontaneous a little longer.    Platelet Count improved.    Heparin started yesterday at minimum dose.  Will increase goal pTT to 60-80 today with no boluses.   They are starting baby aspirin daily as well.      Ultimately, " anticoagulation plan will be for warfarin with an INR target of 2.5(goal range 2-3) and daily aspirin.      Chest tube output remains low volume and serous. Will keep for now and but if no concerns of bleeding after pTT higher likely can come out soon, will let her get up today if she gets extubated.     Tolerating Feeds.  on PPI as well.     Continue supportive care     I appreciate the assistance of the ICU team in her care. Also appreciate the nephrology team for their input and assistant with renal replacement therapy.     Alexandru Campbell MD

## 2023-12-13 NOTE — PT/OT/SLP EVAL
Physical Therapy Co-Evaluation and Co-Treatment    Patient Name:  Megan Cook   MRN:  [id number]  Admit Date: 12/8/2023  Admitting Diagnosis:  Rheumatic aortic stenosis   Length of Stay: 5 days  Recent Surgery: Procedure(s) (LRB):  Replacement-valve-aortic (N/A)  REPLACEMENT, PULMONARY VALVE (N/A) 5 Days Post-Op    Recommendations:     Discharge Recommendations: High Intensity Therapy  Discharge Equipment Recommendations: wheelchair, walker, rolling, shower chair, bedside commode   Barriers to discharge:  increased assist required    Appropriate transfer level with nursing staff: bed <> chair transfer with assist x 2    Plan:     During this hospitalization, patient to be seen 5 x/week to address the identified rehab impairments via gait training, therapeutic activities, therapeutic exercises, neuromuscular re-education and progress towards the established goals.  Plan of Care Expires:  01/12/24  Plan of Care Reviewed with: patient, family    Assessment     Megan Cook is a 38 y.o. female admitted with a medical diagnosis of Rheumatic aortic stenosis. Pt tolerated initial evaluation well today. Patient is s/p pulmonary valve replacement on 12/8/2023. Pt extubated 12/13 in the AM and presents in PM for PT evaluation. Pt with good functional strength demonstrating 4/5 quad strength in BLE and WFL strength in hip flexors and ankle DF/PF. Despite good strength pt with poor endurance and power generation and demo'ed need for max assist x 2 persons to safely stand and transfer to bedside chair. Pt's deficits are likely due to deconditioning from prolonged bedrest and intubation. Pt is very motivated to improve functionally but is well below baseline independence. Pt will continue to benefit from skilled PT services during this hospital admit to continue to improve transfer ability and efficiency as well as continue to progress pt's ambulation distance and cardiopulmonary endurance to maximize pt's functional  independence and return to PLOF. Patient presents with good participation and motivation to return to prior level of function with high intensity therapy.  The patient demonstrates appropriate pain control to participate in up to 3 hours or 15hrs of combined therapy post acute    Problem List: weakness, impaired endurance, impaired self care skills, impaired functional mobility, gait instability, impaired balance, decreased upper extremity function, decreased lower extremity function, decreased safety awareness, pain, impaired coordination, impaired skin, impaired fine motor, edema, impaired cardiopulmonary response to activity.  Rehab Prognosis: Good; patient would benefit from acute skilled PT services to address these deficits and reach maximum level of function.      Goals:   Multidisciplinary Problems       Physical Therapy Goals          Problem: Physical Therapy    Goal Priority Disciplines Outcome Goal Variances Interventions   Physical Therapy Goal     PT, PT/OT Ongoing, Progressing     Description: Goals to be met by: 2023     Patient will increase functional independence with mobility by performin. Sit to stand transfer with Minimal Assistance  2. Bed to chair transfer with Moderate Assistance using physician approved AD  3. Gait  x 20 feet with Moderate Assistance using physician approved AD.   4. Ascend/descend 14 stair with right Handrails Minimal Assistance using physician approved AD.   5. Stand for 5 minutes with Contact Guard Assistance using physician approved AD  6. Lower extremity exercise program x30 reps per handout, with independence                         Subjective     RN notified prior to session. Multiple family members present upon PT entrance into room. Patient agreeable to PT evaluation.    Chief Complaint: No chief complaint on file.  Patient/Family Comments/goals: pt motivated to get OOB  Pain/Comfort:  Pain Rating 1: other (see comments) (reporting generalized  "discomfort)  Pain Addressed 1: Reposition  Pain Rating Post-Intervention 1:  (reports pain/discomfort improved after transitioned to bedside chair)    Social History:  Residence: Patient lives with their significant other in a 2 story house with number of outside stair(s): 0, number of inside stair(s): 14 with Lt HR, walk-in shower, and grab bars.  Equipment Owned (not using): none  Equipment Used: none  Prior level of function:  Prior to admission, patient was independent  Work: employed, works from home.   Drive: yes.   Managing Medicines/Managing Home: yes.   Hobbies: watching sports ([employer] fan) and playing with her dogs  Assistance Upon Discharge: family    Objective:     Additional staff present: OT; OT for co-evaluation due to suspected patient need for two skilled therapists to appropriately assess patient's functional deficits as well as ensure patient safety, accommodate for limited activity tolerance, and provide appropriate, skilled assistance to maximize functional potential during evaluation.    Patient found HOB elevated with: telemetry, blood pressure cuff, pulse ox (continuous), oxygen, Trialysis, central line, arterial line, chest tube     General Precautions: Standard, Cardiac fall, sternal   Orthopedic Precautions:N/A   Braces: N/A   Body mass index is 46.59 kg/m².  Oxygen Device: High Flow Nasal Cannula 35*% & 25L  Vitals: BP (!) 95/45   Pulse 103   Temp 97.5 °F (36.4 °C) (Oral)   Resp (!) 26   Ht 5' 5" (1.651 m)   Wt 127 kg (280 lb)   LMP 11/08/2023 (Approximate)   SpO2 99%   Breastfeeding No   BMI 46.59 kg/m²     Exams:  Cognition:   Alert, Distractible, and Cooperative   Patient is oriented to Person, Place, Time  Command following: Follows multistep verbal commands  Fluency: clear/fluent  Hearing: Intact  Vision:  Intact  Skin Integrity: Visible skin intact  Postural Assessment: slouched posture and rounded shoulders  Physical Exam:    Left UE Left LE Right UE Right LE   Edema " absent absent absent absent   ROM AROM WFL AROM WFL AROM WFL AROM WFL   Strength adequate ROM, decreased strength 4/5 adequate ROM, decreased strength 4/5   Sensation intact to light touch intact to light touch intact to light touch intact to light touch   Coordination normal normal normal normal     Outcome Measures:  AM-PAC 6 CLICK MOBILITY  Turning over in bed (including adjusting bedclothes, sheets and blankets)?: 2  Sitting down on and standing up from a chair with arms (e.g., wheelchair, bedside commode, etc.): 2  Moving from lying on back to sitting on the side of the bed?: 2  Moving to and from a bed to a chair (including a wheelchair)?: 2  Need to walk in hospital room?: 2  Climbing 3-5 steps with a railing?: 1  Basic Mobility Total Score: 11     Functional Mobility:    Bed Mobility:   Scooting to HOB via supine bridge: total assistance and 2 persons   Supine to Sit: moderate assistance and 2 persons for LE management and for trunk management; to Lt side of bed  Supine to Sit: maximal assistance and 2 persons for LE management and for trunk management; to Rt side of bed  Scooting anteriorly to EOB to have both feet planted on floor: maximal assistance   Sit to Supine: total assistance and 2 persons for LE management and for trunk management; to Lt side of bed    Sitting Balance at Edge of Bed:  Static Sitting Balance: Good- : able to accept minimal resistance  Dynamic Sitting Balance: Fair : minimal weight shifting ipsilaterally or anteriorly. Difficulty crossing midline  Assistance Level Required: Contact Guard Assistance  Time:  8 minutes + return to supine due to mattress difficulty + 7 minutes  Postural deviations noted: slouched posture, rounded shoulders, and forward head  Comments: Time EOB focused primarily on tolerance to upright positioning, cardiopulmonary response and endurance for activities, and strength of postural musculature to perform dynamic sitting to prepare for tasks in the home. Pt  with VSS and no c/o dizziness throughout time EOB. Pt returned to supine due to mattress sliding off of bed frame. Pt transitioned to Rt side of bed with improved mattress stability on bed fram.      Transfers:   Sit <> Stand Transfer: maximal assistance and of 2 persons with hand-held assist. [id number] from EOB  Pt with decreased effort in trial 1; pt with improved effort on 2nd trial but still with posterior lean and quickly returned to sitting; pt able to reach and maintain full standing on trial 3  Bed <> Chair Transfer Stand Pivot technique: maximal assistance and of 2 persons with hand-held assist. Chair on the Lt    Standing Balance:  Static Standing Balance: Poor-: requires maximal assistance and UE support to maintain standing balance without loss  Dynamic Standing Balance: Poor : able to stand with moderate assistance and minimally reach ipsilaterally. Unable to cross midline.  Assistance Level Required: Maximum Assistance x 2 persons  Patient used: hand-held assist     Gait: Attempted steps but pt with difficulty clearing feet so transfer transitioned to stand pivot    Education:  Time provided for education, counseling and discussion of health disposition in regards to patient's current status  All questions answered within PT scope of practice and to patient's satisfaction  PT role in POC to address current functional deficits  Pt educated on proper body mechanics, safety techniques, and energy conservation with PT facilitation and cueing throughout session  Whiteboard updated with therapist name and pt's current mobility status documented above  Safe to perform stand pivot transfer to/from chair/bedside commode assist x2 and HHA w/ nursing/PCT present  Pt educated on Post-op sternal precautions, including no lifting > 5 lbs, pulling or pushing with BUEs. Able to move arms within a pain free range.    Patient left up in chair with all lines intact, call button in reach, RN notified, and family  present.    History:     Past Medical History:   Diagnosis Date    Rheumatic fever with cardiac involvement        Past Surgical History:   Procedure Laterality Date    ANGIOGRAM, CORONARY, PEDIATRIC  8/1/2023    Procedure: Angiogram, Coronary, Pediatric;  Surgeon: Anthony Dubois Jr., MD;  Location: [facility] CATH LAB;  Service: Cardiology;;    ANGIOGRAM, PULMONARY, PEDIATRIC  8/1/2023    Procedure: Angiogram, Pulmonary, Pediatric;  Surgeon: Anthony Dubois Jr., MD;  Location: [facility] CATH LAB;  Service: Cardiology;;    AORTIC VALVE REPLACEMENT  2000    AORTIC VALVE REPLACEMENT  2012    AORTIC VALVE REPLACEMENT  2012    with aortic root replacement at [facility]    AORTIC VALVE REPLACEMENT N/A 12/8/2023    Procedure: Replacement-valve-aortic;  Surgeon: Alexandru Campbell MD;  Location: [facility] OR [address] [place] [address];  Service: Cardiovascular;  Laterality: N/A;    COMBINED RIGHT AND RETROGRADE LEFT HEART CATHETERIZATION FOR CONGENITAL HEART DEFECT N/A 8/1/2023    Procedure: Catheterization, Heart, Combined Right and Retrograde Left, for Congenital Heart Defect;  Surgeon: Anthony Dubois Jr., MD;  Location: [facility] CATH LAB;  Service: Cardiology;  Laterality: N/A;    PULMONARY VALVE REPLACEMENT N/A 12/8/2023    Procedure: REPLACEMENT, PULMONARY VALVE;  Surgeon: Alexandru Campbell MD;  Location: [facility] OR [address] [place] [address];  Service: Cardiovascular;  Laterality: N/A;    REPLACEMENT OF AORTIC VALVE USING ROSS PROCEDURE N/A 2000    [facility]       Family History   Problem Relation Age of Onset    Cardiomyopathy Other     Congenital heart disease Other        Social History     Socioeconomic History    Marital status: Single   Tobacco Use    Smoking status: Never    Smokeless tobacco: Never   Substance and Sexual Activity    Alcohol use: Yes     Alcohol/week: 1.0 standard drink of alcohol     Types: 1 Glasses of wine per week     Comment: social     Social Determinants of Health     Financial Resource Strain: Low Risk  (12/11/2023)    Overall Financial Resource  Strain (CARDIA)     Difficulty of Paying Living Expenses: Not hard at all   Food Insecurity: No Food Insecurity (12/11/2023)    Hunger Vital Sign     Worried About Running Out of Food in the Last Year: Never true     Ran Out of Food in the Last Year: Never true   Transportation Needs: No Transportation Needs (12/11/2023)    PRAPARE - Transportation     Lack of Transportation (Medical): No     Lack of Transportation (Non-Medical): No   Physical Activity: Insufficiently Active (12/11/2023)    Exercise Vital Sign     Days of Exercise per Week: 2 days     Minutes of Exercise per Session: 20 min   Stress: Unknown (12/11/2023)    [employer] - Occupational Stress Questionnaire     Feeling of Stress : Patient refused   Social Connections: Socially Isolated (12/11/2023)    Social Connection and Isolation Panel [NHANES]     Frequency of Communication with Friends and Family: Once a week     Frequency of Social Gatherings with Friends and Family: Once a week     Attends [employer] Services: Never     Active Member of Clubs or Organizations: No     Attends Club or Organization Meetings: Never     Marital Status:    Housing Stability: Unknown (12/11/2023)    Housing Stability Vital Sign     Unable to Pay for Housing in the Last Year: No     Unstable Housing in the Last Year: No       Time Tracking:     PT Received On: 12/13/23  PT Start Time: 1433     PT Stop Time: 1518  PT Total Time (min): 45 min     Billable Minutes: Evaluation 7 minutes, Therapeutic Exercise 15 minutes, and Neuromuscular Re-education 23 minutes    12/13/2023

## 2023-12-13 NOTE — PROGRESS NOTES
[facility] - [facility]ensive [facility]  Nephrology  Progress Note    Patient Name: Megan Cook  MRN: [id number]  Admission Date: 12/8/2023  Hospital Length of Stay: 5 days  Attending Provider: Alexandru Campbell MD   Primary Care Physician: Adelaida Disla NP  Principal Problem:Rheumatic aortic stenosis    Subjective:       Interval History:   Seen on RRT today. Tolerating UFR of 450 mL/hr. Patient negative 4.1 L/24 hrs. Electrolytes non emergent. Extubated this AM. No distress on exam.   Primary team requesting allow time for PT/OT.     Review of patient's allergies indicates:   Allergen Reactions    Cephalexin Other (See Comments)     She gets mouth sores.     Penicillin g Other (See Comments)     Causes mouth sores.      Current Facility-Administered Medications   Medication Frequency    0.9%  NaCl infusion (CRRT USE ONLY) Continuous    0.9%  NaCl infusion PRN    acetaminophen oral solution 650 mg Q8H PRN    acetaminophen oral solution 650 mg Q8H    aspirin chewable tablet 81 mg Daily    balsam peru-castor oiL Oint BID    calcium gluconate 1 g in NS IVPB (premixed) PRN    calcium gluconate 1 g in NS IVPB (premixed) PRN    calcium gluconate 1 g in NS IVPB (premixed) PRN    dexmedetomidine (PRECEDEX) 400mcg/100mL 0.9% NaCL infusion Continuous    dextrose 10% bolus 125 mL 125 mL PRN    dextrose 10% bolus 250 mL 250 mL PRN    docusate 50 mg/5 mL liquid 100 mg Daily    heparin 25,000 units in dextrose 5% 250 mL (100 units/mL) infusion LOW INTENSITY nomogram - [facility] Continuous    magnesium sulfate 2g in water 50mL IVPB (premix) PRN    magnesium sulfate 2g in water 50mL IVPB (premix) PRN    magnesium sulfate 2g in water 50mL IVPB (premix) PRN    NORepinephrine 8 mg in dextrose 5% 250 mL infusion Continuous    ondansetron injection 4 mg Q12H PRN    oxyCODONE immediate release tablet 5 mg Q6H PRN    pantoprazole injection 40 mg Daily    polyethylene glycol packet 17 g Daily    propofol (DIPRIVAN) 10 mg/mL infusion  Continuous    senna-docusate 8.6-50 mg per tablet 1 tablet BID    sodium phosphate 20.01 mmol in dextrose 5 % (D5W) 250 mL IVPB PRN    sodium phosphate 30 mmol in dextrose 5 % (D5W) 250 mL IVPB PRN    sodium phosphate 39.99 mmol in dextrose 5 % (D5W) 250 mL IVPB PRN       Objective:     Vital Signs (Most Recent):  Temp: 97.5 °F (36.4 °C) (12/13/23 0700)  Pulse: 86 (12/13/23 1245)  Resp: (!) 38 (12/13/23 1245)  BP: (!) 101/51 (12/12/23 0800)  SpO2: 95 % (12/13/23 1245) Vital Signs (24h Range):  Temp:  [97.5 °F (36.4 °C)-98.4 °F (36.9 °C)] 97.5 °F (36.4 °C)  Pulse:  [76-90] 86  Resp:  [15-38] 38  SpO2:  [95 %-100 %] 95 %  Arterial Line BP: ()/(44-69) 101/53     Weight: 127 kg (280 lb) (12/13/23 0421)  Body mass index is 46.59 kg/m².  Body surface area is 2.41 meters squared.    I/O last 3 completed shifts:  In: 11572.4 [I.V.:1337.5; NG/GT:1390; IV Piggyback:7767.9]  Out: 42548 [Drains:45; Other:26535; Chest Tube:110]     Physical Exam  Vitals and nursing note reviewed.   Constitutional:       Appearance: She is obese.      Interventions: She is sedated and intubated.   HENT:      Head: Normocephalic and atraumatic.      Mouth/Throat:      Mouth: Mucous membranes are moist.   Cardiovascular:      Rate and Rhythm: Normal rate and regular rhythm.      Pulses: Normal pulses.   Pulmonary:      Effort: Pulmonary effort is normal. No respiratory distress. She is intubated.      Breath sounds: Normal breath sounds. No wheezing or rales.   Chest:      Comments: Three chest tubes in midline   Abdominal:      General: Abdomen is flat.      Palpations: Abdomen is soft.   Musculoskeletal:         General: Swelling present. Normal range of motion.      Right lower leg: Edema present.      Left lower leg: Edema present.   Skin:     General: Skin is warm and dry.   Neurological:      General: No focal deficit present.   Psychiatric:         Mood and Affect: Mood normal.         Behavior: Behavior normal.          Significant  Labs:  CBC:   Recent Labs   Lab 12/13/23  0415   WBC 14.66*   RBC 3.28*   HGB 9.7*   HCT 29.0*   PLT 71*   MCV 88   MCH 29.6   MCHC 33.4     CMP:   Recent Labs   Lab 12/13/23  0415     107   CALCIUM 7.7*  7.7*   ALBUMIN 2.1*  2.1*   PROT 5.8*   *  135*   K 4.1  4.1   CO2 18*  18*     104   BUN 6  6   CREATININE 0.8  0.8   ALKPHOS 108   *   *   BILITOT 4.5*     All labs within the past 24 hours have been reviewed.     Assessment/Plan:     Cardiac/Vascular  * Rheumatic aortic stenosis  - defer to primary team     Renal/  TINO (acute kidney injury)  Aute kidney injury secondary to iATN from hypotensive episodes/hemodynamic instability during OR  (Baseline 0.4-1)  SLED started 12/9 for oliguric TINO    Plan  -Team requesting time off machine for physical therapy, will terminate treatment at 1200 and restart nocturnally.   -Will continue 12 hr SLED with citrate/calcium for regional anticoagulation  -minimal hour intake, -450 cc/hr as tolerated, please call Nephrology prior to adjusting UFR on RRT machine.   -strict I/O's  -RFP q 8 hours with iCA          Thank you for your consult. I will follow-up with patient. Please contact us if you have any additional questions.    Tiffany Joyce DNP, FNP-C  Nephrology  [facility]

## 2023-12-13 NOTE — PLAN OF CARE
Post-Acute Therapy Recommendation: high intensity     Evaluation completed today. PT goals appropriate.    Please continue Progressive Mobility Protocol as appropriate.    Appropriate transfer level with nursing staff: bed <> chair transfer with assist x 2    Jessenia Lucas PT, DPT  2023  Pager: [phone]    Problem: Physical Therapy  Goal: Physical Therapy Goal  Description: Goals to be met by: 2023     Patient will increase functional independence with mobility by performin. Sit to stand transfer with Minimal Assistance  2. Bed to chair transfer with Moderate Assistance using physician approved AD  3. Gait  x 20 feet with Moderate Assistance using physician approved AD.   4. Ascend/descend 14 stair with right Handrails Minimal Assistance using physician approved AD.   5. Stand for 5 minutes with Contact Guard Assistance using physician approved AD  6. Lower extremity exercise program x30 reps per handout, with independence    Outcome: Ongoing, Progressing

## 2023-12-13 NOTE — PLAN OF CARE
SICU PLAN OF CARE NOTE    Dx: Rheumatic aortic stenosis    Goals of Care: MAP >65    Vital Signs (last 12 hours):   Temp:  [98.4 °F (36.9 °C)-99 °F (37.2 °C)]   Pulse:  [74-95]   Resp:  [15-33]   BP: ()/(51-63)   SpO2:  [98 %-100 %]   Arterial Line BP: ()/(44-65)      Neuro: Sedated, Follows Commands, and Moves All Extremities    Cardiac: NSR; no junctional rhythm noted today    Respiratory: Ventilator ACVC+, FiO2 40%. On Spont for majority of the day 20/6, tolerating.     Gtts: Norepinephrine, Propofol, and Heparin    Urine Output: Urinary Catheter 0 cc/shift    Dialysis: SLED, UF Rate: 450/hr - net negative 2.6L this shift    Drains: Chest Tube, total output 40 cc /  shift    Diet: Tube Feeds 40cc/hr     Labs/Accuchecks: WNL. TEG sent and WNL    Skin:  Wound care to bedside to assess skin - see note. All other skin remains free from injury.  Patient turned Q2, waffle mattress inflated, and bed working correctly. Transitioned to Immerse Mattress this evening, tolerated well.    Shift Events:  No acute events this shift.  See flowsheet for further assessment/details.  Family updated on current condition/plan of care, questions answered, and emotional support provided.  MD updated on current condition, vitals, labs, and gtts.  No new orders received, will continue to monitor.

## 2023-12-13 NOTE — PLAN OF CARE
SICU PLAN OF CARE NOTE    Dx: Rheumatic aortic stenosis    Goals of Care: MAP 60-80    Vital Signs (last 12 hours):   Temp:  [97.5 °F (36.4 °C)-98 °F (36.7 °C)]   Pulse:  [76-87]   Resp:  [18-28]   SpO2:  [95 %-100 %]   Arterial Line BP: ()/(47-69)      Neuro: Sedated, Arouses to Voice, Follows Commands, and Moves All Extremities    Cardiac: NSR    Respiratory: Ventilator    Gtts: Propofol and Heparin    Urine Output: Anuric 0   cc/shift    Chest tubes: 10 cc/shift    SLED: -450    Diet: Tube Feeds    Labs/Accuchecks: Accuchecks Q4, renal and ical Q8, ABG PRN, aPTT with heparin nomogram    Skin:  All skin remains free from injury.  Patient turned Q2, waffle mattress inflated, and bed working correctly.    Shift Events:   See flowsheet for further assessment/details.  Family updated on current condition/plan of care, questions answered, and emotional support provided.  MD updated on current condition, vitals, labs, and gtts.  No new orders received, will continue to monitor.

## 2023-12-13 NOTE — RESPIRATORY THERAPY
Patient extubated per MD order.  Placed patient on AIRVO 25L/50%.  Sats are currently 97%.  Will continue to monitor.

## 2023-12-14 LAB
ALBUMIN SERPL BCP-MCNC: 2.2 G/DL (ref 3.5–5.2)
ALBUMIN SERPL BCP-MCNC: 2.3 G/DL (ref 3.5–5.2)
ALLENS TEST: ABNORMAL
ALLENS TEST: NORMAL
ALP SERPL-CCNC: 121 U/L (ref 55–135)
ALT SERPL W/O P-5'-P-CCNC: 111 U/L (ref 10–44)
ANION GAP SERPL CALC-SCNC: 13 MMOL/L (ref 8–16)
ANION GAP SERPL CALC-SCNC: 13 MMOL/L (ref 8–16)
APTT PPP: 33.5 SEC (ref 21–32)
APTT PPP: 37.5 SEC (ref 21–32)
APTT PPP: 38.9 SEC (ref 21–32)
APTT PPP: 40.7 SEC (ref 21–32)
AST SERPL-CCNC: 124 U/L (ref 10–40)
BACTERIA SPEC AEROBE CULT: NORMAL
BACTERIA SPEC AEROBE CULT: NORMAL
BASOPHILS # BLD AUTO: 0.11 K/UL (ref 0–0.2)
BASOPHILS NFR BLD: 0.6 % (ref 0–1.9)
BILIRUB DIRECT SERPL-MCNC: 3.1 MG/DL (ref 0.1–0.3)
BILIRUB SERPL-MCNC: 4.8 MG/DL (ref 0.1–1)
BUN SERPL-MCNC: 13 MG/DL (ref 6–20)
BUN SERPL-MCNC: 14 MG/DL (ref 6–20)
CA-I BLDV-SCNC: 1.05 MMOL/L (ref 1.06–1.42)
CA-I BLDV-SCNC: 1.07 MMOL/L (ref 1.06–1.42)
CA-I BLDV-SCNC: 1.1 MMOL/L (ref 1.06–1.42)
CALCIUM SERPL-MCNC: 9.1 MG/DL (ref 8.7–10.5)
CALCIUM SERPL-MCNC: 9.3 MG/DL (ref 8.7–10.5)
CHLORIDE SERPL-SCNC: 103 MMOL/L (ref 95–110)
CHLORIDE SERPL-SCNC: 103 MMOL/L (ref 95–110)
CO2 SERPL-SCNC: 17 MMOL/L (ref 23–29)
CO2 SERPL-SCNC: 18 MMOL/L (ref 23–29)
CREAT SERPL-MCNC: 1.7 MG/DL (ref 0.5–1.4)
CREAT SERPL-MCNC: 1.9 MG/DL (ref 0.5–1.4)
DIFFERENTIAL METHOD: ABNORMAL
EOSINOPHIL # BLD AUTO: 0.4 K/UL (ref 0–0.5)
EOSINOPHIL NFR BLD: 2.5 % (ref 0–8)
ERYTHROCYTE [DISTWIDTH] IN BLOOD BY AUTOMATED COUNT: 15.8 % (ref 11.5–14.5)
EST. GFR  (NO RACE VARIABLE): 34.4 ML/MIN/1.73 M^2
EST. GFR  (NO RACE VARIABLE): 39.4 ML/MIN/1.73 M^2
GLUCOSE SERPL-MCNC: 115 MG/DL (ref 70–110)
GLUCOSE SERPL-MCNC: 116 MG/DL (ref 70–110)
GRAM STN SPEC: NORMAL
HCO3 UR-SCNC: 19.5 MMOL/L (ref 24–28)
HCT VFR BLD AUTO: 30.7 % (ref 37–48.5)
HGB BLD-MCNC: 10 G/DL (ref 12–16)
IMM GRANULOCYTES # BLD AUTO: 0.63 K/UL (ref 0–0.04)
IMM GRANULOCYTES NFR BLD AUTO: 3.6 % (ref 0–0.5)
INR PPP: 1 (ref 0.8–1.2)
LACTATE SERPL-SCNC: 0.6 MMOL/L (ref 0.5–2.2)
LACTATE SERPL-SCNC: 0.8 MMOL/L (ref 0.5–2.2)
LDH SERPL L TO P-CCNC: 0.65 MMOL/L (ref 0.36–1.25)
LYMPHOCYTES # BLD AUTO: 1.8 K/UL (ref 1–4.8)
LYMPHOCYTES NFR BLD: 10.4 % (ref 18–48)
MAGNESIUM SERPL-MCNC: 2.1 MG/DL (ref 1.6–2.6)
MCH RBC QN AUTO: 29 PG (ref 27–31)
MCHC RBC AUTO-ENTMCNC: 32.6 G/DL (ref 32–36)
MCV RBC AUTO: 89 FL (ref 82–98)
MONOCYTES # BLD AUTO: 1.9 K/UL (ref 0.3–1)
MONOCYTES NFR BLD: 11 % (ref 4–15)
NEUTROPHILS # BLD AUTO: 12.6 K/UL (ref 1.8–7.7)
NEUTROPHILS NFR BLD: 71.9 % (ref 38–73)
NRBC BLD-RTO: 0 /100 WBC
PCO2 BLDA: 31.9 MMHG (ref 35–45)
PH SMN: 7.39 [PH] (ref 7.35–7.45)
PHOSPHATE SERPL-MCNC: 2.7 MG/DL (ref 2.7–4.5)
PLATELET # BLD AUTO: 171 K/UL (ref 150–450)
PMV BLD AUTO: 12 FL (ref 9.2–12.9)
PO2 BLDA: 69 MMHG (ref 80–100)
POC BE: -5 MMOL/L
POC SATURATED O2: 94 % (ref 95–100)
POC TCO2: 20 MMOL/L (ref 23–27)
POCT GLUCOSE: 120 MG/DL (ref 70–110)
POCT GLUCOSE: 76 MG/DL (ref 70–110)
POTASSIUM SERPL-SCNC: 4.5 MMOL/L (ref 3.5–5.1)
POTASSIUM SERPL-SCNC: 4.5 MMOL/L (ref 3.5–5.1)
PROT SERPL-MCNC: 6.3 G/DL (ref 6–8.4)
PROTHROMBIN TIME: 11.2 SEC (ref 9–12.5)
RBC # BLD AUTO: 3.45 M/UL (ref 4–5.4)
SAMPLE: ABNORMAL
SAMPLE: NORMAL
SITE: ABNORMAL
SITE: NORMAL
SODIUM SERPL-SCNC: 133 MMOL/L (ref 136–145)
SODIUM SERPL-SCNC: 134 MMOL/L (ref 136–145)
WBC # BLD AUTO: 17.56 K/UL (ref 3.9–12.7)

## 2023-12-14 PROCEDURE — 20000000 HC ICU ROOM

## 2023-12-14 PROCEDURE — 90945 DIALYSIS ONE EVALUATION: CPT

## 2023-12-14 PROCEDURE — 25000003 PHARM REV CODE 250: Performed by: PHYSICIAN ASSISTANT

## 2023-12-14 PROCEDURE — 25000003 PHARM REV CODE 250

## 2023-12-14 PROCEDURE — 82330 ASSAY OF CALCIUM: CPT | Mod: 91 | Performed by: NURSE PRACTITIONER

## 2023-12-14 PROCEDURE — 93005 ELECTROCARDIOGRAM TRACING: CPT

## 2023-12-14 PROCEDURE — 80069 RENAL FUNCTION PANEL: CPT | Performed by: NURSE PRACTITIONER

## 2023-12-14 PROCEDURE — 82248 BILIRUBIN DIRECT: CPT

## 2023-12-14 PROCEDURE — 63600175 PHARM REV CODE 636 W HCPCS

## 2023-12-14 PROCEDURE — 25000003 PHARM REV CODE 250: Performed by: INTERNAL MEDICINE

## 2023-12-14 PROCEDURE — 25000003 PHARM REV CODE 250: Performed by: STUDENT IN AN ORGANIZED HEALTH CARE EDUCATION/TRAINING PROGRAM

## 2023-12-14 PROCEDURE — 82803 BLOOD GASES ANY COMBINATION: CPT

## 2023-12-14 PROCEDURE — 99232 SBSQ HOSP IP/OBS MODERATE 35: CPT | Mod: ,,, | Performed by: NURSE PRACTITIONER

## 2023-12-14 PROCEDURE — 85025 COMPLETE CBC W/AUTO DIFF WBC: CPT

## 2023-12-14 PROCEDURE — 97535 SELF CARE MNGMENT TRAINING: CPT

## 2023-12-14 PROCEDURE — 85730 THROMBOPLASTIN TIME PARTIAL: CPT | Performed by: THORACIC SURGERY (CARDIOTHORACIC VASCULAR SURGERY)

## 2023-12-14 PROCEDURE — 83735 ASSAY OF MAGNESIUM: CPT | Performed by: NURSE PRACTITIONER

## 2023-12-14 PROCEDURE — 82800 BLOOD PH: CPT

## 2023-12-14 PROCEDURE — C9113 INJ PANTOPRAZOLE SODIUM, VIA: HCPCS | Performed by: STUDENT IN AN ORGANIZED HEALTH CARE EDUCATION/TRAINING PROGRAM

## 2023-12-14 PROCEDURE — 94761 N-INVAS EAR/PLS OXIMETRY MLT: CPT | Mod: XB

## 2023-12-14 PROCEDURE — 63600175 PHARM REV CODE 636 W HCPCS: Performed by: STUDENT IN AN ORGANIZED HEALTH CARE EDUCATION/TRAINING PROGRAM

## 2023-12-14 PROCEDURE — 99900035 HC TECH TIME PER 15 MIN (STAT)

## 2023-12-14 PROCEDURE — 99291 PR CRITICAL CARE, E/M 30-74 MINUTES: ICD-10-PCS | Mod: ,,, | Performed by: INTERNAL MEDICINE

## 2023-12-14 PROCEDURE — 80053 COMPREHEN METABOLIC PANEL: CPT | Performed by: INTERNAL MEDICINE

## 2023-12-14 PROCEDURE — 80100008 HC CRRT DAILY MAINTENANCE

## 2023-12-14 PROCEDURE — 92610 EVALUATE SWALLOWING FUNCTION: CPT

## 2023-12-14 PROCEDURE — 97530 THERAPEUTIC ACTIVITIES: CPT

## 2023-12-14 PROCEDURE — 37799 UNLISTED PX VASCULAR SURGERY: CPT

## 2023-12-14 PROCEDURE — 27000221 HC OXYGEN, UP TO 24 HOURS

## 2023-12-14 PROCEDURE — 99291 CRITICAL CARE FIRST HOUR: CPT | Mod: ,,, | Performed by: INTERNAL MEDICINE

## 2023-12-14 PROCEDURE — 99232 PR SUBSEQUENT HOSPITAL CARE,LEVL II: ICD-10-PCS | Mod: ,,, | Performed by: NURSE PRACTITIONER

## 2023-12-14 PROCEDURE — 63600175 PHARM REV CODE 636 W HCPCS: Performed by: PHYSICIAN ASSISTANT

## 2023-12-14 PROCEDURE — 93010 EKG 12-LEAD: ICD-10-PCS | Mod: ,,, | Performed by: INTERNAL MEDICINE

## 2023-12-14 PROCEDURE — 83605 ASSAY OF LACTIC ACID: CPT | Mod: 91

## 2023-12-14 PROCEDURE — 83605 ASSAY OF LACTIC ACID: CPT

## 2023-12-14 PROCEDURE — 85610 PROTHROMBIN TIME: CPT

## 2023-12-14 PROCEDURE — 25000003 PHARM REV CODE 250: Performed by: NURSE PRACTITIONER

## 2023-12-14 PROCEDURE — 93010 ELECTROCARDIOGRAM REPORT: CPT | Mod: ,,, | Performed by: INTERNAL MEDICINE

## 2023-12-14 RX ORDER — LIDOCAINE 50 MG/G
1 PATCH TOPICAL
Status: DISCONTINUED | OUTPATIENT
Start: 2023-12-14 | End: 2023-12-22 | Stop reason: HOSPADM

## 2023-12-14 RX ORDER — WARFARIN SODIUM 5 MG/1
5 TABLET ORAL DAILY
Status: DISCONTINUED | OUTPATIENT
Start: 2023-12-14 | End: 2023-12-14

## 2023-12-14 RX ORDER — NOREPINEPHRINE BITARTRATE/D5W 8 MG/250ML
0-.1 PLASTIC BAG, INJECTION (ML) INTRAVENOUS CONTINUOUS
Status: DISCONTINUED | OUTPATIENT
Start: 2023-12-14 | End: 2023-12-14

## 2023-12-14 RX ORDER — AMOXICILLIN 250 MG
1 CAPSULE ORAL 2 TIMES DAILY
Status: DISCONTINUED | OUTPATIENT
Start: 2023-12-14 | End: 2023-12-22 | Stop reason: HOSPADM

## 2023-12-14 RX ORDER — NOREPINEPHRINE BITARTRATE/D5W 8 MG/250ML
0-.05 PLASTIC BAG, INJECTION (ML) INTRAVENOUS CONTINUOUS
Status: DISCONTINUED | OUTPATIENT
Start: 2023-12-14 | End: 2023-12-18

## 2023-12-14 RX ORDER — NOREPINEPHRINE BITARTRATE/D5W 4MG/250ML
0-.1 PLASTIC BAG, INJECTION (ML) INTRAVENOUS CONTINUOUS
Status: DISCONTINUED | OUTPATIENT
Start: 2023-12-14 | End: 2023-12-14

## 2023-12-14 RX ORDER — ACETAMINOPHEN 10 MG/ML
1000 INJECTION, SOLUTION INTRAVENOUS EVERY 12 HOURS
Status: DISCONTINUED | OUTPATIENT
Start: 2023-12-14 | End: 2023-12-14

## 2023-12-14 RX ORDER — NAPROXEN SODIUM 220 MG/1
81 TABLET, FILM COATED ORAL DAILY
Status: DISCONTINUED | OUTPATIENT
Start: 2023-12-14 | End: 2023-12-22 | Stop reason: HOSPADM

## 2023-12-14 RX ORDER — HEPARIN SODIUM 10000 [USP'U]/100ML
25 INJECTION, SOLUTION INTRAVENOUS CONTINUOUS
Status: DISCONTINUED | OUTPATIENT
Start: 2023-12-14 | End: 2023-12-15

## 2023-12-14 RX ORDER — ACETAMINOPHEN 500 MG
1000 TABLET ORAL ONCE
Status: COMPLETED | OUTPATIENT
Start: 2023-12-14 | End: 2023-12-14

## 2023-12-14 RX ORDER — TALC
6 POWDER (GRAM) TOPICAL NIGHTLY
Status: DISCONTINUED | OUTPATIENT
Start: 2023-12-14 | End: 2023-12-22 | Stop reason: HOSPADM

## 2023-12-14 RX ORDER — ACETAMINOPHEN 650 MG/20.3ML
650 LIQUID ORAL EVERY 8 HOURS
Status: DISCONTINUED | OUTPATIENT
Start: 2023-12-14 | End: 2023-12-14

## 2023-12-14 RX ADMIN — HEPARIN SODIUM AND DEXTROSE 25 UNITS/KG/HR: 10000; 5 INJECTION INTRAVENOUS at 10:12

## 2023-12-14 RX ADMIN — Medication: at 09:12

## 2023-12-14 RX ADMIN — ONDANSETRON 4 MG: 2 INJECTION INTRAMUSCULAR; INTRAVENOUS at 12:12

## 2023-12-14 RX ADMIN — POLYETHYLENE GLYCOL 3350 17 G: 17 POWDER, FOR SOLUTION ORAL at 08:12

## 2023-12-14 RX ADMIN — PANTOPRAZOLE SODIUM 40 MG: 40 INJECTION, POWDER, FOR SOLUTION INTRAVENOUS at 08:12

## 2023-12-14 RX ADMIN — SENNOSIDES AND DOCUSATE SODIUM 1 TABLET: 8.6; 5 TABLET ORAL at 08:12

## 2023-12-14 RX ADMIN — ACETAMINOPHEN 1000 MG: 500 TABLET ORAL at 08:12

## 2023-12-14 RX ADMIN — NOREPINEPHRINE BITARTRATE 0.1 MCG/KG/MIN: 8 INJECTION, SOLUTION INTRAVENOUS at 06:12

## 2023-12-14 RX ADMIN — SODIUM CHLORIDE, SODIUM LACTATE, POTASSIUM CHLORIDE, AND CALCIUM CHLORIDE 250 ML: .6; .31; .03; .02 INJECTION, SOLUTION INTRAVENOUS at 10:12

## 2023-12-14 RX ADMIN — NOREPINEPHRINE BITARTRATE 0.01 MCG/KG/MIN: 8 INJECTION, SOLUTION INTRAVENOUS at 05:12

## 2023-12-14 RX ADMIN — SODIUM CHLORIDE, POTASSIUM CHLORIDE, SODIUM LACTATE AND CALCIUM CHLORIDE 500 ML: 600; 310; 30; 20 INJECTION, SOLUTION INTRAVENOUS at 12:12

## 2023-12-14 RX ADMIN — SODIUM PHOSPHATE, MONOBASIC, MONOHYDRATE AND SODIUM PHOSPHATE, DIBASIC, ANHYDROUS 20.01 MMOL: 142; 276 INJECTION, SOLUTION INTRAVENOUS at 07:12

## 2023-12-14 RX ADMIN — ACETAMINOPHEN 650 MG: 650 SOLUTION ORAL at 09:12

## 2023-12-14 RX ADMIN — CALCIUM GLUCONATE 1 G: 20 INJECTION, SOLUTION INTRAVENOUS at 01:12

## 2023-12-14 RX ADMIN — ASPIRIN 81 MG CHEWABLE TABLET 81 MG: 81 TABLET CHEWABLE at 08:12

## 2023-12-14 RX ADMIN — CALCIUM GLUCONATE 1 G: 20 INJECTION, SOLUTION INTRAVENOUS at 12:12

## 2023-12-14 RX ADMIN — NOREPINEPHRINE BITARTRATE 0.06 MCG/KG/MIN: 4 INJECTION, SOLUTION INTRAVENOUS at 05:12

## 2023-12-14 RX ADMIN — LIDOCAINE 5% 1 PATCH: 700 PATCH TOPICAL at 03:12

## 2023-12-14 RX ADMIN — Medication: at 08:12

## 2023-12-14 RX ADMIN — Medication 6 MG: at 08:12

## 2023-12-14 NOTE — PLAN OF CARE
Pt seen for bedside swallow assessment. Vocal quality is hoarse but periods of true phonation able to be appreciated. Pt appearing safe for small single sips of water and teaspoons of puree. Crushed meds in puree would be most beneficial. Speech to continue to follow.     Nelly Stone MS, CCC-SLP  Speech Language Pathologist  Pager: [phone]  Date 12/14/2023

## 2023-12-14 NOTE — PROGRESS NOTES
12/13/23 1826   Treatment   Treatment Type SLED   Treatment Status Restart   Dialysis Machine Number [id number]   Dialyzer Time (hours) 0   BVP (Liters) 0 L   Solutions Labeled and Current  Yes   Access Temporary Cath;Left;IJ   Catheter Dressing Intact  Yes   Alarms Engaged Yes   CRRT Comments crrt restarted   Prescription   Time (Hours) 12   Dialysate K + (mEq/L) 4   Dialysate CA + (mEq/L) 2.25   Dialysate HCO3 - (Bicarb) (mEq/L) 30   Dialysate Na + (mEq/L) 140   Cartridge Type Other  (r300)   Dialysate Flow Rate (mL/min) 200   UF Goal Rate 450 mL/hr   CRRT Hourly Documentation   Blood Flow (mL/min) 200   UF Rate 200 cc/hr   Arterial Pressure (mmHg) -60 mmHg   Venous Pressure (mmHg) 150 mmHg   Effluent Pressure (EP) (mmHg) 10 mmHg   Total UF (Hourly Cleared) (mL) 0     SLED restarted as ordered. LIJ CVC aspirated, flushed, and accessed using aseptic technique. Lines connected and secured.

## 2023-12-14 NOTE — PLAN OF CARE
Problem: Infection  Goal: Absence of Infection Signs and Symptoms  Outcome: Ongoing, Progressing       Problem: Postoperative Nausea and Vomiting (Cardiovascular Surgery)  Goal: Nausea and Vomiting Relief (Cardiovascular Surgery)  Outcome: Ongoing, Progressing     Problem: Hypothermia (CRRT (Continuous Renal Replacement Therapy))  Goal: Body Temperature Maintained in Desired Range  Outcome: Ongoing, Progressing

## 2023-12-14 NOTE — PROGRESS NOTES
[facility]  Nephrology  Progress Note    Patient Name: Megan Cook  MRN: [id number]  Admission Date: 12/8/2023  Hospital Length of Stay: 6 days  Attending Provider: Alexandru Campbell MD   Primary Care Physician: Adelaida Disla NP  Principal Problem:Rheumatic aortic stenosis    Subjective:     Interval History:   SLED terminated after 6 hrs due to hypotensive event. Levophed restarted, up to 0.1 mcg. Patient net positive ~ 450 mL/24 hrs. Remain anuric.   Electrolytes non emergent.   Appears comfortable on exam. 2 L NC.     Review of patient's allergies indicates:   Allergen Reactions    Cephalexin Other (See Comments)     She gets mouth sores.     Penicillin g Other (See Comments)     Causes mouth sores.      Current Facility-Administered Medications   Medication Frequency    0.9%  NaCl infusion PRN    acetaminophen oral solution 650 mg Q8H PRN    aspirin chewable tablet 81 mg Daily    balsam peru-castor oiL Oint BID    calcium gluconate 1 g in NS IVPB (premixed) PRN    calcium gluconate 1 g in NS IVPB (premixed) PRN    calcium gluconate 1 g in NS IVPB (premixed) PRN    calcium gluconate 3 g in dextrose 5 % (D5W) 100 mL infusion Continuous    dextrose 10% bolus 125 mL 125 mL PRN    dextrose 10% bolus 250 mL 250 mL PRN    dextrose-sod citrate-citric ac 2.45-2.2 gram- 800 mg/100 mL Soln Continuous    heparin 25,000 units in dextrose 5% 250 mL (100 units/mL) infusion LOW INTENSITY nomogram - [facility] Continuous    LIDOcaine 5 % patch 1 patch Q24H    magnesium sulfate 2g in water 50mL IVPB (premix) PRN    magnesium sulfate 2g in water 50mL IVPB (premix) PRN    melatonin tablet 6 mg QHS    NORepinephrine 8 mg in dextrose 5% 250 mL infusion Continuous    ondansetron injection 4 mg Q12H PRN    oxyCODONE immediate release tablet 5 mg Q6H PRN    polyethylene glycol packet 17 g Daily    senna-docusate 8.6-50 mg per tablet 1 tablet BID       Objective:     Vital Signs (Most Recent):  Temp: 97.7 °F  (36.5 °C) (12/14/23 0700)  Pulse: 92 (12/14/23 1130)  Resp: (!) 31 (12/14/23 1130)  BP: (!) 111/58 (12/14/23 0930)  SpO2: 98 % (12/14/23 1130) Vital Signs (24h Range):  Temp:  [97.7 °F (36.5 °C)-99 °F (37.2 °C)] 97.7 °F (36.5 °C)  Pulse:  [] 92  Resp:  [15-38] 31  SpO2:  [93 %-100 %] 98 %  BP: ()/(44-68) 111/58  Arterial Line BP: ()/(26-63) 106/45     Weight: 121.1 kg (267 lb) (12/14/23 0428)  Body mass index is 44.43 kg/m².  Body surface area is 2.36 meters squared.    I/O last 3 completed shifts:  In: 7530.1 [I.V.:1296.6; NG/GT:500; IV Piggyback:5733.5]  Out: 8803 [Drains:405; Other:8298; Chest Tube:100]     Physical Exam  Vitals and nursing note reviewed.   Constitutional:       Appearance: She is obese.      Interventions: She is sedated and intubated.   HENT:      Head: Normocephalic and atraumatic.      Mouth/Throat:      Mouth: Mucous membranes are moist.   Cardiovascular:      Rate and Rhythm: Normal rate and regular rhythm.      Pulses: Normal pulses.   Pulmonary:      Effort: Pulmonary effort is normal. No respiratory distress. She is intubated.      Breath sounds: Normal breath sounds. No wheezing or rales.   Chest:      Comments: Three chest tubes in midline   Abdominal:      General: Abdomen is flat.      Palpations: Abdomen is soft.   Musculoskeletal:         General: Swelling present. Normal range of motion.      Right lower leg: Edema present.      Left lower leg: Edema present.   Skin:     General: Skin is warm and dry.   Neurological:      General: No focal deficit present.   Psychiatric:         Mood and Affect: Mood normal.         Behavior: Behavior normal.          Significant Labs:  CBC:   Recent Labs   Lab 12/14/23  0307   WBC 17.56*   RBC 3.45*   HGB 10.0*   HCT 30.7*      MCV 89   MCH 29.0   MCHC 32.6     CMP:   Recent Labs   Lab 12/14/23  0307   *  115*   CALCIUM 9.3  9.1   ALBUMIN 2.3*  2.2*   PROT 6.3   *  133*   K 4.5  4.5   CO2 18*  17*   CL  103  103   BUN 14  13   CREATININE 1.7*  1.9*   ALKPHOS 121   *   *   BILITOT 4.8*     All labs within the past 24 hours have been reviewed.   Assessment/Plan:     Cardiac/Vascular  * Rheumatic aortic stenosis  - defer to primary team     Renal/  TINO (acute kidney injury)  Aute kidney injury secondary to iATN from hypotensive episodes/hemodynamic instability during OR  (Baseline 0.4-1)  SLED started 12/9 for oliguric TINO    Plan  -Will to hold further RRT at this time per primary's team request. No emergent need for clearance and/or volume removal at this time. Please call Nephrology for any acute indications for RRT.   -Remain anuric. Will continue to monitor for signs of renal recovery.           Thank you for your consult. I will follow-up with patient. Please contact us if you have any additional questions.    Tiffany Joyce, DAVINA, FNP-C  Nephrology  [facility]

## 2023-12-14 NOTE — PROGRESS NOTES
[facility]  Critical Care - Surgery  Progress Note    Patient Name: Megan Cook  MRN: [id number]  Admission Date: 12/8/2023  Hospital Length of Stay: 6 days  Code Status: Full Code  Attending Provider: Alexandru Campbell MD  Primary Care Provider: Adelaida Disla NP   Principal Problem: Rheumatic aortic stenosis    Subjective:     Hospital/ICU Course:  No notes on file    Interval History/Significant Events: Levo up to 0.1, CRRT been off since midnight. Net +193cc/24H. Extubated yesterday, currently on 2L NC.     Follow-up For: Procedure(s) (LRB):  Replacement-valve-aortic (N/A)  REPLACEMENT, PULMONARY VALVE (N/A)    Post-Operative Day: 6 Days Post-Op    Objective:     Vital Signs (Most Recent):  Temp: 99 °F (37.2 °C) (12/14/23 0300)  Pulse: 100 (12/14/23 0545)  Resp: (!) 29 (12/13/23 1800)  BP: (!) 111/56 (12/14/23 0530)  SpO2: 98 % (12/14/23 0545) Vital Signs (24h Range):  Temp:  [97.5 °F (36.4 °C)-99 °F (37.2 °C)] 99 °F (37.2 °C)  Pulse:  [] 100  Resp:  [15-38] 29  SpO2:  [95 %-100 %] 98 %  BP: ()/(44-68) 111/56  Arterial Line BP: ()/(26-63) 116/44     Weight: 121.1 kg (267 lb)  Body mass index is 44.43 kg/m².      Intake/Output Summary (Last 24 hours) at 12/14/2023 0639  Last data filed at 12/14/2023 0600  Gross per 24 hour   Intake 3808.68 ml   Output 3753 ml   Net 55.68 ml          Physical Exam  Vitals and nursing note reviewed.   Constitutional:       Appearance: She is obese.   HENT:      Head: Normocephalic and atraumatic.   Cardiovascular:      Rate and Rhythm: Regular rhythm.   Pulmonary:      Effort: Pulmonary effort is normal.      Comments:      Chest:      Comments: Three chest tubes in midline   Abdominal:      General: Abdomen is flat.      Palpations: Abdomen is soft.   Musculoskeletal:         General: Swelling present.   Skin:     General: Skin is warm and dry.      Capillary Refill: Capillary refill takes less than 2 seconds.                 Lines/Drains/Airways       Central Venous Catheter Line  Duration             Percutaneous Central Line Insertion/Assessment - Quad Lumen  12/08/23 0949 Internal Jugular Right 5 days    Trialysis (Dialysis) Catheter 12/09/23 1327 left internal jugular 4 days              Drain  Duration                  Chest Tube 12/08/23 2059 Tube - 3 Mediastinal 24 Fr. 5 days         Y Chest Tube 1 and 2 12/08/23 2058 1 Right Pleural 24 Fr. 2 Left Pleural 24 Fr. 5 days              Airway  Duration                  Airway - Non-Surgical 12/08/23 0738 5 days              Arterial Line  Duration             Arterial Line 12/11/23 1100 Right Radial 2 days                    Significant Labs:    CBC/Anemia Profile:  Recent Labs   Lab 12/12/23  1944 12/13/23  0407 12/13/23  0415 12/14/23  0307   WBC 15.84*  --  14.66* 17.56*   HGB 10.0*  --  9.7* 10.0*   HCT 29.4* 28* 29.0* 30.7*   PLT 61*  --  71* 171   MCV 91  --  88 89   RDW 15.8*  --  15.7* 15.8*        Chemistries:  Recent Labs   Lab 12/13/23  0415 12/13/23  1417 12/13/23  2153 12/14/23  0307   *  135* 133* 134* 134*  133*   K 4.1  4.1 4.7 4.5 4.5  4.5     104 100 104 103  103   CO2 18*  18* 22* 22* 18*  17*   BUN 6  6 11 11 14  13   CREATININE 0.8  0.8 1.3 1.2 1.7*  1.9*   CALCIUM 7.7*  7.7* 8.6* 8.5* 9.3  9.1   ALBUMIN 2.1*  2.1* 2.3* 2.2* 2.3*  2.2*   PROT 5.8*  --   --  6.3   BILITOT 4.5*  --   --  4.8*   ALKPHOS 108  --   --  121   *  --   --  111*   *  --   --  124*   MG 1.8 1.8 2.2 2.1   PHOS 1.9* 2.1* 2.0* 2.7           Significant Imaging:  I have reviewed all pertinent imaging results/findings within the past 24 hours.  Assessment/Plan:     Cardiac/Vascular  * Rheumatic aortic stenosis  38-year-old, 127 kg, woman with history of rheumatic heart disease and Ross procedure and mitral valve repair(including ring annuloplasty) in 2000 followed by repair of autograft aneurysm repair and aortic valve replacement with 23mm CE  bioprothesis in 2012 who presents for aortic and pulmonary valve replacement on 12/8      Neuro/Psych:   -- Sedation: n/a  -- Pain: Tylenol 650 mg PRN, Morphine 2 mg PRN, oxy 5 Q6 PRN             Cards:   History of rheumatic fever with rheumatic heart disease . S/p Ross procedure and mitral valve repair(including ring annuloplasty) in 2000   Now s/p aortic and pulmonary valve replacement with Dr. Campbell on 12/8  -- HDS  -- MAPS >65  -- SBP   -- Pressors to maintain blood pressure goals: epi off, vaso off, levo 0.1  -- s/p 500cc LR bolus overnight, not responsive  -- wean pressors as tolerated      Pulm:   -- Extubated 12/13  -- Goal O2 sat > 90%  -- Wean as able  -- Two pleural and one mediastinal chest tube to water seal       Renal:  -- Nicolas removed, anuric  -- CRRT, /hr, paused since midnight  -- BUN/Cr   Recent Labs   Lab 12/13/23  1417 12/13/23 2153 12/14/23  0307   BUN 11 11 14  13   CREATININE 1.3 1.2 1.7*  1.9*     -- Urine output: anuric      FEN / GI:   -- Replace lytes as needed  -- Nutrition: TF  -- Docusate   -- Famotidine       ID:   -- Tm: afebrile; WBC 12.03 preop. 18.7 post op   Recent Labs   Lab 12/12/23  1944 12/13/23 0415 12/14/23  0307   WBC 15.84* 14.66* 17.56*     -- Vancomycin two doses in OR       Heme/Onc:  -- H/H stable, 12.6 preop. 13.8 post op.   -- Daily CBC  -- holding heparin for platelets 30  -- Received 8 units of pRBC in the OR   Recent Labs   Lab 12/09/23  1846 12/10/23  0303 12/10/23  1720 12/12/23  0304 12/12/23  0307 12/12/23  1944 12/13/23  0415 12/13/23  1417 12/13/23 1948 12/14/23  0307   HGB 11.9* 11.0*   < >  --    < > 10.0* 9.7*  --   --  10.0*   PLT 37* 30*   < >  --    < > 61* 71*  --   --  171   APTT  --  29.9  --  29.2  --  27.4 30.8 28.3 29.5 33.5*   INR 1.1 1.1  --  1.1  --   --   --   --   --   --     < > = values in this interval not displayed.        Endo:   -- Gluc goal 140-180  -- POCT 152      PPx:   Feeding: TF  Analgesia/Sedation: tylenol,  morphine  Thromboembolic prevention: heparin gtt @20/hr  HOB >30: Yes  Stress Ulcer ppx: Famotidine   Glucose control: Critical care goal 140-180 g/dl   Bowel reg: docusate  Invasive Lines/Drains/Airway: PIV x2, [place], Central line, Chest tube x3; L IJ trialysis  Deescalation: dc'ed         Dispo/Code Status/Palliative:   -- SICU / Full Code               Critical care was time spent personally by me on the following activities: development of treatment plan with patient or surrogate and bedside caregivers, discussions with consultants, evaluation of patient's response to treatment, examination of patient, ordering and performing treatments and interventions, ordering and review of laboratory studies, ordering and review of radiographic studies, pulse oximetry, re-evaluation of patient's condition.  This critical care time did not overlap with that of any other provider or involve time for any procedures.     Gloria Cruz MD  Critical Care - Surgery  [facility]

## 2023-12-14 NOTE — PROGRESS NOTES
Congenital Cardiac Surgery Note:     Ms. Megan Cook underwent re-replacement of her aortic valve and re-replacement of her RV-PA conduit/pulmonary valve on 12-8-2023.       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      Post-op course complicated by Acute Renal Failure.     Interval Events  Hypotension overnight.  Got 500mL of fluid back and CRRT held this morning.  Also started on moderate dose levophed.    This morning when I arrived her MAPs were in the 70s and SBP in the 120s with lower diastolic Bps than previous.    She was sitting up smiling and interactive.    They began weaning the levophed.      She says she feels relatively well all things considered and says pain is mainly just around her drains.  She got up to the chair yesterday evening.      Vitals:    12/14/23 0707 12/14/23 0730 12/14/23 0745 12/14/23 0800   BP: (!) 107/55 (!) 116/58  122/60   BP Location:    Left forearm   Patient Position:    Lying   Pulse: 99 95 95 94   Resp:       Temp:       TempSrc:       SpO2: (!) 94% (!) 94% 95% 96%   Weight:       Height:             Physical Exam:  General: sitting up in bed, looks well  CV: normal rate and regular, good pedal pulses  Resp: normal effort, on 2L supplemental O2 via NC  Neuro: alert, oriented  Drains: Serous output, low volume, no air leak  Incision: clean and dry, redressed     Imaging:  CXR: poor quality film, very rotated.  Left lower lobe opacification likely combination of atelectasis and small effusion     Assessment and Plan:  Discussed her care with Dr. Zhu, and ICU team.    A little concerned that needed to be placed back on moderate dose levophed.  She looks well though and blood pressures were good this morning so will see how well they are able to wean the levophed today.    No evidence of infection at this time.  WBC slightly up to 17 but all cell lines up.   No fevers.  Some left lower lobe consolidation.    Cardiac output seems good  based on BP this morning, strong pedal pulses, her overall appearance, normal low lactate.      Holding CRRT for now and seeing how she does and if she makes any urine.       Platelet Count improved to normal.    pTT still not therapeutic(goal 60-80), titrating Heparin infusion without bolus dosing.    Once pressors are no longer needed, can start warfarin as well.  Discussed with pharmacy.        Ultimately, anticoagulation plan will be for warfarin with an INR target of 2.5(goal range 2-3) and daily aspirin.       Chest tube output remains low volume and serous. Possibly remove later today     Encouraged getting out of bed to chair and pulmonary toilet.      Continue supportive care     I appreciate the assistance of the ICU team in her care. Also appreciate the nephrology team for their input and assistance with renal replacement therapy.     Alexandru Campbell MD

## 2023-12-14 NOTE — ASSESSMENT & PLAN NOTE
38-year-old, 127 kg, woman with history of rheumatic heart disease and Ross procedure and mitral valve repair(including ring annuloplasty) in 2000 followed by repair of autograft aneurysm repair and aortic valve replacement with 23mm CE bioprothesis in 2012 who presents for aortic and pulmonary valve replacement on 12/8      Neuro/Psych:   -- Sedation: n/a  -- Pain: Tylenol 650 mg PRN, Morphine 2 mg PRN, oxy 5 Q6 PRN             Cards:   History of rheumatic fever with rheumatic heart disease . S/p Ross procedure and mitral valve repair(including ring annuloplasty) in 2000   Now s/p aortic and pulmonary valve replacement with Dr. Campbell on 12/8  -- HDS  -- MAPS >65  -- SBP   -- Pressors to maintain blood pressure goals: epi off, vaso off, levo 0.1  -- s/p 500cc LR bolus overnight, not responsive  -- wean pressors as tolerated      Pulm:   -- Extubated 12/13  -- Goal O2 sat > 90%  -- Wean as able  -- Two pleural and one mediastinal chest tube to water seal       Renal:  -- Nicolas removed, anuric  -- CRRT, /hr, paused since midnight  -- BUN/Cr   Recent Labs   Lab 12/13/23  1417 12/13/23  2153 12/14/23  0307   BUN 11 11 14  13   CREATININE 1.3 1.2 1.7*  1.9*     -- Urine output: anuric      FEN / GI:   -- Replace lytes as needed  -- Nutrition: TF  -- Docusate   -- Famotidine       ID:   -- Tm: afebrile; WBC 12.03 preop. 18.7 post op   Recent Labs   Lab 12/12/23  1944 12/13/23  0415 12/14/23  0307   WBC 15.84* 14.66* 17.56*     -- Vancomycin two doses in OR       Heme/Onc:  -- H/H stable, 12.6 preop. 13.8 post op.   -- Daily CBC  -- holding heparin for platelets 30  -- Received 8 units of pRBC in the OR   Recent Labs   Lab 12/09/23  1846 12/10/23  0303 12/10/23  1720 12/12/23  0304 12/12/23  0307 12/12/23  1944 12/13/23  0415 12/13/23  1417 12/13/23  1948 12/14/23  0307   HGB 11.9* 11.0*   < >  --    < > 10.0* 9.7*  --   --  10.0*   PLT 37* 30*   < >  --    < > 61* 71*  --   --  171   APTT  --  29.9  --   29.2  --  27.4 30.8 28.3 29.5 33.5*   INR 1.1 1.1  --  1.1  --   --   --   --   --   --     < > = values in this interval not displayed.        Endo:   -- Gluc goal 140-180  -- POCT 152      PPx:   Feeding: TF  Analgesia/Sedation: tylenol, morphine  Thromboembolic prevention: heparin gtt @20/hr  HOB >30: Yes  Stress Ulcer ppx: Famotidine   Glucose control: Critical care goal 140-180 g/dl   Bowel reg: docusate  Invasive Lines/Drains/Airway: PIV x2, [place], Central line, Chest tube x3; L IJ trialysis  Deescalation: dc'ed         Dispo/Code Status/Palliative:   -- SICU / Full Code

## 2023-12-14 NOTE — PT/OT/SLP PROGRESS
Physical Therapy Co-Treatment    Patient Name:  Megan Cook   MRN:  [id number]  Admitting Diagnosis:  Rheumatic aortic stenosis   Recent Surgery: Procedure(s) (LRB):  Replacement-valve-aortic (N/A)  REPLACEMENT, PULMONARY VALVE (N/A) 6 Days Post-Op  Admit Date: 12/8/2023  Length of Stay: 6 days    Recommendations:     Discharge Recommendations: High Intensity Therapy  Discharge Equipment Recommendations: wheelchair, walker, rolling   Barriers to discharge:  increased assist needed    Appropriate transfer level with nursing staff: bed <> chair transfer with assist x 2    Plan:     During this hospitalization, patient to be seen 5 x/week to address the identified rehab impairments via gait training, therapeutic activities, therapeutic exercises, neuromuscular re-education and progress towards the established goals.  Plan of Care Expires:  01/12/24  Plan of Care Reviewed with: patient, family    Assessment     Megan Cook is a 38 y.o. female admitted with a medical diagnosis of Rheumatic aortic stenosis. Pt tolerated session well today. She showed good improvements in strength and activity tolerance today. She was able to stand with decreased assist as well as perform steps to bedside commode with decreased assist. Despite improvements in strength she remains well below baseline function and demo's impaired cardiopulmonary endurance, functional strength, and dynamic balance. She remains motivated to participate and improve functionally. Pt will continue to benefit from skilled PT services during this hospital admit to continue to improve transfer ability and efficiency as well as continue to progress pt's ambulation distance and cardiopulmonary endurance to maximize pt's functional independence and return to PLOF. Patient has demonstrated sufficient progression to warrant high intensity therapy evidenced by objectives noted below.    Problem List: weakness, impaired endurance, impaired functional mobility,  impaired self care skills, gait instability, impaired balance, decreased upper extremity function, decreased lower extremity function, decreased safety awareness, pain, impaired coordination, impaired fine motor, impaired skin, impaired cardiopulmonary response to activity.  Rehab Prognosis: Good; patient would benefit from acute skilled PT services to address these deficits and reach maximum level of function.      Goals:   Multidisciplinary Problems       Physical Therapy Goals          Problem: Physical Therapy    Goal Priority Disciplines Outcome Goal Variances Interventions   Physical Therapy Goal     PT, PT/OT Ongoing, Progressing     Description: Goals to be met by: 2023     Patient will increase functional independence with mobility by performin. Sit to stand transfer with Minimal Assistance - met   1a. Sit to stand transfer with SBA - not met  2. Bed to chair transfer with Moderate Assistance using physician approved AD - met   2a. Bed to chair transfer with stand by assist using physician approved AD - not met  3. Gait  x 20 feet with Moderate Assistance using physician approved AD. - not met  4. Ascend/descend 14 stair with right Handrails Minimal Assistance using physician approved AD. - not met  5. Stand for 5 minutes with Contact Guard Assistance using physician approved AD- not met  6. Lower extremity exercise program x30 reps per handout, with independence- not met                         Subjective     RN notified prior to session. Sister in law present upon PT entrance into room. Patient agreeable to PT evaluation.    Chief Complaint: pt stating she needs to get on the [facility]  Patient/Family Comments/goals: get stronger  Pain/Comfort:  Pain Rating 1: 6/10  Location - Side 1: Bilateral  Location - Orientation 1: generalized  Location 1: chest  Pain Addressed 1: Reposition, Distraction  Pain Rating Post-Intervention 1: 610    Objective:     Additional staff present: OT; OT for  "cotx due to pt's multiple medical comorbidities and functional deficits req'ing two skilled therapists to appropriately progress pt's musculoskeletal strength, neuromuscular control, and endurance while taking into consideration severe medical acuity in the ICU    Patient found HOB elevated with: telemetry, blood pressure cuff, pulse ox (continuous), oxygen, central line, arterial line, chest tube, Trialysis   Cognition:   Alert and Cooperative  Patient is oriented to Person, Place, Time, Situation  Command following: Follows multistep verbal commands  Fluency: clear/fluent  General Precautions: Standard, Cardiac sternal, fall   Orthopedic Precautions:N/A   Braces: N/A   Body mass index is 44.43 kg/m².  Oxygen Device: Nasal Cannula .5L  Vitals: /60 (BP Location: Left arm, Patient Position: Lying)   Pulse 96   Temp 98 °F (36.7 °C) (Oral)   Resp (!) 22   Ht 5' 5" (1.651 m)   Wt 121.1 kg (267 lb)   LMP 11/08/2023 (Approximate)   SpO2 99%   Breastfeeding No   BMI 44.43 kg/m²     Outcome Measures:  AM-PAC 6 CLICK MOBILITY  Turning over in bed (including adjusting bedclothes, sheets and blankets)?: 2  Sitting down on and standing up from a chair with arms (e.g., wheelchair, bedside commode, etc.): 3  Moving from lying on back to sitting on the side of the bed?: 2  Moving to and from a bed to a chair (including a wheelchair)?: 3  Need to walk in hospital room?: 2  Climbing 3-5 steps with a railing?: 1  Basic Mobility Total Score: 13     Functional Mobility:    Bed Mobility:   Supine to Sit: moderate assistance for LE management and for trunk management; to Lt side of bed  Scooting anteriorly to EOB to have both feet planted on floor: moderate assistance     Sitting Balance at Edge of Bed:  Static Sitting Balance: Good- : able to accept minimal resistance  Dynamic Sitting Balance: Good- : able to sit unsupported and weight shift across midline minimally  Assistance Level Required: Stand-by Assistance  Time: " ~5 minutes  Postural deviations noted: slouched posture and rounded shoulders    Transfers:   Sit <> Stand Transfer: contact guard assistance with hand-held assist. [id number] from EOB  Bed <> Commode Transfer Step Transfer technique: contact guard assistance with hand-held assist. Commode on the Lt    Standing Balance:  Static Standing Balance: Fair- : requires minimal assistance or UE support in order to stand without LOB  Dynamic Standing Balance: Poor+ : able to stand with minimal assistance and reach ipsilaterally. Unable to weight shift.  Assistance Level Required: Contact Guard Assistance  Patient used: hand-held assist       Gait:   Patient ambulated: 4 steps   Patient required: contact guard  Patient used:  hand-held assist   Gait Pattern observed: reciprocal gait  Gait Deviation(s): decreased step length, wide base of support, decreased marc, and shuffle gait  Impairments due to: pain and decreased endurance  all lines remained intact throughout ambulation trial    Education:  Time provided for education, counseling and discussion of health disposition in regards to patient's current status  All questions answered within PT scope of practice and to patient's satisfaction  PT role in POC to address current functional deficits  Pt educated on proper body mechanics, safety techniques, and energy conservation with PT facilitation and cueing throughout session  Call nursing/pct to transfer to chair/use bathroom. Pt stated understanding.  Whiteboard updated with therapist name and pt's current mobility status documented above  Safe to perform step transfer to/from chair/bedside commode CGA and no AD w/ nursing/PCT present  Importance of OOB tolerance 8-10 hrs/day to improve lung ventilation and expansion as well as strengthen postural musculature    Patient left  seated on bedside comomode  with all lines intact, call button in reach, and RN notified.    Time Tracking:     PT Received On: 12/14/23  PT Start  Time: 0939     PT Stop Time: 1004  PT Total Time (min): 25 min     Billable Minutes:   Therapeutic Activity 25 minutes    Treatment Type: Treatment  PT/PTA: PT       12/14/2023

## 2023-12-14 NOTE — SUBJECTIVE & OBJECTIVE
Interval History:   SLED terminated after 6 hrs due to hypotensive event. Levophed restarted, up to 0.1 mcg. Patient net positive ~ 450 mL/24 hrs. Remain anuric.   Electrolytes non emergent.   Appears comfortable on exam. 2 L NC.     Review of patient's allergies indicates:   Allergen Reactions    Cephalexin Other (See Comments)     She gets mouth sores.     Penicillin g Other (See Comments)     Causes mouth sores.      Current Facility-Administered Medications   Medication Frequency    0.9%  NaCl infusion PRN    acetaminophen oral solution 650 mg Q8H PRN    aspirin chewable tablet 81 mg Daily    balsam peru-castor oiL Oint BID    calcium gluconate 1 g in NS IVPB (premixed) PRN    calcium gluconate 1 g in NS IVPB (premixed) PRN    calcium gluconate 1 g in NS IVPB (premixed) PRN    calcium gluconate 3 g in dextrose 5 % (D5W) 100 mL infusion Continuous    dextrose 10% bolus 125 mL 125 mL PRN    dextrose 10% bolus 250 mL 250 mL PRN    dextrose-sod citrate-citric ac 2.45-2.2 gram- 800 mg/100 mL Soln Continuous    heparin 25,000 units in dextrose 5% 250 mL (100 units/mL) infusion LOW INTENSITY nomogram - [facility] Continuous    LIDOcaine 5 % patch 1 patch Q24H    magnesium sulfate 2g in water 50mL IVPB (premix) PRN    magnesium sulfate 2g in water 50mL IVPB (premix) PRN    melatonin tablet 6 mg QHS    NORepinephrine 8 mg in dextrose 5% 250 mL infusion Continuous    ondansetron injection 4 mg Q12H PRN    oxyCODONE immediate release tablet 5 mg Q6H PRN    polyethylene glycol packet 17 g Daily    senna-docusate 8.6-50 mg per tablet 1 tablet BID       Objective:     Vital Signs (Most Recent):  Temp: 97.7 °F (36.5 °C) (12/14/23 0700)  Pulse: 92 (12/14/23 1130)  Resp: (!) 31 (12/14/23 1130)  BP: (!) 111/58 (12/14/23 0930)  SpO2: 98 % (12/14/23 1130) Vital Signs (24h Range):  Temp:  [97.7 °F (36.5 °C)-99 °F (37.2 °C)] 97.7 °F (36.5 °C)  Pulse:  [] 92  Resp:  [15-38] 31  SpO2:  [93 %-100 %] 98 %  BP: ()/(44-68)  111/58  Arterial Line BP: ()/(26-63) 106/45     Weight: 121.1 kg (267 lb) (12/14/23 0428)  Body mass index is 44.43 kg/m².  Body surface area is 2.36 meters squared.    I/O last 3 completed shifts:  In: 7530.1 [I.V.:1296.6; NG/GT:500; IV Piggyback:5733.5]  Out: 8803 [Drains:405; Other:8298; Chest Tube:100]     Physical Exam  Vitals and nursing note reviewed.   Constitutional:       Appearance: She is obese.      Interventions: She is sedated and intubated.   HENT:      Head: Normocephalic and atraumatic.      Mouth/Throat:      Mouth: Mucous membranes are moist.   Cardiovascular:      Rate and Rhythm: Normal rate and regular rhythm.      Pulses: Normal pulses.   Pulmonary:      Effort: Pulmonary effort is normal. No respiratory distress. She is intubated.      Breath sounds: Normal breath sounds. No wheezing or rales.   Chest:      Comments: Three chest tubes in midline   Abdominal:      General: Abdomen is flat.      Palpations: Abdomen is soft.   Musculoskeletal:         General: Swelling present. Normal range of motion.      Right lower leg: Edema present.      Left lower leg: Edema present.   Skin:     General: Skin is warm and dry.   Neurological:      General: No focal deficit present.   Psychiatric:         Mood and Affect: Mood normal.         Behavior: Behavior normal.          Significant Labs:  CBC:   Recent Labs   Lab 12/14/23  0307   WBC 17.56*   RBC 3.45*   HGB 10.0*   HCT 30.7*      MCV 89   MCH 29.0   MCHC 32.6     CMP:   Recent Labs   Lab 12/14/23  0307   *  115*   CALCIUM 9.3  9.1   ALBUMIN 2.3*  2.2*   PROT 6.3   *  133*   K 4.5  4.5   CO2 18*  17*     103   BUN 14  13   CREATININE 1.7*  1.9*   ALKPHOS 121   *   *   BILITOT 4.8*     All labs within the past 24 hours have been reviewed.

## 2023-12-14 NOTE — PT/OT/SLP EVAL
Speech Language Pathology Evaluation  Bedside Swallow    Patient Name:  Megan Cook   MRN:  [id number]  Admitting Diagnosis: Rheumatic aortic stenosis    Recommendations:                 General Recommendations:  Dysphagia therapy  Diet recommendations:  Pleasure Feeding, Puree, Thin liquids - IDDSI Level 0   Aspiration Precautions: Standard aspiration precautions   General Precautions: Standard,    Communication strategies:  none    Assessment:     Megan Cook is a 38 y.o. female with an SLP diagnosis of Dysphagia and Dysphonia.    History:     Past Medical History:   Diagnosis Date    Rheumatic fever with cardiac involvement        Past Surgical History:   Procedure Laterality Date    ANGIOGRAM, CORONARY, PEDIATRIC  8/1/2023    Procedure: Angiogram, Coronary, Pediatric;  Surgeon: Anthony Dubois Jr., MD;  Location: [facility] CATH LAB;  Service: Cardiology;;    ANGIOGRAM, PULMONARY, PEDIATRIC  8/1/2023    Procedure: Angiogram, Pulmonary, Pediatric;  Surgeon: Anthony Dubois Jr., MD;  Location: [facility] CATH LAB;  Service: Cardiology;;    AORTIC VALVE REPLACEMENT  2000    AORTIC VALVE REPLACEMENT  2012    AORTIC VALVE REPLACEMENT  2012    with aortic root replacement at [facility]    AORTIC VALVE REPLACEMENT N/A 12/8/2023    Procedure: Replacement-valve-aortic;  Surgeon: Alexandru Campbell MD;  Location: [facility] OR [address] [place] [address];  Service: Cardiovascular;  Laterality: N/A;    COMBINED RIGHT AND RETROGRADE LEFT HEART CATHETERIZATION FOR CONGENITAL HEART DEFECT N/A 8/1/2023    Procedure: Catheterization, Heart, Combined Right and Retrograde Left, for Congenital Heart Defect;  Surgeon: Anthony Dubois Jr., MD;  Location: [facility] CATH LAB;  Service: Cardiology;  Laterality: N/A;    PULMONARY VALVE REPLACEMENT N/A 12/8/2023    Procedure: REPLACEMENT, PULMONARY VALVE;  Surgeon: Alexandru Campbell MD;  Location: [facility] OR [address] [place] [address];  Service: Cardiovascular;  Laterality: N/A;    REPLACEMENT OF AORTIC VALVE USING ROSS PROCEDURE N/A 2000    CHOP    38-year-old, 127 kg, woman with history of rheumatic heart disease and Ross procedure and mitral valve repair(including ring annuloplasty) in 2000 followed by repair of autograft aneurysm repair and aortic valve replacement with 23mm CE bioprothesis in 2012 who presents for aortic and pulmonary valve replacement on 12/8     Prior Intubation HX:  12/8-12/13    Modified Barium Swallow: non prior     Prior diet: regular and thin liquids    Subjective   Pt awake and alert   Sister at the bedside  RN in agreement with SLP seeing at this time     Pain/Comfort:  Pain Rating 1: 0/10  Pain Rating Post-Intervention 1: 0/10    Respiratory Status: Nasal cannula, flow 1 L/min    Objective:     Oral Musculature Evaluation  Oral Musculature: WFL  Secretion Management: adequate  Oral Labial Strength and Mobility: WFL  Lingual Strength and Mobility: WFL  Volitional Swallow: prompt  Voice Prior to PO Intake: dysphonia ; hoarse    Bedside Swallow Eval:   Consistencies Assessed:  Thin liquids small single sip x4  Puree full tsp x5      Oral Phase:   WFL    Pharyngeal Phase:   no overt clinical signs/symptoms of aspiration  no overt clinical signs/symptoms of pharyngeal dysphagia  Per pt report immediately following extubation was managing thin liquids without difficulty however as  the dy progress vocal quality became more hoarse and she was sputtering with straw drinking with thin liquids.     Compensatory Strategies  None    Treatment:  Education provided to Pt re: SLP role in acute care setting, overall impressions and therapeutic goals. Whiteboard updated.           Pt seen for bedside swallow assessment. Vocal quality is hoarse but periods of true phonation able to be appreciated. Pt appearing safe for small single sips of water and teaspoons of puree. Crushed meds in puree would be most beneficial. Speech to continue to follow.             Goals:   Multidisciplinary Problems       SLP Goals          Problem: SLP    Goal  Priority Disciplines Outcome   SLP Goal     SLP    Description: Speech Language Pathology Goals  Goals expected to be met by  12/24    1. Pt will participate in ongoing assessment of swallow function                          Plan:     Patient to be seen:  4 x/week   Plan of Care expires:     Plan of Care reviewed with:  patient, family   SLP Follow-Up:  Yes       Discharge recommendations:  High Intensity Therapy   Barriers to Discharge:  None    Time Tracking:     SLP Treatment Date:   12/14/23  Speech Start Time:  0755  Speech Stop Time:  0814     Speech Total Time (min):  19 min    Billable Minutes: Eval 10  and Self Care/Home Management Training 9    12/14/2023

## 2023-12-14 NOTE — NURSING
SICU PLAN OF CARE NOTE    Dx: Rheumatic aortic stenosis    Goals of Care: MAP 60-80    Vital Signs (last 12 hours):   Temp:  [97.8 °F (36.6 °C)-99 °F (37.2 °C)]   Pulse:  []   BP: ()/(47-59)   SpO2:  [94 %-100 %]   Arterial Line BP: ()/(26-60)      Neuro: AAO x4    Cardiac: NSR    Respiratory: Nasal Cannula 2L    Gtts: Norepinephrine and Heparin    Urine Output: Anuric     Drains: Chest Tube, total output 90 cc /  shift    Diet: NPO  Labs/Accuchecks: q8 renal and ionized calcium, q6 aPTT w heparin gtt until therapeutic    Skin:  Patient turned Q2, bariatric mattress inflated, and bed working correctly. Heel foams intact and feet elevated off bed.     Shift Events:  see note about hypotensive episode, starting levo, stopping CRRT, and 500cc LR bolus. Pt complained of chest pain radiating to right shoulder. Labs drawn, EKG completed, ordered lidocaine patch for shoulder, and chest x-ray completed in the am.

## 2023-12-14 NOTE — PROGRESS NOTES
12/14/23 0016   Treatment   Treatment Type SLED   Dialyzer Time (hours) 5.49   BVP (Liters) 51.6 L   CRRT Comments pt rinsed back by ICU RN; low BP; hold off on restarting SLED per ICU team

## 2023-12-14 NOTE — SUBJECTIVE & OBJECTIVE
Interval History/Significant Events: Levo up to 0.1, CRRT been off since midnight. Net +193cc/24H. Extubated yesterday, currently on 2L NC.     Follow-up For: Procedure(s) (LRB):  Replacement-valve-aortic (N/A)  REPLACEMENT, PULMONARY VALVE (N/A)    Post-Operative Day: 6 Days Post-Op    Objective:     Vital Signs (Most Recent):  Temp: 99 °F (37.2 °C) (12/14/23 0300)  Pulse: 100 (12/14/23 0545)  Resp: (!) 29 (12/13/23 1800)  BP: (!) 111/56 (12/14/23 0530)  SpO2: 98 % (12/14/23 0545) Vital Signs (24h Range):  Temp:  [97.5 °F (36.4 °C)-99 °F (37.2 °C)] 99 °F (37.2 °C)  Pulse:  [] 100  Resp:  [15-38] 29  SpO2:  [95 %-100 %] 98 %  BP: ()/(44-68) 111/56  Arterial Line BP: ()/(26-63) 116/44     Weight: 121.1 kg (267 lb)  Body mass index is 44.43 kg/m².      Intake/Output Summary (Last 24 hours) at 12/14/2023 0639  Last data filed at 12/14/2023 0600  Gross per 24 hour   Intake 3808.68 ml   Output 3753 ml   Net 55.68 ml          Physical Exam  Vitals and nursing note reviewed.   Constitutional:       Appearance: She is obese.   HENT:      Head: Normocephalic and atraumatic.   Cardiovascular:      Rate and Rhythm: Regular rhythm.   Pulmonary:      Effort: Pulmonary effort is normal.      Comments:      Chest:      Comments: Three chest tubes in midline   Abdominal:      General: Abdomen is flat.      Palpations: Abdomen is soft.   Musculoskeletal:         General: Swelling present.   Skin:     General: Skin is warm and dry.      Capillary Refill: Capillary refill takes less than 2 seconds.                Lines/Drains/Airways       Central Venous Catheter Line  Duration             Percutaneous Central Line Insertion/Assessment - Quad Lumen  12/08/23 0949 Internal Jugular Right 5 days    Trialysis (Dialysis) Catheter 12/09/23 1327 left internal jugular 4 days              Drain  Duration                  Chest Tube 12/08/23 2059 Tube - 3 Mediastinal 24 Fr. 5 days         Y Chest Tube 1 and 2 12/08/23 2058 1  Right Pleural 24 Fr. 2 Left Pleural 24 Fr. 5 days              Airway  Duration                  Airway - Non-Surgical 12/08/23 0738 5 days              Arterial Line  Duration             Arterial Line 12/11/23 1100 Right Radial 2 days                    Significant Labs:    CBC/Anemia Profile:  Recent Labs   Lab 12/12/23  1944 12/13/23  0407 12/13/23 0415 12/14/23  0307   WBC 15.84*  --  14.66* 17.56*   HGB 10.0*  --  9.7* 10.0*   HCT 29.4* 28* 29.0* 30.7*   PLT 61*  --  71* 171   MCV 91  --  88 89   RDW 15.8*  --  15.7* 15.8*        Chemistries:  Recent Labs   Lab 12/13/23 0415 12/13/23  1417 12/13/23  2153 12/14/23  0307   *  135* 133* 134* 134*  133*   K 4.1  4.1 4.7 4.5 4.5  4.5     104 100 104 103  103   CO2 18*  18* 22* 22* 18*  17*   BUN 6  6 11 11 14  13   CREATININE 0.8  0.8 1.3 1.2 1.7*  1.9*   CALCIUM 7.7*  7.7* 8.6* 8.5* 9.3  9.1   ALBUMIN 2.1*  2.1* 2.3* 2.2* 2.3*  2.2*   PROT 5.8*  --   --  6.3   BILITOT 4.5*  --   --  4.8*   ALKPHOS 108  --   --  121   *  --   --  111*   *  --   --  124*   MG 1.8 1.8 2.2 2.1   PHOS 1.9* 2.1* 2.0* 2.7           Significant Imaging:  I have reviewed all pertinent imaging results/findings within the past 24 hours.

## 2023-12-14 NOTE — NURSING
Pt's art line BP initially in 110s at start of shift. Art line pressures decreased to SBP in 80s and MAPs < 50. UF rate decreased from 200 to 150, BP and MAP not improved. MD Khris notified and at bedside. Instructed to rinse back CRRT, start low dose levophed, and give LR 500cc bolus.

## 2023-12-14 NOTE — ASSESSMENT & PLAN NOTE
Aute kidney injury secondary to iATN from hypotensive episodes/hemodynamic instability during OR  (Baseline 0.4-1)  SLED started 12/9 for oliguric TINO    Plan  -Will to hold further RRT at this time per primary's team request. No emergent need for clearance and/or volume removal at this time. Please call Nephrology for any acute indications for RRT.   -Remain anuric. Will continue to monitor for signs of renal recovery.

## 2023-12-14 NOTE — PROGRESS NOTES
12/14/23 0003   Treatment   Treatment Type SLED   Treatment Status Daily equipment check   Dialysis Machine Number [id number]   Dialyzer Time (hours) 5.37   BVP (Liters) 49.9 L   Solutions Labeled and Current  Yes   Access Temporary Cath;Left;IJ   Catheter Dressing Intact  Yes   Alarms Engaged Yes   CRRT Comments daily check done

## 2023-12-14 NOTE — PLAN OF CARE
Post-Acute Therapy Recommendation: high intensity     2 goals met today. PT goals appropriate.    Please continue Progressive Mobility Protocol as appropriate.    Appropriate transfer level with nursing staff: bed <> chair transfer with assist x 2    Jessenia Lucas PT, DPT  2023  Pager: [phone]    Problem: Physical Therapy  Goal: Physical Therapy Goal  Description: Goals to be met by: 2023     Patient will increase functional independence with mobility by performin. Sit to stand transfer with Minimal Assistance - met   1a. Sit to stand transfer with SBA - not met  2. Bed to chair transfer with Moderate Assistance using physician approved AD - met   2a. Bed to chair transfer with stand by assist using physician approved AD - not met  3. Gait  x 20 feet with Moderate Assistance using physician approved AD. - not met  4. Ascend/descend 14 stair with right Handrails Minimal Assistance using physician approved AD. - not met  5. Stand for 5 minutes with Contact Guard Assistance using physician approved AD- not met  6. Lower extremity exercise program x30 reps per handout, with independence- not met    Outcome: Ongoing, Progressing

## 2023-12-14 NOTE — PT/OT/SLP EVAL
Occupational Therapy   Evaluation    Name: Megan Cook  MRN: [id number]  Admitting Diagnosis: Rheumatic aortic stenosis  Recent Surgery: Procedure(s) (LRB):  Replacement-valve-aortic (N/A)  REPLACEMENT, PULMONARY VALVE (N/A) 5 Days Post-Op    Recommendations:     Discharge Recommendations: High Intensity Therapy  Discharge Equipment Recommendations:  walker, rolling, shower chair, bedside commode, wheelchair  Barriers to discharge:  Other (Comment)    Assessment:     Megan Cook is a 38 y.o. female with a medical diagnosis of Rheumatic aortic stenosis.  She presents with the following performance deficits affecting function: weakness, impaired endurance, impaired self care skills, impaired functional mobility, gait instability, decreased upper extremity function, decreased lower extremity function, decreased safety awareness, pain, impaired coordination, impaired cardiopulmonary response to activity, impaired fine motor.      Rehab Prognosis: Good; patient would benefit from acute skilled OT services to address these deficits and reach maximum level of function.       Plan:     Patient to be seen 5 x/week to address the above listed problems via self-care/home management, therapeutic activities, therapeutic exercises, neuromuscular re-education  Plan of Care Expires: 12/27/23  Plan of Care Reviewed with: patient, family    Subjective     Chief Complaint: NA  Patient/Family Comments/goals: Patient eager to sit up    Occupational Profile:  Living Environment: Pt lives with significant other, 2SH, 14 steps to b/b on second floor with L HR, walk-in shower  Previous level of function: Independent  Roles and Routines: Works from home, enjoys spending time with her dogs  Equipment Used at Home: none  Assistance upon Discharge: Family    Pain/Comfort:  Pain Rating 1: other (see comments) (Did not rate numerically)  Pain Rating Post-Intervention 1: other (see comments) (Did not rate numerically)    Patients cultural,  spiritual, Mormon conflicts given the current situation: no    Objective:     Communicated with: Nursing prior to session.  Patient found HOB elevated with blood pressure cuff, oxygen, Trialysis, central line, chest tube, telemetry upon OT entry to room.    General Precautions: Standard, fall, sternal  Orthopedic Precautions: N/A  Braces: N/A  Respiratory Status: High flow, flow 25 L/min, concentration 35%    Occupational Performance:    Bed Mobility:    Patient completed Scooting/Bridging with maximal assistance  Patient completed Supine to Sit with maximal assistance and 2 persons  Patient completed Sit to Supine with maximal assistance and 2 persons    Functional Mobility/Transfers:  Patient completed Sit <> Stand Transfer with maximal assistance and of 2 persons  with  hand-held assist   Bed <> Bedside chair: Maximal assistance x 2 persons with hand-held assist     Activities of Daily Living:  Grooming: minimum assistance seated edge of bed  Upper Body Dressing: maximal assistance seated edge of bed  Lower Body Dressing: dependence prior to transfer    Cognitive/Visual Perceptual:  Cognitive/Psychosocial Skills:     -       Oriented to: Person, Place, Time, and Situation   -       Follows Commands/attention:Follows multistep  commands  -       Safety awareness/insight to disability: intact   -       Mood/Affect/Coping skills/emotional control: Appropriate to situation    Physical Exam:  Postural examination/scapula alignment:    -       No postural abnormalities identified  Upper Extremity Range of Motion:     -       Right Upper Extremity: WFL  -       Left Upper Extremity: WFL  Upper Extremity Strength:    -       Right Upper Extremity: WFL  -       Left Upper Extremity: WFL   Strength:    -       Right Upper Extremity: WFL  -       Left Upper Extremity: WFL    AMPAC 6 Click ADL:  AMPAC Total Score: 9    Treatment & Education:  -Evaluation completed, plan of care established.  -Patient and family  educated on roles/goals of OT and POC.  -White board updated.  -Therapist provided time for questions and answered within scope of practice.  -Patient educated on importance of EOB/OOB activity to maximize recovery.    Patient left up in chair with all lines intact, call button in reach, nursing notified, and patients family x 2 present    GOALS:   Multidisciplinary Problems       Occupational Therapy Goals          Problem: Occupational Therapy    Goal Priority Disciplines Outcome Interventions   Occupational Therapy Goal     OT, PT/OT Ongoing, Progressing    Description: Goals to be met by: 1/13/23    Patient will increase functional independence with ADLs by performing:    UE Dressing with Stand-by Assistance.  LE Dressing with Minimal Assistance.  Grooming while standing with Minimal Assistance.  Toileting from toilet with Minimal Assistance for hygiene and clothing management.   Supine to sit with Stand-by Assistance.  Step transfer with Minimal Assistance  Toilet transfer to toilet with Minimal Assistance.                         History:     Past Medical History:   Diagnosis Date    Rheumatic fever with cardiac involvement          Past Surgical History:   Procedure Laterality Date    ANGIOGRAM, CORONARY, PEDIATRIC  8/1/2023    Procedure: Angiogram, Coronary, Pediatric;  Surgeon: Anthony Dubois Jr., MD;  [place] [facility] [place] [facility];  Service: Cardiology;;    ANGIOGRAM, PULMONARY, PEDIATRIC  8/1/2023    Procedure: Angiogram, Pulmonary, Pediatric;  Surgeon: Anthony Dubois Jr., MD;  [place] [facility];  Service: Cardiology;;    AORTIC VALVE REPLACEMENT  2000    AORTIC VALVE REPLACEMENT  2012    AORTIC VALVE REPLACEMENT  2012    with aortic root replacement at [facility]    AORTIC VALVE REPLACEMENT N/A 12/8/2023    Procedure: Replacement-valve-aortic;  Surgeon: Alexandru Campbell MD;  [place] [facility] [place] [address] [place] [address];  Service: Cardiovascular;  Laterality: N/A;    COMBINED RIGHT AND RETROGRADE LEFT HEART  CATHETERIZATION FOR CONGENITAL HEART DEFECT N/A 8/1/2023    Procedure: Catheterization, Heart, Combined Right and Retrograde Left, for Congenital Heart Defect;  Surgeon: Anthony Dubois Jr., MD;  Location: [facility] CATH LAB;  Service: Cardiology;  Laterality: N/A;    PULMONARY VALVE REPLACEMENT N/A 12/8/2023    Procedure: REPLACEMENT, PULMONARY VALVE;  Surgeon: Alexandru Campbell MD;  Location: [facility] OR [address] [place] [address];  Service: Cardiovascular;  Laterality: N/A;    REPLACEMENT OF AORTIC VALVE USING ROSS PROCEDURE N/A 2000    [facility]       Time Tracking:     OT Date of Treatment: 12/13/23  OT Start Time: 1433  OT Stop Time: 1518  OT Total Time (min): 45 min    Billable Minutes:Evaluation 1 unit  Self Care/Home Management 15  Therapeutic Activity 23 12/13/2023

## 2023-12-15 ENCOUNTER — PATIENT MESSAGE (OUTPATIENT)
Dept: VASCULAR SURGERY | Facility: CLINIC | Age: 38
End: 2023-12-15
Payer: COMMERCIAL

## 2023-12-15 PROBLEM — Z45.2 ENCOUNTER FOR CENTRAL LINE PLACEMENT: Status: ACTIVE | Noted: 2023-12-15

## 2023-12-15 LAB
ABO + RH BLD: NORMAL
ALBUMIN SERPL BCP-MCNC: 2 G/DL (ref 3.5–5.2)
ALBUMIN SERPL BCP-MCNC: 2.1 G/DL (ref 3.5–5.2)
ALP SERPL-CCNC: 128 U/L (ref 55–135)
ALT SERPL W/O P-5'-P-CCNC: 80 U/L (ref 10–44)
ANION GAP SERPL CALC-SCNC: 13 MMOL/L (ref 8–16)
ANION GAP SERPL CALC-SCNC: 14 MMOL/L (ref 8–16)
ANION GAP SERPL CALC-SCNC: 14 MMOL/L (ref 8–16)
ANISOCYTOSIS BLD QL SMEAR: SLIGHT
APTT PPP: 44.8 SEC (ref 21–32)
APTT PPP: 52.6 SEC (ref 21–32)
APTT PPP: 57.8 SEC (ref 21–32)
APTT PPP: 57.8 SEC (ref 21–32)
AST SERPL-CCNC: 68 U/L (ref 10–40)
AV INDEX (PROSTH): 0.6
AV MEAN GRADIENT: 10 MMHG
AV PEAK GRADIENT: 16 MMHG
AV VALVE AREA BY VELOCITY RATIO: 2.04 CM²
AV VALVE AREA: 2.09 CM²
AV VELOCITY RATIO: 0.58
BASOPHILS NFR BLD: 0 % (ref 0–1.9)
BILIRUB SERPL-MCNC: 3.7 MG/DL (ref 0.1–1)
BLD GP AB SCN CELLS X3 SERPL QL: NORMAL
BSA FOR ECHO PROCEDURE: 2.34 M2
BUN SERPL-MCNC: 43 MG/DL (ref 6–20)
BUN SERPL-MCNC: 45 MG/DL (ref 6–20)
BUN SERPL-MCNC: 57 MG/DL (ref 6–20)
CA-I BLDV-SCNC: 1.01 MMOL/L (ref 1.06–1.42)
CA-I BLDV-SCNC: 1.03 MMOL/L (ref 1.06–1.42)
CA-I BLDV-SCNC: 1.04 MMOL/L (ref 1.06–1.42)
CALCIUM SERPL-MCNC: 8.4 MG/DL (ref 8.7–10.5)
CALCIUM SERPL-MCNC: 8.7 MG/DL (ref 8.7–10.5)
CALCIUM SERPL-MCNC: 9 MG/DL (ref 8.7–10.5)
CHLORIDE SERPL-SCNC: 100 MMOL/L (ref 95–110)
CHLORIDE SERPL-SCNC: 103 MMOL/L (ref 95–110)
CHLORIDE SERPL-SCNC: 99 MMOL/L (ref 95–110)
CO2 SERPL-SCNC: 17 MMOL/L (ref 23–29)
CO2 SERPL-SCNC: 18 MMOL/L (ref 23–29)
CO2 SERPL-SCNC: 20 MMOL/L (ref 23–29)
CREAT SERPL-MCNC: 4.1 MG/DL (ref 0.5–1.4)
CREAT SERPL-MCNC: 4.3 MG/DL (ref 0.5–1.4)
CREAT SERPL-MCNC: 5.3 MG/DL (ref 0.5–1.4)
CV ECHO LV RWT: 0.39 CM
DIFFERENTIAL METHOD: ABNORMAL
DOP CALC AO PEAK VEL: 2.02 M/S
DOP CALC AO VTI: 26.89 CM
DOP CALC LVOT AREA: 3.5 CM2
DOP CALC LVOT DIAMETER: 2.11 CM
DOP CALC LVOT PEAK VEL: 1.18 M/S
DOP CALC LVOT STROKE VOLUME: 56.3 CM3
DOP CALC MV VTI: 29.67 CM
DOP CALC RVOT PEAK VEL: 0.88 M/S
DOP CALC RVOT VTI: 16.2 CM
DOP CALCLVOT PEAK VEL VTI: 16.11 CM
E WAVE DECELERATION TIME: 255.41 MSEC
E/A RATIO: 1.11
ECHO LV POSTERIOR WALL: 0.74 CM (ref 0.6–1.1)
EOSINOPHIL NFR BLD: 7 % (ref 0–8)
ERYTHROCYTE [DISTWIDTH] IN BLOOD BY AUTOMATED COUNT: 16.1 % (ref 11.5–14.5)
EST. GFR  (NO RACE VARIABLE): 10.1 ML/MIN/1.73 M^2
EST. GFR  (NO RACE VARIABLE): 12.9 ML/MIN/1.73 M^2
EST. GFR  (NO RACE VARIABLE): 13.7 ML/MIN/1.73 M^2
FACT X PPP CHRO-ACNC: 0.5 IU/ML (ref 0.3–0.7)
FACT X PPP CHRO-ACNC: 0.6 IU/ML (ref 0.3–0.7)
FACT X PPP CHRO-ACNC: 0.61 IU/ML (ref 0.3–0.7)
FRACTIONAL SHORTENING: 36 % (ref 28–44)
GLUCOSE SERPL-MCNC: 102 MG/DL (ref 70–110)
GLUCOSE SERPL-MCNC: 105 MG/DL (ref 70–110)
GLUCOSE SERPL-MCNC: 107 MG/DL (ref 70–110)
HCT VFR BLD AUTO: 29.8 % (ref 37–48.5)
HGB BLD-MCNC: 9.9 G/DL (ref 12–16)
HYPOCHROMIA BLD QL SMEAR: ABNORMAL
IMM GRANULOCYTES # BLD AUTO: ABNORMAL K/UL (ref 0–0.04)
IMM GRANULOCYTES NFR BLD AUTO: ABNORMAL % (ref 0–0.5)
INR PPP: 1.1 (ref 0.8–1.2)
INTERVENTRICULAR SEPTUM: 0.92 CM (ref 0.6–1.1)
LA MAJOR: 6.34 CM
LA WIDTH: 3.71 CM
LEFT INTERNAL DIMENSION IN SYSTOLE: 2.45 CM (ref 2.1–4)
LEFT VENTRICLE DIASTOLIC VOLUME INDEX: 28.65 ML/M2
LEFT VENTRICLE DIASTOLIC VOLUME: 63.61 ML
LEFT VENTRICLE MASS INDEX: 41 G/M2
LEFT VENTRICLE SYSTOLIC VOLUME INDEX: 9.6 ML/M2
LEFT VENTRICLE SYSTOLIC VOLUME: 21.28 ML
LEFT VENTRICULAR INTERNAL DIMENSION IN DIASTOLE: 3.84 CM (ref 3.5–6)
LEFT VENTRICULAR MASS: 91.91 G
LYMPHOCYTES NFR BLD: 10 % (ref 18–48)
MAGNESIUM SERPL-MCNC: 2.4 MG/DL (ref 1.6–2.6)
MCH RBC QN AUTO: 30.5 PG (ref 27–31)
MCHC RBC AUTO-ENTMCNC: 33.2 G/DL (ref 32–36)
MCV RBC AUTO: 92 FL (ref 82–98)
MONOCYTES NFR BLD: 6 % (ref 4–15)
MV MEAN GRADIENT: 4 MMHG
MV PEAK A VEL: 1.07 M/S
MV PEAK E VEL: 1.19 M/S
MV PEAK GRADIENT: 7 MMHG
MV STENOSIS PRESSURE HALF TIME: 74.07 MS
MV VALVE AREA BY CONTINUITY EQUATION: 1.9 CM2
MV VALVE AREA P 1/2 METHOD: 2.97 CM2
NEUTROPHILS NFR BLD: 75 % (ref 38–73)
NEUTS BAND NFR BLD MANUAL: 2 %
NRBC BLD-RTO: 0 /100 WBC
PHOSPHATE SERPL-MCNC: 4.5 MG/DL (ref 2.7–4.5)
PLATELET # BLD AUTO: 238 K/UL (ref 150–450)
PLATELET BLD QL SMEAR: ABNORMAL
PMV BLD AUTO: 11.3 FL (ref 9.2–12.9)
POIKILOCYTOSIS BLD QL SMEAR: SLIGHT
POLYCHROMASIA BLD QL SMEAR: ABNORMAL
POTASSIUM SERPL-SCNC: 4.2 MMOL/L (ref 3.5–5.1)
POTASSIUM SERPL-SCNC: 4.4 MMOL/L (ref 3.5–5.1)
POTASSIUM SERPL-SCNC: 4.5 MMOL/L (ref 3.5–5.1)
PROT SERPL-MCNC: 6 G/DL (ref 6–8.4)
PROTHROMBIN TIME: 11.4 SEC (ref 9–12.5)
PV MEAN GRADIENT: 16 MMHG
PV PEAK GRADIENT: 27 MMHG
PV PEAK VELOCITY: 2.6 M/S
RA MAJOR: 5.24 CM
RA WIDTH: 3.78 CM
RBC # BLD AUTO: 3.25 M/UL (ref 4–5.4)
RIGHT VENTRICULAR END-DIASTOLIC DIMENSION: 4.7 CM
SODIUM SERPL-SCNC: 131 MMOL/L (ref 136–145)
SODIUM SERPL-SCNC: 133 MMOL/L (ref 136–145)
SODIUM SERPL-SCNC: 134 MMOL/L (ref 136–145)
SPECIMEN OUTDATE: NORMAL
SPHEROCYTES BLD QL SMEAR: ABNORMAL
WBC # BLD AUTO: 14.58 K/UL (ref 3.9–12.7)
Z-SCORE OF LEFT VENTRICULAR DIMENSION IN END DIASTOLE: -7.03
Z-SCORE OF LEFT VENTRICULAR DIMENSION IN END SYSTOLE: -5.16

## 2023-12-15 PROCEDURE — 63600175 PHARM REV CODE 636 W HCPCS: Performed by: STUDENT IN AN ORGANIZED HEALTH CARE EDUCATION/TRAINING PROGRAM

## 2023-12-15 PROCEDURE — 97116 GAIT TRAINING THERAPY: CPT

## 2023-12-15 PROCEDURE — 97530 THERAPEUTIC ACTIVITIES: CPT

## 2023-12-15 PROCEDURE — 99232 PR SUBSEQUENT HOSPITAL CARE,LEVL II: ICD-10-PCS | Mod: ,,, | Performed by: NURSE PRACTITIONER

## 2023-12-15 PROCEDURE — 83735 ASSAY OF MAGNESIUM: CPT | Performed by: NURSE PRACTITIONER

## 2023-12-15 PROCEDURE — 85520 HEPARIN ASSAY: CPT | Performed by: INTERNAL MEDICINE

## 2023-12-15 PROCEDURE — 99291 CRITICAL CARE FIRST HOUR: CPT | Mod: ,,, | Performed by: INTERNAL MEDICINE

## 2023-12-15 PROCEDURE — 82330 ASSAY OF CALCIUM: CPT | Performed by: NURSE PRACTITIONER

## 2023-12-15 PROCEDURE — 94761 N-INVAS EAR/PLS OXIMETRY MLT: CPT

## 2023-12-15 PROCEDURE — 99223 PR INITIAL HOSPITAL CARE,LEVL III: ICD-10-PCS | Mod: ,,, | Performed by: NURSE PRACTITIONER

## 2023-12-15 PROCEDURE — 25000003 PHARM REV CODE 250: Performed by: STUDENT IN AN ORGANIZED HEALTH CARE EDUCATION/TRAINING PROGRAM

## 2023-12-15 PROCEDURE — 85520 HEPARIN ASSAY: CPT | Mod: 91

## 2023-12-15 PROCEDURE — 97535 SELF CARE MNGMENT TRAINING: CPT

## 2023-12-15 PROCEDURE — 90945 DIALYSIS ONE EVALUATION: CPT

## 2023-12-15 PROCEDURE — 25000003 PHARM REV CODE 250: Performed by: INTERNAL MEDICINE

## 2023-12-15 PROCEDURE — 85027 COMPLETE CBC AUTOMATED: CPT

## 2023-12-15 PROCEDURE — 63600175 PHARM REV CODE 636 W HCPCS: Performed by: PHYSICIAN ASSISTANT

## 2023-12-15 PROCEDURE — 80100008 HC CRRT DAILY MAINTENANCE

## 2023-12-15 PROCEDURE — 80053 COMPREHEN METABOLIC PANEL: CPT | Performed by: INTERNAL MEDICINE

## 2023-12-15 PROCEDURE — 99291 PR CRITICAL CARE, E/M 30-74 MINUTES: ICD-10-PCS | Mod: ,,, | Performed by: INTERNAL MEDICINE

## 2023-12-15 PROCEDURE — 25000003 PHARM REV CODE 250

## 2023-12-15 PROCEDURE — 85730 THROMBOPLASTIN TIME PARTIAL: CPT | Mod: 91

## 2023-12-15 PROCEDURE — 80069 RENAL FUNCTION PANEL: CPT | Performed by: NURSE PRACTITIONER

## 2023-12-15 PROCEDURE — 99232 SBSQ HOSP IP/OBS MODERATE 35: CPT | Mod: ,,, | Performed by: NURSE PRACTITIONER

## 2023-12-15 PROCEDURE — 85007 BL SMEAR W/DIFF WBC COUNT: CPT

## 2023-12-15 PROCEDURE — 85610 PROTHROMBIN TIME: CPT | Performed by: INTERNAL MEDICINE

## 2023-12-15 PROCEDURE — 51798 US URINE CAPACITY MEASURE: CPT

## 2023-12-15 PROCEDURE — 94799 UNLISTED PULMONARY SVC/PX: CPT

## 2023-12-15 PROCEDURE — 99900035 HC TECH TIME PER 15 MIN (STAT)

## 2023-12-15 PROCEDURE — 99223 1ST HOSP IP/OBS HIGH 75: CPT | Mod: ,,, | Performed by: NURSE PRACTITIONER

## 2023-12-15 PROCEDURE — 92526 ORAL FUNCTION THERAPY: CPT

## 2023-12-15 PROCEDURE — 63600175 PHARM REV CODE 636 W HCPCS

## 2023-12-15 PROCEDURE — 86901 BLOOD TYPING SEROLOGIC RH(D): CPT | Performed by: THORACIC SURGERY (CARDIOTHORACIC VASCULAR SURGERY)

## 2023-12-15 PROCEDURE — 25000003 PHARM REV CODE 250: Performed by: NURSE PRACTITIONER

## 2023-12-15 PROCEDURE — 85730 THROMBOPLASTIN TIME PARTIAL: CPT | Mod: 91 | Performed by: THORACIC SURGERY (CARDIOTHORACIC VASCULAR SURGERY)

## 2023-12-15 PROCEDURE — 20000000 HC ICU ROOM

## 2023-12-15 PROCEDURE — 80048 BASIC METABOLIC PNL TOTAL CA: CPT | Mod: XB | Performed by: STUDENT IN AN ORGANIZED HEALTH CARE EDUCATION/TRAINING PROGRAM

## 2023-12-15 RX ORDER — SIMETHICONE 80 MG
1 TABLET,CHEWABLE ORAL 3 TIMES DAILY PRN
Status: DISCONTINUED | OUTPATIENT
Start: 2023-12-15 | End: 2023-12-22 | Stop reason: HOSPADM

## 2023-12-15 RX ORDER — HEPARIN SODIUM 10000 [USP'U]/100ML
27 INJECTION, SOLUTION INTRAVENOUS CONTINUOUS
Status: DISCONTINUED | OUTPATIENT
Start: 2023-12-15 | End: 2023-12-17

## 2023-12-15 RX ORDER — HEPARIN SODIUM 10000 [USP'U]/100ML
27 INJECTION, SOLUTION INTRAVENOUS CONTINUOUS
Status: DISCONTINUED | OUTPATIENT
Start: 2023-12-15 | End: 2023-12-15

## 2023-12-15 RX ORDER — MIDODRINE HYDROCHLORIDE 5 MG/1
10 TABLET ORAL EVERY 8 HOURS PRN
Status: DISCONTINUED | OUTPATIENT
Start: 2023-12-15 | End: 2023-12-16

## 2023-12-15 RX ORDER — MIDODRINE HYDROCHLORIDE 5 MG/1
5 TABLET ORAL EVERY 8 HOURS
Status: DISCONTINUED | OUTPATIENT
Start: 2023-12-15 | End: 2023-12-15

## 2023-12-15 RX ORDER — MAGNESIUM SULFATE HEPTAHYDRATE 40 MG/ML
2 INJECTION, SOLUTION INTRAVENOUS
Status: ACTIVE | OUTPATIENT
Start: 2023-12-15 | End: 2023-12-16

## 2023-12-15 RX ORDER — HYDROCODONE BITARTRATE AND ACETAMINOPHEN 500; 5 MG/1; MG/1
TABLET ORAL CONTINUOUS
Status: ACTIVE | OUTPATIENT
Start: 2023-12-15 | End: 2023-12-16

## 2023-12-15 RX ORDER — SODIUM CHLORIDE 9 MG/ML
INJECTION, SOLUTION INTRAVENOUS ONCE
Status: DISCONTINUED | OUTPATIENT
Start: 2023-12-15 | End: 2023-12-18

## 2023-12-15 RX ADMIN — CHLOROTHIAZIDE SODIUM 1000 MG: 500 INJECTION, POWDER, LYOPHILIZED, FOR SOLUTION INTRAVENOUS at 08:12

## 2023-12-15 RX ADMIN — Medication: at 09:12

## 2023-12-15 RX ADMIN — ASPIRIN 81 MG CHEWABLE TABLET 81 MG: 81 TABLET CHEWABLE at 08:12

## 2023-12-15 RX ADMIN — HEPARIN SODIUM AND DEXTROSE 27 UNITS/KG/HR: 10000; 5 INJECTION INTRAVENOUS at 09:12

## 2023-12-15 RX ADMIN — LIDOCAINE 5% 1 PATCH: 700 PATCH TOPICAL at 04:12

## 2023-12-15 RX ADMIN — SODIUM CHLORIDE: 9 INJECTION, SOLUTION INTRAVENOUS at 07:12

## 2023-12-15 RX ADMIN — FUROSEMIDE 240 MG: 10 INJECTION, SOLUTION INTRAMUSCULAR; INTRAVENOUS at 08:12

## 2023-12-15 RX ADMIN — Medication 6 MG: at 09:12

## 2023-12-15 RX ADMIN — HEPARIN SODIUM AND DEXTROSE 25 UNITS/KG/HR: 10000; 5 INJECTION INTRAVENOUS at 12:12

## 2023-12-15 RX ADMIN — Medication: at 08:12

## 2023-12-15 RX ADMIN — HEPARIN SODIUM AND DEXTROSE 27 UNITS/KG/HR: 10000; 5 INJECTION INTRAVENOUS at 06:12

## 2023-12-15 RX ADMIN — OXYCODONE HYDROCHLORIDE 5 MG: 5 TABLET ORAL at 02:12

## 2023-12-15 RX ADMIN — SIMETHICONE 80 MG: 80 TABLET, CHEWABLE ORAL at 05:12

## 2023-12-15 RX ADMIN — ONDANSETRON 4 MG: 2 INJECTION INTRAMUSCULAR; INTRAVENOUS at 08:12

## 2023-12-15 RX ADMIN — SENNOSIDES AND DOCUSATE SODIUM 1 TABLET: 8.6; 5 TABLET ORAL at 09:12

## 2023-12-15 RX ADMIN — MIDODRINE HYDROCHLORIDE 10 MG: 5 TABLET ORAL at 06:12

## 2023-12-15 NOTE — SUBJECTIVE & OBJECTIVE
Interval History:   RRT held overnight. Net positive > 1 L/24 hrs. sCr up to 4.3.   Lasix/Diuril given this AM with no response.   Off Levophed.     Review of patient's allergies indicates:   Allergen Reactions    Cephalexin Other (See Comments)     She gets mouth sores.     Penicillin g Other (See Comments)     Causes mouth sores.      Current Facility-Administered Medications   Medication Frequency    0.9%  NaCl infusion PRN    0.9%  NaCl infusion Once    acetaminophen oral solution 650 mg Q8H PRN    aspirin chewable tablet 81 mg Daily    balsam peru-castor oiL Oint BID    calcium gluconate 1 g in NS IVPB (premixed) PRN    calcium gluconate 1 g in NS IVPB (premixed) PRN    calcium gluconate 1 g in NS IVPB (premixed) PRN    dextrose 10% bolus 125 mL 125 mL PRN    dextrose 10% bolus 250 mL 250 mL PRN    heparin 25,000 units in dextrose 5% 250 mL (100 units/mL) infusion (heparin infusion - NO NOMOGRAM) Continuous    LIDOcaine 5 % patch 1 patch Q24H    magnesium sulfate 2g in water 50mL IVPB (premix) PRN    magnesium sulfate 2g in water 50mL IVPB (premix) PRN    melatonin tablet 6 mg QHS    midodrine tablet 10 mg Q8H PRN    NORepinephrine 8 mg in dextrose 5% 250 mL infusion Continuous    ondansetron injection 4 mg Q12H PRN    oxyCODONE immediate release tablet 5 mg Q6H PRN    polyethylene glycol packet 17 g Daily    senna-docusate 8.6-50 mg per tablet 1 tablet BID       Objective:     Vital Signs (Most Recent):  Temp: 98.1 °F (36.7 °C) (12/15/23 1145)  Pulse: 97 (12/15/23 1215)  Resp: (!) 36 (12/15/23 1215)  BP: 97/61 (12/15/23 1100)  SpO2: (!) 94 % (12/15/23 1215) Vital Signs (24h Range):  Temp:  [98 °F (36.7 °C)-98.8 °F (37.1 °C)] 98.1 °F (36.7 °C)  Pulse:  [] 97  Resp:  [13-42] 36  SpO2:  [91 %-100 %] 94 %  BP: ()/(53-66) 97/61  Arterial Line BP: ()/(42-70) 104/55     Weight: 119.3 kg (263 lb) (12/15/23 0920)  Body mass index is 43.77 kg/m².  Body surface area is 2.34 meters squared.    I/O last 3  completed shifts:  In: 3508.1 [I.V.:1030; IV Piggyback:2478.1]  Out: 1133 [Other:1133]     Physical Exam  Vitals and nursing note reviewed.   Constitutional:       Appearance: She is obese.      Interventions: She is sedated and intubated.   HENT:      Head: Normocephalic and atraumatic.      Mouth/Throat:      Mouth: Mucous membranes are moist.   Cardiovascular:      Rate and Rhythm: Normal rate and regular rhythm.      Pulses: Normal pulses.   Pulmonary:      Effort: Pulmonary effort is normal. No respiratory distress. She is intubated.      Breath sounds: Normal breath sounds. No wheezing or rales.   Chest:      Comments: Three chest tubes in midline   Abdominal:      General: Abdomen is flat.      Palpations: Abdomen is soft.   Musculoskeletal:         General: Swelling present. Normal range of motion.      Right lower leg: Edema present.      Left lower leg: Edema present.   Skin:     General: Skin is warm and dry.   Neurological:      General: No focal deficit present.   Psychiatric:         Mood and Affect: Mood normal.         Behavior: Behavior normal.          Significant Labs:  CBC:   Recent Labs   Lab 12/15/23  0445   WBC 14.58*   RBC 3.25*   HGB 9.9*   HCT 29.8*      MCV 92   MCH 30.5   MCHC 33.2     CMP:   Recent Labs   Lab 12/15/23  0411      CALCIUM 9.0   ALBUMIN 2.1*   PROT 6.0   *   K 4.5   CO2 20*   CL 99   BUN 45*   CREATININE 4.3*   ALKPHOS 128   ALT 80*   AST 68*   BILITOT 3.7*     All labs within the past 24 hours have been reviewed.

## 2023-12-15 NOTE — PT/OT/SLP PROGRESS
Physical Therapy Co-Treatment    Patient Name:  Megan Cook   MRN:  [id number]  Admitting Diagnosis:  Rheumatic aortic stenosis   Recent Surgery: Procedure(s) (LRB):  Replacement-valve-aortic (N/A)  REPLACEMENT, PULMONARY VALVE (N/A) 7 Days Post-Op  Admit Date: 12/8/2023  Length of Stay: 7 days    Recommendations:     Discharge Recommendations: High Intensity Therapy  Discharge Equipment Recommendations: wheelchair, walker, rolling   Barriers to discharge:  decreased endurance    Appropriate transfer level with nursing staff: in-room ambulation with CGA    Plan:     During this hospitalization, patient to be seen 5 x/week to address the identified rehab impairments via gait training, therapeutic activities, therapeutic exercises, neuromuscular re-education and progress towards the established goals.  Plan of Care Expires:  01/12/24  Plan of Care Reviewed with: patient, family    Assessment     Megan Cook is a 38 y.o. female admitted with a medical diagnosis of Rheumatic aortic stenosis. Pt tolerated session well today. She continues to make excellent progress with PT and was able to ambulate an increased distance as well as perform transfers with decreased assist. This points towards improving activity tolerance as well as endurance. After toileting BRB noted in toilet. Pt reports not having taken her birth control since her surgery. RN notified of this. She remains very motivated to progress functionally and return to PLOF. Pt will continue to benefit from skilled PT services during this hospital admit to continue to improve transfer ability and efficiency as well as continue to progress pt's ambulation distance and cardiopulmonary endurance to maximize pt's functional independence and return to PLOF.     Problem List: weakness, impaired endurance, impaired self care skills, impaired functional mobility, gait instability, impaired balance, decreased upper extremity function, decreased lower extremity  function, pain, decreased safety awareness, impaired cardiopulmonary response to activity, edema.  Rehab Prognosis: Good; patient would benefit from acute skilled PT services to address these deficits and reach maximum level of function.      Goals:   Multidisciplinary Problems       Physical Therapy Goals          Problem: Physical Therapy    Goal Priority Disciplines Outcome Goal Variances Interventions   Physical Therapy Goal     PT, PT/OT Ongoing, Progressing     Description: Goals to be met by: 2023     Patient will increase functional independence with mobility by performin. Sit to stand transfer with Minimal Assistance - met   1a. Sit to stand transfer with SBA - not met  2. Bed to chair transfer with Moderate Assistance using physician approved AD - met   2a. Bed to chair transfer with stand by assist using physician approved AD - not met  3. Gait  x 20 feet with Moderate Assistance using physician approved AD. - not met  4. Ascend/descend 14 stair with right Handrails Minimal Assistance using physician approved AD. - not met  5. Stand for 5 minutes with Contact Guard Assistance using physician approved AD- not met  6. Lower extremity exercise program x30 reps per handout, with independence- not met                         Subjective     RN notified prior to session. No present upon PT entrance into room. Patient agreeable to PT evaluation.    Chief Complaint: pt reporting having to have BM  Patient/Family Comments/goals: get stronger  Pain/Comfort:  Pain Rating 1: 0/10  Pain Rating Post-Intervention 1: 0/10    Objective:     Additional staff present: OT; OT for cotx due to pt's multiple medical comorbidities and functional deficits req'ing two skilled therapists to appropriately progress pt's musculoskeletal strength, neuromuscular control, and endurance while taking into consideration severe medical acuity in the ICU    Patient found HOB elevated with: telemetry, blood pressure cuff,  "pulse ox (continuous), arterial line, Trialysis   Cognition:   Alert and Cooperative  Patient is oriented to Person, Place, Time, Situation  Command following: Follows multistep verbal commands  Fluency: clear/fluent  General Precautions: Standard, Cardiac fall, sternal   Orthopedic Precautions:N/A   Braces: N/A   Body mass index is 43.77 kg/m².  Oxygen Device: Room Air  Vitals: BP (!) 98/55   Pulse 97   Temp 98.4 °F (36.9 °C)   Resp (!) 33   Ht 5' 5" (1.651 m)   Wt 119.3 kg (263 lb)   LMP 11/08/2023 (Approximate)   SpO2 (!) 92%   Breastfeeding No   BMI 43.77 kg/m²     Outcome Measures:  AM-PAC 6 CLICK MOBILITY  Turning over in bed (including adjusting bedclothes, sheets and blankets)?: 2  Sitting down on and standing up from a chair with arms (e.g., wheelchair, bedside commode, etc.): 3  Moving from lying on back to sitting on the side of the bed?: 2  Moving to and from a bed to a chair (including a wheelchair)?: 3  Need to walk in hospital room?: 3  Climbing 3-5 steps with a railing?: 2  Basic Mobility Total Score: 15     Functional Mobility:    Bed Mobility:   Pt found/returned to bedside chair    Transfers:   Sit <> Stand Transfer: contact guard assistance with no AD. [id number] from bedside chair and [id number] from toliet    Standing Balance:  Static Standing Balance: Fair : able to stand unsupported without UE support and without LOB for 1 minute  Dynamic Standing Balance: Fair : stand independently unsupported, weight shift, and reach ipsilaterally. LOB noted when crossing midline.  Assistance Level Required: Contact Guard Assistance  Patient used: hand-held assist   Comments: Pt with no c/o dizziness      Gait:   Patient ambulated: 14 ft + toileting + 14 ft   Patient required: contact guard  Patient used:  no assistive device   Gait Pattern observed: reciprocal gait  Gait Deviation(s): decreased step length, decreased foot clearance, flexed posture, decreased marc, and shuffle gait  Impairments due " to: pain and decreased endurance  all lines remained intact throughout ambulation trial  Comments:  Pt required vc's for step length but otherwise no LOB or instability.     Education:  Time provided for education, counseling and discussion of health disposition in regards to patient's current status  All questions answered within PT scope of practice and to patient's satisfaction  PT role in POC to address current functional deficits  Pt educated on proper body mechanics, safety techniques, and energy conservation with PT facilitation and cueing throughout session  Call nursing/pct to transfer to chair/use bathroom. Pt stated understanding.  Whiteboard updated with therapist name and pt's current mobility status documented above  Safe to perform step transfer to/from chair/bedside commode CGA and HHA w/ nursing/PCT present  Importance of OOB tolerance prn hrs/day to improve lung ventilation and expansion as well as strengthen postural musculature    Patient left up in chair with all lines intact, call button in reach, RN notified, and boyfriend present.    Time Tracking:     PT Received On: 12/15/23  PT Start Time: 0917     PT Stop Time: 0945  PT Total Time (min): 28 min     Billable Minutes:   Gait Training 13 minutes and Therapeutic Activity 15 minutes    Treatment Type: Treatment  PT/PTA: PT       12/15/2023

## 2023-12-15 NOTE — PROGRESS NOTES
[facility]  Nephrology  Progress Note    Patient Name: Megan Cook  MRN: [id number]  Admission Date: 12/8/2023  Hospital Length of Stay: 7 days  Attending Provider: Alexandru Campbell MD   Primary Care Physician: Adelaida Disla NP  Principal Problem:Rheumatic aortic stenosis    Subjective:     Interval History:   RRT held overnight. Net positive > 1 L/24 hrs. sCr up to 4.3.   Lasix/Diuril given this AM with no response.   Off Levophed.     Review of patient's allergies indicates:   Allergen Reactions    Cephalexin Other (See Comments)     She gets mouth sores.     Penicillin g Other (See Comments)     Causes mouth sores.      Current Facility-Administered Medications   Medication Frequency    0.9%  NaCl infusion PRN    0.9%  NaCl infusion Once    acetaminophen oral solution 650 mg Q8H PRN    aspirin chewable tablet 81 mg Daily    balsam peru-castor oiL Oint BID    calcium gluconate 1 g in NS IVPB (premixed) PRN    calcium gluconate 1 g in NS IVPB (premixed) PRN    calcium gluconate 1 g in NS IVPB (premixed) PRN    dextrose 10% bolus 125 mL 125 mL PRN    dextrose 10% bolus 250 mL 250 mL PRN    heparin 25,000 units in dextrose 5% 250 mL (100 units/mL) infusion (heparin infusion - NO NOMOGRAM) Continuous    LIDOcaine 5 % patch 1 patch Q24H    magnesium sulfate 2g in water 50mL IVPB (premix) PRN    magnesium sulfate 2g in water 50mL IVPB (premix) PRN    melatonin tablet 6 mg QHS    midodrine tablet 10 mg Q8H PRN    NORepinephrine 8 mg in dextrose 5% 250 mL infusion Continuous    ondansetron injection 4 mg Q12H PRN    oxyCODONE immediate release tablet 5 mg Q6H PRN    polyethylene glycol packet 17 g Daily    senna-docusate 8.6-50 mg per tablet 1 tablet BID       Objective:     Vital Signs (Most Recent):  Temp: 98.1 °F (36.7 °C) (12/15/23 1145)  Pulse: 97 (12/15/23 1215)  Resp: (!) 36 (12/15/23 1215)  BP: 97/61 (12/15/23 1100)  SpO2: (!) 94 % (12/15/23 1215) Vital Signs (24h Range):  Temp:   [98 °F (36.7 °C)-98.8 °F (37.1 °C)] 98.1 °F (36.7 °C)  Pulse:  [] 97  Resp:  [13-42] 36  SpO2:  [91 %-100 %] 94 %  BP: ()/(53-66) 97/61  Arterial Line BP: ()/(42-70) 104/55     Weight: 119.3 kg (263 lb) (12/15/23 0920)  Body mass index is 43.77 kg/m².  Body surface area is 2.34 meters squared.    I/O last 3 completed shifts:  In: 3508.1 [I.V.:1030; IV Piggyback:2478.1]  Out: 1133 [Other:1133]     Physical Exam  Vitals and nursing note reviewed.   Constitutional:       Appearance: She is obese.      Interventions: She is sedated and intubated.   HENT:      Head: Normocephalic and atraumatic.      Mouth/Throat:      Mouth: Mucous membranes are moist.   Cardiovascular:      Rate and Rhythm: Normal rate and regular rhythm.      Pulses: Normal pulses.   Pulmonary:      Effort: Pulmonary effort is normal. No respiratory distress. She is intubated.      Breath sounds: Normal breath sounds. No wheezing or rales.   Chest:      Comments: Three chest tubes in midline   Abdominal:      General: Abdomen is flat.      Palpations: Abdomen is soft.   Musculoskeletal:         General: Swelling present. Normal range of motion.      Right lower leg: Edema present.      Left lower leg: Edema present.   Skin:     General: Skin is warm and dry.   Neurological:      General: No focal deficit present.   Psychiatric:         Mood and Affect: Mood normal.         Behavior: Behavior normal.          Significant Labs:  CBC:   Recent Labs   Lab 12/15/23  0445   WBC 14.58*   RBC 3.25*   HGB 9.9*   HCT 29.8*      MCV 92   MCH 30.5   MCHC 33.2     CMP:   Recent Labs   Lab 12/15/23  0411      CALCIUM 9.0   ALBUMIN 2.1*   PROT 6.0   *   K 4.5   CO2 20*   CL 99   BUN 45*   CREATININE 4.3*   ALKPHOS 128   ALT 80*   AST 68*   BILITOT 3.7*     All labs within the past 24 hours have been reviewed.   Assessment/Plan:     Cardiac/Vascular  * Rheumatic aortic stenosis  - defer to primary team     Renal/  TINO (acute  kidney injury)  Aute kidney injury secondary to iATN from hypotensive episodes/hemodynamic instability during OR  (Baseline 0.4-1)  SLED started 12/9 for oliguric TINO    Plan  -High dose Diuril / Lasix boluses this AM with no response.   -Will plan for 10 hr SLED for clearance and volume management. Target -400 mL/hr as tolerated, keep MAP >65.  -Can tentatively plan for tunneled line placement early next week, recommend consulting IR for line placement.           Thank you for your consult. I will follow-up with patient. Please contact us if you have any additional questions.    Tiffany Joyce DNP, FNP-C  Nephrology  [facility]

## 2023-12-15 NOTE — PROGRESS NOTES
"Congenital Cardiac Surgery Note:     Ms. Megan Cook underwent re-replacement of her aortic valve and re-replacement of her RV-PA conduit/pulmonary valve on 12-8-2023.       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      Post-op course complicated by Acute Renal Failure.     Interval Events  Off pressors yesterday.  On room air.  Up in chair this morning.         Vitals:    12/15/23 0730 12/15/23 0745 12/15/23 0907 12/15/23 0920   BP: (!) 96/58  (!) 98/55    BP Location:       Patient Position:       Pulse: 97 97 97    Resp: (!) 28 (!) 25 (!) 33    Temp: 98.4 °F (36.9 °C)      TempSrc:       SpO2: (!) 93% (!) 93% (!) 92%    Weight:    119.3 kg (263 lb)   Height:    5' 5" (1.651 m)         Physical Exam:  General: appears relatively well, up in chair  CV: normal rate and regular, normal pedal pulses  Resp: normal effort  Neuro: alert and oriented  Ext: Mild LE edema  Drains: Removed  Incision: Dressed     Imaging:  CXR: Overall improved, still with possible small left effusion and left lower lobe atelectasis.  But improved volumes since yesterday and after chest tube removal yesterday.     Assessment and Plan:  Discussed her care with Dr. Zhu, and ICU team.     She looks even better today and is off pressors and on room air.      Getting echo this morning, formal report pending, biventricular function normal.      Renal Replacement therapy held yesterday.  Made very small amount of urine.  Nephrology planning diuretic challenge today but likely will need HD tonight.    If fails diuretic challenge, will activate plan to obtain tunneled HD line soon.      pTT still not therapeutic(goal 60-80), titrating Heparin infusion without bolus dosing.    Checking anti-Xa given on substantial heparin dose.      Discussed anticoagulation around potential need for tunneled HD catheter and plan to hold warfarin until HD line in place if necessary and stick with heparin and aspirin in the " interim.      Ultimately, anticoagulation plan will be for warfarin with an INR target of 2.5(goal range 2-3) and daily aspirin.       No active concerns for infection. Stable mild Leukocytosis, WBC decreased to 14.  No fevers.    Platelet Count continues to improve.  Hgb stable.     Encouraged getting out of bed to chair and pulmonary toilet. PT to see today.       Continue supportive care     I appreciate the assistance of the ICU team in her care. Also appreciate the nephrology team for their assistance with renal replacement therapy.     Alexandru Campbell MD

## 2023-12-15 NOTE — NURSING
SICU PLAN OF CARE NOTE    Dx: Rheumatic aortic stenosis    Goals of Care: MAP 60-80    Vital Signs (last 12 hours):   Temp:  [98.2 °F (36.8 °C)]   Pulse:  []   Resp:  [15-38]   BP: ()/(53-61)   SpO2:  [91 %-98 %]   Arterial Line BP: ()/(42-64)      Neuro: AAO x4    Cardiac: NSR    Respiratory: Room Air    Gtts: Heparin    Urine Output: Anuric       Diet: Clear Liquid      Skin:  no new breakdown noted. Turn and reposition q2. Heel foams in tact. Bariatric bed in use. OOBTC     Shift Events:  weaned off levo gtt, weaned off oxygen

## 2023-12-15 NOTE — ASSESSMENT & PLAN NOTE
Aute kidney injury secondary to iATN from hypotensive episodes/hemodynamic instability during OR  (Baseline 0.4-1)  SLED started 12/9 for oliguric TINO    Plan  -High dose Diuril / Lasix boluses this AM with no response.   -Will plan for iHD trial today for clearance and volume management. Target UF 0.5-1 L as tolerated, keep MAP >65.  -Can tentatively plan for tunneled line placement early next week, recommend consulting IR for line placement.

## 2023-12-15 NOTE — PLAN OF CARE
SICU PLAN OF CARE NOTE    Dx: Rheumatic aortic stenosis    Goals of Care: Map 60-80    Vital Signs (last 12 hours):   Temp:  [97.9 °F (36.6 °C)-98.4 °F (36.9 °C)]   Pulse:  []   Resp:  [6-42]   BP: ()/(55-61)   SpO2:  [91 %-98 %]   Arterial Line BP: ()/(46-70)      Neuro: AAO x4    Cardiac: NSR    Respiratory: Room Air    Gtts: Heparin    Urine Output: Anuric 0 cc/shift    Dialysis, orders placed  Drains:     Diet: Cardiac Diet     Labs/Accuchecks:     Skin:  All skin remains free from injury.  Patient turned Q2, Immerse mattress inflated, and bed working correctly.    Shift Events:  RIJ quad lumen removed. Lasix and diuril administered to pt and bladder scan was obtained with 50 cc of urine in bladder. Pt never voided. Plan for dialysis. Pt worked with pt/ot  See flowsheet for further assessment/details.  Family updated on current condition/plan of care, questions answered, and emotional support provided.  MD updated on current condition, vitals, labs, and gtts.  No new orders received, will continue to monitor.

## 2023-12-15 NOTE — SUBJECTIVE & OBJECTIVE
Interval History/Significant Events: Paused CRRT yesterday. CT removed. Voided at bedside. On room air, sitting up in chair.    Follow-up For: Procedure(s) (LRB):  Replacement-valve-aortic (N/A)  REPLACEMENT, PULMONARY VALVE (N/A)    Post-Operative Day: 7 Days Post-Op    Objective:     Vital Signs (Most Recent):  Temp: 98.2 °F (36.8 °C) (12/15/23 0300)  Pulse: 98 (12/15/23 0404)  Resp: (!) 31 (12/15/23 0404)  BP: (!) 111/58 (12/15/23 0404)  SpO2: 96 % (12/15/23 0404) Vital Signs (24h Range):  Temp:  [97.7 °F (36.5 °C)-98.8 °F (37.1 °C)] 98.2 °F (36.8 °C)  Pulse:  [] 98  Resp:  [15-38] 31  SpO2:  [93 %-100 %] 96 %  BP: (100-132)/(53-70) 111/58  Arterial Line BP: ()/(39-64) 125/62     Weight: 119.3 kg (263 lb)  Body mass index is 43.77 kg/m².      Intake/Output Summary (Last 24 hours) at 12/15/2023 0611  Last data filed at 12/15/2023 0300  Gross per 24 hour   Intake 2051.01 ml   Output 0 ml   Net 2051.01 ml          Physical Exam  Vitals and nursing note reviewed.   Constitutional:       Appearance: She is obese.   HENT:      Head: Normocephalic and atraumatic.   Cardiovascular:      Rate and Rhythm: Regular rhythm.   Pulmonary:      Effort: Pulmonary effort is normal.      Comments:      Abdominal:      General: Abdomen is flat.      Palpations: Abdomen is soft.   Musculoskeletal:         General: Swelling present.   Skin:     General: Skin is warm and dry.      Capillary Refill: Capillary refill takes less than 2 seconds.              Lines/Drains/Airways       Central Venous Catheter Line  Duration             Percutaneous Central Line Insertion/Assessment - Quad Lumen  12/08/23 0949 Internal Jugular Right 6 days    Trialysis (Dialysis) Catheter 12/09/23 1327 left internal jugular 5 days              Arterial Line  Duration             Arterial Line 12/11/23 1100 Right Radial 3 days                    Significant Labs:    CBC/Anemia Profile:  Recent Labs   Lab 12/14/23  0307 12/15/23  0445   WBC 17.56*  14.58*   HGB 10.0* 9.9*   HCT 30.7* 29.8*    238   MCV 89 92   RDW 15.8* 16.1*        Chemistries:  Recent Labs   Lab 12/13/23  1417 12/13/23  2153 12/14/23  0307 12/15/23  0411   * 134* 134*  133* 133*   K 4.7 4.5 4.5  4.5 4.5    104 103  103 99   CO2 22* 22* 18*  17* 20*   BUN 11 11 14  13 45*   CREATININE 1.3 1.2 1.7*  1.9* 4.3*   CALCIUM 8.6* 8.5* 9.3  9.1 9.0   ALBUMIN 2.3* 2.2* 2.3*  2.2* 2.1*   PROT  --   --  6.3 6.0   BILITOT  --   --  4.8* 3.7*   ALKPHOS  --   --  121 128   ALT  --   --  111* 80*   AST  --   --  124* 68*   MG 1.8 2.2 2.1  --    PHOS 2.1* 2.0* 2.7  --          Significant Imaging:  I have reviewed all pertinent imaging results/findings within the past 24 hours.

## 2023-12-15 NOTE — CARE UPDATE
SICU PLAN OF CARE NOTE    Dx: Rheumatic aortic stenosis    Goals of Care: Wean presser requirements. Increase mobility. Maintain MAP 60-80.     Neuro: AAO x4, Follows Commands, and Moves All Extremities    Cardiac: NSR    Respiratory: Nasal Cannula 1 L     Gtts: Norepinephrine and Heparin    Urine Output: Anuric 0 cc/shift      Diet: Sips with Meds. Crushed meds with apple sauce/pudding.        Labs/Accuchecks: Per MD order     Skin:  All skin remains free from injury.  Patient turned Q2, bariatric mattress inflated, and bed working correctly.    Shift Events: See flowsheet for further assessment/details.  Family updated on current condition/plan of care, questions answered, and emotional support provided.  MD updated on current condition, vitals, labs, and gtts.  No new orders received, will continue to monitor.

## 2023-12-15 NOTE — PT/OT/SLP PROGRESS
"Occupational Therapy   Co-Treatment with PT  Co-treatment performed due to patient's multiple deficits requiring two skilled therapists to appropriately and safely assess patient's strength and endurance while facilitating functional tasks in addition to accommodating for patient's activity tolerance.         Name: Megan Cook  MRN: [id number]  Admitting Diagnosis:  Rheumatic aortic stenosis  7 Days Post-Op    Recommendations:     Discharge Recommendations: High Intensity Therapy  Discharge Equipment Recommendations:   (TBD closer to d/c)  Barriers to discharge:  Other (Comment) (decreased functional abilities for self care and mobility)    Assessment:     Megan Cook is a 38 y.o. female with a medical diagnosis of Rheumatic aortic stenosis.  Pt tolerated OT session well with good participation and motivation. Pt is progressing well overall but does remain limited. Pt would continue to benefit from skilled OT services to maximize functional (I) & facilitate safe discharge. Performance deficits affecting function are weakness, impaired endurance, impaired sensation, impaired self care skills, impaired functional mobility, gait instability, impaired balance, decreased coordination, decreased upper extremity function, decreased lower extremity function, pain, impaired cardiopulmonary response to activity, decreased ROM, impaired coordination.     Rehab Prognosis:  Good; patient would benefit from acute skilled OT services to address these deficits and reach maximum level of function.       Plan:     Patient to be seen 5 x/week to address the above listed problems via self-care/home management, therapeutic activities, therapeutic exercises  Plan of Care Expires: 12/27/23  Plan of Care Reviewed with: patient, spouse    Subjective     Chief Complaint: "I need to go to the bathroom"  Patient/Family Comments/goals: Get better and return home  Pain/Comfort:  Pain Rating 1: 0/10    Objective:     Communicated with: RN " prior to session.  Patient found up in chair with blood pressure cuff, peripheral IV, pulse ox (continuous) upon OT entry to room.    General Precautions: Standard, fall, sternal    Orthopedic Precautions:N/A  Braces: N/A  Respiratory Status: Room air     Occupational Performance:     Bed Mobility:    Bed mobility untested; patient presented up in chair and remained up in chair at end of session.      Functional Mobility/Transfers:  Patient completed Sit <> Stand Transfer with contact guard assistance  with  hand-held assist   Patient completed Toilet Transfer Step Transfer technique with contact guard assistance with  hand-held assist  Functional Mobility: Patient completed 3 separate sit to stands x2 from chair and x1 from toilet all with CGA. All functional mobility competed with CGA HHA. Good balance and progress showcased.     Activities of Daily Living:  Grooming: stand by assistance standing at sink  Upper Body Dressing: minimum assistance to don 2nd gown  Toileting: maximal assistance standing at toilet      AMPA 6 Click ADL: 18    Treatment & Education:  Treatment as stated above. Patient showing great progress.   -Education on energy conservation and task modification to maximize safety and (I) during ADLs and mobility  -Education on importance of OOB activity to improve overall activity tolerance and promote recovery  -Pt educated to call for assistance and to transfer with hospital staff only  -Provided education regarding role of OT, POC, & discharge recommendations with pt and significant other verbalizing understanding.  Pt had no further questions & when asked whether there were any concerns pt reported none.      Patient left up in chair with all lines intact, call button in reach, nursing notified, and  present    GOALS:   Multidisciplinary Problems       Occupational Therapy Goals          Problem: Occupational Therapy    Goal Priority Disciplines Outcome Interventions   Occupational  Therapy Goal     OT, PT/OT Ongoing, Progressing    Description: Goals to be met by: 1/13/23    Patient will increase functional independence with ADLs by performing:    UE Dressing with Stand-by Assistance.  LE Dressing with Minimal Assistance.  Grooming while standing with Minimal Assistance.  Toileting from toilet with Minimal Assistance for hygiene and clothing management.   Supine to sit with Stand-by Assistance.  Step transfer with Minimal Assistance  Toilet transfer to toilet with Minimal Assistance.                         Time Tracking:     OT Date of Treatment: 12/15/23  OT Start Time: 0917  OT Stop Time: 0945  OT Total Time (min): 28 min    Billable Minutes:Self Care/Home Management 18  Therapeutic Activity 10    OT/AAKASH: OT          12/15/2023

## 2023-12-15 NOTE — PLAN OF CARE
Problem: Adult Inpatient Plan of Care  Goal: Readiness for Transition of Care  Outcome: Ongoing, Progressing     Problem: Bleeding (Cardiovascular Surgery)  Goal: Bleeding (Cardiovascular Surgery)  Outcome: Ongoing, Progressing

## 2023-12-15 NOTE — PROGRESS NOTES
[facility]  Critical Care - Surgery  Progress Note    Patient Name: Megan Cook  MRN: [id number]  Admission Date: 12/8/2023  Hospital Length of Stay: 7 days  Code Status: Full Code  Attending Provider: Alexandru Campbell MD  Primary Care Provider: Adelaida Disla NP   Principal Problem: Rheumatic aortic stenosis    Subjective:     Hospital/ICU Course:  No notes on file    Interval History/Significant Events: Paused CRRT yesterday. CT removed. Voided at bedside. On room air, sitting up in chair.    Follow-up For: Procedure(s) (LRB):  Replacement-valve-aortic (N/A)  REPLACEMENT, PULMONARY VALVE (N/A)    Post-Operative Day: 7 Days Post-Op    Objective:     Vital Signs (Most Recent):  Temp: 98.2 °F (36.8 °C) (12/15/23 0300)  Pulse: 98 (12/15/23 0404)  Resp: (!) 31 (12/15/23 0404)  BP: (!) 111/58 (12/15/23 0404)  SpO2: 96 % (12/15/23 0404) Vital Signs (24h Range):  Temp:  [97.7 °F (36.5 °C)-98.8 °F (37.1 °C)] 98.2 °F (36.8 °C)  Pulse:  [] 98  Resp:  [15-38] 31  SpO2:  [93 %-100 %] 96 %  BP: (100-132)/(53-70) 111/58  Arterial Line BP: ()/(39-64) 125/62     Weight: 119.3 kg (263 lb)  Body mass index is 43.77 kg/m².      Intake/Output Summary (Last 24 hours) at 12/15/2023 0611  Last data filed at 12/15/2023 0300  Gross per 24 hour   Intake 2051.01 ml   Output 0 ml   Net 2051.01 ml          Physical Exam  Vitals and nursing note reviewed.   Constitutional:       Appearance: She is obese.   HENT:      Head: Normocephalic and atraumatic.   Cardiovascular:      Rate and Rhythm: Regular rhythm.   Pulmonary:      Effort: Pulmonary effort is normal.      Comments:      Abdominal:      General: Abdomen is flat.      Palpations: Abdomen is soft.   Musculoskeletal:         General: Swelling present.   Skin:     General: Skin is warm and dry.      Capillary Refill: Capillary refill takes less than 2 seconds.              Lines/Drains/Airways       Central Venous Catheter Line  Duration              Percutaneous Central Line Insertion/Assessment - Quad Lumen  12/08/23 0949 Internal Jugular Right 6 days    Trialysis (Dialysis) Catheter 12/09/23 1327 left internal jugular 5 days              Arterial Line  Duration             Arterial Line 12/11/23 1100 Right Radial 3 days                    Significant Labs:    CBC/Anemia Profile:  Recent Labs   Lab 12/14/23  0307 12/15/23  0445   WBC 17.56* 14.58*   HGB 10.0* 9.9*   HCT 30.7* 29.8*    238   MCV 89 92   RDW 15.8* 16.1*        Chemistries:  Recent Labs   Lab 12/13/23  1417 12/13/23  2153 12/14/23  0307 12/15/23  0411   * 134* 134*  133* 133*   K 4.7 4.5 4.5  4.5 4.5    104 103  103 99   CO2 22* 22* 18*  17* 20*   BUN 11 11 14  13 45*   CREATININE 1.3 1.2 1.7*  1.9* 4.3*   CALCIUM 8.6* 8.5* 9.3  9.1 9.0   ALBUMIN 2.3* 2.2* 2.3*  2.2* 2.1*   PROT  --   --  6.3 6.0   BILITOT  --   --  4.8* 3.7*   ALKPHOS  --   --  121 128   ALT  --   --  111* 80*   AST  --   --  124* 68*   MG 1.8 2.2 2.1  --    PHOS 2.1* 2.0* 2.7  --          Significant Imaging:  I have reviewed all pertinent imaging results/findings within the past 24 hours.  Assessment/Plan:     Cardiac/Vascular  * Rheumatic aortic stenosis  38-year-old, 127 kg, woman with history of rheumatic heart disease and Ross procedure and mitral valve repair(including ring annuloplasty) in 2000 followed by repair of autograft aneurysm repair and aortic valve replacement with 23mm CE bioprothesis in 2012 who presents for aortic and pulmonary valve replacement on 12/8      Neuro/Psych:   -- Sedation: n/a  -- Pain: Tylenol 650 mg PRN, Morphine 2 mg PRN, oxy 5 Q6 PRN             Cards:   History of rheumatic fever with rheumatic heart disease . S/p Ross procedure and mitral valve repair(including ring annuloplasty) in 2000   Now s/p aortic and pulmonary valve replacement with Dr. Campbell on 12/8  -- HDS  -- MAPS >65  -- SBP   -- Pressors to maintain blood pressure goals: off pressors       Pulm:   -- Extubated 12/13  -- Goal O2 sat > 90%  -- Wean as able      Renal:  -- Nicolas removed, anuric  -- CRRT paused for 24H  -- Lasix challenge today  -- BUN/Cr   Recent Labs   Lab 12/13/23  2153 12/14/23  0307 12/15/23  0411   BUN 11 14  13 45*   CREATININE 1.2 1.7*  1.9* 4.3*     -- Urine output: voided small amount 12/15 overnight      FEN / GI:   -- Replace lytes as needed  -- Nutrition: thin liquids  -- Docusate   -- Famotidine       ID:   -- Tm: afebrile; WBC 12.03 preop. 18.7 post op   Recent Labs   Lab 12/13/23  0415 12/14/23  0307 12/15/23  0445   WBC 14.66* 17.56* 14.58*     -- Vancomycin two doses in OR       Heme/Onc:  -- H/H stable, 12.6 preop. 13.8 post op.   -- Daily CBC  -- heparin gtt at 27/hr  -- Received 8 units of pRBC in the OR   Recent Labs   Lab 12/12/23  0304 12/12/23  0307 12/13/23  0415 12/13/23  1417 12/14/23  0307 12/14/23  0715 12/14/23  0923 12/14/23  1504 12/14/23  1746 12/15/23  0411 12/15/23  0445   HGB  --    < > 9.7*  --  10.0*  --   --   --   --   --  9.9*   PLT  --    < > 71*  --  171  --   --   --   --   --  238   APTT 29.2   < > 30.8   < > 33.5*  --    < > 40.7* 38.9* 44.8*  --    INR 1.1  --   --   --   --  1.0  --   --   --  1.1  --     < > = values in this interval not displayed.        Endo:   -- Gluc goal 140-180  -- POCT 152      PPx:   Feeding: thin liquids  Analgesia/Sedation: tylenol, morphine  Thromboembolic prevention: heparin gtt @27/hr  HOB >30: Yes  Stress Ulcer ppx: Famotidine   Glucose control: Critical care goal 140-180 g/dl   Bowel reg: docusate  Invasive Lines/Drains/Airway: PIV x2, [place], Central line, L IJ trialysis  Deescalation: dc'ed         Dispo/Code Status/Palliative:   -- SICU / Full Code             Critical care was time spent personally by me on the following activities: development of treatment plan with patient or surrogate and bedside caregivers, discussions with consultants, evaluation of patient's response to treatment, examination of  patient, ordering and performing treatments and interventions, ordering and review of laboratory studies, ordering and review of radiographic studies, pulse oximetry, re-evaluation of patient's condition.  This critical care time did not overlap with that of any other provider or involve time for any procedures.     Gloria Cruz MD  Critical Care - Surgery  [facility] - [facility]

## 2023-12-15 NOTE — PT/OT/SLP PROGRESS
Speech Language Pathology Treatment    Patient Name:  Megan Cook   MRN:  [id number]  Admitting Diagnosis: Rheumatic aortic stenosis    Recommendations:                 General Recommendations:  Dysphagia therapy  Diet recommendations:  Regular Diet - IDDSI Level 7, Liquid Diet Level: Thin liquids - IDDSI Level 0   Aspiration Precautions: Standard aspiration precautions   General Precautions: Standard,    Communication strategies:  none     Assessment:     Megan Cook is a 38 y.o. female with an SLP diagnosis of improving dysphagia and persistent hoarse voice/Dysphonia.      Subjective     Pt awake and alert   Significant other at the bedside   RN in agreement with SLP seeing at this time    Pain/Comfort:  Pain Rating Post-Intervention 1: 0/10    Respiratory Status: Room air    Objective:     Has the patient been evaluated by SLP for swallowing?   Yes  Keep patient NPO? No   Current Respiratory Status:        Pt seen for ongoing swallow follow up. Pt remains with hoarse vocal quality. Pt reports no additional coughing or sputtering with thin liquids as was previous complaint. SLP discussed will continue to monitor resolution of voice disturbance and/or need for additional assessment/intervention. Pt was observed to consume thin liquids with HOB upright via teaspoon (pt preference) . No overt clinical signs pf aspiration of thin liquids appreciated. Pt tolerated regular solid trials without difficulty. Functional chewing skills noted. SLP discussed advancing to regular unrestricted diet (dysphagia level 7 and thin liquids). Speech to continue to monitor.     Goals:   Multidisciplinary Problems       SLP Goals          Problem: SLP    Goal Priority Disciplines Outcome   SLP Goal     SLP    Description: Speech Language Pathology Goals  Goals expected to be met by  12/24    1. Pt will participate in ongoing assessment of swallow function                          Plan:     Patient to be seen:  3 x/week   Plan of  Care expires:     Plan of Care reviewed with:  patient, family   SLP Follow-Up:  Yes       Discharge recommendations:  High Intensity Therapy   Barriers to Discharge:  None    Time Tracking:     SLP Treatment Date:   12/15/23  Speech Start Time:  1202  Speech Stop Time:  1216     Speech Total Time (min):  14 min    Billable Minutes: Treatment Swallowing Dysfunction 6 and Self Care/Home Management Training 8    12/15/2023

## 2023-12-15 NOTE — PLAN OF CARE
[facility]  Discharge Reassessment    Primary Care Provider: Adelaida Disla NP    Expected Discharge Date: 12/22/2023    Reassessment (most recent)       Discharge Reassessment - 12/15/23 1034          Discharge Reassessment    Assessment Type Discharge Planning Reassessment     Discharge Plan A Long-term acute care facility (LTAC)     Discharge Plan B Rehab;Home;Home Health;Home with family     DME Needed Upon Discharge  other (see comments)   TBD    Transition of Care Barriers None     Why the patient remains in the hospital Requires continued medical care                     Per MD's Note, Interval History/Significant Events: Paused CRRT yesterday. CT removed. Voided at bedside. On room air, sitting up in chair     Discharge Plan A and Plan B have been determined by review of patient's clinical status, future medical and therapeutic needs, and coverage/benefits for post-acute care in coordination with multidisciplinary team members.        Carlos Nieves LMSW  Case Management [facility]

## 2023-12-15 NOTE — ASSESSMENT & PLAN NOTE
38-year-old, 127 kg, woman with history of rheumatic heart disease and Ross procedure and mitral valve repair(including ring annuloplasty) in 2000 followed by repair of autograft aneurysm repair and aortic valve replacement with 23mm CE bioprothesis in 2012 who presents for aortic and pulmonary valve replacement on 12/8      Neuro/Psych:   -- Sedation: n/a  -- Pain: Tylenol 650 mg PRN, Morphine 2 mg PRN, oxy 5 Q6 PRN             Cards:   History of rheumatic fever with rheumatic heart disease . S/p Ross procedure and mitral valve repair(including ring annuloplasty) in 2000   Now s/p aortic and pulmonary valve replacement with Dr. Campbell on 12/8  -- HDS  -- MAPS >65  -- SBP   -- Pressors to maintain blood pressure goals: off pressors      Pulm:   -- Extubated 12/13  -- Goal O2 sat > 90%  -- Wean as able      Renal:  -- Nicolas removed, anuric  -- CRRT paused for 24H  -- Lasix challenge today  -- BUN/Cr   Recent Labs   Lab 12/13/23  2153 12/14/23  0307 12/15/23  0411   BUN 11 14  13 45*   CREATININE 1.2 1.7*  1.9* 4.3*     -- Urine output: voided small amount 12/15 overnight      FEN / GI:   -- Replace lytes as needed  -- Nutrition: thin liquids  -- Docusate   -- Famotidine       ID:   -- Tm: afebrile; WBC 12.03 preop. 18.7 post op   Recent Labs   Lab 12/13/23  0415 12/14/23  0307 12/15/23  0445   WBC 14.66* 17.56* 14.58*     -- Vancomycin two doses in OR       Heme/Onc:  -- H/H stable, 12.6 preop. 13.8 post op.   -- Daily CBC  -- heparin gtt at 27/hr  -- Received 8 units of pRBC in the OR   Recent Labs   Lab 12/12/23  0304 12/12/23  0307 12/13/23  0415 12/13/23  1417 12/14/23  0307 12/14/23  0715 12/14/23  0923 12/14/23  1504 12/14/23  1746 12/15/23  0411 12/15/23  0445   HGB  --    < > 9.7*  --  10.0*  --   --   --   --   --  9.9*   PLT  --    < > 71*  --  171  --   --   --   --   --  238   APTT 29.2   < > 30.8   < > 33.5*  --    < > 40.7* 38.9* 44.8*  --    INR 1.1  --   --   --   --  1.0  --   --   --  1.1   --     < > = values in this interval not displayed.        Endo:   -- Gluc goal 140-180  -- POCT 152      PPx:   Feeding: thin liquids  Analgesia/Sedation: tylenol, morphine  Thromboembolic prevention: heparin gtt @27/hr  HOB >30: Yes  Stress Ulcer ppx: Famotidine   Glucose control: Critical care goal 140-180 g/dl   Bowel reg: docusate  Invasive Lines/Drains/Airway: PIV x2, Sue, Central line, L IJ trialysis  Deescalation: dc'ed         Dispo/Code Status/Palliative:   -- SICU / Full Code

## 2023-12-15 NOTE — CONSULTS
Tunneled Dialysis Line Placement Consult Note  Interventional Radiology    Consult Requested By: Gloria Cruz MD  Reason for Consult: tunneled line placement    SUBJECTIVE:     Chief Complaint:  Need for long term HD catheter    History of Present Illness:  Megan Cook is a 38 y.o. female with a PMHx of rheumatic heart disease and Ross procedure and mitral valve repair (including ring annuloplasty) in 2000 followed by repair of autograft aneurysm repair and aortic valve replacement with 23mm CE bioprothesis in 2012  who was admitted on 12/8 for aortic and pulmonic valve replacement for severe aortic stenosis and moderate pulmonary stenosis. Pt underwent aortic and pulmonic valve replacement with Pediatric Cardiothoracic Surgery on 12/8. Pt with episodic hypotensive periods post-op requiring epi/vaso. Additionally she required cardioversion for a fib on 12/9. Post-operatively, the patient developed anuric TINO and was started on RRT on 12/9. Pt has not had evidence of renal recovery and has been dialysis dependent since 12/9.   Interventional Radiology has been consulted for tunneled HD catheter placement for  long term dialysis needs .  WBC is 14 & stable. No Bcx data available. She is afebrile. Levo has been off since 12/15 4 am.     Review of Systems   Constitutional:  Negative for fever.   Gastrointestinal:  Negative for vomiting.   Neurological:  Negative for loss of consciousness.       Scheduled Meds:   sodium chloride 0.9%   Intravenous Once    aspirin  81 mg Oral Daily    balsam peru-castor oiL   Topical (Top) BID    LIDOcaine  1 patch Transdermal Q24H    melatonin  6 mg Oral QHS    polyethylene glycol  17 g Oral Daily    senna-docusate 8.6-50 mg  1 tablet Oral BID     Continuous Infusions:   sodium chloride 0.9%      heparin (porcine) in 5 % dex      NORepinephrine bitartrate-D5W Stopped (12/15/23 0340)     PRN Meds:sodium chloride 0.9%, acetaminophen, calcium gluconate IVPB, calcium gluconate IVPB,  calcium gluconate IVPB, dextrose 10%, dextrose 10%, magnesium sulfate IVPB, magnesium sulfate IVPB, magnesium sulfate IVPB, midodrine, ondansetron, oxyCODONE, sodium phosphate 20.01 mmol in dextrose 5 % (D5W) 250 mL IVPB, sodium phosphate 30 mmol in dextrose 5 % (D5W) 250 mL IVPB, sodium phosphate 39.99 mmol in dextrose 5 % (D5W) 250 mL IVPB    Review of patient's allergies indicates:   Allergen Reactions    Cephalexin Other (See Comments)     She gets mouth sores.     Penicillin g Other (See Comments)     Causes mouth sores.        Past Medical History:   Diagnosis Date    Rheumatic fever with cardiac involvement      Past Surgical History:   Procedure Laterality Date    ANGIOGRAM, CORONARY, PEDIATRIC  8/1/2023    Procedure: Angiogram, Coronary, Pediatric;  Surgeon: Anthony Dubois Jr., MD;  Location: [facility] CATH LAB;  Service: Cardiology;;    ANGIOGRAM, PULMONARY, PEDIATRIC  8/1/2023    Procedure: Angiogram, Pulmonary, Pediatric;  Surgeon: Anthony Dubois Jr., MD;  Location: [facility] CATH LAB;  Service: Cardiology;;    AORTIC VALVE REPLACEMENT  2000    AORTIC VALVE REPLACEMENT  2012    AORTIC VALVE REPLACEMENT  2012    with aortic root replacement at [facility]    AORTIC VALVE REPLACEMENT N/A 12/8/2023    Procedure: Replacement-valve-aortic;  Surgeon: Alexandru Campbell MD;  Location: [facility] OR [address] [place] [address];  Service: Cardiovascular;  Laterality: N/A;    COMBINED RIGHT AND RETROGRADE LEFT HEART CATHETERIZATION FOR CONGENITAL HEART DEFECT N/A 8/1/2023    Procedure: Catheterization, Heart, Combined Right and Retrograde Left, for Congenital Heart Defect;  Surgeon: Anthony Dubois Jr., MD;  Location: [facility] CATH LAB;  Service: Cardiology;  Laterality: N/A;    PULMONARY VALVE REPLACEMENT N/A 12/8/2023    Procedure: REPLACEMENT, PULMONARY VALVE;  Surgeon: Alexandru Campbell MD;  [facility];  Service: Cardiovascular;  Laterality: N/A;    REPLACEMENT OF AORTIC VALVE USING ROSS PROCEDURE N/A 2000    [facility]     Family History    Problem Relation Age of Onset    Cardiomyopathy Other     Congenital heart disease Other      Social History     Tobacco Use    Smoking status: Never    Smokeless tobacco: Never   Substance Use Topics    Alcohol use: Yes     Alcohol/week: 1.0 standard drink of alcohol     Types: 1 Glasses of wine per week     Comment: social       OBJECTIVE:     Vital Signs (Most Recent)  Temp: 98.1 °F (36.7 °C) (12/15/23 1145)  Pulse: 93 (12/15/23 1402)  Resp: 20 (12/15/23 1452)  BP: 97/61 (12/15/23 1100)  SpO2: 96 % (12/15/23 1345)    Physical Exam:  Physical Exam  Vitals reviewed.   HENT:      Nose: Nose normal.      Mouth/Throat:      Pharynx: Oropharynx is clear.   Cardiovascular:      Rate and Rhythm: Normal rate and regular rhythm.   Pulmonary:      Effort: Pulmonary effort is normal. No respiratory distress.   Abdominal:      General: There is no distension.   Skin:     General: Skin is dry.   Neurological:      General: No focal deficit present.      Mental Status: She is alert.         Laboratory  I have reviewed all pertinent lab results within the past 24 hours.    ASA/Mallampati  ASA: 3  Mallampati: 1    Imaging:  Recent imaging studies reviewed.     ASSESSMENT/PLAN:     Assessment:  38 y.o. female with a PMHx of MHx of rheumatic heart disease and Ross procedure and mitral valve repair (including ring annuloplasty) in 2000 followed by repair of autograft aneurysm repair and aortic valve replacement with 23mm CE bioprothesis in 2012; now s/p aortic and pulmonic valve replacement on 12/8 who has been referred to IR for tunneled HD line placement for  long term HD access . The procedure was discussed in great detail with the patient including thorough explanations of the potential risks and benefits of tunneled HD line placement. Risks include bleeding at the puncture site, infection, catheter related thrombus, catheter dysfunction, and central vein stenosis. The patient is a candidate for tunneled HD line placement  under moderate sedation. Plan discussed with ordering physician.The pt verbalized understanding of the plan and would like to proceed.    Plan:  Will proceed with tunneled HD line placement under moderate sedation once patient is out of the ICU.   Please reach out to Interventional Radiology once the patient is on the floor and appropriate for tunneled HD line placement.   Thank you for the consult. Please contact with questions via [facility] secure chat.    Angelica Brito NP  Interventional Radiology

## 2023-12-15 NOTE — PLAN OF CARE
Patient making great progress toward stated goals. Will continue to benefit from current POC.    Goals to be met by: 1/13/23    Patient will increase functional independence with ADLs by performing:    UE Dressing with Stand-by Assistance.  LE Dressing with Minimal Assistance.  Grooming while standing with Minimal Assistance.  Toileting from toilet with Minimal Assistance for hygiene and clothing management.   Supine to sit with Stand-by Assistance.  Step transfer with Minimal Assistance Met 12/15  Toilet transfer to toilet with Minimal Assistance. Met 12/15

## 2023-12-16 LAB
ALBUMIN SERPL BCP-MCNC: 2.1 G/DL (ref 3.5–5.2)
ALBUMIN SERPL BCP-MCNC: 2.1 G/DL (ref 3.5–5.2)
ALBUMIN SERPL BCP-MCNC: 2.4 G/DL (ref 3.5–5.2)
ALP SERPL-CCNC: 168 U/L (ref 55–135)
ALT SERPL W/O P-5'-P-CCNC: 69 U/L (ref 10–44)
ANION GAP SERPL CALC-SCNC: 10 MMOL/L (ref 8–16)
ANION GAP SERPL CALC-SCNC: 11 MMOL/L (ref 8–16)
ANION GAP SERPL CALC-SCNC: 13 MMOL/L (ref 8–16)
ANISOCYTOSIS BLD QL SMEAR: SLIGHT
AST SERPL-CCNC: 63 U/L (ref 10–40)
BASOPHILS # BLD AUTO: ABNORMAL K/UL (ref 0–0.2)
BASOPHILS NFR BLD: 0 % (ref 0–1.9)
BILIRUB SERPL-MCNC: 3 MG/DL (ref 0.1–1)
BUN SERPL-MCNC: 24 MG/DL (ref 6–20)
BUN SERPL-MCNC: 25 MG/DL (ref 6–20)
BUN SERPL-MCNC: 36 MG/DL (ref 6–20)
CA-I BLDV-SCNC: 1.04 MMOL/L (ref 1.06–1.42)
CA-I BLDV-SCNC: 1.12 MMOL/L (ref 1.06–1.42)
CALCIUM SERPL-MCNC: 8.4 MG/DL (ref 8.7–10.5)
CALCIUM SERPL-MCNC: 8.5 MG/DL (ref 8.7–10.5)
CALCIUM SERPL-MCNC: 9.1 MG/DL (ref 8.7–10.5)
CHLORIDE SERPL-SCNC: 102 MMOL/L (ref 95–110)
CHLORIDE SERPL-SCNC: 106 MMOL/L (ref 95–110)
CHLORIDE SERPL-SCNC: 106 MMOL/L (ref 95–110)
CO2 SERPL-SCNC: 18 MMOL/L (ref 23–29)
CO2 SERPL-SCNC: 19 MMOL/L (ref 23–29)
CO2 SERPL-SCNC: 20 MMOL/L (ref 23–29)
CREAT SERPL-MCNC: 2.7 MG/DL (ref 0.5–1.4)
CREAT SERPL-MCNC: 2.7 MG/DL (ref 0.5–1.4)
CREAT SERPL-MCNC: 3.8 MG/DL (ref 0.5–1.4)
DACRYOCYTES BLD QL SMEAR: ABNORMAL
DIFFERENTIAL METHOD: ABNORMAL
EOSINOPHIL # BLD AUTO: ABNORMAL K/UL (ref 0–0.5)
EOSINOPHIL NFR BLD: 0 % (ref 0–8)
ERYTHROCYTE [DISTWIDTH] IN BLOOD BY AUTOMATED COUNT: 16.2 % (ref 11.5–14.5)
EST. GFR  (NO RACE VARIABLE): 15 ML/MIN/1.73 M^2
EST. GFR  (NO RACE VARIABLE): 22.6 ML/MIN/1.73 M^2
EST. GFR  (NO RACE VARIABLE): 22.6 ML/MIN/1.73 M^2
FACT X PPP CHRO-ACNC: 0.66 IU/ML (ref 0.3–0.7)
GLUCOSE SERPL-MCNC: 103 MG/DL (ref 70–110)
GLUCOSE SERPL-MCNC: 103 MG/DL (ref 70–110)
GLUCOSE SERPL-MCNC: 128 MG/DL (ref 70–110)
HCT VFR BLD AUTO: 26.6 % (ref 37–48.5)
HGB BLD-MCNC: 8.9 G/DL (ref 12–16)
HYPOCHROMIA BLD QL SMEAR: ABNORMAL
IMM GRANULOCYTES # BLD AUTO: ABNORMAL K/UL (ref 0–0.04)
IMM GRANULOCYTES NFR BLD AUTO: ABNORMAL % (ref 0–0.5)
INR PPP: 1.1 (ref 0.8–1.2)
LYMPHOCYTES # BLD AUTO: ABNORMAL K/UL (ref 1–4.8)
LYMPHOCYTES NFR BLD: 6 % (ref 18–48)
MAGNESIUM SERPL-MCNC: 2.1 MG/DL (ref 1.6–2.6)
MAGNESIUM SERPL-MCNC: 2.5 MG/DL (ref 1.6–2.6)
MCH RBC QN AUTO: 30.1 PG (ref 27–31)
MCHC RBC AUTO-ENTMCNC: 33.5 G/DL (ref 32–36)
MCV RBC AUTO: 90 FL (ref 82–98)
METAMYELOCYTES NFR BLD MANUAL: 2 %
MONOCYTES # BLD AUTO: ABNORMAL K/UL (ref 0.3–1)
MONOCYTES NFR BLD: 3 % (ref 4–15)
MYELOCYTES NFR BLD MANUAL: 4 %
NEUTROPHILS NFR BLD: 83 % (ref 38–73)
NEUTS BAND NFR BLD MANUAL: 2 %
NRBC BLD-RTO: 0 /100 WBC
OVALOCYTES BLD QL SMEAR: ABNORMAL
PHOSPHATE SERPL-MCNC: 3 MG/DL (ref 2.7–4.5)
PHOSPHATE SERPL-MCNC: 4.4 MG/DL (ref 2.7–4.5)
PLATELET # BLD AUTO: 262 K/UL (ref 150–450)
PLATELET BLD QL SMEAR: ABNORMAL
PMV BLD AUTO: 11.2 FL (ref 9.2–12.9)
POIKILOCYTOSIS BLD QL SMEAR: SLIGHT
POLYCHROMASIA BLD QL SMEAR: ABNORMAL
POTASSIUM SERPL-SCNC: 4.2 MMOL/L (ref 3.5–5.1)
POTASSIUM SERPL-SCNC: 4.5 MMOL/L (ref 3.5–5.1)
POTASSIUM SERPL-SCNC: 4.5 MMOL/L (ref 3.5–5.1)
PROT SERPL-MCNC: 6.1 G/DL (ref 6–8.4)
PROTHROMBIN TIME: 11.9 SEC (ref 9–12.5)
RBC # BLD AUTO: 2.96 M/UL (ref 4–5.4)
SCHISTOCYTES BLD QL SMEAR: ABNORMAL
SCHISTOCYTES BLD QL SMEAR: PRESENT
SODIUM SERPL-SCNC: 135 MMOL/L (ref 136–145)
SPHEROCYTES BLD QL SMEAR: ABNORMAL
WBC # BLD AUTO: 11.45 K/UL (ref 3.9–12.7)

## 2023-12-16 PROCEDURE — 83735 ASSAY OF MAGNESIUM: CPT | Mod: 91 | Performed by: NURSE PRACTITIONER

## 2023-12-16 PROCEDURE — 20000000 HC ICU ROOM

## 2023-12-16 PROCEDURE — 80100008 HC CRRT DAILY MAINTENANCE

## 2023-12-16 PROCEDURE — 99233 SBSQ HOSP IP/OBS HIGH 50: CPT | Mod: ,,, | Performed by: INTERNAL MEDICINE

## 2023-12-16 PROCEDURE — 85610 PROTHROMBIN TIME: CPT | Performed by: INTERNAL MEDICINE

## 2023-12-16 PROCEDURE — 51798 US URINE CAPACITY MEASURE: CPT

## 2023-12-16 PROCEDURE — 82330 ASSAY OF CALCIUM: CPT | Performed by: NURSE PRACTITIONER

## 2023-12-16 PROCEDURE — 80069 RENAL FUNCTION PANEL: CPT | Mod: 91 | Performed by: NURSE PRACTITIONER

## 2023-12-16 PROCEDURE — 63600175 PHARM REV CODE 636 W HCPCS

## 2023-12-16 PROCEDURE — 90945 DIALYSIS ONE EVALUATION: CPT

## 2023-12-16 PROCEDURE — 99233 PR SUBSEQUENT HOSPITAL CARE,LEVL III: ICD-10-PCS | Mod: ,,, | Performed by: INTERNAL MEDICINE

## 2023-12-16 PROCEDURE — 25000003 PHARM REV CODE 250: Performed by: STUDENT IN AN ORGANIZED HEALTH CARE EDUCATION/TRAINING PROGRAM

## 2023-12-16 PROCEDURE — 80053 COMPREHEN METABOLIC PANEL: CPT | Performed by: INTERNAL MEDICINE

## 2023-12-16 PROCEDURE — 85520 HEPARIN ASSAY: CPT

## 2023-12-16 PROCEDURE — 99291 PR CRITICAL CARE, E/M 30-74 MINUTES: ICD-10-PCS | Mod: ,,, | Performed by: INTERNAL MEDICINE

## 2023-12-16 PROCEDURE — 25000003 PHARM REV CODE 250: Performed by: INTERNAL MEDICINE

## 2023-12-16 PROCEDURE — 99291 CRITICAL CARE FIRST HOUR: CPT | Mod: ,,, | Performed by: INTERNAL MEDICINE

## 2023-12-16 PROCEDURE — 85027 COMPLETE CBC AUTOMATED: CPT | Performed by: THORACIC SURGERY (CARDIOTHORACIC VASCULAR SURGERY)

## 2023-12-16 PROCEDURE — 25000003 PHARM REV CODE 250

## 2023-12-16 PROCEDURE — 85007 BL SMEAR W/DIFF WBC COUNT: CPT | Performed by: THORACIC SURGERY (CARDIOTHORACIC VASCULAR SURGERY)

## 2023-12-16 RX ORDER — WARFARIN 2 MG/1
2 TABLET ORAL DAILY
Status: DISCONTINUED | OUTPATIENT
Start: 2023-12-16 | End: 2023-12-16

## 2023-12-16 RX ORDER — WARFARIN SODIUM 5 MG/1
5 TABLET ORAL DAILY
Status: DISCONTINUED | OUTPATIENT
Start: 2023-12-16 | End: 2023-12-17

## 2023-12-16 RX ORDER — MIDODRINE HYDROCHLORIDE 5 MG/1
15 TABLET ORAL
Status: DISCONTINUED | OUTPATIENT
Start: 2023-12-16 | End: 2023-12-22 | Stop reason: HOSPADM

## 2023-12-16 RX ORDER — CALCIUM GLUCONATE 20 MG/ML
1 INJECTION, SOLUTION INTRAVENOUS ONCE
Status: COMPLETED | OUTPATIENT
Start: 2023-12-16 | End: 2023-12-16

## 2023-12-16 RX ADMIN — WARFARIN SODIUM 5 MG: 5 TABLET ORAL at 04:12

## 2023-12-16 RX ADMIN — HEPARIN SODIUM AND DEXTROSE 27 UNITS/KG/HR: 10000; 5 INJECTION INTRAVENOUS at 08:12

## 2023-12-16 RX ADMIN — ACETAMINOPHEN 650 MG: 650 SOLUTION ORAL at 11:12

## 2023-12-16 RX ADMIN — LIDOCAINE 5% 1 PATCH: 700 PATCH TOPICAL at 03:12

## 2023-12-16 RX ADMIN — Medication: at 09:12

## 2023-12-16 RX ADMIN — Medication 6 MG: at 09:12

## 2023-12-16 RX ADMIN — ASPIRIN 81 MG CHEWABLE TABLET 81 MG: 81 TABLET CHEWABLE at 09:12

## 2023-12-16 RX ADMIN — CALCIUM GLUCONATE 1 G: 20 INJECTION, SOLUTION INTRAVENOUS at 10:12

## 2023-12-16 NOTE — ASSESSMENT & PLAN NOTE
Aute kidney injury secondary to iATN from hypotensive episodes/hemodynamic instability during OR  (Baseline 0.4-1)  SLED started 12/9 for oliguric TINO    Plan  -Will recommend a session of SLED tomorrow vs iHD if tolerated with a net fluid removal of 1 liter  -Keep MAP > 65, avaoid drastic changes in BP  -Can tentatively plan for tunneled line placement early next week, recommend consulting IR for line placement.

## 2023-12-16 NOTE — PROGRESS NOTES
Congenital Cardiac Surgery Note:     Ms. Megan Cook underwent re-replacement of her aortic valve and re-replacement of her RV-PA conduit/pulmonary valve on 12-8-2023.       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      Post-op course complicated by Acute Renal Failure.     Interval Events  Minimal urine production after diuretic challenge, she voided a little, she described as dribbles and bladder scans showing small volumes, 40mL  Renal Replacement Therapy overnight-SLED    Remains on room air  Up in chair again this morning  Echo yesterday looked good  Walked in Room Yesterday  Says pain well controlled  Eating better        Vitals:    12/16/23 0730 12/16/23 0745 12/16/23 0800 12/16/23 0815   BP:   (!) 108/55    BP Location:       Patient Position:       Pulse: 99 96 96 97   Resp: (!) 27 (!) 21 (!) 24 (!) 26   Temp:       TempSrc:       SpO2: (!) 93% 95% (!) 94% 96%   Weight:       Height:             Physical Exam:  General: appears well, up in chair, voice is hoarse  CV: normal rate and regular, good pedal pulses  Resp: normal effort, on room air  Neuro: alert and oriented  Ext: Mild LE edema  Drains: Removed  Incision: Dressed     Imaging:  CXR: None yet today     Assessment and Plan:   Discussed her care with Dr. Zhu, and ICU team.     She continues to look better day by day.  Says she feels pretty well today with good pain control and was able to walk some yesterday.    Echo yesterday with normal RV function, low normal LV function, well functioning valves and no effusion     On room air, encouraged pulmonary toilet  Encouraged Ambulation  Tolerating diet and eating better    Therapeutic on heparin infusion based on antiXa, goal 0.5-0.7.    ON daily aspirin  Hgb stable  Will start warfarin tonight    Ultimately, anticoagulation plan will be for warfarin with an INR target of 2.5(goal range 2-3) and daily aspirin.      Renal Replacement with SLED overnight.   Minimal  urine production after diuretic challenge yesterday  Tentative plan to try to hold renal replacement therapy today and attempt IHD tomorrow.    BP was borderline with SLED overnight, good this morning.  Plan to add midodrine and consider calcium replacement prior to iHD.       Discussed anticoagulation around potential need for tunneled HD catheter.  IR was consulted.  They are ok with starting warfarin and placing a tunneled line as long as INR is under 3.  Likely place first thing next week.       No active concerns for infection. Leukocytosis resolved. No fevers.    Thrombocytopenia resolved.       Continue supportive care     I appreciate the assistance of the ICU team in her care. Also appreciate the nephrology team for their assistance with her renal failure and renal replacement therapy.        Alexandru Campbell MD

## 2023-12-16 NOTE — PROGRESS NOTES
[facility] - Surgical Intensive Care  Critical Care - Surgery  Progress Note    Patient Name: Megan Cook  MRN: [id number]  Admission Date: 12/8/2023  Hospital Length of Stay: 8 days  Code Status: Full Code  Attending Provider: Alexandru Campbell MD  Primary Care Provider: Adelaida Disla NP   Principal Problem: Rheumatic aortic stenosis    Subjective:     Hospital/ICU Course:  No notes on file    Interval History/Significant Events: NAEON, tolerated SLED. -3L off during dialysis. IR reports able to tunnel line if INR less than 3. Non-formulary request for home BC. Contacted nephro for dialysis holiday with trial of iHD tomorrow. Warfarin therapy tonight.     Follow-up For: Procedure(s) (LRB):  Replacement-valve-aortic (N/A)  REPLACEMENT, PULMONARY VALVE (N/A)    Post-Operative Day: 8 Days Post-Op    Objective:     Vital Signs (Most Recent):  Temp: 97.7 °F (36.5 °C) (12/16/23 0300)  Pulse: 97 (12/16/23 0815)  Resp: (!) 26 (12/16/23 0815)  BP: (!) 108/55 (12/16/23 0800)  SpO2: 96 % (12/16/23 0815) Vital Signs (24h Range):  Temp:  [97.7 °F (36.5 °C)-98.3 °F (36.8 °C)] 97.7 °F (36.5 °C)  Pulse:  [] 97  Resp:  [6-42] 26  SpO2:  [92 %-99 %] 96 %  BP: ()/(49-69) 108/55  Arterial Line BP: ()/(45-86) 105/49     Weight: 120.2 kg (265 lb)  Body mass index is 44.1 kg/m².      Intake/Output Summary (Last 24 hours) at 12/16/2023 0829  Last data filed at 12/16/2023 0701  Gross per 24 hour   Intake 2861.43 ml   Output 3431 ml   Net -569.57 ml          Physical Exam  Vitals and nursing note reviewed.   Constitutional:       Appearance: She is obese.   HENT:      Head: Normocephalic and atraumatic.   Cardiovascular:      Rate and Rhythm: Normal rate and regular rhythm.   Pulmonary:      Effort: Pulmonary effort is normal.      Comments:      Abdominal:      General: Abdomen is flat.      Palpations: Abdomen is soft.   Musculoskeletal:         General: Swelling present.   Skin:     General: Skin is warm and  dry.      Capillary Refill: Capillary refill takes less than 2 seconds.          Vents:  Vent Mode: Spont (12/13/23 1030)  Set Rate: 18 BPM (12/13/23 0747)  Vt Set: 400 mL (12/13/23 0747)  Pressure Support: 5 cmH20 (12/13/23 1030)  PEEP/CPAP: 5 cmH20 (12/13/23 1030)  Oxygen Concentration (%): 24 (12/14/23 2321)  Peak Airway Pressure: 11 cmH20 (12/13/23 1030)  Plateau Pressure: 0 cmH20 (12/13/23 1030)  Total Ve: 9.35 L/m (12/13/23 1030)  Negative Inspiratory Force (cm H2O): -38 (12/13/23 1030)  F/VT Ratio<105 (RSBI): (!) 73.96 (12/13/23 1030)    Lines/Drains/Airways       Central Venous Catheter Line  Duration             Trialysis (Dialysis) Catheter 12/09/23 1327 left internal jugular 6 days              Arterial Line  Duration             Arterial Line 12/11/23 1100 Right Radial 4 days                    Significant Labs:    CBC/Anemia Profile:  Recent Labs   Lab 12/15/23  0445 12/16/23  0350   WBC 14.58* 11.45   HGB 9.9* 8.9*   HCT 29.8* 26.6*    262   MCV 92 90   RDW 16.1* 16.2*        Chemistries:  Recent Labs   Lab 12/15/23  0411 12/15/23  1331 12/15/23  2245 12/16/23  0350   * 131* 134* 135*  135*   K 4.5 4.4 4.2 4.5  4.5   CL 99 100 103 106  106   CO2 20* 17* 18* 18*  19*   BUN 45* 57* 43* 24*  25*   CREATININE 4.3* 5.3* 4.1* 2.7*  2.7*   CALCIUM 9.0 8.7 8.4* 8.5*  8.4*   ALBUMIN 2.1*  --  2.0* 2.1*  2.1*   PROT 6.0  --   --  6.1   BILITOT 3.7*  --   --  3.0*   ALKPHOS 128  --   --  168*   ALT 80*  --   --  69*   AST 68*  --   --  63*   MG  --   --  2.4 2.1   PHOS  --   --  4.5 3.0         Significant Imaging:  I have reviewed all pertinent imaging results/findings within the past 24 hours.  Assessment/Plan:     Cardiac/Vascular  * Rheumatic aortic stenosis  38-year-old, 127 kg, woman with history of rheumatic heart disease and Ross procedure and mitral valve repair(including ring annuloplasty) in 2000 followed by repair of autograft aneurysm repair and aortic valve replacement with 23mm  SOLOMON bioprothesis in 2012 who presents for aortic and pulmonary valve replacement on 12/8      Neuro/Psych:   -- Sedation: n/a  -- Pain: Tylenol 650 mg PRN, Morphine 2 mg PRN, oxy 5 Q6 PRN             Cards:   History of rheumatic fever with rheumatic heart disease . S/p Ross procedure and mitral valve repair(including ring annuloplasty) in 2000   Now s/p aortic and pulmonary valve replacement with Dr. Campbell on 12/8  -- HDS  -- MAPS >65  -- SBP   -- Pressors to maintain blood pressure goals: off pressors      Pulm:   -- Extubated 12/13  -- Goal O2 sat > 90%  -- Wean as able      Renal:  -- Nicolas removed, anuric  -- CRRT overnight with ~3L off   - Dialysis holiday with trial of iHD TMR   - increased midodrine 15 prn   - IR ok with INR less than 3 for tunneled line when on floor  -- BUN/Cr   Recent Labs   Lab 12/15/23  1331 12/15/23  2245 12/16/23  0350   BUN 57* 43* 24*  25*   CREATININE 5.3* 4.1* 2.7*  2.7*     -- Urine output: anuric      FEN / GI:   -- Replace lytes as needed  -- Nutrition: reg with thin, 1500mls  -- Docusate      ID:   -- Tm: afebrile; WBC 12.03 preop. 18.7 post op   Recent Labs   Lab 12/14/23  0307 12/15/23  0445 12/16/23  0350   WBC 17.56* 14.58* 11.45     -- Vancomycin two doses in OR       Heme/Onc:  -- H/H stable, 12.6 preop. 13.8 post op.   -- Daily CBC  -- heparin gtt at 27/hr, Xa goal 0.5-0.7   - Warfarin beginning 12/16  -- Received 8 units of pRBC in the OR   Recent Labs   Lab 12/14/23  0307 12/14/23  0715 12/14/23  0923 12/15/23  0411 12/15/23  0445 12/15/23  1220 12/15/23  2245 12/16/23  0350   HGB 10.0*  --   --   --  9.9*  --   --  8.9*     --   --   --  238  --   --  262   APTT 33.5*  --    < > 44.8*  --  57.8*  57.8* 52.6*  --    INR  --  1.0  --  1.1  --   --   --  1.1    < > = values in this interval not displayed.        Endo:   -- Gluc goal 140-180  -- POCT 152      PPx:   Feeding: reg, thin liquids. 1.5L fluid   Analgesia/Sedation: tylenol,  morphine  Thromboembolic prevention: heparin gtt @27/hr  HOB >30: Yes  Stress Ulcer ppx: Famotidine   Glucose control: Critical care goal 140-180 g/dl   Bowel reg: docusate  Invasive Lines/Drains/Airway: PIV x2, Sue, L IJ trialysis, midline  Deescalation: continuing to reassess        Dispo/Code Status/Palliative:   -- SICU / Full Code           Juan Dixon MD  Critical Care - Surgery  [facility] - Surgical Intensive Care

## 2023-12-16 NOTE — ASSESSMENT & PLAN NOTE
38-year-old, 127 kg, woman with history of rheumatic heart disease and Ross procedure and mitral valve repair(including ring annuloplasty) in 2000 followed by repair of autograft aneurysm repair and aortic valve replacement with 23mm CE bioprothesis in 2012 who presents for aortic and pulmonary valve replacement on 12/8      Neuro/Psych:   -- Sedation: n/a  -- Pain: Tylenol 650 mg PRN, Morphine 2 mg PRN, oxy 5 Q6 PRN             Cards:   History of rheumatic fever with rheumatic heart disease . S/p Ross procedure and mitral valve repair(including ring annuloplasty) in 2000   Now s/p aortic and pulmonary valve replacement with Dr. Campbell on 12/8  -- HDS  -- MAPS >65  -- SBP   -- Pressors to maintain blood pressure goals: off pressors      Pulm:   -- Extubated 12/13  -- Goal O2 sat > 90%  -- Wean as able      Renal:  -- Nicolas removed, anuric  -- CRRT overnight with ~3L off   - Dialysis holiday with trial of iHD TMR   - increased midodrine 15 prn   - IR ok with INR less than 3 for tunneled line when on floor  -- BUN/Cr   Recent Labs   Lab 12/15/23  1331 12/15/23  2245 12/16/23  0350   BUN 57* 43* 24*  25*   CREATININE 5.3* 4.1* 2.7*  2.7*     -- Urine output: anuric      FEN / GI:   -- Replace lytes as needed  -- Nutrition: reg with thin, 1500mls  -- Docusate      ID:   -- Tm: afebrile; WBC 12.03 preop. 18.7 post op   Recent Labs   Lab 12/14/23  0307 12/15/23  0445 12/16/23  0350   WBC 17.56* 14.58* 11.45     -- Vancomycin two doses in OR       Heme/Onc:  -- H/H stable, 12.6 preop. 13.8 post op.   -- Daily CBC  -- heparin gtt at 27/hr, Xa goal 0.5-0.7   - Warfarin beginning 12/16  -- Received 8 units of pRBC in the OR   Recent Labs   Lab 12/14/23  0307 12/14/23  0715 12/14/23  0923 12/15/23  0411 12/15/23  0445 12/15/23  1220 12/15/23  2245 12/16/23  0350   HGB 10.0*  --   --   --  9.9*  --   --  8.9*     --   --   --  238  --   --  262   APTT 33.5*  --    < > 44.8*  --  57.8*  57.8* 52.6*  --    INR  --   1.0  --  1.1  --   --   --  1.1    < > = values in this interval not displayed.        Endo:   -- Gluc goal 140-180  -- POCT 152      PPx:   Feeding: reg, thin liquids. 1.5L fluid   Analgesia/Sedation: tylenol, morphine  Thromboembolic prevention: heparin gtt @27/hr  HOB >30: Yes  Stress Ulcer ppx: Famotidine   Glucose control: Critical care goal 140-180 g/dl   Bowel reg: docusate  Invasive Lines/Drains/Airway: PIV tonya, Sue, L IJ trialysis, midline  Deescalation: continuing to reassess        Dispo/Code Status/Palliative:   -- SICU / Full Code

## 2023-12-16 NOTE — PROGRESS NOTES
12/16/23 0125   Treatment   Treatment Type SLED   Treatment Status Daily equipment check   Dialysis Machine Number [id number]   Dialyzer Time (hours) 6.23   BVP (Liters) 53.9 L   Solutions Labeled and Current  Yes   Access Temporary Cath;Left;IJ   Catheter Dressing Intact  Yes   Alarms Engaged Yes   CRRT Comments daily check done   $ CRRT Charges   $ CRRT Daily Assessment Complete   $ CRRT Daily Maintenance Complete     Ongoing 10 hour SLED. Orders and machine settings verified. Daily equipment check and maintenance done.

## 2023-12-16 NOTE — SUBJECTIVE & OBJECTIVE
Interval History: No acute events overnight, received a session of SLED for 10h, /66, on room air, minimal swelling in lower extremities, slept well, not on pressors, labs improved.  Will do another session of SLED tomorrow vs iHD if tolerated with net UF of upto 1 liter as tolerated.    Review of patient's allergies indicates:   Allergen Reactions    Cephalexin Other (See Comments)     She gets mouth sores.     Penicillin g Other (See Comments)     Causes mouth sores.      Current Facility-Administered Medications   Medication Frequency    0.9%  NaCl infusion (CRRT USE ONLY) Continuous    0.9%  NaCl infusion PRN    0.9%  NaCl infusion Once    acetaminophen oral solution 650 mg Q8H PRN    aspirin chewable tablet 81 mg Daily    balsam peru-castor oiL Oint BID    calcium gluconate 1 g in NS IVPB (premixed) PRN    calcium gluconate 1 g in NS IVPB (premixed) PRN    calcium gluconate 1 g in NS IVPB (premixed) PRN    dextrose 10% bolus 125 mL 125 mL PRN    dextrose 10% bolus 250 mL 250 mL PRN    heparin 25,000 units in dextrose 5% 250 mL (100 units/mL) infusion (heparin infusion - NO NOMOGRAM) Continuous    LIDOcaine 5 % patch 1 patch Q24H    magnesium sulfate 2g in water 50mL IVPB (premix) PRN    magnesium sulfate 2g in water 50mL IVPB (premix) PRN    magnesium sulfate 2g in water 50mL IVPB (premix) PRN    melatonin tablet 6 mg QHS    midodrine tablet 10 mg Q8H PRN    NORepinephrine 8 mg in dextrose 5% 250 mL infusion Continuous    ondansetron injection 4 mg Q12H PRN    oxyCODONE immediate release tablet 5 mg Q6H PRN    polyethylene glycol packet 17 g Daily    senna-docusate 8.6-50 mg per tablet 1 tablet BID    simethicone chewable tablet 80 mg TID PRN    sodium phosphate 20.01 mmol in dextrose 5 % (D5W) 250 mL IVPB PRN    sodium phosphate 30 mmol in dextrose 5 % (D5W) 250 mL IVPB PRN    sodium phosphate 39.99 mmol in dextrose 5 % (D5W) 250 mL IVPB PRN       Objective:     Vital Signs (Most Recent):  Temp: 98 °F  (36.7 °C) (12/16/23 0800)  Pulse: 98 (12/16/23 0915)  Resp: (!) 28 (12/16/23 0915)  BP: (!) 108/55 (12/16/23 0800)  SpO2: 96 % (12/16/23 0915) Vital Signs (24h Range):  Temp:  [97.7 °F (36.5 °C)-98.3 °F (36.8 °C)] 98 °F (36.7 °C)  Pulse:  [] 98  Resp:  [6-42] 28  SpO2:  [92 %-99 %] 96 %  BP: ()/(49-69) 108/55  Arterial Line BP: ()/(45-86) 105/56     Weight: 120.2 kg (265 lb) (12/16/23 0500)  Body mass index is 44.1 kg/m².  Body surface area is 2.35 meters squared.    I/O last 3 completed shifts:  In: 3145.5 [I.V.:2996.7; IV Piggyback:148.8]  Out: 3431 [Other:3431]     Physical Exam     Significant Labs:  BMP:   Recent Labs   Lab 12/16/23  0350     103   *  135*   K 4.5  4.5     106   CO2 18*  19*   BUN 24*  25*   CREATININE 2.7*  2.7*   CALCIUM 8.5*  8.4*   MG 2.1     CBC:   Recent Labs   Lab 12/16/23  0350   WBC 11.45   RBC 2.96*   HGB 8.9*   HCT 26.6*      MCV 90   MCH 30.1   MCHC 33.5        Significant Imaging:  Labs: Reviewed  X-Ray: Reviewed

## 2023-12-16 NOTE — PROGRESS NOTES
12/16/23 0510   Treatment   Treatment Type SLED   Treatment Status Blood returned;Discontinued treatment   Dialysis Machine Number [id number]   Dialyzer Time (hours) 10.01   BVP (Liters) 86.2 L   Solutions Labeled and Current  Yes   Access Temporary Cath;Left;IJ   Catheter Dressing Intact  Yes   Alarms Engaged Yes   CRRT Comments CRRT completed   CRRT Hourly Documentation   Total UF (Hourly Cleared) (mL) 474     SLED for 10 hours completed. Blood returned. L IJ temporary CVC flushed with saline, lumens capped. Machine disinfected.

## 2023-12-16 NOTE — CARE UPDATE
SICU Staff Addendum  I have reviewed and concur with the SB/resident's history, physical, assessment, and plan.  I have personally interviewed and examined the patient at bedside.  See below for any additional findings.    Reason for admission:  Rheumatic aortic stenosis  Present on Admission:   Rheumatic aortic stenosis   Pulmonary artery stenosis of peripheral branch at or beyond the hilar bifurcation   Hypervolemia   Oliguria   Anasarca   Accelerated junctional rhythm      Goals for Today:   - POD 8 s/p redo PVR and mechanical AVR   - had 10 hour sledd session overnight, BP borderline and given PRN midodrine   - MAPS int he mid 60s this AM. Pt on room air. Slept well overnight   -theraputic on her heparin gtt; goal of anti 10a of 0.5-0.7  - starting warfarin tonight   - IR for tunneled line next week, as long as her INR <3   - failed diuretic challenge yesterday. Dialysis holiday today, SLEDD vs iHD tomorrow   - diet advanced, she's tolerating this well  - restart home birth control   - pt rounded on with Dr. dewey    35  minutes of critical care time was spent personally by me on the following activities: development of treatment plan with patient or surrogate and bedside caregivers, discussions with consultants, evaluation of patient's response to treatment, examination of patient, ordering and performing treatments and interventions, ordering and review of laboratory studies, ordering and review of radiographic studies, pulse oximetry, re-evaluation of patient's condition.  This critical care time did not overlap with that of any other provider or involve time for any procedures.    Komal Zhu MD  Anesthesia Critical Care  Spectra [phone]

## 2023-12-16 NOTE — PROGRESS NOTES
[facility] - Surgical Intensive Care  Nephrology  Progress Note    Patient Name: Megan Cook  MRN: [id number]  Admission Date: 12/8/2023  Hospital Length of Stay: 8 days  Attending Provider: Alexandru Campbell MD   Primary Care Physician: Adelaida Disla NP  Principal Problem:Rheumatic aortic stenosis    Subjective:     HPI: HPI:  Ms. 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        The patient presents to the SICU s/p AVR and PVR with Dr. Campbell on 12/08/2023. During the procedure the patient was on bypass for 398 minutes and clamped for 138 minutes.      On admission, they are intubated, sedated with Precedex, and in stable condition. Inotropic and vasopressor requirements upon admission are 0.05 of Epinephrine, and 0.1 Vasopressin, 0.25 Milrinone to maintain blood pressure at a MAP 65 and SBP < 120. Central access includes a RIJ CVC, arterial access includes a left radial arterial line. They also have 2 pleural chest tubed and 1 mediastinal chest tube with appropriate output.     Intraoperatively, they received 2,000 mL of NS, 4,965 mL of PRBCs, 441 mL FFP, 1,077mL Platelets, and 475 mL Cryoprecipitate. Urine output intraoperatively was 1,835cc.      Immediate post-operative plans include hemodynamic stabilization and weaning of cardiac and respiratory support. Plan to wean vasopressors and inotropes as tolerated, also wean ventilator support with goal of early extubation, and closely monitor fluid status with strict Is/Os and continued fluid resuscitation as needed.    Interval History: No acute events overnight, received a session of SLED for 10h, /66, on room air, minimal swelling  in lower extremities, slept well, not on pressors, labs improved.  Will do another session of SLED tomorrow vs iHD if tolerated with net UF of upto 1 liter as tolerated.    Review of patient's allergies indicates:   Allergen Reactions    Cephalexin Other (See Comments)     She gets mouth sores.     Penicillin g Other (See Comments)     Causes mouth sores.      Current Facility-Administered Medications   Medication Frequency    0.9%  NaCl infusion (CRRT USE ONLY) Continuous    0.9%  NaCl infusion PRN    0.9%  NaCl infusion Once    acetaminophen oral solution 650 mg Q8H PRN    aspirin chewable tablet 81 mg Daily    balsam peru-castor oiL Oint BID    calcium gluconate 1 g in NS IVPB (premixed) PRN    calcium gluconate 1 g in NS IVPB (premixed) PRN    calcium gluconate 1 g in NS IVPB (premixed) PRN    dextrose 10% bolus 125 mL 125 mL PRN    dextrose 10% bolus 250 mL 250 mL PRN    heparin 25,000 units in dextrose 5% 250 mL (100 units/mL) infusion (heparin infusion - NO NOMOGRAM) Continuous    LIDOcaine 5 % patch 1 patch Q24H    magnesium sulfate 2g in water 50mL IVPB (premix) PRN    magnesium sulfate 2g in water 50mL IVPB (premix) PRN    magnesium sulfate 2g in water 50mL IVPB (premix) PRN    melatonin tablet 6 mg QHS    midodrine tablet 10 mg Q8H PRN    NORepinephrine 8 mg in dextrose 5% 250 mL infusion Continuous    ondansetron injection 4 mg Q12H PRN    oxyCODONE immediate release tablet 5 mg Q6H PRN    polyethylene glycol packet 17 g Daily    senna-docusate 8.6-50 mg per tablet 1 tablet BID    simethicone chewable tablet 80 mg TID PRN    sodium phosphate 20.01 mmol in dextrose 5 % (D5W) 250 mL IVPB PRN    sodium phosphate 30 mmol in dextrose 5 % (D5W) 250 mL IVPB PRN    sodium phosphate 39.99 mmol in dextrose 5 % (D5W) 250 mL IVPB PRN       Objective:     Vital Signs (Most Recent):  Temp: 98 °F (36.7 °C) (12/16/23 0800)  Pulse: 98 (12/16/23 0915)  Resp: (!) 28 (12/16/23 0915)  BP: (!) 108/55 (12/16/23 0800)  SpO2: 96  % (12/16/23 0915) Vital Signs (24h Range):  Temp:  [97.7 °F (36.5 °C)-98.3 °F (36.8 °C)] 98 °F (36.7 °C)  Pulse:  [] 98  Resp:  [6-42] 28  SpO2:  [92 %-99 %] 96 %  BP: ()/(49-69) 108/55  Arterial Line BP: ()/(45-86) 105/56     Weight: 120.2 kg (265 lb) (12/16/23 0500)  Body mass index is 44.1 kg/m².  Body surface area is 2.35 meters squared.    I/O last 3 completed shifts:  In: 3145.5 [I.V.:2996.7; IV Piggyback:148.8]  Out: 3431 [Other:3431]     Physical Exam     Significant Labs:  BMP:   Recent Labs   Lab 12/16/23  0350     103   *  135*   K 4.5  4.5     106   CO2 18*  19*   BUN 24*  25*   CREATININE 2.7*  2.7*   CALCIUM 8.5*  8.4*   MG 2.1     CBC:   Recent Labs   Lab 12/16/23  0350   WBC 11.45   RBC 2.96*   HGB 8.9*   HCT 26.6*      MCV 90   MCH 30.1   MCHC 33.5        Significant Imaging:  Labs: Reviewed  X-Ray: Reviewed  Assessment/Plan:     Cardiac/Vascular  * Rheumatic aortic stenosis  - defer to primary team     Renal/  TINO (acute kidney injury)  Aute kidney injury secondary to iATN from hypotensive episodes/hemodynamic instability during OR  (Baseline 0.4-1)  SLED started 12/9 for oliguric TINO    Plan  -Will recommend a session of SLED tomorrow vs iHD if tolerated with a net fluid removal of 1 liter  -Keep MAP > 65, avaoid drastic changes in BP  -Can tentatively plan for tunneled line placement early next week, recommend consulting IR for line placement.           Thank you for your consult. I will follow-up with patient. Please contact us if you have any additional questions.    Natalia Kessler MD  Nephrology  [facility] - Surgical Intensive Care    ATTENDING PHYSICIAN ATTESTATION  I have personally verified the history and examined the patient. I thoroughly reviewed the demographic, clinical, laboratorial and imaging information available in medical records. I agree with the assessment and recommendations provided by the subspecialty resident who was  under my supervision.

## 2023-12-16 NOTE — PLAN OF CARE
SICU PLAN OF CARE NOTE    Dx: Rheumatic aortic stenosis    Goals of Care: MAP 60-80    Vital Signs (last 12 hours):   Temp:  [97.7 °F (36.5 °C)-98 °F (36.7 °C)]   Pulse:  []   Resp:  [16-32]   BP: ()/(52-69)   SpO2:  [94 %-99 %]   Arterial Line BP: ()/(45-86)      Neuro: AAO x4, Follows Commands, and Moves All Extremities    Cardiac: NSR 90s-100s    Respiratory: Room Air    Gtts: Heparin @ 27 units/kg/hr for therapeutic Anti Xa    Urine Output: Anuric 0 cc/shift, 40 cc per bladder scan     Dialysis: CRRT SLED for 10 hrs overnight, -400 well tolerated      Diet: Regular Diet 1500 cc fluid restriction       Labs/Accuchecks: Daily AM labs    Skin:  Wound care completed per orders. BPCO applied to buttocks.  Patient turned Q2, bariatric mattress inflated, and bed working correctly.    Shift Events:  NAEON. See flowsheet for further assessment/details.  Patient and sister updated on current condition/plan of care, questions answered, and emotional support provided.  MD updated on current condition, vitals, labs, and gtts. Midline consult placed. BM x 1 overnight. OOBTC this AM, 3 person assist.

## 2023-12-16 NOTE — PROGRESS NOTES
12/15/23 1901   Treatment   Treatment Type SLED   Treatment Status Restart;Order change   Dialysis Machine Number [id number]   Solutions Labeled and Current  Yes   Access Temporary Cath;Left;IJ   Catheter Dressing Intact  Yes   Alarms Engaged Yes     CRRT restarted without issue, lines reversed d/t no blood return to arterial lumen primary RN aware. Report given. Pt left in NAD

## 2023-12-16 NOTE — SUBJECTIVE & OBJECTIVE
Interval History/Significant Events: NAEON, tolerated SLED. -3L off during dialysis. IR reports able to tunnel line if INR less than 3. Non-formulary request for home BC. Contacted nephro for dialysis holiday with trial of iHD tomorrow. Warfarin therapy tonight.     Follow-up For: Procedure(s) (LRB):  Replacement-valve-aortic (N/A)  REPLACEMENT, PULMONARY VALVE (N/A)    Post-Operative Day: 8 Days Post-Op    Objective:     Vital Signs (Most Recent):  Temp: 97.7 °F (36.5 °C) (12/16/23 0300)  Pulse: 97 (12/16/23 0815)  Resp: (!) 26 (12/16/23 0815)  BP: (!) 108/55 (12/16/23 0800)  SpO2: 96 % (12/16/23 0815) Vital Signs (24h Range):  Temp:  [97.7 °F (36.5 °C)-98.3 °F (36.8 °C)] 97.7 °F (36.5 °C)  Pulse:  [] 97  Resp:  [6-42] 26  SpO2:  [92 %-99 %] 96 %  BP: ()/(49-69) 108/55  Arterial Line BP: ()/(45-86) 105/49     Weight: 120.2 kg (265 lb)  Body mass index is 44.1 kg/m².      Intake/Output Summary (Last 24 hours) at 12/16/2023 0829  Last data filed at 12/16/2023 0701  Gross per 24 hour   Intake 2861.43 ml   Output 3431 ml   Net -569.57 ml          Physical Exam  Vitals and nursing note reviewed.   Constitutional:       Appearance: She is obese.   HENT:      Head: Normocephalic and atraumatic.   Cardiovascular:      Rate and Rhythm: Normal rate and regular rhythm.   Pulmonary:      Effort: Pulmonary effort is normal.      Comments:      Abdominal:      General: Abdomen is flat.      Palpations: Abdomen is soft.   Musculoskeletal:         General: Swelling present.   Skin:     General: Skin is warm and dry.      Capillary Refill: Capillary refill takes less than 2 seconds.          Vents:  Vent Mode: Spont (12/13/23 1030)  Set Rate: 18 BPM (12/13/23 0747)  Vt Set: 400 mL (12/13/23 0747)  Pressure Support: 5 cmH20 (12/13/23 1030)  PEEP/CPAP: 5 cmH20 (12/13/23 1030)  Oxygen Concentration (%): 24 (12/14/23 2321)  Peak Airway Pressure: 11 cmH20 (12/13/23 1030)  Plateau Pressure: 0 cmH20 (12/13/23  1030)  Total Ve: 9.35 L/m (12/13/23 1030)  Negative Inspiratory Force (cm H2O): -38 (12/13/23 1030)  F/VT Ratio<105 (RSBI): (!) 73.96 (12/13/23 1030)    Lines/Drains/Airways       Central Venous Catheter Line  Duration             Trialysis (Dialysis) Catheter 12/09/23 1327 left internal jugular 6 days              Arterial Line  Duration             Arterial Line 12/11/23 1100 Right Radial 4 days                    Significant Labs:    CBC/Anemia Profile:  Recent Labs   Lab 12/15/23  0445 12/16/23  0350   WBC 14.58* 11.45   HGB 9.9* 8.9*   HCT 29.8* 26.6*    262   MCV 92 90   RDW 16.1* 16.2*        Chemistries:  Recent Labs   Lab 12/15/23  0411 12/15/23  1331 12/15/23  2245 12/16/23  0350   * 131* 134* 135*  135*   K 4.5 4.4 4.2 4.5  4.5   CL 99 100 103 106  106   CO2 20* 17* 18* 18*  19*   BUN 45* 57* 43* 24*  25*   CREATININE 4.3* 5.3* 4.1* 2.7*  2.7*   CALCIUM 9.0 8.7 8.4* 8.5*  8.4*   ALBUMIN 2.1*  --  2.0* 2.1*  2.1*   PROT 6.0  --   --  6.1   BILITOT 3.7*  --   --  3.0*   ALKPHOS 128  --   --  168*   ALT 80*  --   --  69*   AST 68*  --   --  63*   MG  --   --  2.4 2.1   PHOS  --   --  4.5 3.0         Significant Imaging:  I have reviewed all pertinent imaging results/findings within the past 24 hours.

## 2023-12-17 LAB
ALBUMIN SERPL BCP-MCNC: 2.3 G/DL (ref 3.5–5.2)
ALBUMIN SERPL BCP-MCNC: 2.4 G/DL (ref 3.5–5.2)
ALP SERPL-CCNC: 204 U/L (ref 55–135)
ALT SERPL W/O P-5'-P-CCNC: 70 U/L (ref 10–44)
ANION GAP SERPL CALC-SCNC: 13 MMOL/L (ref 8–16)
ANION GAP SERPL CALC-SCNC: 15 MMOL/L (ref 8–16)
ANISOCYTOSIS BLD QL SMEAR: SLIGHT
APTT PPP: 54.8 SEC (ref 21–32)
AST SERPL-CCNC: 54 U/L (ref 10–40)
BASOPHILS # BLD AUTO: ABNORMAL K/UL (ref 0–0.2)
BASOPHILS NFR BLD: 0 % (ref 0–1.9)
BILIRUB SERPL-MCNC: 2.4 MG/DL (ref 0.1–1)
BUN SERPL-MCNC: 54 MG/DL (ref 6–20)
BUN SERPL-MCNC: 63 MG/DL (ref 6–20)
CA-I BLDV-SCNC: 1.08 MMOL/L (ref 1.06–1.42)
CA-I BLDV-SCNC: 1.13 MMOL/L (ref 1.06–1.42)
CALCIUM SERPL-MCNC: 9.1 MG/DL (ref 8.7–10.5)
CALCIUM SERPL-MCNC: 9.5 MG/DL (ref 8.7–10.5)
CHLORIDE SERPL-SCNC: 102 MMOL/L (ref 95–110)
CHLORIDE SERPL-SCNC: 98 MMOL/L (ref 95–110)
CO2 SERPL-SCNC: 18 MMOL/L (ref 23–29)
CO2 SERPL-SCNC: 19 MMOL/L (ref 23–29)
CREAT SERPL-MCNC: 5 MG/DL (ref 0.5–1.4)
CREAT SERPL-MCNC: 5.9 MG/DL (ref 0.5–1.4)
DIFFERENTIAL METHOD: ABNORMAL
EOSINOPHIL # BLD AUTO: ABNORMAL K/UL (ref 0–0.5)
EOSINOPHIL NFR BLD: 4 % (ref 0–8)
ERYTHROCYTE [DISTWIDTH] IN BLOOD BY AUTOMATED COUNT: 16.8 % (ref 11.5–14.5)
EST. GFR  (NO RACE VARIABLE): 10.8 ML/MIN/1.73 M^2
EST. GFR  (NO RACE VARIABLE): 8.8 ML/MIN/1.73 M^2
FACT X PPP CHRO-ACNC: 0.66 IU/ML (ref 0.3–0.7)
FACT X PPP CHRO-ACNC: 0.66 IU/ML (ref 0.3–0.7)
FACT X PPP CHRO-ACNC: 0.77 IU/ML (ref 0.3–0.7)
GLUCOSE SERPL-MCNC: 106 MG/DL (ref 70–110)
GLUCOSE SERPL-MCNC: 109 MG/DL (ref 70–110)
HCT VFR BLD AUTO: 26.2 % (ref 37–48.5)
HGB BLD-MCNC: 8.6 G/DL (ref 12–16)
HYPOCHROMIA BLD QL SMEAR: ABNORMAL
IMM GRANULOCYTES # BLD AUTO: ABNORMAL K/UL (ref 0–0.04)
IMM GRANULOCYTES NFR BLD AUTO: ABNORMAL % (ref 0–0.5)
INR PPP: 1.1 (ref 0.8–1.2)
LYMPHOCYTES # BLD AUTO: ABNORMAL K/UL (ref 1–4.8)
LYMPHOCYTES NFR BLD: 12 % (ref 18–48)
MAGNESIUM SERPL-MCNC: 2.5 MG/DL (ref 1.6–2.6)
MAGNESIUM SERPL-MCNC: 2.6 MG/DL (ref 1.6–2.6)
MCH RBC QN AUTO: 29.1 PG (ref 27–31)
MCHC RBC AUTO-ENTMCNC: 32.8 G/DL (ref 32–36)
MCV RBC AUTO: 89 FL (ref 82–98)
MONOCYTES # BLD AUTO: ABNORMAL K/UL (ref 0.3–1)
MONOCYTES NFR BLD: 10 % (ref 4–15)
MYELOCYTES NFR BLD MANUAL: 1 %
NEUTROPHILS NFR BLD: 73 % (ref 38–73)
NRBC BLD-RTO: 0 /100 WBC
OVALOCYTES BLD QL SMEAR: ABNORMAL
PHOSPHATE SERPL-MCNC: 5.8 MG/DL (ref 2.7–4.5)
PHOSPHATE SERPL-MCNC: 7 MG/DL (ref 2.7–4.5)
PLATELET # BLD AUTO: 344 K/UL (ref 150–450)
PLATELET BLD QL SMEAR: ABNORMAL
PMV BLD AUTO: 10.2 FL (ref 9.2–12.9)
POIKILOCYTOSIS BLD QL SMEAR: SLIGHT
POLYCHROMASIA BLD QL SMEAR: ABNORMAL
POTASSIUM SERPL-SCNC: 4.4 MMOL/L (ref 3.5–5.1)
POTASSIUM SERPL-SCNC: 4.5 MMOL/L (ref 3.5–5.1)
PROT SERPL-MCNC: 6.3 G/DL (ref 6–8.4)
PROTHROMBIN TIME: 11.9 SEC (ref 9–12.5)
RBC # BLD AUTO: 2.96 M/UL (ref 4–5.4)
SODIUM SERPL-SCNC: 132 MMOL/L (ref 136–145)
SODIUM SERPL-SCNC: 133 MMOL/L (ref 136–145)
WBC # BLD AUTO: 11.26 K/UL (ref 3.9–12.7)

## 2023-12-17 PROCEDURE — 99291 PR CRITICAL CARE, E/M 30-74 MINUTES: ICD-10-PCS | Mod: ,,, | Performed by: INTERNAL MEDICINE

## 2023-12-17 PROCEDURE — 94761 N-INVAS EAR/PLS OXIMETRY MLT: CPT

## 2023-12-17 PROCEDURE — 25000003 PHARM REV CODE 250: Performed by: STUDENT IN AN ORGANIZED HEALTH CARE EDUCATION/TRAINING PROGRAM

## 2023-12-17 PROCEDURE — 83735 ASSAY OF MAGNESIUM: CPT | Mod: 91 | Performed by: NURSE PRACTITIONER

## 2023-12-17 PROCEDURE — 99291 CRITICAL CARE FIRST HOUR: CPT | Mod: ,,, | Performed by: INTERNAL MEDICINE

## 2023-12-17 PROCEDURE — 25000003 PHARM REV CODE 250

## 2023-12-17 PROCEDURE — 51798 US URINE CAPACITY MEASURE: CPT

## 2023-12-17 PROCEDURE — 63600175 PHARM REV CODE 636 W HCPCS

## 2023-12-17 PROCEDURE — 99232 PR SUBSEQUENT HOSPITAL CARE,LEVL II: ICD-10-PCS | Mod: ,,, | Performed by: INTERNAL MEDICINE

## 2023-12-17 PROCEDURE — 99232 SBSQ HOSP IP/OBS MODERATE 35: CPT | Mod: ,,, | Performed by: INTERNAL MEDICINE

## 2023-12-17 PROCEDURE — 80053 COMPREHEN METABOLIC PANEL: CPT | Performed by: INTERNAL MEDICINE

## 2023-12-17 PROCEDURE — 25000003 PHARM REV CODE 250: Performed by: INTERNAL MEDICINE

## 2023-12-17 PROCEDURE — 83735 ASSAY OF MAGNESIUM: CPT | Performed by: THORACIC SURGERY (CARDIOTHORACIC VASCULAR SURGERY)

## 2023-12-17 PROCEDURE — 82330 ASSAY OF CALCIUM: CPT | Mod: 91 | Performed by: NURSE PRACTITIONER

## 2023-12-17 PROCEDURE — 85610 PROTHROMBIN TIME: CPT | Performed by: INTERNAL MEDICINE

## 2023-12-17 PROCEDURE — 85027 COMPLETE CBC AUTOMATED: CPT | Performed by: THORACIC SURGERY (CARDIOTHORACIC VASCULAR SURGERY)

## 2023-12-17 PROCEDURE — 85520 HEPARIN ASSAY: CPT | Mod: 91 | Performed by: THORACIC SURGERY (CARDIOTHORACIC VASCULAR SURGERY)

## 2023-12-17 PROCEDURE — 85730 THROMBOPLASTIN TIME PARTIAL: CPT | Performed by: THORACIC SURGERY (CARDIOTHORACIC VASCULAR SURGERY)

## 2023-12-17 PROCEDURE — 85520 HEPARIN ASSAY: CPT

## 2023-12-17 PROCEDURE — 80069 RENAL FUNCTION PANEL: CPT | Performed by: NURSE PRACTITIONER

## 2023-12-17 PROCEDURE — 84100 ASSAY OF PHOSPHORUS: CPT | Performed by: THORACIC SURGERY (CARDIOTHORACIC VASCULAR SURGERY)

## 2023-12-17 PROCEDURE — 85007 BL SMEAR W/DIFF WBC COUNT: CPT | Performed by: THORACIC SURGERY (CARDIOTHORACIC VASCULAR SURGERY)

## 2023-12-17 PROCEDURE — 20000000 HC ICU ROOM

## 2023-12-17 RX ORDER — HEPARIN SODIUM 10000 [USP'U]/100ML
24 INJECTION, SOLUTION INTRAVENOUS CONTINUOUS
Status: DISCONTINUED | OUTPATIENT
Start: 2023-12-17 | End: 2023-12-17

## 2023-12-17 RX ORDER — SODIUM CHLORIDE 9 MG/ML
INJECTION, SOLUTION INTRAVENOUS ONCE
Status: CANCELLED | OUTPATIENT
Start: 2023-12-17 | End: 2023-12-17

## 2023-12-17 RX ORDER — HEPARIN SODIUM 10000 [USP'U]/100ML
24 INJECTION, SOLUTION INTRAVENOUS CONTINUOUS
Status: DISCONTINUED | OUTPATIENT
Start: 2023-12-17 | End: 2023-12-18

## 2023-12-17 RX ORDER — CALCIUM CARBONATE 200(500)MG
2 TABLET,CHEWABLE ORAL 3 TIMES DAILY PRN
Status: DISCONTINUED | OUTPATIENT
Start: 2023-12-17 | End: 2023-12-18

## 2023-12-17 RX ORDER — WARFARIN 2.5 MG/1
2.5 TABLET ORAL DAILY
Status: DISCONTINUED | OUTPATIENT
Start: 2023-12-17 | End: 2023-12-19

## 2023-12-17 RX ORDER — CALCIUM CARBONATE 200(500)MG
500 TABLET,CHEWABLE ORAL ONCE
Status: COMPLETED | OUTPATIENT
Start: 2023-12-17 | End: 2023-12-17

## 2023-12-17 RX ADMIN — CALCIUM CARBONATE (ANTACID) CHEW TAB 500 MG 500 MG: 500 CHEW TAB at 05:12

## 2023-12-17 RX ADMIN — WARFARIN SODIUM 2.5 MG: 2.5 TABLET ORAL at 05:12

## 2023-12-17 RX ADMIN — LIDOCAINE 5% 1 PATCH: 700 PATCH TOPICAL at 09:12

## 2023-12-17 RX ADMIN — Medication: at 08:12

## 2023-12-17 RX ADMIN — CALCIUM CARBONATE (ANTACID) CHEW TAB 500 MG 1000 MG: 500 CHEW TAB at 11:12

## 2023-12-17 RX ADMIN — HEPARIN SODIUM AND DEXTROSE 24 UNITS/KG/HR: 10000; 5 INJECTION INTRAVENOUS at 09:12

## 2023-12-17 RX ADMIN — ASPIRIN 81 MG CHEWABLE TABLET 81 MG: 81 TABLET CHEWABLE at 08:12

## 2023-12-17 RX ADMIN — CALCIUM CARBONATE (ANTACID) CHEW TAB 500 MG 1000 MG: 500 CHEW TAB at 08:12

## 2023-12-17 RX ADMIN — Medication 6 MG: at 08:12

## 2023-12-17 NOTE — PLAN OF CARE
"      SICU PLAN OF CARE NOTE    Dx: Rheumatic aortic stenosis    Shift Events: BM x1.  Awaiting HD trial.      Goals of Care: MAP > 65    Neuro: AAO x4, Follows Commands, and Moves All Extremities    Vital Signs: /68 (BP Location: Right arm, Patient Position: Lying)   Pulse 94   Temp 98 °F (36.7 °C) (Oral)   Resp (!) 28   Ht 5' 5" (1.651 m)   Wt 120 kg (264 lb 8.8 oz)   LMP 11/08/2023 (Approximate)   SpO2 (!) 93%   Breastfeeding No   BMI 44.02 kg/m²     Cardiac: NSR    Respiratory: Room Air    Diet: Cardiac Diet    Gtts: Heparin    Urine Output: Anuric 0 cc/shift    Labs/Accuchecks: daily labs, renal panel q8.    Skin: Pt skin dry and intact.  No new skin breakdown during shift.  Pt able to shift weight independently in bed.  Fluid immersion mattress in place.      View flowsheet for full assessment details.      "

## 2023-12-17 NOTE — SUBJECTIVE & OBJECTIVE
Interval History:   No acute events overnight, on room air, still anuric, BP on the softer side 110/50, no edema noted in extremities.  Will give a trial of iHD today per primary request, 2 hours with net UF 0.5-1 liter, midodrine 15mg P/O if BP unstable.    Review of patient's allergies indicates:   Allergen Reactions    Cephalexin Other (See Comments)     She gets mouth sores.     Penicillin g Other (See Comments)     Causes mouth sores.      Current Facility-Administered Medications   Medication Frequency    0.9%  NaCl infusion PRN    0.9%  NaCl infusion Once    acetaminophen oral solution 650 mg Q8H PRN    aspirin chewable tablet 81 mg Daily    balsam peru-castor oiL Oint BID    calcium gluconate 1 g in NS IVPB (premixed) PRN    calcium gluconate 1 g in NS IVPB (premixed) PRN    calcium gluconate 1 g in NS IVPB (premixed) PRN    dextrose 10% bolus 125 mL 125 mL PRN    dextrose 10% bolus 250 mL 250 mL PRN    heparin 25,000 units in dextrose 5% 250 mL (100 units/mL) infusion (heparin infusion - NO NOMOGRAM) Continuous    LIDOcaine 5 % patch 1 patch Q24H    magnesium sulfate 2g in water 50mL IVPB (premix) PRN    magnesium sulfate 2g in water 50mL IVPB (premix) PRN    melatonin tablet 6 mg QHS    midodrine tablet 15 mg PRN    NON FORMULARY MEDICATION 0.35 mg Daily    NORepinephrine 8 mg in dextrose 5% 250 mL infusion Continuous    ondansetron injection 4 mg Q12H PRN    oxyCODONE immediate release tablet 5 mg Q6H PRN    polyethylene glycol packet 17 g Daily    senna-docusate 8.6-50 mg per tablet 1 tablet BID    simethicone chewable tablet 80 mg TID PRN    warfarin (COUMADIN) tablet 5 mg Daily       Objective:     Vital Signs (Most Recent):  Temp: 98.1 °F (36.7 °C) (12/17/23 0300)  Pulse: 98 (12/17/23 0801)  Resp: (!) 24 (12/17/23 0801)  BP: 121/78 (12/16/23 1600)  SpO2: 100 % (12/17/23 0801) Vital Signs (24h Range):  Temp:  [98.1 °F (36.7 °C)-98.3 °F (36.8 °C)] 98.1 °F (36.7 °C)  Pulse:  [] 98  Resp:  [17-42]  24  SpO2:  [93 %-100 %] 100 %  BP: ()/(50-78) 121/78  Arterial Line BP: ()/() 117/53     Weight: 120 kg (264 lb 8.8 oz) (12/17/23 0500)  Body mass index is 44.02 kg/m².  Body surface area is 2.35 meters squared.    I/O last 3 completed shifts:  In: 3446.5 [P.O.:340; I.V.:3056.7; IV Piggyback:49.9]  Out: 3431 [Other:3431]     Physical Exam  Constitutional:       Appearance: She is obese.   HENT:      Head: Normocephalic.   Cardiovascular:      Rate and Rhythm: Normal rate.   Pulmonary:      Effort: Pulmonary effort is normal.   Musculoskeletal:         General: No swelling.   Skin:     General: Skin is warm.   Neurological:      General: No focal deficit present.      Mental Status: She is alert and oriented to person, place, and time.   Psychiatric:         Mood and Affect: Mood normal.         Behavior: Behavior normal.          Significant Labs:  BMP:   Recent Labs   Lab 12/17/23  0355      *   K 4.4      CO2 18*   BUN 54*   CREATININE 5.0*   CALCIUM 9.1   MG 2.5     CBC:   Recent Labs   Lab 12/17/23  0355   WBC 11.26   RBC 2.96*   HGB 8.6*   HCT 26.2*      MCV 89   MCH 29.1   MCHC 32.8     All labs within the past 24 hours have been reviewed.     Significant Imaging:  Labs: Reviewed

## 2023-12-17 NOTE — PROGRESS NOTES
Expand All Collapse All    Congenital Cardiac Surgery Note:     Ms. Megan Cook underwent re-replacement of her aortic valve and re-replacement of her RV-PA conduit/pulmonary valve on 12-8-2023.       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      Post-op course complicated by Acute Renal Failure.     Interval Events  No dialysis yesterday  Warfarin started  Improved mobility, walked around room well  Says she feels well and back to herself                 Vitals:    12/17/23 0730 12/17/23 0745 12/17/23 0800 12/17/23 0801   BP:       BP Location:       Patient Position:       Pulse: 93 97 99 98   Resp: (!) 31 (!) 22 (!) 25 (!) 24   Temp:       TempSrc:       SpO2: 99% 100% 100% 100%   Weight:       Height:             Physical Exam:  General: appears well, up to bedside toilet, voice is improved  CV: normal rate and regular  Resp: normal effort, on room air  Neuro: alert and oriented  Ext: Mild LE edema  Drains: Removed  Incision: Healing well     Imaging:  CXR: None today     Assessment and Plan:  Discussed her care with Dr. Zhu, and ICU team.     Looks very well today, she says she feels much better as well  Walking better, breathing easy, eating well     Continue pulmonary toilet  Frequent  Ambulation     Therapeutic on heparin infusion based on antiXa, goal 0.5-0.7.   Heparin dose reduced, last anti Xa 0.77   On daily aspirin  Hgb down 1gm but no clinical evidence of bleeding and she looks better today, likely dilutional given dialysis held    Warfarin started 12/16 at 5mg daily  Follow INR     Ultimately, anticoagulation plan will be for warfarin with an INR target of 2.5(goal range 2-3) and daily aspirin.    Continue heparin until therapeutic on warfarin     Still minimal urine production  Will need dialysis today, could likely tolerate HD given BP has been good but may get SLED based on availability of HD staff     Discussed anticoagulation around potential need  for tunneled HD catheter.  IR consulted.  They are ok with starting warfarin and placing a tunneled line as long as INR is under 3.  Likely place first thing next week.        No active concerns for infection. Leukocytosis resolved. No fevers.  Thrombocytopenia resolved.       Continue supportive care    Had echo on 12/15 that looked good     I appreciate the assistance of the ICU team in her care. Also appreciate the nephrology team for their assistance with her renal failure and renal replacement therapy.     Alexandru Campbell MD

## 2023-12-17 NOTE — PLAN OF CARE
NAEON. AAOx4. VSS. NSR, HR 90s. Room air. Aneuric, 50 cc per bladder scan. Heparin @ 24 units/kg/hr. No new skin breakdown noted this shift. Wound care completed per orders. PM bath completed. OOBTC this AM, 2 person assist. Plan of care reviewed with patient at sister at bedside.

## 2023-12-17 NOTE — PROGRESS NOTES
[facility] - [facility]  Critical Care - Surgery  Progress Note    Patient Name: Megan Cook  MRN: [id number]  Admission Date: 12/8/2023  Hospital Length of Stay: 9 days  Code Status: Full Code  Attending Provider: Alexandru Campbell MD  Primary Care Provider: Adelaida Disla NP   Principal Problem: Rheumatic aortic stenosis    Subjective:     Hospital/ICU Course:  No notes on file    Interval History/Significant Events: NAEON, dialysis holiday yesterday. Per nephrology, trial of HD today if tolerable. Heparin drip adjusted based on Xa .     Follow-up For: Procedure(s) (LRB):  Replacement-valve-aortic (N/A)  REPLACEMENT, PULMONARY VALVE (N/A)    Post-Operative Day: 9 Days Post-Op    Objective:     Vital Signs (Most Recent):  Temp: 98.1 °F (36.7 °C) (12/16/23 1901)  Pulse: 95 (12/17/23 0445)  Resp: (!) 32 (12/17/23 0445)  BP: 121/78 (12/16/23 1600)  SpO2: 99 % (12/17/23 0445) Vital Signs (24h Range):  Temp:  [98 °F (36.7 °C)-98.3 °F (36.8 °C)] 98.1 °F (36.7 °C)  Pulse:  [] 95  Resp:  [16-42] 32  SpO2:  [93 %-100 %] 99 %  BP: ()/(49-78) 121/78  Arterial Line BP: ()/() 116/51     Weight: 120.2 kg (265 lb)  Body mass index is 44.1 kg/m².      Intake/Output Summary (Last 24 hours) at 12/17/2023 0507  Last data filed at 12/17/2023 0400  Gross per 24 hour   Intake 906.71 ml   Output 474 ml   Net 432.71 ml          Physical Exam  Vitals and nursing note reviewed.   Constitutional:       Appearance: She is obese.   HENT:      Head: Normocephalic and atraumatic.   Cardiovascular:      Rate and Rhythm: Normal rate and regular rhythm.   Pulmonary:      Effort: Pulmonary effort is normal.      Comments:      Abdominal:      General: Abdomen is flat.      Palpations: Abdomen is soft.   Musculoskeletal:         General: Swelling present.   Skin:     General: Skin is warm and dry.      Capillary Refill: Capillary refill takes less than 2 seconds.            Vents:  Vent Mode: Spont  (12/13/23 1030)  Set Rate: 18 BPM (12/13/23 0747)  Vt Set: 400 mL (12/13/23 0747)  Pressure Support: 5 cmH20 (12/13/23 1030)  PEEP/CPAP: 5 cmH20 (12/13/23 1030)  Oxygen Concentration (%): 24 (12/14/23 2321)  Peak Airway Pressure: 11 cmH20 (12/13/23 1030)  Plateau Pressure: 0 cmH20 (12/13/23 1030)  Total Ve: 9.35 L/m (12/13/23 1030)  Negative Inspiratory Force (cm H2O): -38 (12/13/23 1030)  F/VT Ratio<105 (RSBI): (!) 73.96 (12/13/23 1030)    Lines/Drains/Airways       Central Venous Catheter Line  Duration             Trialysis (Dialysis) Catheter 12/09/23 1327 left internal jugular 7 days              Arterial Line  Duration             Arterial Line 12/11/23 1100 Right Radial 5 days              Peripheral Intravenous Line  Duration                  Peripheral IV - Single Lumen 12/16/23 1158 20 G;1 3/4 in Anterior;Left Forearm <1 day                    Significant Labs:    CBC/Anemia Profile:  Recent Labs   Lab 12/16/23  0350 12/17/23  0355   WBC 11.45 11.26   HGB 8.9* 8.6*   HCT 26.6* 26.2*    344   MCV 90 89   RDW 16.2* 16.8*        Chemistries:  Recent Labs   Lab 12/15/23  2245 12/16/23  0350 12/16/23  1501   * 135*  135* 135*   K 4.2 4.5  4.5 4.2    106  106 102   CO2 18* 18*  19* 20*   BUN 43* 24*  25* 36*   CREATININE 4.1* 2.7*  2.7* 3.8*   CALCIUM 8.4* 8.5*  8.4* 9.1   ALBUMIN 2.0* 2.1*  2.1* 2.4*   PROT  --  6.1  --    BILITOT  --  3.0*  --    ALKPHOS  --  168*  --    ALT  --  69*  --    AST  --  63*  --    MG 2.4 2.1 2.5   PHOS 4.5 3.0 4.4       All pertinent labs within the past 24 hours have been reviewed.    Significant Imaging:  I have reviewed all pertinent imaging results/findings within the past 24 hours.  Assessment/Plan:     Cardiac/Vascular  * Rheumatic aortic stenosis  38-year-old, 127 kg, woman with history of rheumatic heart disease and Ross procedure and mitral valve repair(including ring annuloplasty) in 2000 followed by repair of autograft aneurysm repair and  aortic valve replacement with 23mm CE bioprothesis in 2012 who presents for aortic and pulmonary valve replacement on 12/8      Neuro/Psych:   -- Sedation: n/a  -- Pain: Tylenol 650 mg PRN, Morphine 2 mg PRN, oxy 5 Q6 PRN             Cards:   History of rheumatic fever with rheumatic heart disease . S/p Ross procedure and mitral valve repair(including ring annuloplasty) in 2000   Now s/p aortic and pulmonary valve replacement with Dr. Campbell on 12/8  -- HDS  -- MAPS >65  -- SBP   -- Pressors to maintain blood pressure goals: off pressors      Pulm:   -- Extubated 12/13  -- Goal O2 sat > 90%  -- Wean as able      Renal:  -- Nicolas removed, anuric  -- CRRT/HD today   - increased midodrine 15 prn   - IR ok with INR less than 3 for tunneled line when on floor  -- BUN/Cr   Recent Labs   Lab 12/16/23  0350 12/16/23  1501 12/17/23  0355   BUN 24*  25* 36* 54*   CREATININE 2.7*  2.7* 3.8* 5.0*     -- Urine output: anuric      FEN / GI:   -- Replace lytes as needed  -- Nutrition: reg with thin, 1500mls  -- Docusate      ID:   -- Tm: afebrile; WBC 12.03 preop. 18.7 post op   Recent Labs   Lab 12/15/23  0445 12/16/23  0350 12/17/23  0355   WBC 14.58* 11.45 11.26     -- Vancomycin two doses in OR       Heme/Onc:  -- H/H stable, 12.6 preop. 13.8 post op.   -- Daily CBC  -- heparin gtt at 27/hr, Xa goal 0.5-0.7   - Warfarin beginning 12/16  -- Received 8 units of pRBC in the OR   Recent Labs   Lab 12/15/23  0411 12/15/23  0445 12/15/23  1220 12/15/23  2245 12/16/23  0350 12/17/23  0355   HGB  --  9.9*  --   --  8.9* 8.6*   PLT  --  238  --   --  262 344   APTT 44.8*  --  57.8*  57.8* 52.6*  --   --    INR 1.1  --   --   --  1.1 1.1        Endo:   -- Gluc goal 140-180  -- POCT 152      PPx:   Feeding: reg, thin liquids. 1.5L fluid   Analgesia/Sedation: tylenol, morphine  Thromboembolic prevention: heparin gtt @27/hr  HOB >30: Yes  Stress Ulcer ppx: Not indicated  Glucose control: Critical care goal 140-180 g/dl   Bowel reg:  docusate, peg  Invasive Lines/Drains/Airway: PIV x2, [place], L IJ trialysis, midline  Deescalation: continuing to reassess        Dispo/Code Status/Palliative:   -- SICU / Full Code           Juan Dixon MD  Critical Care - Surgery  [facility] - Surgical Intensive Care

## 2023-12-17 NOTE — PROGRESS NOTES
[facility] - [facility]  Nephrology  Progress Note    Patient Name: Megan Cook  MRN: [id number]  Admission Date: 12/8/2023  Hospital Length of Stay: 9 days  Attending Provider: Alexandru Campbell MD   Primary Care Physician: Adelaida Disla NP  Principal Problem:Rheumatic aortic stenosis    Subjective:     HPI: HPI:  Ms. 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        The patient presents to the SICU s/p AVR and PVR with Dr. Campbell on 12/08/2023. During the procedure the patient was on bypass for 398 minutes and clamped for 138 minutes.      On admission, they are intubated, sedated with Precedex, and in stable condition. Inotropic and vasopressor requirements upon admission are 0.05 of Epinephrine, and 0.1 Vasopressin, 0.25 Milrinone to maintain blood pressure at a MAP 65 and SBP < 120. Central access includes a RIJ CVC, arterial access includes a left radial arterial line. They also have 2 pleural chest tubed and 1 mediastinal chest tube with appropriate output.     Intraoperatively, they received 2,000 mL of NS, 4,965 mL of PRBCs, 441 mL FFP, 1,077mL Platelets, and 475 mL Cryoprecipitate. Urine output intraoperatively was 1,835cc.      Immediate post-operative plans include hemodynamic stabilization and weaning of cardiac and respiratory support. Plan to wean vasopressors and inotropes as tolerated, also wean ventilator support with goal of early extubation, and closely monitor fluid status with strict Is/Os and continued fluid resuscitation as needed.    Interval History:   No acute events overnight, on room air, still anuric, BP on the softer side 110/50, no edema noted in  extremities.  Will give a trial of iHD today per primary request, 2 hours with net UF 0.5-1 liter, midodrine 15mg P/O if BP unstable.    Review of patient's allergies indicates:   Allergen Reactions    Cephalexin Other (See Comments)     She gets mouth sores.     Penicillin g Other (See Comments)     Causes mouth sores.      Current Facility-Administered Medications   Medication Frequency    0.9%  NaCl infusion PRN    0.9%  NaCl infusion Once    acetaminophen oral solution 650 mg Q8H PRN    aspirin chewable tablet 81 mg Daily    balsam peru-castor oiL Oint BID    calcium gluconate 1 g in NS IVPB (premixed) PRN    calcium gluconate 1 g in NS IVPB (premixed) PRN    calcium gluconate 1 g in NS IVPB (premixed) PRN    dextrose 10% bolus 125 mL 125 mL PRN    dextrose 10% bolus 250 mL 250 mL PRN    heparin 25,000 units in dextrose 5% 250 mL (100 units/mL) infusion (heparin infusion - NO NOMOGRAM) Continuous    LIDOcaine 5 % patch 1 patch Q24H    magnesium sulfate 2g in water 50mL IVPB (premix) PRN    magnesium sulfate 2g in water 50mL IVPB (premix) PRN    melatonin tablet 6 mg QHS    midodrine tablet 15 mg PRN    NON FORMULARY MEDICATION 0.35 mg Daily    NORepinephrine 8 mg in dextrose 5% 250 mL infusion Continuous    ondansetron injection 4 mg Q12H PRN    oxyCODONE immediate release tablet 5 mg Q6H PRN    polyethylene glycol packet 17 g Daily    senna-docusate 8.6-50 mg per tablet 1 tablet BID    simethicone chewable tablet 80 mg TID PRN    warfarin (COUMADIN) tablet 5 mg Daily       Objective:     Vital Signs (Most Recent):  Temp: 98.1 °F (36.7 °C) (12/17/23 0300)  Pulse: 98 (12/17/23 0801)  Resp: (!) 24 (12/17/23 0801)  BP: 121/78 (12/16/23 1600)  SpO2: 100 % (12/17/23 0801) Vital Signs (24h Range):  Temp:  [98.1 °F (36.7 °C)-98.3 °F (36.8 °C)] 98.1 °F (36.7 °C)  Pulse:  [] 98  Resp:  [17-42] 24  SpO2:  [93 %-100 %] 100 %  BP: ()/(50-78) 121/78  Arterial Line BP: ()/() 117/53     Weight: 120 kg  (264 lb 8.8 oz) (12/17/23 0500)  Body mass index is 44.02 kg/m².  Body surface area is 2.35 meters squared.    I/O last 3 completed shifts:  In: 3446.5 [P.O.:340; I.V.:3056.7; IV Piggyback:49.9]  Out: 3431 [Other:3431]     Physical Exam  Constitutional:       Appearance: She is obese.   HENT:      Head: Normocephalic.   Cardiovascular:      Rate and Rhythm: Normal rate.   Pulmonary:      Effort: Pulmonary effort is normal.   Musculoskeletal:         General: No swelling.   Skin:     General: Skin is warm.   Neurological:      General: No focal deficit present.      Mental Status: She is alert and oriented to person, place, and time.   Psychiatric:         Mood and Affect: Mood normal.         Behavior: Behavior normal.          Significant Labs:  BMP:   Recent Labs   Lab 12/17/23  0355      *   K 4.4      CO2 18*   BUN 54*   CREATININE 5.0*   CALCIUM 9.1   MG 2.5     CBC:   Recent Labs   Lab 12/17/23  0355   WBC 11.26   RBC 2.96*   HGB 8.6*   HCT 26.2*      MCV 89   MCH 29.1   MCHC 32.8     All labs within the past 24 hours have been reviewed.     Significant Imaging:  Labs: Reviewed  Assessment/Plan:     Cardiac/Vascular  * Rheumatic aortic stenosis  - defer to primary team     Renal/  TINO (acute kidney injury)  Aute kidney injury secondary to iATN from hypotensive episodes/hemodynamic instability during OR  (Baseline 0.4-1)  SLED started 12/9 for oliguric TINO    Plan  -Will give her a trial of iHD today with UF 0.5-1 liter, 2h, P/O midodrine 15mg if BP unstable.   -Keep MAP > 65, avaoid drastic changes in BP  -Can tentatively plan for tunneled line placement early next week, recommend consulting IR for line placement.           Thank you for your consult. I will follow-up with patient. Please contact us if you have any additional questions.    Natalia Kessler MD  Nephrology  [facility]    ATTENDING PHYSICIAN ATTESTATION  I have personally verified the history and  examined the patient. I thoroughly reviewed the demographic, clinical, laboratorial and imaging information available in medical records. I agree with the assessment and recommendations provided by the subspecialty resident who was under my supervision.

## 2023-12-17 NOTE — ASSESSMENT & PLAN NOTE
38-year-old, 127 kg, woman with history of rheumatic heart disease and Ross procedure and mitral valve repair(including ring annuloplasty) in 2000 followed by repair of autograft aneurysm repair and aortic valve replacement with 23mm CE bioprothesis in 2012 who presents for aortic and pulmonary valve replacement on 12/8      Neuro/Psych:   -- Sedation: n/a  -- Pain: Tylenol 650 mg PRN, Morphine 2 mg PRN, oxy 5 Q6 PRN             Cards:   History of rheumatic fever with rheumatic heart disease . S/p Ross procedure and mitral valve repair(including ring annuloplasty) in 2000   Now s/p aortic and pulmonary valve replacement with Dr. Campbell on 12/8  -- HDS  -- MAPS >65  -- SBP   -- Pressors to maintain blood pressure goals: off pressors      Pulm:   -- Extubated 12/13  -- Goal O2 sat > 90%  -- Wean as able      Renal:  -- Nicolas removed, anuric  -- CRRT/HD today   - increased midodrine 15 prn   - IR ok with INR less than 3 for tunneled line when on floor  -- BUN/Cr   Recent Labs   Lab 12/16/23  0350 12/16/23  1501 12/17/23  0355   BUN 24*  25* 36* 54*   CREATININE 2.7*  2.7* 3.8* 5.0*     -- Urine output: anuric      FEN / GI:   -- Replace lytes as needed  -- Nutrition: reg with thin, 1500mls  -- Docusate      ID:   -- Tm: afebrile; WBC 12.03 preop. 18.7 post op   Recent Labs   Lab 12/15/23  0445 12/16/23  0350 12/17/23  0355   WBC 14.58* 11.45 11.26     -- Vancomycin two doses in OR       Heme/Onc:  -- H/H stable, 12.6 preop. 13.8 post op.   -- Daily CBC  -- heparin gtt at 27/hr, Xa goal 0.5-0.7   - Warfarin beginning 12/16  -- Received 8 units of pRBC in the OR   Recent Labs   Lab 12/15/23  0411 12/15/23  0445 12/15/23  1220 12/15/23  2245 12/16/23  0350 12/17/23  0355   HGB  --  9.9*  --   --  8.9* 8.6*   PLT  --  238  --   --  262 344   APTT 44.8*  --  57.8*  57.8* 52.6*  --   --    INR 1.1  --   --   --  1.1 1.1        Endo:   -- Gluc goal 140-180  -- POCT 152      PPx:   Feeding: reg, thin liquids. 1.5L  fluid   Analgesia/Sedation: tylenol, morphine  Thromboembolic prevention: heparin gtt @27/hr  HOB >30: Yes  Stress Ulcer ppx: Not indicated  Glucose control: Critical care goal 140-180 g/dl   Bowel reg: docusate, peg  Invasive Lines/Drains/Airway: PIV x2, [place], L IJ trialysis, midline  Deescalation: continuing to reassess        Dispo/Code Status/Palliative:   -- SICU / Full Code

## 2023-12-17 NOTE — SUBJECTIVE & OBJECTIVE
Interval History/Significant Events: NAEON, dialysis holiday yesterday. Per nephrology, trial of HD today if tolerable. Heparin drip adjusted based on Xa .     Follow-up For: Procedure(s) (LRB):  Replacement-valve-aortic (N/A)  REPLACEMENT, PULMONARY VALVE (N/A)    Post-Operative Day: 9 Days Post-Op    Objective:     Vital Signs (Most Recent):  Temp: 98.1 °F (36.7 °C) (12/16/23 1901)  Pulse: 95 (12/17/23 0445)  Resp: (!) 32 (12/17/23 0445)  BP: 121/78 (12/16/23 1600)  SpO2: 99 % (12/17/23 0445) Vital Signs (24h Range):  Temp:  [98 °F (36.7 °C)-98.3 °F (36.8 °C)] 98.1 °F (36.7 °C)  Pulse:  [] 95  Resp:  [16-42] 32  SpO2:  [93 %-100 %] 99 %  BP: ()/(49-78) 121/78  Arterial Line BP: ()/() 116/51     Weight: 120.2 kg (265 lb)  Body mass index is 44.1 kg/m².      Intake/Output Summary (Last 24 hours) at 12/17/2023 0507  Last data filed at 12/17/2023 0400  Gross per 24 hour   Intake 906.71 ml   Output 474 ml   Net 432.71 ml          Physical Exam  Vitals and nursing note reviewed.   Constitutional:       Appearance: She is obese.   HENT:      Head: Normocephalic and atraumatic.   Cardiovascular:      Rate and Rhythm: Normal rate and regular rhythm.   Pulmonary:      Effort: Pulmonary effort is normal.      Comments:      Abdominal:      General: Abdomen is flat.      Palpations: Abdomen is soft.   Musculoskeletal:         General: Swelling present.   Skin:     General: Skin is warm and dry.      Capillary Refill: Capillary refill takes less than 2 seconds.            Vents:  Vent Mode: Spont (12/13/23 1030)  Set Rate: 18 BPM (12/13/23 0747)  Vt Set: 400 mL (12/13/23 0747)  Pressure Support: 5 cmH20 (12/13/23 1030)  PEEP/CPAP: 5 cmH20 (12/13/23 1030)  Oxygen Concentration (%): 24 (12/14/23 2321)  Peak Airway Pressure: 11 cmH20 (12/13/23 1030)  Plateau Pressure: 0 cmH20 (12/13/23 1030)  Total Ve: 9.35 L/m (12/13/23 1030)  Negative Inspiratory Force (cm H2O): -38 (12/13/23 1030)  F/VT Ratio<105  (RSBI): (!) 73.96 (12/13/23 1030)    Lines/Drains/Airways       Central Venous Catheter Line  Duration             Trialysis (Dialysis) Catheter 12/09/23 1327 left internal jugular 7 days              Arterial Line  Duration             Arterial Line 12/11/23 1100 Right Radial 5 days              Peripheral Intravenous Line  Duration                  Peripheral IV - Single Lumen 12/16/23 1158 20 G;1 3/4 in Anterior;Left Forearm <1 day                    Significant Labs:    CBC/Anemia Profile:  Recent Labs   Lab 12/16/23  0350 12/17/23  0355   WBC 11.45 11.26   HGB 8.9* 8.6*   HCT 26.6* 26.2*    344   MCV 90 89   RDW 16.2* 16.8*        Chemistries:  Recent Labs   Lab 12/15/23  2245 12/16/23  0350 12/16/23  1501   * 135*  135* 135*   K 4.2 4.5  4.5 4.2    106  106 102   CO2 18* 18*  19* 20*   BUN 43* 24*  25* 36*   CREATININE 4.1* 2.7*  2.7* 3.8*   CALCIUM 8.4* 8.5*  8.4* 9.1   ALBUMIN 2.0* 2.1*  2.1* 2.4*   PROT  --  6.1  --    BILITOT  --  3.0*  --    ALKPHOS  --  168*  --    ALT  --  69*  --    AST  --  63*  --    MG 2.4 2.1 2.5   PHOS 4.5 3.0 4.4       All pertinent labs within the past 24 hours have been reviewed.    Significant Imaging:  I have reviewed all pertinent imaging results/findings within the past 24 hours.

## 2023-12-17 NOTE — ASSESSMENT & PLAN NOTE
Aute kidney injury secondary to iATN from hypotensive episodes/hemodynamic instability during OR  (Baseline 0.4-1)  SLED started 12/9 for oliguric TINO    Plan  -Will give her a trial of iHD today with UF 0.5-1 liter, 2h, P/O midodrine 15mg if BP unstable.   -Keep MAP > 65, avaoid drastic changes in BP  -Can tentatively plan for tunneled line placement early next week, recommend consulting IR for line placement.

## 2023-12-18 PROBLEM — E83.39 HYPERPHOSPHATEMIA: Status: ACTIVE | Noted: 2023-12-18

## 2023-12-18 LAB
ALBUMIN SERPL BCP-MCNC: 2.3 G/DL (ref 3.5–5.2)
ALP SERPL-CCNC: 192 U/L (ref 55–135)
ALT SERPL W/O P-5'-P-CCNC: 61 U/L (ref 10–44)
ANION GAP SERPL CALC-SCNC: 10 MMOL/L (ref 8–16)
ANION GAP SERPL CALC-SCNC: 10 MMOL/L (ref 8–16)
ANION GAP SERPL CALC-SCNC: 13 MMOL/L (ref 8–16)
ANION GAP SERPL CALC-SCNC: 17 MMOL/L (ref 8–16)
ANION GAP SERPL CALC-SCNC: 17 MMOL/L (ref 8–16)
ANISOCYTOSIS BLD QL SMEAR: SLIGHT
AST SERPL-CCNC: 42 U/L (ref 10–40)
BASOPHILS # BLD AUTO: ABNORMAL K/UL (ref 0–0.2)
BASOPHILS NFR BLD: 1 % (ref 0–1.9)
BILIRUB SERPL-MCNC: 1.8 MG/DL (ref 0.1–1)
BUN SERPL-MCNC: 49 MG/DL (ref 6–20)
BUN SERPL-MCNC: 49 MG/DL (ref 6–20)
BUN SERPL-MCNC: 52 MG/DL (ref 6–20)
CA-I BLDV-SCNC: 0.98 MMOL/L (ref 1.06–1.42)
CA-I BLDV-SCNC: 1.07 MMOL/L (ref 1.06–1.42)
CALCIUM SERPL-MCNC: 8.8 MG/DL (ref 8.7–10.5)
CALCIUM SERPL-MCNC: 9.1 MG/DL (ref 8.7–10.5)
CALCIUM SERPL-MCNC: 9.1 MG/DL (ref 8.7–10.5)
CHLORIDE SERPL-SCNC: 100 MMOL/L (ref 95–110)
CHLORIDE SERPL-SCNC: 100 MMOL/L (ref 95–110)
CHLORIDE SERPL-SCNC: 101 MMOL/L (ref 95–110)
CHLORIDE SERPL-SCNC: 101 MMOL/L (ref 95–110)
CHLORIDE SERPL-SCNC: 102 MMOL/L (ref 95–110)
CO2 SERPL-SCNC: 18 MMOL/L (ref 23–29)
CO2 SERPL-SCNC: 19 MMOL/L (ref 23–29)
CO2 SERPL-SCNC: 19 MMOL/L (ref 23–29)
CREAT SERPL-MCNC: 5.2 MG/DL (ref 0.5–1.4)
CREAT SERPL-MCNC: 5.3 MG/DL (ref 0.5–1.4)
CREAT SERPL-MCNC: 5.3 MG/DL (ref 0.5–1.4)
DIFFERENTIAL METHOD: ABNORMAL
EOSINOPHIL # BLD AUTO: ABNORMAL K/UL (ref 0–0.5)
EOSINOPHIL NFR BLD: 5 % (ref 0–8)
ERYTHROCYTE [DISTWIDTH] IN BLOOD BY AUTOMATED COUNT: 16.7 % (ref 11.5–14.5)
EST. GFR  (NO RACE VARIABLE): 10.1 ML/MIN/1.73 M^2
EST. GFR  (NO RACE VARIABLE): 10.1 ML/MIN/1.73 M^2
EST. GFR  (NO RACE VARIABLE): 10.3 ML/MIN/1.73 M^2
FACT X PPP CHRO-ACNC: 0.46 IU/ML (ref 0.3–0.7)
FACT X PPP CHRO-ACNC: 0.56 IU/ML (ref 0.3–0.7)
FACT X PPP CHRO-ACNC: 0.71 IU/ML (ref 0.3–0.7)
GLUCOSE SERPL-MCNC: 103 MG/DL (ref 70–110)
GLUCOSE SERPL-MCNC: 104 MG/DL (ref 70–110)
GLUCOSE SERPL-MCNC: 104 MG/DL (ref 70–110)
GLUCOSE SERPL-MCNC: 110 MG/DL (ref 70–110)
GLUCOSE SERPL-MCNC: 110 MG/DL (ref 70–110)
HCT VFR BLD AUTO: 25.9 % (ref 37–48.5)
HGB BLD-MCNC: 8.7 G/DL (ref 12–16)
HYPOCHROMIA BLD QL SMEAR: ABNORMAL
IMM GRANULOCYTES # BLD AUTO: ABNORMAL K/UL (ref 0–0.04)
IMM GRANULOCYTES NFR BLD AUTO: ABNORMAL % (ref 0–0.5)
INR PPP: 1.2 (ref 0.8–1.2)
LYMPHOCYTES # BLD AUTO: ABNORMAL K/UL (ref 1–4.8)
LYMPHOCYTES NFR BLD: 12 % (ref 18–48)
MAGNESIUM SERPL-MCNC: 2.4 MG/DL (ref 1.6–2.6)
MCH RBC QN AUTO: 30.2 PG (ref 27–31)
MCHC RBC AUTO-ENTMCNC: 33.6 G/DL (ref 32–36)
MCV RBC AUTO: 90 FL (ref 82–98)
METAMYELOCYTES NFR BLD MANUAL: 1 %
MONOCYTES # BLD AUTO: ABNORMAL K/UL (ref 0.3–1)
MONOCYTES NFR BLD: 5 % (ref 4–15)
NEUTROPHILS NFR BLD: 73 % (ref 38–73)
NEUTS BAND NFR BLD MANUAL: 3 %
NRBC BLD-RTO: 0 /100 WBC
OVALOCYTES BLD QL SMEAR: ABNORMAL
PHOSPHATE SERPL-MCNC: 5.6 MG/DL (ref 2.7–4.5)
PHOSPHATE SERPL-MCNC: 5.6 MG/DL (ref 2.7–4.5)
PHOSPHATE SERPL-MCNC: 5.8 MG/DL (ref 2.7–4.5)
PHOSPHATE SERPL-MCNC: 5.8 MG/DL (ref 2.7–4.5)
PLATELET # BLD AUTO: 377 K/UL (ref 150–450)
PMV BLD AUTO: 10.1 FL (ref 9.2–12.9)
POIKILOCYTOSIS BLD QL SMEAR: SLIGHT
POLYCHROMASIA BLD QL SMEAR: ABNORMAL
POTASSIUM SERPL-SCNC: 4.6 MMOL/L (ref 3.5–5.1)
POTASSIUM SERPL-SCNC: 4.7 MMOL/L (ref 3.5–5.1)
POTASSIUM SERPL-SCNC: 4.7 MMOL/L (ref 3.5–5.1)
POTASSIUM SERPL-SCNC: 4.8 MMOL/L (ref 3.5–5.1)
POTASSIUM SERPL-SCNC: 4.8 MMOL/L (ref 3.5–5.1)
PROT SERPL-MCNC: 6.3 G/DL (ref 6–8.4)
PROTHROMBIN TIME: 13.2 SEC (ref 9–12.5)
RBC # BLD AUTO: 2.88 M/UL (ref 4–5.4)
SODIUM SERPL-SCNC: 130 MMOL/L (ref 136–145)
SODIUM SERPL-SCNC: 130 MMOL/L (ref 136–145)
SODIUM SERPL-SCNC: 133 MMOL/L (ref 136–145)
SODIUM SERPL-SCNC: 135 MMOL/L (ref 136–145)
SODIUM SERPL-SCNC: 135 MMOL/L (ref 136–145)
SPHEROCYTES BLD QL SMEAR: ABNORMAL
WBC # BLD AUTO: 9.14 K/UL (ref 3.9–12.7)

## 2023-12-18 PROCEDURE — 85610 PROTHROMBIN TIME: CPT | Performed by: INTERNAL MEDICINE

## 2023-12-18 PROCEDURE — 25000003 PHARM REV CODE 250: Performed by: STUDENT IN AN ORGANIZED HEALTH CARE EDUCATION/TRAINING PROGRAM

## 2023-12-18 PROCEDURE — 25000003 PHARM REV CODE 250

## 2023-12-18 PROCEDURE — 97535 SELF CARE MNGMENT TRAINING: CPT

## 2023-12-18 PROCEDURE — 25000003 PHARM REV CODE 250: Performed by: INTERNAL MEDICINE

## 2023-12-18 PROCEDURE — 83735 ASSAY OF MAGNESIUM: CPT | Mod: 91 | Performed by: NURSE PRACTITIONER

## 2023-12-18 PROCEDURE — 25000003 PHARM REV CODE 250: Performed by: NURSE PRACTITIONER

## 2023-12-18 PROCEDURE — 63600175 PHARM REV CODE 636 W HCPCS: Performed by: THORACIC SURGERY (CARDIOTHORACIC VASCULAR SURGERY)

## 2023-12-18 PROCEDURE — 97116 GAIT TRAINING THERAPY: CPT

## 2023-12-18 PROCEDURE — 85520 HEPARIN ASSAY: CPT | Mod: 91 | Performed by: THORACIC SURGERY (CARDIOTHORACIC VASCULAR SURGERY)

## 2023-12-18 PROCEDURE — 90945 DIALYSIS ONE EVALUATION: CPT

## 2023-12-18 PROCEDURE — 80053 COMPREHEN METABOLIC PANEL: CPT | Performed by: INTERNAL MEDICINE

## 2023-12-18 PROCEDURE — 63600175 PHARM REV CODE 636 W HCPCS: Performed by: STUDENT IN AN ORGANIZED HEALTH CARE EDUCATION/TRAINING PROGRAM

## 2023-12-18 PROCEDURE — 82330 ASSAY OF CALCIUM: CPT | Performed by: NURSE PRACTITIONER

## 2023-12-18 PROCEDURE — 20600001 HC STEP DOWN PRIVATE ROOM

## 2023-12-18 PROCEDURE — 85007 BL SMEAR W/DIFF WBC COUNT: CPT | Performed by: THORACIC SURGERY (CARDIOTHORACIC VASCULAR SURGERY)

## 2023-12-18 PROCEDURE — 99233 PR SUBSEQUENT HOSPITAL CARE,LEVL III: ICD-10-PCS | Mod: ,,, | Performed by: NURSE PRACTITIONER

## 2023-12-18 PROCEDURE — 85027 COMPLETE CBC AUTOMATED: CPT | Performed by: THORACIC SURGERY (CARDIOTHORACIC VASCULAR SURGERY)

## 2023-12-18 PROCEDURE — 80100008 HC CRRT DAILY MAINTENANCE

## 2023-12-18 PROCEDURE — 63600175 PHARM REV CODE 636 W HCPCS

## 2023-12-18 PROCEDURE — 99222 1ST HOSP IP/OBS MODERATE 55: CPT | Mod: ,,, | Performed by: ANESTHESIOLOGY

## 2023-12-18 PROCEDURE — 99222 PR INITIAL HOSPITAL CARE,LEVL II: ICD-10-PCS | Mod: ,,, | Performed by: ANESTHESIOLOGY

## 2023-12-18 PROCEDURE — 80069 RENAL FUNCTION PANEL: CPT | Performed by: NURSE PRACTITIONER

## 2023-12-18 PROCEDURE — 99233 SBSQ HOSP IP/OBS HIGH 50: CPT | Mod: ,,, | Performed by: NURSE PRACTITIONER

## 2023-12-18 PROCEDURE — 92526 ORAL FUNCTION THERAPY: CPT

## 2023-12-18 RX ORDER — ACETAMINOPHEN AND CODEINE PHOSPHATE 120; 12 MG/5ML; MG/5ML
1 SOLUTION ORAL DAILY
Qty: 28 TABLET | Refills: 0 | Status: SHIPPED | OUTPATIENT
Start: 2023-12-18 | End: 2023-12-28

## 2023-12-18 RX ORDER — ACETAMINOPHEN AND CODEINE PHOSPHATE 120; 12 MG/5ML; MG/5ML
1 SOLUTION ORAL DAILY
Status: DISCONTINUED | OUTPATIENT
Start: 2023-12-18 | End: 2023-12-22 | Stop reason: HOSPADM

## 2023-12-18 RX ORDER — CETIRIZINE HYDROCHLORIDE 5 MG/1
10 TABLET ORAL DAILY
Status: DISCONTINUED | OUTPATIENT
Start: 2023-12-18 | End: 2023-12-18

## 2023-12-18 RX ORDER — CALCIUM CARBONATE 200(500)MG
500 TABLET,CHEWABLE ORAL 2 TIMES DAILY PRN
Status: DISCONTINUED | OUTPATIENT
Start: 2023-12-18 | End: 2023-12-22 | Stop reason: HOSPADM

## 2023-12-18 RX ORDER — HYDROCODONE BITARTRATE AND ACETAMINOPHEN 500; 5 MG/1; MG/1
TABLET ORAL CONTINUOUS
Status: DISCONTINUED | OUTPATIENT
Start: 2023-12-18 | End: 2023-12-19

## 2023-12-18 RX ORDER — CETIRIZINE HYDROCHLORIDE 5 MG/1
5 TABLET ORAL
Status: DISCONTINUED | OUTPATIENT
Start: 2023-12-20 | End: 2023-12-22 | Stop reason: HOSPADM

## 2023-12-18 RX ORDER — HEPARIN SODIUM 10000 [USP'U]/100ML
23 INJECTION, SOLUTION INTRAVENOUS CONTINUOUS
Status: DISCONTINUED | OUTPATIENT
Start: 2023-12-18 | End: 2023-12-18

## 2023-12-18 RX ORDER — HEPARIN SODIUM 10000 [USP'U]/100ML
22 INJECTION, SOLUTION INTRAVENOUS CONTINUOUS
Status: DISCONTINUED | OUTPATIENT
Start: 2023-12-18 | End: 2023-12-18

## 2023-12-18 RX ORDER — MAGNESIUM SULFATE HEPTAHYDRATE 40 MG/ML
2 INJECTION, SOLUTION INTRAVENOUS
Status: DISCONTINUED | OUTPATIENT
Start: 2023-12-18 | End: 2023-12-18

## 2023-12-18 RX ORDER — SEVELAMER CARBONATE 800 MG/1
800 TABLET, FILM COATED ORAL
Status: DISCONTINUED | OUTPATIENT
Start: 2023-12-18 | End: 2023-12-22 | Stop reason: HOSPADM

## 2023-12-18 RX ORDER — HEPARIN SODIUM 10000 [USP'U]/100ML
23 INJECTION, SOLUTION INTRAVENOUS CONTINUOUS
Status: DISCONTINUED | OUTPATIENT
Start: 2023-12-18 | End: 2023-12-21

## 2023-12-18 RX ADMIN — CALCIUM CARBONATE (ANTACID) CHEW TAB 500 MG 500 MG: 500 CHEW TAB at 09:12

## 2023-12-18 RX ADMIN — WARFARIN SODIUM 2.5 MG: 2.5 TABLET ORAL at 04:12

## 2023-12-18 RX ADMIN — SIMETHICONE 80 MG: 80 TABLET, CHEWABLE ORAL at 09:12

## 2023-12-18 RX ADMIN — HEPARIN SODIUM AND DEXTROSE 23 UNITS/KG/HR: 10000; 5 INJECTION INTRAVENOUS at 11:12

## 2023-12-18 RX ADMIN — ACETAMINOPHEN AND CODEINE PHOSPHATE 0.35 MG: 120; 12 SOLUTION ORAL at 01:12

## 2023-12-18 RX ADMIN — Medication 6 MG: at 09:12

## 2023-12-18 RX ADMIN — CETIRIZINE HYDROCHLORIDE 10 MG: 5 TABLET, FILM COATED ORAL at 10:12

## 2023-12-18 RX ADMIN — SODIUM CHLORIDE: 9 INJECTION, SOLUTION INTRAVENOUS at 02:12

## 2023-12-18 RX ADMIN — Medication: at 09:12

## 2023-12-18 RX ADMIN — HEPARIN SODIUM AND DEXTROSE 23 UNITS/KG/HR: 10000; 5 INJECTION INTRAVENOUS at 04:12

## 2023-12-18 RX ADMIN — ASPIRIN 81 MG CHEWABLE TABLET 81 MG: 81 TABLET CHEWABLE at 09:12

## 2023-12-18 RX ADMIN — HEPARIN SODIUM AND DEXTROSE 22 UNITS/KG/HR: 10000; 5 INJECTION INTRAVENOUS at 10:12

## 2023-12-18 RX ADMIN — HEPARIN SODIUM AND DEXTROSE 22 UNITS/KG/HR: 10000; 5 INJECTION INTRAVENOUS at 06:12

## 2023-12-18 RX ADMIN — SEVELAMER CARBONATE 800 MG: 800 TABLET, FILM COATED ORAL at 04:12

## 2023-12-18 RX ADMIN — CALCIUM CARBONATE (ANTACID) CHEW TAB 500 MG 1000 MG: 500 CHEW TAB at 04:12

## 2023-12-18 NOTE — TREATMENT PLAN
SICU Transfer of Care Note  Critical Care Surgery    Admit Date: 12/8/2023  LOS: 11    CC: Rheumatic aortic stenosis    Code Status: Full Code     Transfer to Cardiac Stepdown Unit discussed with Dr. Campbell.    HPI and Hospital Course:     HPI:  Ms. 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       The patient presents to the SICU s/p AVR and PVR with Dr. Campbell on 12/08/2023. During the procedure the patient was on bypass for 398 minutes and clamped for 138 minutes.     On admission, they are intubated, sedated with Precedex, and in stable condition. Inotropic and vasopressor requirements upon admission are 0.05 of Epinephrine, and 0.1 Vasopressin, 0.25 Milrinone to maintain blood pressure at a MAP 65 and SBP < 120. Central access includes a RIJ CVC, arterial access includes a left radial arterial line. They also have 2 pleural chest tubed and 1 mediastinal chest tube with appropriate output.    Intraoperatively, they received 2,000 mL of NS, 4,965 mL of PRBCs, 441 mL FFP, 1,077mL Platelets, and 475 mL Cryoprecipitate. Urine output intraoperatively was 1,835cc.     Immediate post-operative plans include hemodynamic stabilization and weaning of cardiac and respiratory support. Plan to wean vasopressors and inotropes as tolerated, also wean ventilator support with goal of early extubation, and closely monitor fluid status with strict Is/Os and continued fluid resuscitation as needed.     Hospital/ICU Course:  Patient was weaning of cardiac support medications and extubated in SICU. Hospital course complicated by acute renal failure. Nephrology consulted and ordered lasix challenge which was ineffective.  Patient able to tolerate CRRT with pending trial of dialysis. Recent NIBP reading reassuring of pending trial of HD. IR consulted for tunneled dialysis line. IR will schedule once bed is assigned on the floor.     Vitals:    12/18/23 1300   BP: 105/68   Pulse: 96   SpO2: 99%       Physical Exam  Vitals and nursing note reviewed.   Constitutional:       Appearance: She is obese.   HENT:      Head: Normocephalic and atraumatic.   Cardiovascular:      Rate and Rhythm: Normal rate and regular rhythm.   Pulmonary:      Effort: Pulmonary effort is normal.      Comments:      Abdominal:      General: Abdomen is flat.      Palpations: Abdomen is soft.   Musculoskeletal:         General: Swelling present.   Skin:     General: Skin is warm and dry.      Capillary Refill: Capillary refill takes less than 2 seconds.        To Follow Up:     IR to schedule line under moderate sedation once stepdown from ICU  Will need to be NPO for procedure and INR less than 3.  No need to stop hep drip prior to procedure.  INR goal 2-3 for mechanical heart valve.  Scheduled warfarin 2.5 QD  On heparin drip with specific Xa nomogram seen below:   GOAL 0.5 - 0.7  < 0.3= Increase by 3 units/kg/hr   0.3 - 0.49 =Increase by 2 units/kg/hr   0.5 - 0.7 =No Change   0.71 - 0.8 = Decrease by 2 units/kg/hr   0.81 - 0.99 = Hold infusion for 1 hr and Decrease by 3 units/kg/hr  > 1.0 = STOP INFUSION follow Low intensity nomogram   Trial of HD with Nephrology    Discharge Plan:     Pending clinical progression by Dr. Campbell' team.   Call with questions.   Spectra [id number]    Juan Dixon MD  [facility]  12/19/2023

## 2023-12-18 NOTE — ASSESSMENT & PLAN NOTE
38-year-old, 127 kg, woman with history of rheumatic heart disease and Ross procedure and mitral valve repair(including ring annuloplasty) in 2000 followed by repair of autograft aneurysm repair and aortic valve replacement with 23mm CE bioprothesis in 2012 who presents for aortic and pulmonary valve replacement on 12/8      Neuro/Psych:   -- Sedation: n/a  -- Pain: Tylenol 650 mg PRN, Morphine 2 mg PRN, oxy 5 Q6 PRN             Cards:   History of rheumatic fever with rheumatic heart disease. S/p Ross procedure and mitral valve repair(including ring annuloplasty) in 2000   Now s/p aortic and pulmonary valve replacement with Dr. Campbell on 12/8  -- HDS  -- MAPS >65  -- SBP   -- Pressors to maintain blood pressure goals: off pressors      Pulm:   -- Extubated 12/13  -- Goal O2 sat > 90%  -- Wean as able      Renal:  -- Nicolas removed, anuric  -- CRRT/HD    - midodrine 15 prn   - IR ok with INR less than 3 for tunneled line when on floor   - needs HD prior to stepdown  -- BUN/Cr   Recent Labs   Lab 12/17/23  0355 12/17/23  1440 12/18/23  0415   BUN 54* 63* 52*  52*  52*   CREATININE 5.0* 5.9* 5.2*  5.2*  5.2*     -- Urine output: anuric      FEN / GI:   -- Replace lytes as needed  -- Nutrition: reg with thin, 1500mls  -- Docusate      ID:   -- Tm: afebrile; WBC 12.03 preop. 18.7 post op   Recent Labs   Lab 12/16/23  0350 12/17/23  0355 12/18/23  0415   WBC 11.45 11.26 9.14     -- Vancomycin two doses in OR       Heme/Onc:  -- H/H stable, 12.6 preop. 13.8 post op.   -- Daily CBC  -- heparin gtt at 24/hr, Xa goal 0.5-0.7   - Warfarin beginning 12/16  -- Received 8 units of pRBC in the OR   Recent Labs   Lab 12/15/23  1220 12/15/23  2245 12/16/23  0350 12/17/23  0355 12/17/23  2049 12/18/23  0415   HGB  --   --  8.9* 8.6*  --  8.7*   PLT  --   --  262 344  --  377   APTT 57.8*  57.8* 52.6*  --   --  54.8*  --    INR  --   --  1.1 1.1  --  1.2        Endo:   -- Gluc goal 140-180  -- POCT 152      PPx:   Feeding:  reg, thin liquids. 1.5L fluid   Analgesia/Sedation: tylenol, morphine  Thromboembolic prevention: heparin gtt @27/hr  HOB >30: Yes  Stress Ulcer ppx: Not indicated  Glucose control: Critical care goal 140-180 g/dl   Bowel reg: docusate, peg  Invasive Lines/Drains/Airway: PIV x2, L IJ trialysis, midline  Deescalation: continuing to reassess        Dispo/Code Status/Palliative:   -- SICU / Full Code

## 2023-12-18 NOTE — PROGRESS NOTES
[facility]  Critical Care - Surgery  Progress Note    Patient Name: Megan Cook  MRN: [id number]  Admission Date: 12/8/2023  Hospital Length of Stay: 10 days  Code Status: Full Code  Attending Provider: Alexandru Campbell MD  Primary Care Provider: Adelaida Disla NP   Principal Problem: Rheumatic aortic stenosis    Subjective:     Hospital/ICU Course:  No notes on file    Interval History/Significant Events: NAEON. Tolerated SLED overnight, remains off pressors. Xa 0.71 this AM. Reducing hep gtt from 24/hr to 22/hr. Net negative 18cc/24H. Hypertensive, did not require midodrine prior to CRRT.    Follow-up For: Procedure(s) (LRB):  Replacement-valve-aortic (N/A)  REPLACEMENT, PULMONARY VALVE (N/A)    Post-Operative Day: 10 Days Post-Op    Objective:     Vital Signs (Most Recent):  Temp: 97.7 °F (36.5 °C) (12/18/23 0300)  Pulse: 92 (12/18/23 0530)  Resp: (!) 21 (12/18/23 0530)  BP: (!) 126/59 (12/18/23 0500)  SpO2: 98 % (12/18/23 0530) Vital Signs (24h Range):  Temp:  [97.7 °F (36.5 °C)-98.2 °F (36.8 °C)] 97.7 °F (36.5 °C)  Pulse:  [] 92  Resp:  [9-46] 21  SpO2:  [91 %-100 %] 98 %  BP: (104-168)/(52-88) 126/59  Arterial Line BP: ()/(44-73) 113/46     Weight: 120 kg (264 lb 8.8 oz)  Body mass index is 44.02 kg/m².      Intake/Output Summary (Last 24 hours) at 12/18/2023 0533  Last data filed at 12/18/2023 0500  Gross per 24 hour   Intake 1274.76 ml   Output 932 ml   Net 342.76 ml          Physical Exam  Vitals and nursing note reviewed.   Constitutional:       Appearance: She is obese.   HENT:      Head: Normocephalic and atraumatic.   Cardiovascular:      Rate and Rhythm: Normal rate and regular rhythm.   Pulmonary:      Effort: Pulmonary effort is normal.      Comments:      Abdominal:      General: Abdomen is flat.      Palpations: Abdomen is soft.   Musculoskeletal:         General: Swelling present.   Skin:     General: Skin is warm and dry.      Capillary Refill:  Capillary refill takes less than 2 seconds.              Lines/Drains/Airways       Central Venous Catheter Line  Duration             Trialysis (Dialysis) Catheter 12/09/23 1327 left internal jugular 8 days              Peripheral Intravenous Line  Duration                  Peripheral IV - Single Lumen 12/16/23 1158 20 G;1 3/4 in Anterior;Left Forearm 1 day                    Significant Labs:    CBC/Anemia Profile:  Recent Labs   Lab 12/17/23  0355 12/18/23  0415   WBC 11.26 9.14   HGB 8.6* 8.7*   HCT 26.2* 25.9*    377   MCV 89 90   RDW 16.8* 16.7*        Chemistries:  Recent Labs   Lab 12/16/23  1501 12/17/23  0355 12/17/23  1440   * 133* 132*   K 4.2 4.4 4.5    102 98   CO2 20* 18* 19*   BUN 36* 54* 63*   CREATININE 3.8* 5.0* 5.9*   CALCIUM 9.1 9.1 9.5   ALBUMIN 2.4* 2.3* 2.4*   PROT  --  6.3  --    BILITOT  --  2.4*  --    ALKPHOS  --  204*  --    ALT  --  70*  --    AST  --  54*  --    MG 2.5 2.5 2.6   PHOS 4.4 5.8* 7.0*         Significant Imaging:  I have reviewed all pertinent imaging results/findings within the past 24 hours.  Assessment/Plan:     Cardiac/Vascular  * Rheumatic aortic stenosis  38-year-old, 127 kg, woman with history of rheumatic heart disease and Ross procedure and mitral valve repair(including ring annuloplasty) in 2000 followed by repair of autograft aneurysm repair and aortic valve replacement with 23mm CE bioprothesis in 2012 who presents for aortic and pulmonary valve replacement on 12/8      Neuro/Psych:   -- Sedation: n/a  -- Pain: Tylenol 650 mg PRN, Morphine 2 mg PRN, oxy 5 Q6 PRN             Cards:   History of rheumatic fever with rheumatic heart disease. S/p Ross procedure and mitral valve repair(including ring annuloplasty) in 2000   Now s/p aortic and pulmonary valve replacement with Dr. Campbell on 12/8  -- HDS  -- MAPS >65  -- SBP   -- Pressors to maintain blood pressure goals: off pressors      Pulm:   -- Extubated 12/13  -- Goal O2 sat > 90%  --  Wean as able      Renal:  -- Nicolas removed, anuric  -- CRRT/HD    - midodrine 15 prn   - IR ok with INR less than 3 for tunneled line when on floor   - needs HD prior to stepdown  -- BUN/Cr   Recent Labs   Lab 12/17/23  0355 12/17/23  1440 12/18/23  0415   BUN 54* 63* 52*  52*  52*   CREATININE 5.0* 5.9* 5.2*  5.2*  5.2*     -- Urine output: anuric      FEN / GI:   -- Replace lytes as needed  -- Nutrition: reg with thin, 1500mls  -- Docusate      ID:   -- Tm: afebrile; WBC 12.03 preop. 18.7 post op   Recent Labs   Lab 12/16/23  0350 12/17/23  0355 12/18/23  0415   WBC 11.45 11.26 9.14     -- Vancomycin two doses in OR       Heme/Onc:  -- H/H stable, 12.6 preop. 13.8 post op.   -- Daily CBC  -- heparin gtt at 24/hr, Xa goal 0.5-0.7   - Warfarin beginning 12/16  -- Received 8 units of pRBC in the OR   Recent Labs   Lab 12/15/23  1220 12/15/23  2245 12/16/23  0350 12/17/23  0355 12/17/23  2049 12/18/23  0415   HGB  --   --  8.9* 8.6*  --  8.7*   PLT  --   --  262 344  --  377   APTT 57.8*  57.8* 52.6*  --   --  54.8*  --    INR  --   --  1.1 1.1  --  1.2        Endo:   -- Gluc goal 140-180  -- POCT 152      PPx:   Feeding: reg, thin liquids. 1.5L fluid   Analgesia/Sedation: tylenol, morphine  Thromboembolic prevention: heparin gtt @27/hr  HOB >30: Yes  Stress Ulcer ppx: Not indicated  Glucose control: Critical care goal 140-180 g/dl   Bowel reg: docusate, peg  Invasive Lines/Drains/Airway: PIV x2, L IJ trialysis, midline  Deescalation: continuing to reassess        Dispo/Code Status/Palliative:   -- SICU / Full Code         Critical care was time spent personally by me on the following activities: development of treatment plan with patient or surrogate and bedside caregivers, discussions with consultants, evaluation of patient's response to treatment, examination of patient, ordering and performing treatments and interventions, ordering and review of laboratory studies, ordering and review of radiographic studies,  pulse oximetry, re-evaluation of patient's condition.  This critical care time did not overlap with that of any other provider or involve time for any procedures.     Gloria Cruz MD  Critical Care - Surgery  [facility]

## 2023-12-18 NOTE — PLAN OF CARE
SICU PLAN OF CARE NOTE    Dx: Rheumatic aortic stenosis    Goals of Care:     Vital Signs (last 12 hours):   Temp:  [97.7 °F (36.5 °C)-97.8 °F (36.6 °C)]   Pulse:  []   Resp:  [18-32]   BP: (126-168)/(57-88)   SpO2:  [91 %-100 %]      Neuro: AAO x4, Follows Commands, and Moves All Extremities    Cardiac: NSR    Respiratory: Room Air    Gtts: Heparin @ 22units/kg/hr for antiXa goal 0.5-0.7    Urine Output: Anuric 0 cc/shift    Dialysis: CRRT SLED for 6 hrs overnight, -350      Diet: Regular Diet 1500cc fluid restrictions    Skin:  No new skin breakdown noted this shift.. Wound care completed per orders.  Patient turned Q2, bariatric immerse mattress inflated, and bed working correctly.    Shift Events:  NAEON..  See flowsheet for further assessment/details. Patient and family updated on current condition/plan of care, questions answered, and emotional support provided.  BM x 1 overnight. 40cc per bladder scan.

## 2023-12-18 NOTE — PT/OT/SLP PROGRESS
Speech Language Pathology Treatment/ Discharge Summary     Patient Name:  Megan Cook   MRN:  [id number]  Admitting Diagnosis: Rheumatic aortic stenosis    Recommendations:                 General Recommendations:  Dysphagia therapy  Diet recommendations:  Regular Diet - IDDSI Level 7, Liquid Diet Level: Thin liquids - IDDSI Level 0   Aspiration Precautions: Standard aspiration precautions   General Precautions: Standard,    Communication strategies:  none     Assessment:     Megan Cook is a 38 y.o. female with an SLP diagnosis of essentially resolved dysphagia and spontaneously improving hoarse voice/Dysphonia.      Subjective     Pt awake and alert   Family at the bedside   RN in agreement with SLP seeing at this time    Pain/Comfort:  Pain Rating 1: 0/10  Pain Rating Post-Intervention 1: 0/10    Respiratory Status: Room air    Objective:     Has the patient been evaluated by SLP for swallowing?   Yes  Keep patient NPO? No   Current Respiratory Status:        Pt seen for ongoing swallow follow up. Pt vocal quality has steadily improved and pt now achieving strong true phonation with intermittent mild hoarseness changes in phonation.  Pt managed regular unrestricted diet this weekend without difficulty. Pt reports she is now able to tolerate small sips via straw without difficulty, no coughing or choking appreciated. Pt also reporting gradual increase in appetite and participating in meals without difficulty. Pt continues to present with functional chewing skills and swallowing skills across liquids and solids.  SLP discussed continuation of regular unrestricted diet (dysphagia level 7 and thin liquids). No further skilled speech services warranted at this time. All parties demonstrated understanding and agreement with plan.      Goals:   Multidisciplinary Problems       SLP Goals          Problem: SLP    Goal Priority Disciplines Outcome   SLP Goal     SLP    Description: Speech Language Pathology  Goals  Goals expected to be met by  12/24    1. Pt will participate in ongoing assessment of swallow function                          Plan:     Patient to be seen:  3 x/week   Plan of Care expires:     Plan of Care reviewed with:  patient   SLP Follow-Up:  No       Discharge recommendations:  High Intensity Therapy   Barriers to Discharge:  None    Time Tracking:     SLP Treatment Date:   12/18/23  Speech Start Time:  0921  Speech Stop Time:  0930     Speech Total Time (min):  9 min    Billable Minutes: Treatment Swallowing Dysfunction 9    12/18/2023

## 2023-12-18 NOTE — H&P
Interventional Radiology   Consult History & Physical      Date: 12/18/2023   Primary team: Networked reference to record PCT , Alexandru Campbell MD   Room/bed: 07675 CVICU/43268 CVICU A    Consults     Reason for Consult:   Tunneled HD line    History of Present Illness:  Megan Cook is a 38 y.o. female with a history of  rheumatic heart disease and Ross procedure and mitral valve repair (including ring annuloplasty) in 2000 followed by repair of autograft aneurysm repair and aortic valve replacement with 23mm CE bioprothesis in 2012  who was admitted on 12/8 for aortic and pulmonic valve replacement for severe aortic stenosis and moderate pulmonary stenosis. Pt underwent aortic and pulmonic valve replacement with Pediatric Cardiothoracic Surgery on 12/8. Pt with episodic hypotensive periods post-op requiring epi/vaso. Additionally she required cardioversion for a fib on 12/9. Post-operatively, the patient developed anuric TINO and was started on RRT on 12/9. Pt has not had evidence of renal recovery and has been dialysis dependent since 12/9.      IR has been consulted for tunneled HD line. As of 12/16, leukocytosis has since normalized and patient remains afebrile. Per SICU team, patient is stable for transition to floor, and is awaiting floor bed assignment.     Past Medical History:  Past Medical History:   Diagnosis Date    Rheumatic fever with cardiac involvement        Past Surgical History:  Past Surgical History:   Procedure Laterality Date    ANGIOGRAM, CORONARY, PEDIATRIC  8/1/2023    Procedure: Angiogram, Coronary, Pediatric;  Surgeon: Anthony Dubois Jr., MD;  Location: [facility] CATH LAB;  Service: Cardiology;;    ANGIOGRAM, PULMONARY, PEDIATRIC  8/1/2023    Procedure: Angiogram, Pulmonary, Pediatric;  Surgeon: Anthony Dubois Jr., MD;  Location: [facility] CATH LAB;  Service: Cardiology;;    AORTIC VALVE REPLACEMENT  2000    AORTIC VALVE REPLACEMENT  2012    AORTIC VALVE REPLACEMENT  2012    with aortic root  replacement at [facility]    AORTIC VALVE REPLACEMENT N/A 12/8/2023    Procedure: Replacement-valve-aortic;  Surgeon: Alexandru Campbell MD;  Location: [facility] OR [address] [place] [address];  Service: Cardiovascular;  Laterality: N/A;    COMBINED RIGHT AND RETROGRADE LEFT HEART CATHETERIZATION FOR CONGENITAL HEART DEFECT N/A 8/1/2023    Procedure: Catheterization, Heart, Combined Right and Retrograde Left, for Congenital Heart Defect;  Surgeon: Anthony Dubois Jr., MD;  Location: [facility] CATH LAB;  Service: Cardiology;  Laterality: N/A;    PULMONARY VALVE REPLACEMENT N/A 12/8/2023    Procedure: REPLACEMENT, PULMONARY VALVE;  Surgeon: Alexandru Campbell MD;  [facility];  Service: Cardiovascular;  Laterality: N/A;    REPLACEMENT OF AORTIC VALVE USING ROSS PROCEDURE N/A 2000    [facility]        Sedation History:    No known adverse reactions.     Social History:  Social History     Tobacco Use    Smoking status: Never    Smokeless tobacco: Never   Substance Use Topics    Alcohol use: Yes     Alcohol/week: 1.0 standard drink of alcohol     Types: 1 Glasses of wine per week     Comment: social        Home Medications:   Prior to Admission medications    Medication Sig Start Date End Date Taking? Authorizing Provider   cetirizine (ZYRTEC) 10 MG tablet Take 10 mg by mouth once daily.   Yes Provider, Historical   cholecalciferol, vitamin D3, 125 mcg (5,000 unit) Tab Take 5,000 Units by mouth. 12/5/22  Yes Provider, Historical   PAMELA 0.35 mg tablet Take by mouth. 5/10/23  Yes Provider, Historical   furosemide (LASIX) 20 MG tablet Take 20 mg by mouth daily as needed.   Yes Provider, Historical   metoprolol succinate (TOPROL-XL) 50 MG 24 hr tablet Take 50 mg by mouth every morning. 5/7/23  Yes Provider, Historical   norethindrone (MICRONOR) 0.35 mg tablet Take 1 tablet (0.35 mg total) by mouth once daily. for 28 doses 12/18/23 1/15/24  Juan Dixon MD       Inpatient Medications:    Current Facility-Administered Medications:     0.9%  NaCl  infusion (CRRT USE ONLY), , Intravenous, Continuous, Natalia Kessler MD, Stopped at 12/18/23 0706    0.9%  NaCl infusion, , Intravenous, PRN, Kalen Lindsey MD    0.9%  NaCl infusion, , Intravenous, Once, Tiffany Joyce DNP, FNP-C    acetaminophen oral solution 650 mg, 650 mg, Per NG tube, Q8H PRN, Vandana Vargas MD, 650 mg at 12/16/23 1131    artificial tears 0.5 % ophthalmic solution 1 drop, 1 drop, Both Eyes, PRN, Juan Dixon MD    aspirin chewable tablet 81 mg, 81 mg, Oral, Daily, ZhuKomal MD, 81 mg at 12/18/23 0902    balsam peru-castor oiL Oint, , Topical (Top), BID, Alexandru Campbell MD, Given at 12/18/23 0902    calcium carbonate 200 mg calcium (500 mg) chewable tablet 1,000 mg, 2 tablet, Oral, TID PRN, Juan Dixon MD, 1,000 mg at 12/18/23 1643    calcium gluconate 1 g in NS IVPB (premixed), 1 g, Intravenous, PRN, Carlee Almeida PA-C, Held at 12/14/23 0709    calcium gluconate 1 g in NS IVPB (premixed), 2 g, Intravenous, PRN, Carlee Almeida PA-C, Stopped at 12/14/23 0304    calcium gluconate 1 g in NS IVPB (premixed), 3 g, Intravenous, PRN, Carlee Almeida PA-C    [START ON 12/20/2023] cetirizine tablet 5 mg, 5 mg, Oral, Once per day on Mon Wed Fri, Juan Dixon MD    dextrose 10% bolus 125 mL 125 mL, 12.5 g, Intravenous, PRN, Lay Lynn, NP    dextrose 10% bolus 250 mL 250 mL, 25 g, Intravenous, PRN, Lay Lynn AGama, NP    heparin 25,000 units in dextrose 5% 250 mL (100 units/mL) infusion (heparin infusion - NO NOMOGRAM), 23 Units/kg/hr (Adjusted), Intravenous, Continuous, Last Rate: 18.8 mL/hr at 12/18/23 1644, 23 Units/kg/hr at 12/18/23 1644 **AND** [COMPLETED] Anti-Xa Heparin Monitoring, , , Once **AND** [CANCELED] Anti-Xa Heparin Monitoring, , , Once Timed, Lisa Monet MD    LIDOcaine 5 % patch 1 patch, 1 patch, Transdermal, Q24H, WallaceTal farfan MD, 1 patch at 12/17/23 0937    magnesium sulfate 2g in water 50mL IVPB (premix), 2 g,  Intravenous, PRN, Carlee Almeida PA-C, Stopped at 12/12/23 0053    magnesium sulfate 2g in water 50mL IVPB (premix), 4 g, Intravenous, PRN, Carlee Almeida PA-C, Stopped at 12/12/23 2115    magnesium sulfate 2g in water 50mL IVPB (premix), 2 g, Intravenous, PRN, Natalia Kessler MD    melatonin tablet 6 mg, 6 mg, Oral, QHS, Komal Zhu MD, 6 mg at 12/17/23 2050    midodrine tablet 15 mg, 15 mg, Oral, PRN, Juan Dixon MD    norethindrone tablet 0.35 mg [Patient Supplied Medication], 1 tablet, Oral, Daily, Juan Dixon MD, 0.35 mg at 12/18/23 1300    ondansetron injection 4 mg, 4 mg, Intravenous, Q12H PRN, Carlee Almeida PA-C, 4 mg at 12/15/23 0838    oxyCODONE immediate release tablet 5 mg, 5 mg, Per NG tube, Q6H PRN, Vandana Vargas MD, 5 mg at 12/15/23 1452    polyethylene glycol packet 17 g, 17 g, Oral, Daily, Gloria Cruz MD, 17 g at 12/14/23 0858    senna-docusate 8.6-50 mg per tablet 1 tablet, 1 tablet, Oral, BID, Komal Zhu MD, 1 tablet at 12/15/23 2140    sevelamer carbonate tablet 800 mg, 800 mg, Oral, TID WM, Denis Daniels NP, 800 mg at 12/18/23 1643    simethicone chewable tablet 80 mg, 1 tablet, Oral, TID PRN, Juan Dixon MD, 80 mg at 12/15/23 1715    sodium phosphate 20.01 mmol in dextrose 5 % (D5W) 250 mL IVPB, 20.01 mmol, Intravenous, PRN, Natalia Kessler MD    sodium phosphate 30 mmol in dextrose 5 % (D5W) 250 mL IVPB, 30 mmol, Intravenous, PRNVictoria Anab R, MD    sodium phosphate 39.99 mmol in dextrose 5 % (D5W) 250 mL IVPB, 39.99 mmol, Intravenous, PRN, Natalia Kessler MD    warfarin (COUMADIN) tablet 2.5 mg, 2.5 mg, Oral, Daily, Juan Dixon MD, 2.5 mg at 12/18/23 7289     Anticoagulants/Antiplatelets:   Aspirin, Heparin infusion, and Warfarin    Allergies:   Review of patient's allergies indicates:   Allergen Reactions    Cephalexin Other (See Comments)     She gets mouth sores.     Penicillin g Other (See Comments)     Causes mouth sores.         Review of Systems:   As documented in primary provider H&P.    Vital Signs:  Temp: 98.4 °F (36.9 °C) (12/18/23 0845)  Pulse: 100 (12/18/23 1415)  Resp: (!) 28 (12/18/23 1330)  BP: 105/68 (12/18/23 1300)  SpO2: 99 % (12/18/23 1300)    Temp:  [97.7 °F (36.5 °C)-98.4 °F (36.9 °C)]   Pulse:  []   Resp:  [15-40]   BP: ()/(56-93)   SpO2:  [91 %-100 %]      Physical Exam:  No acute distress, sitting comfortably in chair, pleasant and cooperative  Regular rate and rhythm  Breathing unlabored  Abdomen benign  Extremities warm and well perfused    Sedation Exam:  ASA: III - Patient appears to have severe systemic disease not posing a constant threat to life  Mallampati score: I (soft palate, uvula, fauces, and tonsillar pillars visible)    Laboratory:  Lab Results   Component Value Date    INR 1.2 12/18/2023       Lab Results   Component Value Date    WBC 9.14 12/18/2023    HGB 8.7 (L) 12/18/2023    HCT 25.9 (L) 12/18/2023    MCV 90 12/18/2023     12/18/2023      Lab Results   Component Value Date     12/18/2023     12/18/2023     (L) 12/18/2023     (L) 12/18/2023    K 4.8 12/18/2023    K 4.8 12/18/2023     12/18/2023     12/18/2023    CO2 19 (L) 12/18/2023    CO2 19 (L) 12/18/2023    BUN 49 (H) 12/18/2023    BUN 49 (H) 12/18/2023    CREATININE 5.3 (H) 12/18/2023    CREATININE 5.3 (H) 12/18/2023    CALCIUM 8.8 12/18/2023    CALCIUM 8.8 12/18/2023    MG 2.4 12/18/2023    MG 2.4 12/18/2023    ALT 61 (H) 12/18/2023    AST 42 (H) 12/18/2023    ALBUMIN 2.3 (L) 12/18/2023    ALBUMIN 2.3 (L) 12/18/2023    BILITOT 1.8 (H) 12/18/2023    BILIDIR 3.1 (H) 12/14/2023       Imaging:   Reviewed.      ASSESSMENT/PLAN/RECOMMENDATIONS:   37 yo F with TINO following aortic and pulmonic valve replacement. HD dependent since 12/9.     Sedation Plan: up to moderate sedation  Patient will undergo: Tunneled HD catheter placement      Angelica Brito NP  Interventional Radiology

## 2023-12-18 NOTE — PLAN OF CARE
CM and SICU  Pharmacist met with the patient and family to discuss post acute coumadin management CM asked patient and family to contact new PCP so that coumadin management can be set up  CM RENEETM

## 2023-12-18 NOTE — PLAN OF CARE
Recommendations     1.) Recommend continuing with Regular diet as tolerated, fluid per MD.      2.) Recommend continuing with Novasource Renal ONS as ordered.      3.) RD to monitor wt, PO intake, skin, labs.        Goals: Meet % EEN, EPN by RD f/u date  Nutrition Goal Status: progressing towards goal  Communication of RD Recs: reviewed with RN

## 2023-12-18 NOTE — PLAN OF CARE
[facility]  Discharge Reassessment    Primary Care Provider: Adelaida Disla NP    Expected Discharge Date: 12/22/2023    Reassessment (most recent)       Discharge Reassessment - 12/18/23 0932          Discharge Reassessment    Assessment Type Discharge Planning Reassessment     Communicated CHRISTINE with patient/caregiver Date not available/Unable to determine     Discharge Plan A Long-term acute care facility (LTAC)     Discharge Plan B Rehab;Home;Home with family;Home Health     DME Needed Upon Discharge  other (see comments)   TBD    Transition of Care Barriers None     Why the patient remains in the hospital Requires continued medical care                     Per MD's Note, Interval History/Significant Events: NAEON. Tolerated SLED overnight, remains off pressors. Xa 0.71 this AM. Reducing hep gtt from 24/hr to 22/hr. Net negative 18cc/24H. Hypertensive, did not require midodrine prior to CRRT.     Discharge Plan A and Plan B have been determined by review of patient's clinical status, future medical and therapeutic needs, and coverage/benefits for post-acute care in coordination with multidisciplinary team members.    Carlos Nieves LMSW  Case Management [facility]

## 2023-12-18 NOTE — PT/OT/SLP PROGRESS
"Occupational Therapy   Co-Treatment    Name: Megan Cook  MRN: [id number]  Admitting Diagnosis:  Rheumatic aortic stenosis  10 Days Post-Op    Recommendations:     Discharge Recommendations: High Intensity Therapy  Discharge Equipment Recommendations:  wheelchair, bath bench  Barriers to discharge:  None    Assessment:     Megan Cook is a 38 y.o. female with a medical diagnosis of Rheumatic aortic stenosis.  She presents with progress towards goals as evidenced by decreased assistance needed for functional transfers during ADL and mobility. Performance deficits affecting function are weakness, impaired self care skills, impaired balance, impaired endurance, impaired functional mobility, decreased upper extremity function, pain, impaired cardiopulmonary response to activity, gait instability. Pt benefits from co-treatment with PT to accommodate pt's activity tolerance and progression with therapy.    Rehab Prognosis:  Good; patient would benefit from acute skilled OT services to address these deficits and reach maximum level of function.       Plan:     Patient to be seen 5 x/week to address the above listed problems via self-care/home management, therapeutic activities, therapeutic exercises  Plan of Care Expires: 12/27/23  Plan of Care Reviewed with: patient    Subjective     Chief Complaint: "I'm stiff from laying still during dialysis."  Patient/Family Comments/goals: to get better and go home  Pain/Comfort:  Pain Rating 1: 5/10  Location - Side 1: Bilateral  Location - Orientation 1: generalized  Location 1: back  Pain Addressed 1: Reposition, Distraction  Pain Rating Post-Intervention 1: 5/10  Pain Rating 2: 6/10  Location - Side 2: Bilateral  Location - Orientation 2: generalized  Location 2: leg  Pain Addressed 2: Reposition, Distraction  Pain Rating Post-Intervention 2: 6/10    Objective:     Communicated with: RN prior to session.  Patient found right sidelying with blood pressure cuff, pulse ox " (continuous), telemetry, peripheral IV, central line upon OT entry to room.    General Precautions: Standard, fall, sternal    Orthopedic Precautions:N/A  Braces: N/A  Respiratory Status: Room air     Occupational Performance:     Bed Mobility:    Patient completed Scooting/Bridging with stand by assistance  Patient completed Supine to Sit with stand by assistance     Functional Mobility/Transfers:  Patient completed Sit <> Stand Transfer with stand by assistance  with  no assistive device   Functional Mobility: SBA to/from bathroom and in hallway to simulate HH distances    Activities of Daily Living:  Grooming: independence    Upper Body Dressing: stand by assistance    Lower Body Dressing: minimum assistance    Toileting: minimum assistance        [facility] 6 Click ADL: 20    Treatment & Education:  Pt ed on OT POC  Pt re-ed on sternal precautions during ADL  Pt ed on ROM ex's 3x daily for increased overall strength and endurance      Patient left up in chair with all lines intact, call button in reach, RN notified, and mother present    GOALS:   Multidisciplinary Problems       Occupational Therapy Goals          Problem: Occupational Therapy    Goal Priority Disciplines Outcome Interventions   Occupational Therapy Goal     OT, PT/OT Ongoing, Progressing    Description: Goals to be met by: 1/13/23    Patient will increase functional independence with ADLs by performing:    UE Dressing with Stand-by Assistance.  LE Dressing with Minimal Assistance.  Grooming while standing with Minimal Assistance.  Toileting from toilet with Minimal Assistance for hygiene and clothing management.   Supine to sit with Stand-by Assistance.  Step transfer with Minimal Assistance  Toilet transfer to toilet with Minimal Assistance.                         Time Tracking:     OT Date of Treatment: 12/18/23  OT Start Time: 0815  OT Stop Time: 0831  OT Total Time (min): 16 min    Billable Minutes:Self Care/Home Management 16                12/18/2023

## 2023-12-18 NOTE — SUBJECTIVE & OBJECTIVE
Interval History:   Had SLED overnight, tolerated well.  Net even over the past 24 hours with stable electrolytes.  No complaints on exam this morning, was able to walk with physical therapy.  She remains anuric.    Review of patient's allergies indicates:   Allergen Reactions    Cephalexin Other (See Comments)     She gets mouth sores.     Penicillin g Other (See Comments)     Causes mouth sores.      Current Facility-Administered Medications   Medication Frequency    0.9%  NaCl infusion (CRRT USE ONLY) Continuous    0.9%  NaCl infusion PRN    0.9%  NaCl infusion Once    acetaminophen oral solution 650 mg Q8H PRN    artificial tears 0.5 % ophthalmic solution 1 drop PRN    aspirin chewable tablet 81 mg Daily    balsam peru-castor oiL Oint BID    calcium carbonate 200 mg calcium (500 mg) chewable tablet 1,000 mg TID PRN    calcium gluconate 1 g in NS IVPB (premixed) PRN    calcium gluconate 1 g in NS IVPB (premixed) PRN    calcium gluconate 1 g in NS IVPB (premixed) PRN    cetirizine tablet 10 mg Daily    dextrose 10% bolus 125 mL 125 mL PRN    dextrose 10% bolus 250 mL 250 mL PRN    heparin 25,000 units in dextrose 5% 250 mL (100 units/mL) infusion (heparin infusion - NO NOMOGRAM) Continuous    LIDOcaine 5 % patch 1 patch Q24H    magnesium sulfate 2g in water 50mL IVPB (premix) PRN    magnesium sulfate 2g in water 50mL IVPB (premix) PRN    magnesium sulfate 2g in water 50mL IVPB (premix) PRN    melatonin tablet 6 mg QHS    midodrine tablet 15 mg PRN    norethindrone tablet 0.35 mg Daily    ondansetron injection 4 mg Q12H PRN    oxyCODONE immediate release tablet 5 mg Q6H PRN    polyethylene glycol packet 17 g Daily    senna-docusate 8.6-50 mg per tablet 1 tablet BID    simethicone chewable tablet 80 mg TID PRN    sodium phosphate 20.01 mmol in dextrose 5 % (D5W) 250 mL IVPB PRN    sodium phosphate 30 mmol in dextrose 5 % (D5W) 250 mL IVPB PRN    sodium phosphate 39.99 mmol in dextrose 5 % (D5W) 250 mL IVPB PRN     warfarin (COUMADIN) tablet 2.5 mg Daily       Objective:     Vital Signs (Most Recent):  Temp: 98.4 °F (36.9 °C) (12/18/23 0845)  Pulse: 100 (12/18/23 1330)  Resp: (!) 28 (12/18/23 1330)  BP: 105/68 (12/18/23 1300)  SpO2: 99 % (12/18/23 1300) Vital Signs (24h Range):  Temp:  [97.7 °F (36.5 °C)-98.4 °F (36.9 °C)] 98.4 °F (36.9 °C)  Pulse:  [] 100  Resp:  [15-46] 28  SpO2:  [91 %-100 %] 99 %  BP: ()/(56-93) 105/68     Weight: 120.2 kg (265 lb) (12/18/23 0500)  Body mass index is 44.1 kg/m².  Body surface area is 2.35 meters squared.    I/O last 3 completed shifts:  In: 1944.4 [P.O.:318; I.V.:1626.4]  Out: 1274 [Other:1274]     Physical Exam  Constitutional:       Appearance: She is obese.   HENT:      Head: Normocephalic.   Cardiovascular:      Rate and Rhythm: Normal rate.   Pulmonary:      Effort: Pulmonary effort is normal.   Musculoskeletal:         General: No swelling.   Skin:     General: Skin is warm.   Neurological:      General: No focal deficit present.      Mental Status: She is alert and oriented to person, place, and time.   Psychiatric:         Mood and Affect: Mood normal.         Behavior: Behavior normal.          Significant Labs:  CBC:   Recent Labs   Lab 12/18/23  0415   WBC 9.14   RBC 2.88*   HGB 8.7*   HCT 25.9*      MCV 90   MCH 30.2   MCHC 33.6     CMP:   Recent Labs   Lab 12/18/23  0415 12/18/23  1251     104  103 110  110   CALCIUM 9.1  9.1  8.8 8.8  8.8   ALBUMIN 2.3*  2.3*  2.3* 2.3*  2.3*   PROT 6.3  --    *  135*  133* 130*  130*   K 4.7  4.7  4.6 4.8  4.8   CO2 18*  18*  18* 19*  19*     100  102 101  101   BUN 52*  52*  52* 49*  49*   CREATININE 5.2*  5.2*  5.2* 5.3*  5.3*   ALKPHOS 192*  --    ALT 61*  --    AST 42*  --    BILITOT 1.8*  --

## 2023-12-18 NOTE — SUBJECTIVE & OBJECTIVE
Interval History/Significant Events: NAEON. Tolerated SLED overnight, remains off pressors. Xa 0.71 this AM.    Follow-up For: Procedure(s) (LRB):  Replacement-valve-aortic (N/A)  REPLACEMENT, PULMONARY VALVE (N/A)    Post-Operative Day: 10 Days Post-Op    Objective:     Vital Signs (Most Recent):  Temp: 97.7 °F (36.5 °C) (12/18/23 0300)  Pulse: 92 (12/18/23 0530)  Resp: (!) 21 (12/18/23 0530)  BP: (!) 126/59 (12/18/23 0500)  SpO2: 98 % (12/18/23 0530) Vital Signs (24h Range):  Temp:  [97.7 °F (36.5 °C)-98.2 °F (36.8 °C)] 97.7 °F (36.5 °C)  Pulse:  [] 92  Resp:  [9-46] 21  SpO2:  [91 %-100 %] 98 %  BP: (104-168)/(52-88) 126/59  Arterial Line BP: ()/(44-73) 113/46     Weight: 120 kg (264 lb 8.8 oz)  Body mass index is 44.02 kg/m².      Intake/Output Summary (Last 24 hours) at 12/18/2023 0533  Last data filed at 12/18/2023 0500  Gross per 24 hour   Intake 1274.76 ml   Output 932 ml   Net 342.76 ml          Physical Exam  Vitals and nursing note reviewed.   Constitutional:       Appearance: She is obese.   HENT:      Head: Normocephalic and atraumatic.   Cardiovascular:      Rate and Rhythm: Normal rate and regular rhythm.   Pulmonary:      Effort: Pulmonary effort is normal.      Comments:      Abdominal:      General: Abdomen is flat.      Palpations: Abdomen is soft.   Musculoskeletal:         General: Swelling present.   Skin:     General: Skin is warm and dry.      Capillary Refill: Capillary refill takes less than 2 seconds.              Lines/Drains/Airways       Central Venous Catheter Line  Duration             Trialysis (Dialysis) Catheter 12/09/23 1327 left internal jugular 8 days              Peripheral Intravenous Line  Duration                  Peripheral IV - Single Lumen 12/16/23 1158 20 G;1 3/4 in Anterior;Left Forearm 1 day                    Significant Labs:    CBC/Anemia Profile:  Recent Labs   Lab 12/17/23  0355 12/18/23  0415   WBC 11.26 9.14   HGB 8.6* 8.7*   HCT 26.2* 25.9*   PLT  344 377   MCV 89 90   RDW 16.8* 16.7*        Chemistries:  Recent Labs   Lab 12/16/23  1501 12/17/23  0355 12/17/23  1440   * 133* 132*   K 4.2 4.4 4.5    102 98   CO2 20* 18* 19*   BUN 36* 54* 63*   CREATININE 3.8* 5.0* 5.9*   CALCIUM 9.1 9.1 9.5   ALBUMIN 2.4* 2.3* 2.4*   PROT  --  6.3  --    BILITOT  --  2.4*  --    ALKPHOS  --  204*  --    ALT  --  70*  --    AST  --  54*  --    MG 2.5 2.5 2.6   PHOS 4.4 5.8* 7.0*         Significant Imaging:  I have reviewed all pertinent imaging results/findings within the past 24 hours.

## 2023-12-18 NOTE — NURSING
"SICU resident notified of elevated BP. BP not meeting set parameter goals. MD stated to "call back if SBP is > 170." No new orders placed at this time. No nursing communication note documented.   "

## 2023-12-18 NOTE — PROGRESS NOTES
"[facility] - Surgical Intensive Care  Adult Nutrition  Progress Note    SUMMARY       Recommendations    1.) Recommend continuing with Regular diet as tolerated, fluid per MD.     2.) Recommend continuing with Novasource Renal ONS as ordered.     3.) RD to monitor wt, PO intake, skin, labs.      Goals: Meet % EEN, EPN by RD f/u date  Nutrition Goal Status: progressing towards goal  Communication of RD Recs: reviewed with RN    Assessment and Plan    Nutrition Problem:  Inadequate energy intake     Related to (etiology):   Inability to consume sufficient energy      Signs and Symptoms (as evidenced by):   NPO     Interventions(treatment strategy):  Collaboration of nutrition care w/ other providers  EN vs ADAT     Nutrition Diagnosis Status:   Resolved    Reason for Assessment    Reason For Assessment: RD follow-up  Diagnosis: other (see comments) (Aortic stenosis)  Relevant Medical History: Rheumatic heart disease  Interdisciplinary Rounds: did not attend  General Information Comments: RD f/u: pt denies n/v/d/c. Pt endorses fair appetite, stating that they are consuming 50-75% of the meals provided and drinking the ONS as ordered. Pt continues w/ CRRT. Mild to moderate edema noted. Pt appears nourished w/ no s/s of malnutrition. RD team to continue to monitor.  Nutrition Discharge Planning: Pending clinical course    Nutrition Risk Screen    Nutrition Risk Screen: no indicators present    Nutrition/Diet History    Spiritual, Cultural Beliefs, Hindu Practices, Values that Affect Care: no  Factors Affecting Nutritional Intake: NPO    Anthropometrics    Temp: 98.4 °F (36.9 °C)  Height Method: Stated  Height: 5' 5" (165.1 cm)  Height (inches): 65 in  Weight Method: Bed Scale  Weight: 120.2 kg (265 lb)  Weight (lb): 265 lb  Ideal Body Weight (IBW), Female: 125 lb  % Ideal Body Weight, Female (lb): 210.4 %  BMI (Calculated): 44.1  BMI Grade: greater than 40 - morbid obesity    Lab/Procedures/Meds    Pertinent Labs " Reviewed: reviewed  Pertinent Labs Comments: Na: 133, BUN: 52, cr: 5.2, GFR: 10.3, phos: 5.6,  alb: 2.3, tbili: 1.8, AST: 42, ALT: 61  Pertinent Medications Reviewed: reviewed  Pertinent Medications Comments: NaCl, Docusate, polyethylene glycol, senna-docusate, coumadin, heparin, nor-epi    Estimated/Assessed Needs    Weight Used For Calorie Calculations: 120.2 kg (264 lb 15.9 oz)  Energy Calorie Requirements (kcal): 1682 Kcal  Energy Need Method: Kcal/kg (14 kcal/kg d/t BMI >40.0)    Protein Requirements: 114-142 g/d (2-2.5 g/kg)  Weight Used For Protein Calculations: 56.8 kg (125 lb 3.5 oz)    Estimated Fluid Requirement Method: other (see comments) (Per MD or 1 mL/kcal)  RDA Method (mL): 1682    Nutrition Prescription Ordered    Current Diet Order: Regular Diet (1.5L FR)  Current Nutrition Support Formula Ordered:  (-)  Current Nutrition Support Rate Ordered:  (-)  Current Nutrition Support Frequency Ordered: -    Evaluation of Received Nutrient/Fluid Intake    Enteral Calories (kcal):  (-)  Enteral Protein (gm):  (-)  Enteral (Free Water) Fluid (mL):  (-)  I/O: -9.0L since 12/11  Energy Calories Required: meeting needs  Protein Required: meeting needs  Fluid Required:  (as per MD)  Comments: LBM 12/17  % Intake of Estimated Energy Needs: 75 - 100 %  % Meal Intake: 50 - 75 % + ONS    Nutrition Risk    Level of Risk/Frequency of Follow-up:  (1x/week)     Monitor and Evaluation    Food and Nutrient Intake: enteral nutrition intake, food and beverage intake, energy intake  Food and Nutrient Adminstration: diet order, enteral and parenteral nutrition administration  Physical Activity and Function: nutrition-related ADLs and IADLs  Anthropometric Measurements: weight, weight change  Biochemical Data, Medical Tests and Procedures: glucose/endocrine profile, inflammatory profile, lipid profile, gastrointestinal profile, electrolyte and renal panel  Nutrition-Focused Physical Findings: overall appearance     Nutrition  Follow-Up    RD Follow-up?: Yes

## 2023-12-18 NOTE — PROGRESS NOTES
Congenital Cardiac Surgery Note:     Ms. Megan Cook underwent re-replacement of her aortic valve and re-replacement of her RV-PA conduit/pulmonary valve on 12-8-2023.       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      Post-op course complicated by Acute Renal Failure.     Interval Events  SLED overnight, not enough staff for iHD, but BP was fine throughout SLED  Otherwise, no new issues and she feels well        Vitals:    12/18/23 0815 12/18/23 0830 12/18/23 0845 12/18/23 0900   BP:  139/75  (!) 123/93   BP Location:    Left arm   Patient Position:    Lying   Pulse: 96 (!) 128 109 104   Resp: (!) 25 (!) 25 (!) 33 (!) 29   Temp:   98.4 °F (36.9 °C)    TempSrc:   Axillary    SpO2: (!) 94% (!) 93%     Weight:       Height:             Physical Exam:  General: appears well, up in bed  CV: normal rate and regular  Resp: normal effort, on room air  Neuro: alert and oriented  Ext: Mild LE edema  Drains: Removed  Incision: Dressed     Imaging:  CXR: no new     Assessment and Plan:  Discussed her care with Dr. Baires, and ICU team.     She looks and feels well and is asking about timing of going home.    Discussed that need to tolerate iHD, then get tunneled HD catheter, and INR needs to be therapeutic and stable       Therapeutic on heparin infusion based on antiXa, goal 0.5-0.7.   Dose reduced again for last anti Xa 0.71   On daily aspirin  Hgb stable     Warfarin started 12/16 at 5mg daily  Follow INR, currently, 1.2.  Pharmacy following, appreciate their assistance, will discuss dose adjustment if no increase tomorrow     Anticoagulation plans are INR target of 2.5(goal range 2-3) with daily aspirin.    Continue heparin until therapeutic on warfarin     Still minimal urine production  Planned to trial iHD and next opportunity.     Discussed anticoagulation around need for tunneled HD catheter.  IR consulted.  They are ok with placing line as long as INR is under 3.  Plan is  for after she tolerates iHD.        No active concerns for infection. Leukocytosis resolved. No fevers.  Thrombocytopenia resolved.       Continue supportive care  Encourage pulmonary toilet and frequent  ambulation     Last echo on 12/15, looked good     I appreciate the assistance of the ICU team in her care. Also appreciate the nephrology team for their assistance with her renal failure and renal replacement therapy.     At discharge, will need care plan established with nephrology as well as with coumadin clinic    Alexandru Campbell MD

## 2023-12-18 NOTE — PROGRESS NOTES
12/18/23 0216   Treatment   Treatment Type SLED   Treatment Status New start   Dialysis Machine Number [id number]   Dialyzer Time (hours) 0   BVP (Liters) 0 L   Solutions Labeled and Current  Yes   Access Temporary Cath;Left;IJ   Catheter Dressing Intact  Yes   Alarms Engaged Yes   CRRT Comments CRRT started   Prescription   Time (Hours) 6   Dialysate K + (mEq/L) 4   Dialysate CA + (mEq/L) 2.25   Dialysate HCO3 - (Bicarb) (mEq/L) 30   Dialysate Na + (mEq/L) 138   Dialysate Flow Rate (mL/min) 200   UF Goal Rate 350 mL/hr   CRRT Hourly Documentation   Blood Flow (mL/min) 150   UF Rate 250 cc/hr   Arterial Pressure (mmHg) -50 mmHg   Venous Pressure (mmHg) 10 mmHg   Effluent Pressure (EP) (mmHg) 10 mmHg     CRRT SLED for 6 hours started per MD orders. VS stable to start tx. Starting UF rate started at 250 with a goal of 350. Report given to primary nurse.

## 2023-12-18 NOTE — HOSPITAL COURSE
Patient was weaning of cardiac support medications and extubated in SICU. Hospital course complicated by acute renal failure. Nephrology consulted and ordered lasix challenge which was ineffective. Patient able to tolerate CRRT with pending trial of dialysis. Recent NIBP reading reassuring of pending trial of HD. IR consulted for tunneled dialysis line. IR will schedule once bed is assigned on the floor.

## 2023-12-18 NOTE — PROGRESS NOTES
12/18/23 0506   Treatment   Treatment Type SLED   Treatment Status Daily equipment check   Dialysis Machine Number [id number]   Dialyzer Time (hours) 2.49   BVP (Liters) 23.9 L   Solutions Labeled and Current  Yes   Access Temporary Cath;Right;IJ   Catheter Dressing Intact  Yes   Alarms Engaged Yes   CRRT Comments daily check done   $ CRRT Charges   $ CRRT Daily Assessment Complete   $ CRRT Daily Maintenance Complete     Ongoing 6 hour SLED. Orders and machine settings verified. Daily equipment check and maintenance done.

## 2023-12-18 NOTE — PT/OT/SLP PROGRESS
Physical Therapy Treatment    Patient Name:  Megan Cook   MRN:  [id number]  Admit Date: 2023  Admitting Diagnosis:  Rheumatic aortic stenosis   Length of Stay: 10 days  Recent Surgery: Procedure(s) (LRB):  Replacement-valve-aortic (N/A)  REPLACEMENT, PULMONARY VALVE (N/A) 10 Days Post-Op    Recommendations:     Discharge Recommendations:  High Intensity Therapy   Discharge Equipment Recommendations: wheelchair, walker, rolling       Plan:     During this hospitalization, patient to be seen 5 x/week to address the listed problems via gait training, therapeutic activities, therapeutic exercises, neuromuscular re-education  Plan of Care Expires:  24  Plan of Care Reviewed with: patient    Assessment:     Megan Cook is a 38 y.o. female admitted with a medical diagnosis of Rheumatic aortic stenosis. Pt progressing towards goals, but not at PLOF. Pt tolerated session well with VSS.  Pt is improving with therapy evidenced by increased gait distance. Pt would benefit from continued acute PT to maximize functional mobility after surgical intervention. Patient has demonstrated sufficient progression to warrant High Intensity Therapy.    Problem List: impaired endurance, impaired functional mobility, gait instability, impaired balance, decreased upper extremity function, pain, impaired cardiopulmonary response to activity.  Rehab Prognosis: Good     GOALS:   Multidisciplinary Problems       Physical Therapy Goals          Problem: Physical Therapy    Goal Priority Disciplines Outcome Goal Variances Interventions   Physical Therapy Goal     PT, PT/OT Ongoing, Progressing     Description: Goals to be met by: 2023     Patient will increase functional independence with mobility by performin. Sit to stand transfer with Minimal Assistance - met   1a. Sit to stand transfer with SBA - not met  2. Bed to chair transfer with Moderate Assistance using physician approved AD - met   2a. Bed to  "chair transfer with stand by assist using physician approved AD - not met  3. Gait  x 20 feet with Moderate Assistance using physician approved AD. - not met  4. Ascend/descend 14 stair with right Handrails Minimal Assistance using physician approved AD. - not met  5. Stand for 5 minutes with Contact Guard Assistance using physician approved AD- not met  6. Lower extremity exercise program x30 reps per handout, with independence- not met                         Subjective   Communicated with RN prior to session.  Patient found HOB elevated upon PT entry to room, agreeable to evaluation. Megan Cook's mother present during session.    Chief Complaint: pain  Patient/Family Comments/goals: to get better   Pain/Comfort:  Pain Rating 1: 5/10  Location 1:  (back)  Pain Addressed 1: Reposition, Distraction  Pain Rating Post-Intervention 1: 5/10  Pain Rating 2: 6/10  Location 2:  (B LE)  Pain Addressed 2: Reposition, Distraction  Pain Rating Post-Intervention 2: 6/10    Objective:   Patient found with: telemetry, pulse ox (continuous), blood pressure cuff, central line, peripheral IV   General Precautions: Standard, Cardiac fall, sternal   Orthopedic Precautions:N/A   Braces: N/A   Oxygen Device: Room Air  Vitals: BP (!) 123/93 (BP Location: Left arm, Patient Position: Lying)   Pulse 104   Temp 98.4 °F (36.9 °C) (Axillary)   Resp (!) 29   Ht 5' 5" (1.651 m)   Wt 120.2 kg (265 lb)   LMP 11/08/2023 (Approximate)   SpO2 (!) 93%   Breastfeeding No   BMI 44.10 kg/m²     Outcome Measures:  AM-PAC 6 CLICK MOBILITY  Turning over in bed (including adjusting bedclothes, sheets and blankets)?: 3  Sitting down on and standing up from a chair with arms (e.g., wheelchair, bedside commode, etc.): 3  Moving from lying on back to sitting on the side of the bed?: 3  Moving to and from a bed to a chair (including a wheelchair)?: 3  Need to walk in hospital room?: 3  Climbing 3-5 steps with a railing?: 3  Basic Mobility Total " Score: 18     Functional Mobility:  Additional staff present: OT - due to pt requiring 2 skilled therapists to safely perform functional mobility and to accommodate pt's activity tolerance    Bed Mobility:   Supine to Sit: stand by assistance; HOB elevated  Scooting anteriorly to EOB to have both feet planted on floor: stand by assistance    Sitting Balance at Edge of Bed:  Assistance Level Required: Stand-by Assistance  Time: 3 minutes  Comments:   Worked on activity tolerance sitting EOB and worked on tolerance to positional change     Transfers:   Sit <> Stand Transfer: stand by assistance with no assistive device from EOB      Gait:  Patient ambulated: 10ft + 200ft with one standing rest break due to SOB (ADLs standing at the sink between trials)   Patient required: standy by assistance  Patient used: no assistive device  Gait Pattern observed: reciprocal gait  Gait Deviation(s): occasional unsteady gait, decreased step length, decreased arm swing, and decreased marc  Impairments due to: impaired balance, pain, and decreased endurance  Comments:   Portable monitor intact and RN present  Verbal cuing for pacing, upright posture, and normal arm swing  VSS    Therapeutic Activities, Exercises, and Education:   Educated pt on PT role/POC  Educated pt on importance of OOB activity and daily ambulation   Educated pt on sternal precautions   Pt verbalized understanding     Pt performed ADLs standing at the sink with OT    T/f to chair to increase tolerance to OOB activity and to create optimal positioning for lung expansion     Patient left up in chair with all lines intact, call button in reach, RN notified, and mother present..    Time Tracking:     PT Received On: 12/18/23  PT Start Time: 0815     PT Stop Time: 0831  PT Total Time (min): 16 min       Billable Minutes:   Gait Training 10    Treatment Type: Treatment  PT/PTA: PT

## 2023-12-18 NOTE — CARE UPDATE
Care Update    Please draw anti-Xa levels for heparin dosing and refer to photograph in media tab dated 12/18 for instructions on how to dose.    < 0.3= Incr by 3 units/kg/hr   0.3-0.49 =Incr by 2 units/kg/hr   0.5-0.7 =No Change   0.71-0.8 = Decr by 2 units/kg/hr   0.81-0.99 = Hold infusion for 1 hr and Decr by 3 units/kg/hr   > 1.0 = STOP INFUSION follow Low intensity nomogram

## 2023-12-18 NOTE — PROGRESS NOTES
[facility]  Nephrology  Progress Note    Patient Name: Megan Cook  MRN: [id number]  Admission Date: 12/8/2023  Hospital Length of Stay: 10 days  Attending Provider: Alexandru Campbell MD   Primary Care Physician: Adelaida Disla NP  Principal Problem:Rheumatic aortic stenosis    Subjective:     HPI: HPI:  Ms. 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        The patient presents to the SICU s/p AVR and PVR with Dr. Campbell on 12/08/2023. During the procedure the patient was on bypass for 398 minutes and clamped for 138 minutes.      On admission, they are intubated, sedated with Precedex, and in stable condition. Inotropic and vasopressor requirements upon admission are 0.05 of Epinephrine, and 0.1 Vasopressin, 0.25 Milrinone to maintain blood pressure at a MAP 65 and SBP < 120. Central access includes a RIJ CVC, arterial access includes a left radial arterial line. They also have 2 pleural chest tubed and 1 mediastinal chest tube with appropriate output.     Intraoperatively, they received 2,000 mL of NS, 4,965 mL of PRBCs, 441 mL FFP, 1,077mL Platelets, and 475 mL Cryoprecipitate. Urine output intraoperatively was 1,835cc.      Immediate post-operative plans include hemodynamic stabilization and weaning of cardiac and respiratory support. Plan to wean vasopressors and inotropes as tolerated, also wean ventilator support with goal of early extubation, and closely monitor fluid status with strict Is/Os and continued fluid resuscitation as needed.    Interval History:   Had SLED overnight, tolerated well.  Net even over the past 24 hours with stable electrolytes.  No  complaints on exam this morning, was able to walk with physical therapy.  She remains anuric.    Review of patient's allergies indicates:   Allergen Reactions    Cephalexin Other (See Comments)     She gets mouth sores.     Penicillin g Other (See Comments)     Causes mouth sores.      Current Facility-Administered Medications   Medication Frequency    0.9%  NaCl infusion (CRRT USE ONLY) Continuous    0.9%  NaCl infusion PRN    0.9%  NaCl infusion Once    acetaminophen oral solution 650 mg Q8H PRN    artificial tears 0.5 % ophthalmic solution 1 drop PRN    aspirin chewable tablet 81 mg Daily    balsam peru-castor oiL Oint BID    calcium carbonate 200 mg calcium (500 mg) chewable tablet 1,000 mg TID PRN    calcium gluconate 1 g in NS IVPB (premixed) PRN    calcium gluconate 1 g in NS IVPB (premixed) PRN    calcium gluconate 1 g in NS IVPB (premixed) PRN    cetirizine tablet 10 mg Daily    dextrose 10% bolus 125 mL 125 mL PRN    dextrose 10% bolus 250 mL 250 mL PRN    heparin 25,000 units in dextrose 5% 250 mL (100 units/mL) infusion (heparin infusion - NO NOMOGRAM) Continuous    LIDOcaine 5 % patch 1 patch Q24H    magnesium sulfate 2g in water 50mL IVPB (premix) PRN    magnesium sulfate 2g in water 50mL IVPB (premix) PRN    magnesium sulfate 2g in water 50mL IVPB (premix) PRN    melatonin tablet 6 mg QHS    midodrine tablet 15 mg PRN    norethindrone tablet 0.35 mg Daily    ondansetron injection 4 mg Q12H PRN    oxyCODONE immediate release tablet 5 mg Q6H PRN    polyethylene glycol packet 17 g Daily    senna-docusate 8.6-50 mg per tablet 1 tablet BID    simethicone chewable tablet 80 mg TID PRN    sodium phosphate 20.01 mmol in dextrose 5 % (D5W) 250 mL IVPB PRN    sodium phosphate 30 mmol in dextrose 5 % (D5W) 250 mL IVPB PRN    sodium phosphate 39.99 mmol in dextrose 5 % (D5W) 250 mL IVPB PRN    warfarin (COUMADIN) tablet 2.5 mg Daily       Objective:     Vital Signs (Most Recent):  Temp: 98.4 °F (36.9 °C)  (12/18/23 0845)  Pulse: 100 (12/18/23 1330)  Resp: (!) 28 (12/18/23 1330)  BP: 105/68 (12/18/23 1300)  SpO2: 99 % (12/18/23 1300) Vital Signs (24h Range):  Temp:  [97.7 °F (36.5 °C)-98.4 °F (36.9 °C)] 98.4 °F (36.9 °C)  Pulse:  [] 100  Resp:  [15-46] 28  SpO2:  [91 %-100 %] 99 %  BP: ()/(56-93) 105/68     Weight: 120.2 kg (265 lb) (12/18/23 0500)  Body mass index is 44.1 kg/m².  Body surface area is 2.35 meters squared.    I/O last 3 completed shifts:  In: 1944.4 [P.O.:318; I.V.:1626.4]  Out: 1274 [Other:1274]     Physical Exam  Constitutional:       Appearance: She is obese.   HENT:      Head: Normocephalic.   Cardiovascular:      Rate and Rhythm: Normal rate.   Pulmonary:      Effort: Pulmonary effort is normal.   Musculoskeletal:         General: No swelling.   Skin:     General: Skin is warm.   Neurological:      General: No focal deficit present.      Mental Status: She is alert and oriented to person, place, and time.   Psychiatric:         Mood and Affect: Mood normal.         Behavior: Behavior normal.          Significant Labs:  CBC:   Recent Labs   Lab 12/18/23  0415   WBC 9.14   RBC 2.88*   HGB 8.7*   HCT 25.9*      MCV 90   MCH 30.2   MCHC 33.6     CMP:   Recent Labs   Lab 12/18/23  0415 12/18/23  1251     104  103 110  110   CALCIUM 9.1  9.1  8.8 8.8  8.8   ALBUMIN 2.3*  2.3*  2.3* 2.3*  2.3*   PROT 6.3  --    *  135*  133* 130*  130*   K 4.7  4.7  4.6 4.8  4.8   CO2 18*  18*  18* 19*  19*     100  102 101  101   BUN 52*  52*  52* 49*  49*   CREATININE 5.2*  5.2*  5.2* 5.3*  5.3*   ALKPHOS 192*  --    ALT 61*  --    AST 42*  --    BILITOT 1.8*  --         Assessment/Plan:     Renal/  TINO (acute kidney injury)  Aute kidney injury secondary to iATN from hypotensive episodes/hemodynamic instability during OR  (Baseline 0.4-1)  SLED started 12/9 for oliguric TINO    Plan  -Plan for HD tomorrow, no need for RRT today  -Keep MAP > 65, avaoid  drastic changes in BP  -recommend IR consultation for tunneled line placement  -continue strict I/O's  -renal diet when advanced    Hyperphosphatemia  -low phos diet  -phos binders with meals while off SLED            Denis Correa NP  Nephrology  [facility]

## 2023-12-18 NOTE — PLAN OF CARE
SICU PLAN OF CARE NOTE    Dx: Rheumatic aortic stenosis    Goals of Care: comfort, ambulation, maintain MAP >60, pass dialysis trial, transfer to floor, perm cath placement    Vital Signs (last 12 hours):   Temp:  [98.4 °F (36.9 °C)]   Pulse:  []   Resp:  [15-33]   BP: ()/(63-93)   SpO2:  [93 %-99 %]      Neuro: AAO x4, Follows Commands, and Moves All Extremities    Cardiac: NSR    Respiratory: Room Air    Gtts: Heparin    Urine Output: Anuric 0 cc/shift    Dialysis: HD, UF Rate: 0/hr    Drains: n/a    Diet: Regular, 1500cc FR, Nepro TID                 Labs/Accuchecks: anti xa q6h and prn    Skin:  Altered skin integrity to buttocks, lip, back all healing appropriately.  Patient turned Q2 when in bed independently, weight shifts performed while in chair, immerse mattress inflated, and bed working correctly.    Shift Events:  Patient ambulated, heparin gtt continued and adjusted per protocol, some home medications restarted, up to chair most of day. Transfer order placed, once on floor, patient will get perm cath per IR and then received dialysis Regular diet tolerated with protein shakes.  See flowsheet for further assessment/details.  Family updated on current condition/plan of care, questions answered, and emotional support provided.  MD updated on current condition, vitals, labs, and gtts.  No new orders received, will continue to monitor.

## 2023-12-18 NOTE — ASSESSMENT & PLAN NOTE
Aute kidney injury secondary to iATN from hypotensive episodes/hemodynamic instability during OR  (Baseline 0.4-1)  SLED started 12/9 for oliguric TINO    Plan  -Plan for HD tomorrow, no need for RRT today  -Keep MAP > 65, avaoid drastic changes in BP  -Can tentatively plan for tunneled line placement early next week, recommend consulting IR for line placement.

## 2023-12-19 LAB
ALBUMIN SERPL BCP-MCNC: 2.2 G/DL (ref 3.5–5.2)
ALP SERPL-CCNC: 172 U/L (ref 55–135)
ALT SERPL W/O P-5'-P-CCNC: 58 U/L (ref 10–44)
ANION GAP SERPL CALC-SCNC: 13 MMOL/L (ref 8–16)
ANISOCYTOSIS BLD QL SMEAR: SLIGHT
AST SERPL-CCNC: 44 U/L (ref 10–40)
BASOPHILS # BLD AUTO: ABNORMAL K/UL (ref 0–0.2)
BASOPHILS NFR BLD: 1 % (ref 0–1.9)
BILIRUB SERPL-MCNC: 1.4 MG/DL (ref 0.1–1)
BUN SERPL-MCNC: 64 MG/DL (ref 6–20)
BURR CELLS BLD QL SMEAR: ABNORMAL
CA-I BLDV-SCNC: 1.13 MMOL/L (ref 1.06–1.42)
CALCIUM SERPL-MCNC: 8.9 MG/DL (ref 8.7–10.5)
CHLORIDE SERPL-SCNC: 101 MMOL/L (ref 95–110)
CO2 SERPL-SCNC: 19 MMOL/L (ref 23–29)
CREAT SERPL-MCNC: 6.6 MG/DL (ref 0.5–1.4)
DIFFERENTIAL METHOD: ABNORMAL
EOSINOPHIL # BLD AUTO: ABNORMAL K/UL (ref 0–0.5)
EOSINOPHIL NFR BLD: 3 % (ref 0–8)
ERYTHROCYTE [DISTWIDTH] IN BLOOD BY AUTOMATED COUNT: 17.1 % (ref 11.5–14.5)
EST. GFR  (NO RACE VARIABLE): 7.7 ML/MIN/1.73 M^2
FACT X PPP CHRO-ACNC: 0.52 IU/ML (ref 0.3–0.7)
GLUCOSE SERPL-MCNC: 97 MG/DL (ref 70–110)
HBV CORE AB SERPL QL IA: NORMAL
HBV SURFACE AB SER-ACNC: 3.42 MIU/ML
HBV SURFACE AB SER-ACNC: NORMAL M[IU]/ML
HBV SURFACE AG SERPL QL IA: NORMAL
HCT VFR BLD AUTO: 23.2 % (ref 37–48.5)
HGB BLD-MCNC: 7.7 G/DL (ref 12–16)
IMM GRANULOCYTES # BLD AUTO: ABNORMAL K/UL (ref 0–0.04)
IMM GRANULOCYTES NFR BLD AUTO: ABNORMAL % (ref 0–0.5)
INR PPP: 1.4 (ref 0.8–1.2)
LYMPHOCYTES # BLD AUTO: ABNORMAL K/UL (ref 1–4.8)
LYMPHOCYTES NFR BLD: 20 % (ref 18–48)
MAGNESIUM SERPL-MCNC: 2.5 MG/DL (ref 1.6–2.6)
MCH RBC QN AUTO: 29.1 PG (ref 27–31)
MCHC RBC AUTO-ENTMCNC: 33.2 G/DL (ref 32–36)
MCV RBC AUTO: 88 FL (ref 82–98)
METAMYELOCYTES NFR BLD MANUAL: 3 %
MONOCYTES # BLD AUTO: ABNORMAL K/UL (ref 0.3–1)
MONOCYTES NFR BLD: 8 % (ref 4–15)
MYELOCYTES NFR BLD MANUAL: 3 %
NEUTROPHILS NFR BLD: 56 % (ref 38–73)
NEUTS BAND NFR BLD MANUAL: 6 %
NRBC BLD-RTO: 0 /100 WBC
OVALOCYTES BLD QL SMEAR: ABNORMAL
PHOSPHATE SERPL-MCNC: 7.5 MG/DL (ref 2.7–4.5)
PLATELET # BLD AUTO: 380 K/UL (ref 150–450)
PLATELET BLD QL SMEAR: ABNORMAL
PMV BLD AUTO: 9.9 FL (ref 9.2–12.9)
POIKILOCYTOSIS BLD QL SMEAR: SLIGHT
POLYCHROMASIA BLD QL SMEAR: ABNORMAL
POTASSIUM SERPL-SCNC: 4.9 MMOL/L (ref 3.5–5.1)
PROT SERPL-MCNC: 5.8 G/DL (ref 6–8.4)
PROTHROMBIN TIME: 14.5 SEC (ref 9–12.5)
RBC # BLD AUTO: 2.65 M/UL (ref 4–5.4)
SCHISTOCYTES BLD QL SMEAR: ABNORMAL
SODIUM SERPL-SCNC: 133 MMOL/L (ref 136–145)
SPHEROCYTES BLD QL SMEAR: ABNORMAL
TOXIC GRANULES BLD QL SMEAR: PRESENT
WBC # BLD AUTO: 10.6 K/UL (ref 3.9–12.7)

## 2023-12-19 PROCEDURE — 99232 PR SUBSEQUENT HOSPITAL CARE,LEVL II: ICD-10-PCS | Mod: ,,, | Performed by: ANESTHESIOLOGY

## 2023-12-19 PROCEDURE — 99233 PR SUBSEQUENT HOSPITAL CARE,LEVL III: ICD-10-PCS | Mod: ,,, | Performed by: NURSE PRACTITIONER

## 2023-12-19 PROCEDURE — 25000003 PHARM REV CODE 250: Performed by: THORACIC SURGERY (CARDIOTHORACIC VASCULAR SURGERY)

## 2023-12-19 PROCEDURE — 25000003 PHARM REV CODE 250: Performed by: STUDENT IN AN ORGANIZED HEALTH CARE EDUCATION/TRAINING PROGRAM

## 2023-12-19 PROCEDURE — 80053 COMPREHEN METABOLIC PANEL: CPT | Performed by: THORACIC SURGERY (CARDIOTHORACIC VASCULAR SURGERY)

## 2023-12-19 PROCEDURE — 84100 ASSAY OF PHOSPHORUS: CPT | Performed by: THORACIC SURGERY (CARDIOTHORACIC VASCULAR SURGERY)

## 2023-12-19 PROCEDURE — 99233 SBSQ HOSP IP/OBS HIGH 50: CPT | Mod: ,,, | Performed by: NURSE PRACTITIONER

## 2023-12-19 PROCEDURE — 87340 HEPATITIS B SURFACE AG IA: CPT | Performed by: THORACIC SURGERY (CARDIOTHORACIC VASCULAR SURGERY)

## 2023-12-19 PROCEDURE — 82330 ASSAY OF CALCIUM: CPT | Performed by: THORACIC SURGERY (CARDIOTHORACIC VASCULAR SURGERY)

## 2023-12-19 PROCEDURE — 63600175 PHARM REV CODE 636 W HCPCS: Performed by: THORACIC SURGERY (CARDIOTHORACIC VASCULAR SURGERY)

## 2023-12-19 PROCEDURE — 85520 HEPARIN ASSAY: CPT | Performed by: THORACIC SURGERY (CARDIOTHORACIC VASCULAR SURGERY)

## 2023-12-19 PROCEDURE — 63600175 PHARM REV CODE 636 W HCPCS: Performed by: STUDENT IN AN ORGANIZED HEALTH CARE EDUCATION/TRAINING PROGRAM

## 2023-12-19 PROCEDURE — 85027 COMPLETE CBC AUTOMATED: CPT | Performed by: THORACIC SURGERY (CARDIOTHORACIC VASCULAR SURGERY)

## 2023-12-19 PROCEDURE — 83735 ASSAY OF MAGNESIUM: CPT | Performed by: THORACIC SURGERY (CARDIOTHORACIC VASCULAR SURGERY)

## 2023-12-19 PROCEDURE — 85007 BL SMEAR W/DIFF WBC COUNT: CPT | Performed by: THORACIC SURGERY (CARDIOTHORACIC VASCULAR SURGERY)

## 2023-12-19 PROCEDURE — 20600001 HC STEP DOWN PRIVATE ROOM

## 2023-12-19 PROCEDURE — 85610 PROTHROMBIN TIME: CPT | Performed by: THORACIC SURGERY (CARDIOTHORACIC VASCULAR SURGERY)

## 2023-12-19 PROCEDURE — 80100014 HC HEMODIALYSIS 1:1

## 2023-12-19 PROCEDURE — 86704 HEP B CORE ANTIBODY TOTAL: CPT | Performed by: THORACIC SURGERY (CARDIOTHORACIC VASCULAR SURGERY)

## 2023-12-19 PROCEDURE — 25000003 PHARM REV CODE 250

## 2023-12-19 PROCEDURE — 86706 HEP B SURFACE ANTIBODY: CPT | Performed by: THORACIC SURGERY (CARDIOTHORACIC VASCULAR SURGERY)

## 2023-12-19 PROCEDURE — 99232 SBSQ HOSP IP/OBS MODERATE 35: CPT | Mod: ,,, | Performed by: ANESTHESIOLOGY

## 2023-12-19 RX ORDER — OXYCODONE HYDROCHLORIDE 5 MG/1
5 TABLET ORAL EVERY 6 HOURS PRN
Status: DISCONTINUED | OUTPATIENT
Start: 2023-12-19 | End: 2023-12-22 | Stop reason: HOSPADM

## 2023-12-19 RX ORDER — HEPARIN SODIUM 1000 [USP'U]/ML
1000 INJECTION, SOLUTION INTRAVENOUS; SUBCUTANEOUS
Status: DISCONTINUED | OUTPATIENT
Start: 2023-12-19 | End: 2023-12-22 | Stop reason: HOSPADM

## 2023-12-19 RX ORDER — WARFARIN SODIUM 5 MG/1
5 TABLET ORAL DAILY
Status: DISCONTINUED | OUTPATIENT
Start: 2023-12-19 | End: 2023-12-21

## 2023-12-19 RX ORDER — FENTANYL CITRATE 50 UG/ML
INJECTION, SOLUTION INTRAMUSCULAR; INTRAVENOUS
Status: COMPLETED | OUTPATIENT
Start: 2023-12-19 | End: 2023-12-19

## 2023-12-19 RX ORDER — HEPARIN SODIUM 1000 [USP'U]/ML
INJECTION, SOLUTION INTRAVENOUS; SUBCUTANEOUS
Status: COMPLETED | OUTPATIENT
Start: 2023-12-19 | End: 2023-12-19

## 2023-12-19 RX ORDER — ACETAMINOPHEN 650 MG/20.3ML
500 LIQUID ORAL EVERY 4 HOURS PRN
Status: DISCONTINUED | OUTPATIENT
Start: 2023-12-19 | End: 2023-12-22 | Stop reason: HOSPADM

## 2023-12-19 RX ADMIN — LIDOCAINE 5% 1 PATCH: 700 PATCH TOPICAL at 10:12

## 2023-12-19 RX ADMIN — Medication: at 10:12

## 2023-12-19 RX ADMIN — WARFARIN SODIUM 5 MG: 5 TABLET ORAL at 05:12

## 2023-12-19 RX ADMIN — Medication 6 MG: at 11:12

## 2023-12-19 RX ADMIN — HEPARIN SODIUM AND DEXTROSE 23 UNITS/KG/HR: 10000; 5 INJECTION INTRAVENOUS at 02:12

## 2023-12-19 RX ADMIN — SEVELAMER CARBONATE 800 MG: 800 TABLET, FILM COATED ORAL at 12:12

## 2023-12-19 RX ADMIN — FENTANYL CITRATE 50 MCG: 50 INJECTION, SOLUTION INTRAMUSCULAR; INTRAVENOUS at 09:12

## 2023-12-19 RX ADMIN — ASPIRIN 81 MG CHEWABLE TABLET 81 MG: 81 TABLET CHEWABLE at 10:12

## 2023-12-19 RX ADMIN — ACETAMINOPHEN AND CODEINE PHOSPHATE 0.35 MG: 120; 12 SOLUTION ORAL at 10:12

## 2023-12-19 RX ADMIN — Medication: at 11:12

## 2023-12-19 RX ADMIN — CALCIUM CARBONATE (ANTACID) CHEW TAB 500 MG 500 MG: 500 CHEW TAB at 12:12

## 2023-12-19 RX ADMIN — HEPARIN SODIUM 3200 UNITS: 1000 INJECTION, SOLUTION INTRAVENOUS; SUBCUTANEOUS at 10:12

## 2023-12-19 RX ADMIN — SEVELAMER CARBONATE 800 MG: 800 TABLET, FILM COATED ORAL at 05:12

## 2023-12-19 NOTE — NURSING
Pt transported to IR via portable monitor, in wheelchair. Heparin gtt infusing. Heparin paused once in IR. Pt states no complaints at present.

## 2023-12-19 NOTE — PLAN OF CARE
SICU PLAN OF CARE NOTE    Dx: Rheumatic aortic stenosis    Goals of Care: MAP > 60    Vital Signs (last 12 hours):   Temp:  [97.4 °F (36.3 °C)-98.6 °F (37 °C)]   Pulse:  []   Resp:  [11-39]   BP: ()/(58-72)   SpO2:  [91 %-100 %]      Neuro: AAO x4, Follows Commands, and Moves All Extremities    Cardiac: NSR    Respiratory: Room Air    Gtts: Heparin    Urine Output: Anuric 0 cc/shift      Diet: NPO at midnight for possible placement of HD perma-cath        Labs/Accuchecks: Daily AM labs, Q6 anti-Xa for therapeutic heparin     Skin:  Wound care completed per orders. No new skin breakdown noted this shift. Patient turned independently, bariatric immerse mattress inflated, and bed working correctly.    Shift Events:  NAEON. See flowsheet for further assessment/details. Patient and family updated on current condition/plan of care, questions answered, and emotional support provided. Transfer orders.

## 2023-12-19 NOTE — BRIEF OP NOTE
Radiology Post-Procedure Note    Pre Op Diagnosis: renal failure    Post Op Diagnosis: same    Procedure: dialysis line placement     Procedure performed by: Sharron Navas MD    Written Informed Consent Obtained: Yes    Specimen Removed: NO    Estimated Blood Loss: less than 50     Findings:   Right Ij tunneled line placement. Line ready for use    Patient tolerated procedure well.    Sharron Navas MD  Interventional Radiology

## 2023-12-19 NOTE — PT/OT/SLP PROGRESS
Occupational Therapy      Patient Name:  Megan Cook   MRN:  [id number]    Patient not seen today secondary to off unit on first attempt in am, then politely declined in PM 2* awaiting late lunch (pt t/f'd to floor prior to lunch)  . Will follow-up 12/20/23.    12/19/2023

## 2023-12-19 NOTE — PROGRESS NOTES
[facility]  Critical Care - Surgery  Progress Note    Patient Name: Megan Cook  MRN: [id number]  Admission Date: 12/8/2023  Hospital Length of Stay: 11 days  Code Status: Full Code  Attending Provider: Alexandru Campbell MD  Primary Care Provider: Adelaida Disla NP   Principal Problem: Rheumatic aortic stenosis    Subjective:     Hospital/ICU Course:  Patient was weaning of cardiac support medications and extubated in SICU. Hospital course complicated by acute renal failure. Nephrology consulted and ordered lasix challenge which was ineffective. Patient able to tolerate CRRT with pending trial of dialysis. Recent NIBP reading reassuring of pending trial of HD. IR consulted for tunneled dialysis line. IR will schedule once bed is assigned on the floor.     Interval History/Significant Events: NAEON. NPO for IR placed tunneled line catheter today. Off all drips.    Follow-up For: Procedure(s) (LRB):  Replacement-valve-aortic (N/A)  REPLACEMENT, PULMONARY VALVE (N/A)    Post-Operative Day: 11 Days Post-Op    Objective:     Vital Signs (Most Recent):  Temp: 97.4 °F (36.3 °C) (12/19/23 0300)  Pulse: 101 (12/19/23 0530)  Resp: (!) 21 (12/19/23 0530)  BP: (!) 109/59 (12/19/23 0400)  SpO2: (!) 93 % (12/19/23 0530) Vital Signs (24h Range):  Temp:  [97.4 °F (36.3 °C)-98.6 °F (37 °C)] 97.4 °F (36.3 °C)  Pulse:  [] 101  Resp:  [11-39] 21  SpO2:  [91 %-100 %] 93 %  BP: ()/(58-93) 109/59     Weight: 118.8 kg (262 lb)  Body mass index is 43.6 kg/m².      Intake/Output Summary (Last 24 hours) at 12/19/2023 0606  Last data filed at 12/19/2023 0500  Gross per 24 hour   Intake 1117.25 ml   Output --   Net 1117.25 ml          Physical Exam  Vitals and nursing note reviewed.   Constitutional:       Appearance: She is obese.   HENT:      Head: Normocephalic and atraumatic.   Cardiovascular:      Rate and Rhythm: Normal rate and regular rhythm.   Pulmonary:      Effort: Pulmonary effort is  normal.      Comments:      Abdominal:      General: Abdomen is flat.      Palpations: Abdomen is soft.   Musculoskeletal:         General: Swelling present.   Skin:     General: Skin is warm and dry.      Capillary Refill: Capillary refill takes less than 2 seconds.              Lines/Drains/Airways       Central Venous Catheter Line  Duration             Trialysis (Dialysis) Catheter 12/09/23 1327 left internal jugular 9 days              Peripheral Intravenous Line  Duration                  Peripheral IV - Single Lumen 12/16/23 1158 20 G;1 3/4 in Anterior;Left Forearm 2 days                    Significant Labs:    CBC/Anemia Profile:  Recent Labs   Lab 12/18/23  0415 12/19/23  0301   WBC 9.14 10.60   HGB 8.7* 7.7*   HCT 25.9* 23.2*    380   MCV 90 88   RDW 16.7* 17.1*        Chemistries:  Recent Labs   Lab 12/18/23  0415 12/18/23  1251 12/19/23  0301   *  135*  133* 130*  130* 133*   K 4.7  4.7  4.6 4.8  4.8 4.9     100  102 101  101 101   CO2 18*  18*  18* 19*  19* 19*   BUN 52*  52*  52* 49*  49* 64*   CREATININE 5.2*  5.2*  5.2* 5.3*  5.3* 6.6*   CALCIUM 9.1  9.1  8.8 8.8  8.8 8.9   ALBUMIN 2.3*  2.3*  2.3* 2.3*  2.3* 2.2*   PROT 6.3  --  5.8*   BILITOT 1.8*  --  1.4*   ALKPHOS 192*  --  172*   ALT 61*  --  58*   AST 42*  --  44*   MG 2.4  2.4 2.4  2.4 2.5   PHOS 5.6*  5.6* 5.8*  5.8* 7.5*           Significant Imaging:  I have reviewed all pertinent imaging results/findings within the past 24 hours.  Assessment/Plan:     Cardiac/Vascular  * Rheumatic aortic stenosis  38-year-old, 127 kg, woman with history of rheumatic heart disease and Ross procedure and mitral valve repair(including ring annuloplasty) in 2000 followed by repair of autograft aneurysm repair and aortic valve replacement with 23mm CE bioprothesis in 2012 who presents for aortic and pulmonary valve replacement on 12/8.      Neuro/Psych:   -- Sedation: n/a  -- Pain: Tylenol 650 mg PRN, Morphine 2  mg PRN, oxy 5 Q6 PRN             Cards:   History of rheumatic fever with rheumatic heart disease. S/p Ross procedure and mitral valve repair(including ring annuloplasty) in 2000   Now s/p aortic and pulmonary valve replacement with Dr. Campbell on 12/8  -- HDS  -- MAPS >65  -- SBP   -- Pressors to maintain blood pressure goals: off pressors      Pulm:   -- Extubated 12/13  -- Goal O2 sat > 90%  -- Wean as able      Renal:  -- Nicolas removed, anuric  -- CRRT/HD    - midodrine 15 prn   - IR ok with INR less than 3 for tunneled line when on floor   - needs HD prior to stepdown  -- BUN/Cr   Recent Labs   Lab 12/18/23  0415 12/18/23  1251 12/19/23  0301   BUN 52*  52*  52* 49*  49* 64*   CREATININE 5.2*  5.2*  5.2* 5.3*  5.3* 6.6*     -- Urine output: anuric      FEN / GI:   -- Replace lytes as needed  -- Nutrition: reg with thin, 1500mls  -- Docusate      ID:   -- Tm: afebrile; WBC 12.03 preop. 18.7 post op   Recent Labs   Lab 12/17/23  0355 12/18/23  0415 12/19/23  0301   WBC 11.26 9.14 10.60     -- Vancomycin two doses in OR       Heme/Onc:  -- H/H stable, 12.6 preop. 13.8 post op.   -- Daily CBC  -- heparin gtt at 24/hr, Xa goal 0.5-0.7   - Warfarin beginning 12/16, warfarin 2.5  -- Received 8 units of pRBC in the OR   Recent Labs   Lab 12/15/23  1220 12/15/23  2245 12/16/23  0350 12/17/23  0355 12/17/23  2049 12/18/23  0415 12/19/23  0301   HGB  --   --    < > 8.6*  --  8.7* 7.7*   PLT  --   --    < > 344  --  377 380   APTT 57.8*  57.8* 52.6*  --   --  54.8*  --   --    INR  --   --    < > 1.1  --  1.2 1.4*    < > = values in this interval not displayed.        Endo:   -- Gluc goal 140-180  -- POCT 152      PPx:   Feeding: reg, thin liquids. 1.5L fluid   Analgesia/Sedation: tylenol, morphine  Thromboembolic prevention: heparin gtt @23/hr  HOB >30: Yes  Stress Ulcer ppx: Not indicated  Glucose control: Critical care goal 140-180 g/dl   Bowel reg: docusate  Invasive Lines/Drains/Airway: PIV x2, L IJ  trialysis, midline  Deescalation: continuing to reassess        Dispo/Code Status/Palliative:   -- SICU / Full Code             Critical care was time spent personally by me on the following activities: development of treatment plan with patient or surrogate and bedside caregivers, discussions with consultants, evaluation of patient's response to treatment, examination of patient, ordering and performing treatments and interventions, ordering and review of laboratory studies, ordering and review of radiographic studies, pulse oximetry, re-evaluation of patient's condition.  This critical care time did not overlap with that of any other provider or involve time for any procedures.     Gloria Cruz MD  Critical Care - Surgery  [facility]

## 2023-12-19 NOTE — PROGRESS NOTES
[facility] - Cardiology Stepdown  Nephrology  Progress Note    Patient Name: Megan Cook  MRN: [id number]  Admission Date: 12/8/2023  Hospital Length of Stay: 11 days  Attending Provider: Alexandru Campbell MD   Primary Care Physician: Adelaida Disla NP  Principal Problem:Rheumatic aortic stenosis    Subjective:     HPI: HPI:  Ms. 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        The patient presents to the SICU s/p AVR and PVR with Dr. Campbell on 12/08/2023. During the procedure the patient was on bypass for 398 minutes and clamped for 138 minutes.      On admission, they are intubated, sedated with Precedex, and in stable condition. Inotropic and vasopressor requirements upon admission are 0.05 of Epinephrine, and 0.1 Vasopressin, 0.25 Milrinone to maintain blood pressure at a MAP 65 and SBP < 120. Central access includes a RIJ CVC, arterial access includes a left radial arterial line. They also have 2 pleural chest tubed and 1 mediastinal chest tube with appropriate output.     Intraoperatively, they received 2,000 mL of NS, 4,965 mL of PRBCs, 441 mL FFP, 1,077mL Platelets, and 475 mL Cryoprecipitate. Urine output intraoperatively was 1,835cc.      Immediate post-operative plans include hemodynamic stabilization and weaning of cardiac and respiratory support. Plan to wean vasopressors and inotropes as tolerated, also wean ventilator support with goal of early extubation, and closely monitor fluid status with strict Is/Os and continued fluid resuscitation as needed.    Interval History:   ALVARADO, had tunneled dialysis catheter placed by IR today.  Remains anuric, due for HD today.    Review of  patient's allergies indicates:   Allergen Reactions    Cephalexin Other (See Comments)     She gets mouth sores.     Penicillin g Other (See Comments)     Causes mouth sores.      Current Facility-Administered Medications   Medication Frequency    acetaminophen oral solution 499.5074 mg Q4H PRN    artificial tears 0.5 % ophthalmic solution 1 drop PRN    aspirin chewable tablet 81 mg Daily    balsam peru-castor oiL Oint BID    calcium carbonate 200 mg calcium (500 mg) chewable tablet 500 mg BID PRN    [START ON 12/20/2023] cetirizine tablet 5 mg Once per day on Mon Wed Fri    dextrose 10% bolus 125 mL 125 mL PRN    dextrose 10% bolus 250 mL 250 mL PRN    heparin 25,000 units in dextrose 5% 250 mL (100 units/mL) infusion (heparin infusion - NO NOMOGRAM) Continuous    LIDOcaine 5 % patch 1 patch Q24H    melatonin tablet 6 mg QHS    midodrine tablet 15 mg PRN    norethindrone tablet 0.35 mg [Patient Supplied Medication] Daily    ondansetron injection 4 mg Q12H PRN    oxyCODONE immediate release tablet 5 mg Q6H PRN    polyethylene glycol packet 17 g Daily    senna-docusate 8.6-50 mg per tablet 1 tablet BID    sevelamer carbonate tablet 800 mg TID WM    simethicone chewable tablet 80 mg TID PRN    warfarin (COUMADIN) tablet 5 mg Daily       Objective:     Vital Signs (Most Recent):  Temp: 98.2 °F (36.8 °C) (12/19/23 1108)  Pulse: 94 (12/19/23 1108)  Resp: 18 (12/19/23 0945)  BP: (!) 103/55 (12/19/23 1108)  SpO2: 98 % (12/19/23 1108) Vital Signs (24h Range):  Temp:  [97.4 °F (36.3 °C)-98.6 °F (37 °C)] 98.2 °F (36.8 °C)  Pulse:  [] 94  Resp:  [11-39] 18  SpO2:  [91 %-100 %] 98 %  BP: ()/(55-72) 103/55     Weight: 118.8 kg (262 lb) (12/19/23 0500)  Body mass index is 43.6 kg/m².  Body surface area is 2.33 meters squared.    I/O last 3 completed shifts:  In: 2116 [P.O.:480; I.V.:1636]  Out: 1274 [Other:1274]     Physical Exam  Constitutional:       Appearance: She is obese.   HENT:      Head: Normocephalic.    Cardiovascular:      Rate and Rhythm: Normal rate.   Pulmonary:      Effort: Pulmonary effort is normal.   Musculoskeletal:         General: No swelling.   Skin:     General: Skin is warm.   Neurological:      General: No focal deficit present.      Mental Status: She is alert and oriented to person, place, and time.   Psychiatric:         Mood and Affect: Mood normal.         Behavior: Behavior normal.          Significant Labs:  CBC:   Recent Labs   Lab 12/19/23  0301   WBC 10.60   RBC 2.65*   HGB 7.7*   HCT 23.2*      MCV 88   MCH 29.1   MCHC 33.2     CMP:   Recent Labs   Lab 12/19/23  0301   GLU 97   CALCIUM 8.9   ALBUMIN 2.2*   PROT 5.8*   *   K 4.9   CO2 19*      BUN 64*   CREATININE 6.6*   ALKPHOS 172*   ALT 58*   AST 44*   BILITOT 1.4*        Assessment/Plan:     Renal/  TINO (acute kidney injury)  Aute kidney injury secondary to iATN from hypotensive episodes/hemodynamic instability during OR  (Baseline 0.4-1)  SLED started 12/9 for oliguric TINO    Plan  -HD today for metabolic clearance/volume management  -UF 1L as tolerated  -Keep MAP > 65, avaoid drastic changes in BP  -low phos diet with phos binders        Denis Correa NP  Nephrology  [facility] - [facility]iology Stepdown

## 2023-12-19 NOTE — PROGRESS NOTES
Congenital Cardiac Surgery Note:     Ms. Megan Cook underwent re-replacement of her aortic valve and re-replacement of her RV-PA conduit/pulmonary valve on 12-8-2023.       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      Post-op course complicated by Acute Renal Failure.     Interval Events  She is doing well.  Planned for HD today.   Going for tunneled line with IR now.   She says she is getting up on her own, ambulating well, and walked around unit yesterday, eating well, having regular bowel function, breathing easy and no longer requiring narcotic pain medications.       Vitals:    12/19/23 0715 12/19/23 0730 12/19/23 0745 12/19/23 0800   BP:    112/60   BP Location:       Patient Position:       Pulse: 97 97 96 97   Resp: (!) 22 (!) 24 20 (!) 22   Temp: 98 °F (36.7 °C)      TempSrc: Oral      SpO2: (!) 94% (!) 94% 95% (!) 94%   Weight:       Height:             Physical Exam:  General: appears well, up in chair  CV: normal rate and regular  Resp: normal effort, on room air  Neuro: alert and oriented  Ext: moderate edema, left arm looks a little more swollen than others  Drains: none  Incision: open to air, staples, no concerns     Imaging:  CXR: none     Assessment and Plan:  She looks and feels well and is meeting most criteria for discharge.    Getting tunneled HD catheter with IR this morning.  If tolerates HD today, then the rate limiting step will likely be getting her INR therapeutic on warfarin    Therapeutic on heparin infusion based on antiXa, goal 0.5-0.7.   Last anti Xa 0.52 this morning    I reviewed her warfarin dosing and was mistaken and appears her first dose was not until 12/17 and she has received 2.5mg daily, so only 2 doses.      INR today 1.4.    Will discuss dosing with pharmacy team today.      On daily aspirin  Hgb down a gram with no clinical evidence of bleeding.  HD held yesterday and may be dilutional.    Can assess Hgb after HD and assess need  for transfusion.    Anticoagulation plans are INR target of 2.5(goal range 2-3) with daily aspirin.    Continue heparin until therapeutic on warfarin       Continue supportive care  Encourage pulmonary toilet and frequent  ambulation     Last echo on 12/15, looked good.  Will repeat Adult Congential Echo and X-ray tomorrow in anticipation of discharge soon.       Planned to transfer to stepdown unit later today.    I appreciate the assistance of the ICU team in her care.   I also continue to appreciate the nephrology team for their assistance.     At discharge, will need care plan established with nephrology as well as with coumadin clinic, will confirm with Case management that this is in order.       Alexandru Campbell MD    Addendum:  Clarified warfarin dosing, we did initiate on 12/16 at 5mg daily and over the weekend the ICU decreased the dose to 2.5mg in anticipation of the tunneled HD catheter.  So got one dose of 5 and two of 2.5mg.      I did increase the warfarin dose back to 5mg which should be received tonight.      Alexandru Campbell MD

## 2023-12-19 NOTE — NURSING TRANSFER
Nursing Transfer Note      12/19/2023   10:49 AM    Nurse giving handoff:ADWOA Schwartz  Nurse receiving handoff:ADWOA Joseph    Reason patient is being transferred: Improvement in patient condition.     Transfer To: 354    Transfer via wheelchair    Transfer with cardiac monitoring    Transported by RN x2    Transfer Vital Signs:  Blood Pressure:112/62  Heart Rate:88  O2:99  Temperature:98.0  Respirations:18    Telemetry: Rate 88 and Rhythm Sinus Rhythm  Order for Tele Monitor? Yes    Additional Lines:     4eyes on Skin: yes; ADWOA Schwartz; Rita ORONA    Medicines sent: yes-birth control, heparin gtt    Any special needs or follow-up needed: bed rest w/ bathroom privelages until 2pm    Patient belongings transferred with patient: Yes    Chart send with patient: Yes    Notified: spouse    Patient reassessed at: 12/19/23 0950 (date, time)  1  Upon arrival to floor: cardiac monitor applied, patient oriented to room, call bell in reach, and bed in lowest position

## 2023-12-19 NOTE — SUBJECTIVE & OBJECTIVE
Interval History:   ALVARADO, had tunneled dialysis catheter placed by IR today.  Remains anuric, due for HD today.    Review of patient's allergies indicates:   Allergen Reactions    Cephalexin Other (See Comments)     She gets mouth sores.     Penicillin g Other (See Comments)     Causes mouth sores.      Current Facility-Administered Medications   Medication Frequency    acetaminophen oral solution 499.5074 mg Q4H PRN    artificial tears 0.5 % ophthalmic solution 1 drop PRN    aspirin chewable tablet 81 mg Daily    balsam peru-castor oiL Oint BID    calcium carbonate 200 mg calcium (500 mg) chewable tablet 500 mg BID PRN    [START ON 12/20/2023] cetirizine tablet 5 mg Once per day on Mon Wed Fri    dextrose 10% bolus 125 mL 125 mL PRN    dextrose 10% bolus 250 mL 250 mL PRN    heparin 25,000 units in dextrose 5% 250 mL (100 units/mL) infusion (heparin infusion - NO NOMOGRAM) Continuous    LIDOcaine 5 % patch 1 patch Q24H    melatonin tablet 6 mg QHS    midodrine tablet 15 mg PRN    norethindrone tablet 0.35 mg [Patient Supplied Medication] Daily    ondansetron injection 4 mg Q12H PRN    oxyCODONE immediate release tablet 5 mg Q6H PRN    polyethylene glycol packet 17 g Daily    senna-docusate 8.6-50 mg per tablet 1 tablet BID    sevelamer carbonate tablet 800 mg TID WM    simethicone chewable tablet 80 mg TID PRN    warfarin (COUMADIN) tablet 5 mg Daily       Objective:     Vital Signs (Most Recent):  Temp: 98.2 °F (36.8 °C) (12/19/23 1108)  Pulse: 94 (12/19/23 1108)  Resp: 18 (12/19/23 0945)  BP: (!) 103/55 (12/19/23 1108)  SpO2: 98 % (12/19/23 1108) Vital Signs (24h Range):  Temp:  [97.4 °F (36.3 °C)-98.6 °F (37 °C)] 98.2 °F (36.8 °C)  Pulse:  [] 94  Resp:  [11-39] 18  SpO2:  [91 %-100 %] 98 %  BP: ()/(55-72) 103/55     Weight: 118.8 kg (262 lb) (12/19/23 0500)  Body mass index is 43.6 kg/m².  Body surface area is 2.33 meters squared.    I/O last 3 completed shifts:  In: 2116 [P.O.:480; I.V.:1636]  Out:  1274 [Other:1274]     Physical Exam  Constitutional:       Appearance: She is obese.   HENT:      Head: Normocephalic.   Cardiovascular:      Rate and Rhythm: Normal rate.   Pulmonary:      Effort: Pulmonary effort is normal.   Musculoskeletal:         General: No swelling.   Skin:     General: Skin is warm.   Neurological:      General: No focal deficit present.      Mental Status: She is alert and oriented to person, place, and time.   Psychiatric:         Mood and Affect: Mood normal.         Behavior: Behavior normal.          Significant Labs:  CBC:   Recent Labs   Lab 12/19/23  0301   WBC 10.60   RBC 2.65*   HGB 7.7*   HCT 23.2*      MCV 88   MCH 29.1   MCHC 33.2     CMP:   Recent Labs   Lab 12/19/23  0301   GLU 97   CALCIUM 8.9   ALBUMIN 2.2*   PROT 5.8*   *   K 4.9   CO2 19*      BUN 64*   CREATININE 6.6*   ALKPHOS 172*   ALT 58*   AST 44*   BILITOT 1.4*

## 2023-12-19 NOTE — NURSING
Nurses Note -- 4 Eyes      12/19/2023   5:56 PM      Skin assessed during: Transfer      [] No Altered Skin Integrity Present    []Prevention Measures Documented      [x] Yes- Altered Skin Integrity Present or Discovered   [x] LDA Added if Not in Epic (Describe Wound)   [] New Altered Skin Integrity was Present on Admit and Documented in LDA   [] Wound Image Taken    Wound Care Consulted? No    Attending Nurse:  Cheryl Azul RN/Staff Member:  ADWOA Spencer

## 2023-12-19 NOTE — NURSING
Dr. Campbell at bedside to assess patient and discuss plan of care. No complaints at present. Pt verbalized understanding.

## 2023-12-19 NOTE — SUBJECTIVE & OBJECTIVE
Interval History/Significant Events: NAEON. NPO for IR placed tunneled line catheter today. Off all drips.    Follow-up For: Procedure(s) (LRB):  Replacement-valve-aortic (N/A)  REPLACEMENT, PULMONARY VALVE (N/A)    Post-Operative Day: 11 Days Post-Op    Objective:     Vital Signs (Most Recent):  Temp: 97.4 °F (36.3 °C) (12/19/23 0300)  Pulse: 101 (12/19/23 0530)  Resp: (!) 21 (12/19/23 0530)  BP: (!) 109/59 (12/19/23 0400)  SpO2: (!) 93 % (12/19/23 0530) Vital Signs (24h Range):  Temp:  [97.4 °F (36.3 °C)-98.6 °F (37 °C)] 97.4 °F (36.3 °C)  Pulse:  [] 101  Resp:  [11-39] 21  SpO2:  [91 %-100 %] 93 %  BP: ()/(58-93) 109/59     Weight: 118.8 kg (262 lb)  Body mass index is 43.6 kg/m².      Intake/Output Summary (Last 24 hours) at 12/19/2023 0606  Last data filed at 12/19/2023 0500  Gross per 24 hour   Intake 1117.25 ml   Output --   Net 1117.25 ml          Physical Exam  Vitals and nursing note reviewed.   Constitutional:       Appearance: She is obese.   HENT:      Head: Normocephalic and atraumatic.   Cardiovascular:      Rate and Rhythm: Normal rate and regular rhythm.   Pulmonary:      Effort: Pulmonary effort is normal.      Comments:      Abdominal:      General: Abdomen is flat.      Palpations: Abdomen is soft.   Musculoskeletal:         General: Swelling present.   Skin:     General: Skin is warm and dry.      Capillary Refill: Capillary refill takes less than 2 seconds.              Lines/Drains/Airways       Central Venous Catheter Line  Duration             Trialysis (Dialysis) Catheter 12/09/23 1327 left internal jugular 9 days              Peripheral Intravenous Line  Duration                  Peripheral IV - Single Lumen 12/16/23 1158 20 G;1 3/4 in Anterior;Left Forearm 2 days                    Significant Labs:    CBC/Anemia Profile:  Recent Labs   Lab 12/18/23  0415 12/19/23  0301   WBC 9.14 10.60   HGB 8.7* 7.7*   HCT 25.9* 23.2*    380   MCV 90 88   RDW 16.7* 17.1*         Chemistries:  Recent Labs   Lab 12/18/23  0415 12/18/23  1251 12/19/23  0301   *  135*  133* 130*  130* 133*   K 4.7  4.7  4.6 4.8  4.8 4.9     100  102 101  101 101   CO2 18*  18*  18* 19*  19* 19*   BUN 52*  52*  52* 49*  49* 64*   CREATININE 5.2*  5.2*  5.2* 5.3*  5.3* 6.6*   CALCIUM 9.1  9.1  8.8 8.8  8.8 8.9   ALBUMIN 2.3*  2.3*  2.3* 2.3*  2.3* 2.2*   PROT 6.3  --  5.8*   BILITOT 1.8*  --  1.4*   ALKPHOS 192*  --  172*   ALT 61*  --  58*   AST 42*  --  44*   MG 2.4  2.4 2.4  2.4 2.5   PHOS 5.6*  5.6* 5.8*  5.8* 7.5*           Significant Imaging:  I have reviewed all pertinent imaging results/findings within the past 24 hours.

## 2023-12-19 NOTE — ASSESSMENT & PLAN NOTE
38-year-old, 127 kg, woman with history of rheumatic heart disease and Ross procedure and mitral valve repair(including ring annuloplasty) in 2000 followed by repair of autograft aneurysm repair and aortic valve replacement with 23mm CE bioprothesis in 2012 who presents for aortic and pulmonary valve replacement on 12/8.      Neuro/Psych:   -- Sedation: n/a  -- Pain: Tylenol 650 mg PRN, Morphine 2 mg PRN, oxy 5 Q6 PRN             Cards:   History of rheumatic fever with rheumatic heart disease. S/p Ross procedure and mitral valve repair(including ring annuloplasty) in 2000   Now s/p aortic and pulmonary valve replacement with Dr. Campbell on 12/8  -- HDS  -- MAPS >65  -- SBP   -- Pressors to maintain blood pressure goals: off pressors      Pulm:   -- Extubated 12/13  -- Goal O2 sat > 90%  -- Wean as able      Renal:  -- Nicolas removed, anuric  -- CRRT/HD    - midodrine 15 prn   - IR ok with INR less than 3 for tunneled line when on floor   - needs HD prior to stepdown  -- BUN/Cr   Recent Labs   Lab 12/18/23  0415 12/18/23  1251 12/19/23  0301   BUN 52*  52*  52* 49*  49* 64*   CREATININE 5.2*  5.2*  5.2* 5.3*  5.3* 6.6*     -- Urine output: anuric      FEN / GI:   -- Replace lytes as needed  -- Nutrition: reg with thin, 1500mls  -- Docusate      ID:   -- Tm: afebrile; WBC 12.03 preop. 18.7 post op   Recent Labs   Lab 12/17/23  0355 12/18/23  0415 12/19/23  0301   WBC 11.26 9.14 10.60     -- Vancomycin two doses in OR       Heme/Onc:  -- H/H stable, 12.6 preop. 13.8 post op.   -- Daily CBC  -- heparin gtt at 24/hr, Xa goal 0.5-0.7   - Warfarin beginning 12/16, warfarin 2.5  -- Received 8 units of pRBC in the OR   Recent Labs   Lab 12/15/23  1220 12/15/23  2245 12/16/23  0350 12/17/23  0355 12/17/23  2049 12/18/23  0415 12/19/23  0301   HGB  --   --    < > 8.6*  --  8.7* 7.7*   PLT  --   --    < > 344  --  377 380   APTT 57.8*  57.8* 52.6*  --   --  54.8*  --   --    INR  --   --    < > 1.1  --  1.2 1.4*    <  > = values in this interval not displayed.        Endo:   -- Gluc goal 140-180  -- POCT 152      PPx:   Feeding: reg, thin liquids. 1.5L fluid   Analgesia/Sedation: tylenol, morphine  Thromboembolic prevention: heparin gtt @23/hr  HOB >30: Yes  Stress Ulcer ppx: Not indicated  Glucose control: Critical care goal 140-180 g/dl   Bowel reg: docusate  Invasive Lines/Drains/Airway: PIV x2, L IJ trialysis, midline  Deescalation: continuing to reassess        Dispo/Code Status/Palliative:   -- SICU / Full Code

## 2023-12-19 NOTE — ASSESSMENT & PLAN NOTE
Aute kidney injury secondary to iATN from hypotensive episodes/hemodynamic instability during OR  (Baseline 0.4-1)  SLED started 12/9 for oliguric TINO    Plan  -HD today for metabolic clearance/volume management  -UF 1L as tolerated  -Keep MAP > 65, avaoid drastic changes in BP  -low phos diet with phos binders

## 2023-12-20 LAB
ALBUMIN SERPL BCP-MCNC: 2.2 G/DL (ref 3.5–5.2)
ALP SERPL-CCNC: 144 U/L (ref 55–135)
ALT SERPL W/O P-5'-P-CCNC: 57 U/L (ref 10–44)
ANION GAP SERPL CALC-SCNC: 9 MMOL/L (ref 8–16)
ASCENDING AORTA: 3.73 CM
AST SERPL-CCNC: 43 U/L (ref 10–40)
AV INDEX (PROSTH): 0.64
AV MEAN GRADIENT: 8 MMHG
AV PEAK GRADIENT: 16 MMHG
AV VALVE AREA BY VELOCITY RATIO: 3.04 CM²
AV VALVE AREA: 3.31 CM²
AV VELOCITY RATIO: 0.59
BASOPHILS # BLD AUTO: 0.04 K/UL (ref 0–0.2)
BASOPHILS NFR BLD: 0.4 % (ref 0–1.9)
BILIRUB SERPL-MCNC: 1.2 MG/DL (ref 0.1–1)
BSA FOR ECHO PROCEDURE: 2.33 M2
BUN SERPL-MCNC: 33 MG/DL (ref 6–20)
CA-I BLDV-SCNC: 1.21 MMOL/L (ref 1.06–1.42)
CALCIUM SERPL-MCNC: 8.9 MG/DL (ref 8.7–10.5)
CHLORIDE SERPL-SCNC: 103 MMOL/L (ref 95–110)
CO2 SERPL-SCNC: 22 MMOL/L (ref 23–29)
CREAT SERPL-MCNC: 4.7 MG/DL (ref 0.5–1.4)
CV ECHO LV RWT: 0.32 CM
DIFFERENTIAL METHOD: ABNORMAL
DOP CALC AO PEAK VEL: 2.03 M/S
DOP CALC AO VTI: 31.89 CM
DOP CALC LVOT AREA: 5.1 CM2
DOP CALC LVOT DIAMETER: 2.56 CM
DOP CALC LVOT PEAK VEL: 1.2 M/S
DOP CALC LVOT STROKE VOLUME: 105.62 CM3
DOP CALC MV VTI: 35.3 CM
DOP CALC RVOT PEAK VEL: 0.76 M/S
DOP CALC RVOT VTI: 15.82 CM
DOP CALCLVOT PEAK VEL VTI: 20.53 CM
E WAVE DECELERATION TIME: 186.99 MSEC
E/A RATIO: 1.24
ECHO LV POSTERIOR WALL: 0.88 CM (ref 0.6–1.1)
EOSINOPHIL # BLD AUTO: 0.3 K/UL (ref 0–0.5)
EOSINOPHIL NFR BLD: 2.7 % (ref 0–8)
ERYTHROCYTE [DISTWIDTH] IN BLOOD BY AUTOMATED COUNT: 16.6 % (ref 11.5–14.5)
EST. GFR  (NO RACE VARIABLE): 11.6 ML/MIN/1.73 M^2
FACT X PPP CHRO-ACNC: 0.65 IU/ML (ref 0.3–0.7)
FRACTIONAL SHORTENING: 27 % (ref 28–44)
GLUCOSE SERPL-MCNC: 99 MG/DL (ref 70–110)
HCT VFR BLD AUTO: 23.5 % (ref 37–48.5)
HGB BLD-MCNC: 7.9 G/DL (ref 12–16)
IMM GRANULOCYTES # BLD AUTO: 0.33 K/UL (ref 0–0.04)
IMM GRANULOCYTES NFR BLD AUTO: 3 % (ref 0–0.5)
INR PPP: 1.7 (ref 0.8–1.2)
INTERVENTRICULAR SEPTUM: 0.86 CM (ref 0.6–1.1)
LA MAJOR: 6.44 CM
LA MINOR: 6.02 CM
LA WIDTH: 4.61 CM
LEFT ATRIUM SIZE: 4.54 CM
LEFT ATRIUM VOLUME INDEX: 49.9 ML/M2
LEFT ATRIUM VOLUME: 110.71 CM3
LEFT INTERNAL DIMENSION IN SYSTOLE: 4.05 CM (ref 2.1–4)
LEFT VENTRICLE DIASTOLIC VOLUME INDEX: 67.47 ML/M2
LEFT VENTRICLE DIASTOLIC VOLUME: 149.79 ML
LEFT VENTRICLE MASS INDEX: 81 G/M2
LEFT VENTRICLE SYSTOLIC VOLUME INDEX: 32.4 ML/M2
LEFT VENTRICLE SYSTOLIC VOLUME: 71.88 ML
LEFT VENTRICULAR INTERNAL DIMENSION IN DIASTOLE: 5.54 CM (ref 3.5–6)
LEFT VENTRICULAR MASS: 180.14 G
LYMPHOCYTES # BLD AUTO: 1.8 K/UL (ref 1–4.8)
LYMPHOCYTES NFR BLD: 16.1 % (ref 18–48)
MAGNESIUM SERPL-MCNC: 2.1 MG/DL (ref 1.6–2.6)
MCH RBC QN AUTO: 30.4 PG (ref 27–31)
MCHC RBC AUTO-ENTMCNC: 33.6 G/DL (ref 32–36)
MCV RBC AUTO: 90 FL (ref 82–98)
MONOCYTES # BLD AUTO: 0.8 K/UL (ref 0.3–1)
MONOCYTES NFR BLD: 7.4 % (ref 4–15)
MV A" WAVE DURATION": 7.23 MSEC
MV MEAN GRADIENT: 6 MMHG
MV PEAK A VEL: 1.22 M/S
MV PEAK E VEL: 1.51 M/S
MV PEAK GRADIENT: 10 MMHG
MV STENOSIS PRESSURE HALF TIME: 54.23 MS
MV VALVE AREA BY CONTINUITY EQUATION: 2.99 CM2
MV VALVE AREA P 1/2 METHOD: 4.06 CM2
NEUTROPHILS # BLD AUTO: 7.7 K/UL (ref 1.8–7.7)
NEUTROPHILS NFR BLD: 70.4 % (ref 38–73)
NRBC BLD-RTO: 0 /100 WBC
PHOSPHATE SERPL-MCNC: 6.2 MG/DL (ref 2.7–4.5)
PISA TR MAX VEL: 2.5 M/S
PLATELET # BLD AUTO: 405 K/UL (ref 150–450)
PMV BLD AUTO: 10.1 FL (ref 9.2–12.9)
POTASSIUM SERPL-SCNC: 4.4 MMOL/L (ref 3.5–5.1)
PROT SERPL-MCNC: 5.8 G/DL (ref 6–8.4)
PROTHROMBIN TIME: 17.9 SEC (ref 9–12.5)
PULM VEIN S/D RATIO: 0.73
PV MEAN GRADIENT: 13 MMHG
PV PEAK D VEL: 0.64 M/S
PV PEAK GRADIENT: 23 MMHG
PV PEAK S VEL: 0.47 M/S
PV PEAK VELOCITY: 2.39 M/S
RA MAJOR: 5.85 CM
RA WIDTH: 4.72 CM
RBC # BLD AUTO: 2.6 M/UL (ref 4–5.4)
RIGHT VENTRICULAR END-DIASTOLIC DIMENSION: 4.71 CM
SODIUM SERPL-SCNC: 134 MMOL/L (ref 136–145)
TR MAX PG: 25 MMHG
WBC # BLD AUTO: 10.99 K/UL (ref 3.9–12.7)
Z-SCORE OF LEFT VENTRICULAR DIMENSION IN END DIASTOLE: -3.36
Z-SCORE OF LEFT VENTRICULAR DIMENSION IN END SYSTOLE: -1.18

## 2023-12-20 PROCEDURE — 25000003 PHARM REV CODE 250: Performed by: STUDENT IN AN ORGANIZED HEALTH CARE EDUCATION/TRAINING PROGRAM

## 2023-12-20 PROCEDURE — 83735 ASSAY OF MAGNESIUM: CPT | Performed by: THORACIC SURGERY (CARDIOTHORACIC VASCULAR SURGERY)

## 2023-12-20 PROCEDURE — 80053 COMPREHEN METABOLIC PANEL: CPT | Performed by: THORACIC SURGERY (CARDIOTHORACIC VASCULAR SURGERY)

## 2023-12-20 PROCEDURE — 84100 ASSAY OF PHOSPHORUS: CPT | Performed by: THORACIC SURGERY (CARDIOTHORACIC VASCULAR SURGERY)

## 2023-12-20 PROCEDURE — 85610 PROTHROMBIN TIME: CPT | Performed by: THORACIC SURGERY (CARDIOTHORACIC VASCULAR SURGERY)

## 2023-12-20 PROCEDURE — 85520 HEPARIN ASSAY: CPT | Performed by: THORACIC SURGERY (CARDIOTHORACIC VASCULAR SURGERY)

## 2023-12-20 PROCEDURE — 82330 ASSAY OF CALCIUM: CPT | Performed by: THORACIC SURGERY (CARDIOTHORACIC VASCULAR SURGERY)

## 2023-12-20 PROCEDURE — 85025 COMPLETE CBC W/AUTO DIFF WBC: CPT | Performed by: THORACIC SURGERY (CARDIOTHORACIC VASCULAR SURGERY)

## 2023-12-20 PROCEDURE — 20600001 HC STEP DOWN PRIVATE ROOM

## 2023-12-20 PROCEDURE — 97530 THERAPEUTIC ACTIVITIES: CPT

## 2023-12-20 PROCEDURE — 97535 SELF CARE MNGMENT TRAINING: CPT

## 2023-12-20 PROCEDURE — 63600175 PHARM REV CODE 636 W HCPCS: Performed by: THORACIC SURGERY (CARDIOTHORACIC VASCULAR SURGERY)

## 2023-12-20 PROCEDURE — 25000003 PHARM REV CODE 250: Performed by: THORACIC SURGERY (CARDIOTHORACIC VASCULAR SURGERY)

## 2023-12-20 RX ADMIN — ACETAMINOPHEN AND CODEINE PHOSPHATE 0.35 MG: 120; 12 SOLUTION ORAL at 10:12

## 2023-12-20 RX ADMIN — CETIRIZINE HYDROCHLORIDE 5 MG: 5 TABLET, FILM COATED ORAL at 10:12

## 2023-12-20 RX ADMIN — HEPARIN SODIUM AND DEXTROSE 23 UNITS/KG/HR: 10000; 5 INJECTION INTRAVENOUS at 12:12

## 2023-12-20 RX ADMIN — Medication 6 MG: at 10:12

## 2023-12-20 RX ADMIN — WARFARIN SODIUM 5 MG: 5 TABLET ORAL at 05:12

## 2023-12-20 RX ADMIN — POLYETHYLENE GLYCOL 3350 17 G: 17 POWDER, FOR SOLUTION ORAL at 10:12

## 2023-12-20 RX ADMIN — Medication: at 10:12

## 2023-12-20 RX ADMIN — LIDOCAINE 5% 1 PATCH: 700 PATCH TOPICAL at 10:12

## 2023-12-20 RX ADMIN — HEPARIN SODIUM AND DEXTROSE 23 UNITS/KG/HR: 10000; 5 INJECTION INTRAVENOUS at 11:12

## 2023-12-20 RX ADMIN — SENNOSIDES AND DOCUSATE SODIUM 1 TABLET: 8.6; 5 TABLET ORAL at 10:12

## 2023-12-20 RX ADMIN — ASPIRIN 81 MG CHEWABLE TABLET 81 MG: 81 TABLET CHEWABLE at 10:12

## 2023-12-20 RX ADMIN — HEPARIN SODIUM AND DEXTROSE 23 UNITS/KG/HR: 10000; 5 INJECTION INTRAVENOUS at 10:12

## 2023-12-20 RX ADMIN — SEVELAMER CARBONATE 800 MG: 800 TABLET, FILM COATED ORAL at 05:12

## 2023-12-20 RX ADMIN — SEVELAMER CARBONATE 800 MG: 800 TABLET, FILM COATED ORAL at 10:12

## 2023-12-20 NOTE — PLAN OF CARE
Problem: Adult Inpatient Plan of Care  Goal: Plan of Care Review  Outcome: Ongoing, Progressing  Goal: Patient-Specific Goal (Individualized)  Outcome: Ongoing, Progressing  Goal: Absence of Hospital-Acquired Illness or Injury  Outcome: Ongoing, Progressing  Goal: Optimal Comfort and Wellbeing  Outcome: Ongoing, Progressing  Goal: Readiness for Transition of Care  Outcome: Ongoing, Progressing     Problem: Bariatric Environmental Safety  Goal: Safety Maintained with Care  Outcome: Ongoing, Progressing     Problem: Infection  Goal: Absence of Infection Signs and Symptoms  Outcome: Ongoing, Progressing     Problem: Activity Intolerance (Cardiovascular Surgery)  Goal: Improved Activity Tolerance  Outcome: Ongoing, Progressing     Problem: Adjustment to Surgery (Cardiovascular Surgery)  Goal: Optimal Coping with Heart Surgery  Outcome: Ongoing, Progressing     Problem: Bleeding (Cardiovascular Surgery)  Goal: Bleeding (Cardiovascular Surgery)  Outcome: Ongoing, Progressing     Problem: Bowel Motility Impaired (Cardiovascular Surgery)  Goal: Effective Bowel Elimination (Cardiovascular Surgery)  Outcome: Ongoing, Progressing     Problem: Cardiac Function Impaired (Cardiovascular Surgery)  Goal: Effective Cardiac Function  Outcome: Ongoing, Progressing     Problem: Cerebral Tissue Perfusion (Cardiovascular Surgery)  Goal: Optimal Cerebral Tissue Perfusion (Cardiovascular Surgery)  Outcome: Ongoing, Progressing     Problem: Fluid and Electrolyte Imbalance (Cardiovascular Surgery)  Goal: Fluid and Electrolyte Balance (Cardiovascular Surgery)  Outcome: Ongoing, Progressing     Problem: Glycemic Control Impaired (Cardiovascular Surgery)  Goal: Blood Glucose Level Within Targeted Range (Cardiovascular Surgery)  Outcome: Ongoing, Progressing     Problem: Infection (Cardiovascular Surgery)  Goal: Absence of Infection Signs and Symptoms  Outcome: Ongoing, Progressing     Problem: Ongoing Anesthesia Effects (Cardiovascular  Surgery)  Goal: Anesthesia/Sedation Recovery  Outcome: Ongoing, Progressing     Problem: Pain (Cardiovascular Surgery)  Goal: Acceptable Pain Control  Outcome: Ongoing, Progressing     Problem: Postoperative Nausea and Vomiting (Cardiovascular Surgery)  Goal: Nausea and Vomiting Relief (Cardiovascular Surgery)  Outcome: Ongoing, Progressing     Problem: Postoperative Urinary Retention (Cardiovascular Surgery)  Goal: Effective Urinary Elimination (Cardiovascular Surgery)  Outcome: Ongoing, Progressing     Problem: Respiratory Compromise (Cardiovascular Surgery)  Goal: Effective Oxygenation and Ventilation (Cardiovascular Surgery)  Outcome: Ongoing, Progressing     Problem: Fall Injury Risk  Goal: Absence of Fall and Fall-Related Injury  Outcome: Ongoing, Progressing     Problem: Skin Injury Risk Increased  Goal: Skin Health and Integrity  Outcome: Ongoing, Progressing     Problem: Fluid and Electrolyte Imbalance (Acute Kidney Injury/Impairment)  Goal: Fluid and Electrolyte Balance  Outcome: Ongoing, Progressing     Problem: Oral Intake Inadequate (Acute Kidney Injury/Impairment)  Goal: Optimal Nutrition Intake  Outcome: Ongoing, Progressing     Problem: Renal Function Impairment (Acute Kidney Injury/Impairment)  Goal: Effective Renal Function  Outcome: Ongoing, Progressing     Problem: Device-Related Complication Risk (CRRT (Continuous Renal Replacement Therapy))  Goal: Safe, Effective Therapy Delivery  Outcome: Ongoing, Progressing     Problem: Hypothermia (CRRT (Continuous Renal Replacement Therapy))  Goal: Body Temperature Maintained in Desired Range  Outcome: Ongoing, Progressing     Problem: Infection (CRRT (Continuous Renal Replacement Therapy))  Goal: Absence of Infection Signs and Symptoms  Outcome: Ongoing, Progressing     Problem: Impaired Wound Healing  Goal: Optimal Wound Healing  Outcome: Ongoing, Progressing     Problem: Device-Related Complication Risk (Hemodialysis)  Goal: Safe, Effective Therapy  Delivery  Outcome: Ongoing, Progressing     Problem: Hemodynamic Instability (Hemodialysis)  Goal: Effective Tissue Perfusion  Outcome: Ongoing, Progressing     Problem: Infection (Hemodialysis)  Goal: Absence of Infection Signs and Symptoms  Outcome: Ongoing, Progressing

## 2023-12-20 NOTE — NURSING TRANSFER
Nursing Transfer Note      12/20/2023   8:00AM    Reason patient is being transferred: Echocardiogram     Transfer To: Echo    Transfer From: CSU    Transfer via wheelchair    Transfer with cardiac monitoring    Transported by: Pt. escort    Telemetry: Box Number [id number]  Order for Tele Monitor? Yes    4eyes on Skin: yes    Medicines sent: Heparin gtt @23 u/kg    Any special needs or follow-up needed: Sternal precautions( no pushing, pulling , lifting with arms)    Patient belongings transferred with patient: No

## 2023-12-20 NOTE — PROGRESS NOTES
12/19/23 2115   Vital Signs   Temp 97.7 °F (36.5 °C)   Temp Source Oral   Pulse 96   Heart Rate Source Monitor   Resp 16   SpO2 100 %   Pulse Oximetry Type Continuous   Device (Oxygen Therapy) room air   BP (!) 149/78   MAP (mmHg) 106   BP Location Left arm   BP Method Automatic   Patient Position Lying     HD tx completed and pt tolerated well.  Net UF: 1.5L  Tx time : 3.5hrs.  Pt is alert and stable.VSS.

## 2023-12-20 NOTE — PT/OT/SLP PROGRESS
"Occupational Therapy   Treatment    Name: Megan Cook  MRN: [id number]  Admitting Diagnosis:  Rheumatic aortic stenosis  12 Days Post-Op    Recommendations:     Discharge Recommendations: High Intensity Therapy  Discharge Equipment Recommendations:  wheelchair, bath bench  Barriers to discharge:  None    Assessment:     Megan Cook is a 38 y.o. female with a medical diagnosis of Rheumatic aortic stenosis.  Pt presents with decreased endurance and impaired mobility performance as limited by cardiovascular status and generalized weakness. Pt visited x2 sessions today (first session pt completed extensive toileting, second session focused on mobility in hallway). Pt limited today 2/2 significant fatigue with ambulation and tolerating ~2 minutes only in hallway prior to requesting return to room. Pt also needed cues for sternal prec at time of this session. At this time, pt is a high fall risk and not at her baseline. Pt is motivated to participate. Patient has demonstrated sufficient progression to warrant high intensity therapy evidenced by objectives noted below.   Performance deficits affecting function are weakness, impaired functional mobility, impaired endurance, gait instability, impaired self care skills, decreased upper extremity function, decreased lower extremity function, impaired balance, impaired cardiopulmonary response to activity, decreased safety awareness, orthopedic precautions.     Rehab Prognosis:  Good; patient would benefit from acute skilled OT services to address these deficits and reach maximum level of function.       Plan:     Patient to be seen 5 x/week to address the above listed problems via self-care/home management, therapeutic activities, therapeutic exercises  Plan of Care Expires: 12/27/23  Plan of Care Reviewed with: patient, sibling    Subjective     Chief Complaint: fatigue after mobility  Patient/Family Comments/goals: "The toileting took a lot out of " "me"  Pain/Comfort:  Pain Rating 1: 0/10  Pain Rating Post-Intervention 1: 0/10  Pain Rating Post-Intervention 2: 0/10    Objective:     Communicated with: RN prior to session.  Patient found  on commode  with   upon OT entry to room.    General Precautions: Standard, fall, sternal    Orthopedic Precautions:N/A  Braces: N/A  Respiratory Status: Room air     Occupational Performance:     Bed Mobility:    NT    Functional Mobility/Transfers:  Patient completed Sit <> Stand Transfer with contact guard assistance  with  no assistive device   Functional Mobility: Pt stood from chair using arms to push despite cues to maintain sternal prec. Pt was CGA for gait with limited ax tolerance needing to return to room shortly after entering hallway    Activities of Daily Living:  Upper Body Dressing: moderate assistance donning gown around back at edge of chair   Toileting: needing A per report  2/2 sternal prec      [facility] 6 Click ADL: 20    Treatment & Education:  Pt educated on role of occupational therapy, POC, and safety during ADLs and functional mobility. Pt and OT discussed importance of safe, continued mobility to optimize daily living skills. Pt verbalized understanding.  Pt able to recall 1/3 sternal prec initially then 3/3 after cues.  White board updated during session. Pt given instruction to call for medical staff/nurse for assistance.       Patient left up in chair with all lines intact, call button in reach, RN notified, and sister present    GOALS:   Multidisciplinary Problems       Occupational Therapy Goals          Problem: Occupational Therapy    Goal Priority Disciplines Outcome Interventions   Occupational Therapy Goal     OT, PT/OT Ongoing, Progressing    Description: Goals to be met by: 1/13/23    Patient will increase functional independence with ADLs by performing:    UE Dressing with Stand-by Assistance.  LE Dressing with Minimal Assistance.  Grooming while standing with Minimal Assistance.  Toileting " from toilet with Minimal Assistance for hygiene and clothing management.   Supine to sit with Stand-by Assistance.  Step transfer with Minimal Assistance  Toilet transfer to toilet with Minimal Assistance.                         Time Tracking:     OT Date of Treatment: 12/20/23  OT Start Time: 0945 (second visit 1019am-1027am)  OT Stop Time: 1001  OT Total Time (min): 16 min+8 min= 24 minutes    Billable Minutes:Self Care/Home Management 16 min  Therapeutic Activity 8 min    OT/AAKASH: OT          12/20/2023

## 2023-12-20 NOTE — PROGRESS NOTES
"Congenital Cardiac Surgery Note:     Ms. Megan Cook underwent re-replacement of her aortic valve and re-replacement of her RV-PA conduit/pulmonary valve on 12-8-2023.       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      Post-op course complicated by Acute Renal Failure.     Interval Events  She is doing well.    Tunneled Dialysis catheter placed yesterday with IR  Tolerated HD well yesterday         Vitals:    12/20/23 0348 12/20/23 0350 12/20/23 0804 12/20/23 0910   BP: (!) 109/53  (!) 101/55    BP Location: Right arm  Right arm    Patient Position: Lying  Lying    Pulse: 100 104 90    Resp: 18  17    Temp: 98.2 °F (36.8 °C)  98 °F (36.7 °C)    TempSrc: Oral  Tympanic    SpO2: 98%  (!) 93%    Weight:    118.8 kg (262 lb)   Height:    5' 5" (1.651 m)         Physical Exam:  General: appears well   CV: normal rate and regular  Resp: normal effort  Neuro: alert and oriented  Incision: clean, intact, with staples  Ext: Moderate edema, left upper extremity more swollen than others     Imaging:  CXR: no new     Assessment and Plan:    Continues to do well with the exception of anuria   Follow up for outpatient dialysis and nephrology follow up being arranged    INR 1.7 today  Continue warfarin at 5mg daily for now  Received 5mg 12/16, 2.5mg 12/17-12/18, and 5mg yesterday    Continue heparin infusion until INR is 2 or greater  Therapeutic on heparin infusion based on antiXa, goal 0.5-0.7.   Last anti Xa 0.65 this morning     On daily aspirin  Hgb stable, 7.9 today      Anticoagulation plans are INR target of 2.5(goal range 2-3) with daily aspirin.      Continue supportive care  Encourage pulmonary toilet and frequent  ambulation     Last echo on 12/15, looked good.  Repeat performed today, results pending, was completing the study when I went by to see her.    Will get repeat CXR today as well    Ordered LUE ultrasound to evaluate Left arm swelling    Remove Left IJ central line " trialysis catheter now that has tunneled HD catheter.       Discussed her case with  on the step down unit.  Dialysis and nephrology follow up being arranged.  Will follow up in the Coumadin clinic for INR and Dr. Pietro Wilcox will be point of reference as needed. I will place an ambulatory referral to the coumadin clinic at discharge.      Anticipate discharge as early as next couple of days, just needs to be therapeutic with INR.      I continue to appreciate the nephrology team for their assistance.       Alexandru Campbell MD

## 2023-12-20 NOTE — PROGRESS NOTES
Bedside HD initiated., GDH283/. Uf net  goal 1.5 liters as tolerated.  B/P 133/71, pulse 96, o2 sat on room air 98%. Patient alert and stable .

## 2023-12-20 NOTE — ADDENDUM NOTE
Addendum  created 12/20/23 1135 by Kalen Lindsey MD    Clinical Note Signed, Diagnosis association updated, Intraprocedure Blocks edited       no

## 2023-12-20 NOTE — ADDENDUM NOTE
Addendum  created 12/20/23 1224 by Kalen Lindsey MD    Clinical Note Signed, Diagnosis association updated, Intraprocedure Blocks edited, SmartForm saved

## 2023-12-20 NOTE — NURSING TRANSFER
Nursing Transfer Note      12/20/2023   12:24 PM    Reason patient is being transferred: Ultrasound     Transfer From: CSU    Transfer via wheelchair    Transfer with cardiac monitoring    Transported by Pt. escort    Telemetry: Box Number [id number]    Order for Tele Monitor? Yes    4eyes on Skin: yes    Medicines sent: Heparin gtt@ 23u/kg    Any special needs or follow-up needed: Sternal precaution    Patient belongings transferred with patient: No

## 2023-12-20 NOTE — PT/OT/SLP PROGRESS
Physical Therapy      Patient Name:  Megan Cook   MRN:  [id number]    Patient not seen today secondary to  (pt was asleep and family member stated that she had a busy morning and wanted her to rest. Family member refused treatment.). Will follow-up at a later date.    12/20/2023  .

## 2023-12-21 PROBLEM — R73.9 TRANSIENT HYPERGLYCEMIA POST PROCEDURE: Status: RESOLVED | Noted: 2023-12-09 | Resolved: 2023-12-21

## 2023-12-21 PROBLEM — I49.8 ACCELERATED JUNCTIONAL RHYTHM: Status: RESOLVED | Noted: 2023-12-10 | Resolved: 2023-12-21

## 2023-12-21 PROBLEM — E87.70 HYPERVOLEMIA: Status: RESOLVED | Noted: 2023-12-09 | Resolved: 2023-12-21

## 2023-12-21 PROBLEM — I06.0 RHEUMATIC AORTIC STENOSIS: Status: RESOLVED | Noted: 2023-05-24 | Resolved: 2023-12-21

## 2023-12-21 PROBLEM — Z45.2 ENCOUNTER FOR CENTRAL LINE PLACEMENT: Status: RESOLVED | Noted: 2023-12-15 | Resolved: 2023-12-21

## 2023-12-21 PROBLEM — R60.1 ANASARCA: Status: RESOLVED | Noted: 2023-12-09 | Resolved: 2023-12-21

## 2023-12-21 PROBLEM — Q25.6 PULMONARY ARTERY STENOSIS OF PERIPHERAL BRANCH AT OR BEYOND THE HILAR BIFURCATION: Status: RESOLVED | Noted: 2023-12-09 | Resolved: 2023-12-21

## 2023-12-21 LAB
ALBUMIN SERPL BCP-MCNC: 2.3 G/DL (ref 3.5–5.2)
ALP SERPL-CCNC: 122 U/L (ref 55–135)
ALT SERPL W/O P-5'-P-CCNC: 53 U/L (ref 10–44)
ANION GAP SERPL CALC-SCNC: 12 MMOL/L (ref 8–16)
AST SERPL-CCNC: 37 U/L (ref 10–40)
BASOPHILS # BLD AUTO: 0.04 K/UL (ref 0–0.2)
BASOPHILS NFR BLD: 0.4 % (ref 0–1.9)
BILIRUB SERPL-MCNC: 1.2 MG/DL (ref 0.1–1)
BUN SERPL-MCNC: 11 MG/DL (ref 6–20)
BUN SERPL-MCNC: 47 MG/DL (ref 6–20)
CA-I BLDV-SCNC: 1.19 MMOL/L (ref 1.06–1.42)
CALCIUM SERPL-MCNC: 8.9 MG/DL (ref 8.7–10.5)
CHLORIDE SERPL-SCNC: 99 MMOL/L (ref 95–110)
CO2 SERPL-SCNC: 20 MMOL/L (ref 23–29)
CREAT SERPL-MCNC: 6.7 MG/DL (ref 0.5–1.4)
DIFFERENTIAL METHOD: ABNORMAL
EOSINOPHIL # BLD AUTO: 0.4 K/UL (ref 0–0.5)
EOSINOPHIL NFR BLD: 3.4 % (ref 0–8)
ERYTHROCYTE [DISTWIDTH] IN BLOOD BY AUTOMATED COUNT: 16.7 % (ref 11.5–14.5)
EST. GFR  (NO RACE VARIABLE): 7.6 ML/MIN/1.73 M^2
FACT X PPP CHRO-ACNC: 0.63 IU/ML (ref 0.3–0.7)
GLUCOSE SERPL-MCNC: 95 MG/DL (ref 70–110)
HCT VFR BLD AUTO: 22.8 % (ref 37–48.5)
HGB BLD-MCNC: 7.5 G/DL (ref 12–16)
IMM GRANULOCYTES # BLD AUTO: 0.22 K/UL (ref 0–0.04)
IMM GRANULOCYTES NFR BLD AUTO: 2.1 % (ref 0–0.5)
INR PPP: 2.4 (ref 0.8–1.2)
LYMPHOCYTES # BLD AUTO: 1.7 K/UL (ref 1–4.8)
LYMPHOCYTES NFR BLD: 16.3 % (ref 18–48)
MAGNESIUM SERPL-MCNC: 2.3 MG/DL (ref 1.6–2.6)
MCH RBC QN AUTO: 30.2 PG (ref 27–31)
MCHC RBC AUTO-ENTMCNC: 32.9 G/DL (ref 32–36)
MCV RBC AUTO: 92 FL (ref 82–98)
MONOCYTES # BLD AUTO: 0.7 K/UL (ref 0.3–1)
MONOCYTES NFR BLD: 6.2 % (ref 4–15)
NEUTROPHILS # BLD AUTO: 7.6 K/UL (ref 1.8–7.7)
NEUTROPHILS NFR BLD: 71.6 % (ref 38–73)
NRBC BLD-RTO: 0 /100 WBC
PHOSPHATE SERPL-MCNC: 7.8 MG/DL (ref 2.7–4.5)
PLATELET # BLD AUTO: 417 K/UL (ref 150–450)
PMV BLD AUTO: 10 FL (ref 9.2–12.9)
POTASSIUM SERPL-SCNC: 4.8 MMOL/L (ref 3.5–5.1)
PROT SERPL-MCNC: 5.9 G/DL (ref 6–8.4)
PROTHROMBIN TIME: 24 SEC (ref 9–12.5)
RBC # BLD AUTO: 2.48 M/UL (ref 4–5.4)
SODIUM SERPL-SCNC: 131 MMOL/L (ref 136–145)
WBC # BLD AUTO: 10.65 K/UL (ref 3.9–12.7)

## 2023-12-21 PROCEDURE — 85610 PROTHROMBIN TIME: CPT | Performed by: THORACIC SURGERY (CARDIOTHORACIC VASCULAR SURGERY)

## 2023-12-21 PROCEDURE — 94761 N-INVAS EAR/PLS OXIMETRY MLT: CPT

## 2023-12-21 PROCEDURE — 84100 ASSAY OF PHOSPHORUS: CPT | Performed by: THORACIC SURGERY (CARDIOTHORACIC VASCULAR SURGERY)

## 2023-12-21 PROCEDURE — 20600001 HC STEP DOWN PRIVATE ROOM

## 2023-12-21 PROCEDURE — 90935 PR HEMODIALYSIS, ONE EVALUATION: ICD-10-PCS | Mod: ,,, | Performed by: NURSE PRACTITIONER

## 2023-12-21 PROCEDURE — 25000003 PHARM REV CODE 250: Performed by: STUDENT IN AN ORGANIZED HEALTH CARE EDUCATION/TRAINING PROGRAM

## 2023-12-21 PROCEDURE — 83735 ASSAY OF MAGNESIUM: CPT | Performed by: THORACIC SURGERY (CARDIOTHORACIC VASCULAR SURGERY)

## 2023-12-21 PROCEDURE — 90935 HEMODIALYSIS ONE EVALUATION: CPT

## 2023-12-21 PROCEDURE — 80053 COMPREHEN METABOLIC PANEL: CPT | Performed by: THORACIC SURGERY (CARDIOTHORACIC VASCULAR SURGERY)

## 2023-12-21 PROCEDURE — 63600175 PHARM REV CODE 636 W HCPCS: Performed by: NURSE PRACTITIONER

## 2023-12-21 PROCEDURE — 85025 COMPLETE CBC W/AUTO DIFF WBC: CPT | Performed by: THORACIC SURGERY (CARDIOTHORACIC VASCULAR SURGERY)

## 2023-12-21 PROCEDURE — 25000003 PHARM REV CODE 250: Performed by: THORACIC SURGERY (CARDIOTHORACIC VASCULAR SURGERY)

## 2023-12-21 PROCEDURE — 84520 ASSAY OF UREA NITROGEN: CPT | Performed by: THORACIC SURGERY (CARDIOTHORACIC VASCULAR SURGERY)

## 2023-12-21 PROCEDURE — 85520 HEPARIN ASSAY: CPT | Performed by: THORACIC SURGERY (CARDIOTHORACIC VASCULAR SURGERY)

## 2023-12-21 PROCEDURE — 82330 ASSAY OF CALCIUM: CPT | Performed by: THORACIC SURGERY (CARDIOTHORACIC VASCULAR SURGERY)

## 2023-12-21 PROCEDURE — 90935 HEMODIALYSIS ONE EVALUATION: CPT | Mod: ,,, | Performed by: NURSE PRACTITIONER

## 2023-12-21 RX ORDER — ACETAMINOPHEN 500 MG
500 TABLET ORAL EVERY 4 HOURS PRN
Qty: 84 TABLET | Refills: 0 | Status: SHIPPED | OUTPATIENT
Start: 2023-12-21 | End: 2024-01-05

## 2023-12-21 RX ORDER — AMOXICILLIN 250 MG
1 CAPSULE ORAL DAILY
Qty: 14 TABLET | Refills: 0 | Status: SHIPPED | OUTPATIENT
Start: 2023-12-21 | End: 2024-01-05

## 2023-12-21 RX ORDER — WARFARIN 3 MG/1
3 TABLET ORAL DAILY
Qty: 30 TABLET | Refills: 11 | Status: SHIPPED | OUTPATIENT
Start: 2023-12-21 | End: 2023-12-28

## 2023-12-21 RX ORDER — SEVELAMER CARBONATE 800 MG/1
800 TABLET, FILM COATED ORAL
Qty: 90 TABLET | Refills: 11 | Status: SHIPPED | OUTPATIENT
Start: 2023-12-21 | End: 2024-12-20

## 2023-12-21 RX ORDER — WARFARIN SODIUM 5 MG/1
5 TABLET ORAL DAILY
Qty: 30 TABLET | Refills: 11 | Status: SHIPPED | OUTPATIENT
Start: 2023-12-21 | End: 2023-12-21

## 2023-12-21 RX ORDER — TRAMADOL HYDROCHLORIDE 50 MG/1
50 TABLET ORAL EVERY 8 HOURS PRN
Qty: 21 EACH | Refills: 0 | Status: SHIPPED | OUTPATIENT
Start: 2023-12-21 | End: 2023-12-28

## 2023-12-21 RX ORDER — POLYETHYLENE GLYCOL 3350 17 G/17G
17 POWDER, FOR SOLUTION ORAL DAILY
Qty: 238 G | Refills: 0 | Status: SHIPPED | OUTPATIENT
Start: 2023-12-21 | End: 2024-01-05

## 2023-12-21 RX ORDER — WARFARIN 3 MG/1
3 TABLET ORAL DAILY
Status: DISCONTINUED | OUTPATIENT
Start: 2023-12-21 | End: 2023-12-22 | Stop reason: HOSPADM

## 2023-12-21 RX ORDER — NAPROXEN SODIUM 220 MG/1
81 TABLET, FILM COATED ORAL DAILY
Qty: 90 TABLET | Refills: 3 | Status: SHIPPED | OUTPATIENT
Start: 2023-12-21 | End: 2024-12-20

## 2023-12-21 RX ADMIN — LIDOCAINE 5% 1 PATCH: 700 PATCH TOPICAL at 08:12

## 2023-12-21 RX ADMIN — ACETAMINOPHEN AND CODEINE PHOSPHATE 0.35 MG: 120; 12 SOLUTION ORAL at 02:12

## 2023-12-21 RX ADMIN — ASPIRIN 81 MG CHEWABLE TABLET 81 MG: 81 TABLET CHEWABLE at 02:12

## 2023-12-21 RX ADMIN — SEVELAMER CARBONATE 800 MG: 800 TABLET, FILM COATED ORAL at 05:12

## 2023-12-21 RX ADMIN — HEPARIN SODIUM 1000 UNITS: 1000 INJECTION, SOLUTION INTRAVENOUS; SUBCUTANEOUS at 01:12

## 2023-12-21 RX ADMIN — WARFARIN SODIUM 3 MG: 3 TABLET ORAL at 05:12

## 2023-12-21 NOTE — PROGRESS NOTES
"Referral successfully faxed to [facility] central intake for outpt dialysis coordination.    SW to follow with updates.    Evelyn Franklin, LMSW [facility]  X [phone]      Your fax has been successfully sent to [phone] at [phone].  ------------------------------------------------------------  From: [id number]  ------------------------------------------------------------  12/20/2023 2:40:21 PM Transmission Record          Sent to [phone] with remote ID "          Result: (4/3;0/0) Line broken (no loop current)          Page record: NONE SENT          Elapsed time: 00:03 on channel 40    12/20/2023 2:45:26 PM Transmission Record          Sent to [phone] with remote ID "Anya"          Result: (0/339;0/0) Success          Page record: 1 - 37          Elapsed time: 68:36 on channel 10   "

## 2023-12-21 NOTE — SUBJECTIVE & OBJECTIVE
Interval History:   Seen this morning on dialysis, tolerating well with no complaints.  OP dialysis workup in process.  She reports having 2 unmeasured urinary occurrences yesterday although minimal.  Labs w/o evidence of renal recovery at this time.      Review of patient's allergies indicates:   Allergen Reactions    Cephalexin Other (See Comments)     She gets mouth sores.     Penicillin g Other (See Comments)     Causes mouth sores.      Current Facility-Administered Medications   Medication Frequency    acetaminophen oral solution 499.5074 mg Q4H PRN    artificial tears 0.5 % ophthalmic solution 1 drop PRN    aspirin chewable tablet 81 mg Daily    balsam peru-castor oiL Oint BID    calcium carbonate 200 mg calcium (500 mg) chewable tablet 500 mg BID PRN    cetirizine tablet 5 mg Once per day on Mon Wed Fri    dextrose 10% bolus 125 mL 125 mL PRN    dextrose 10% bolus 250 mL 250 mL PRN    heparin (porcine) injection 1,000 Units PRN    LIDOcaine 5 % patch 1 patch Q24H    melatonin tablet 6 mg QHS    midodrine tablet 15 mg PRN    norethindrone tablet 0.35 mg [Patient Supplied Medication] Daily    ondansetron injection 4 mg Q12H PRN    oxyCODONE immediate release tablet 5 mg Q6H PRN    polyethylene glycol packet 17 g Daily    senna-docusate 8.6-50 mg per tablet 1 tablet BID    sevelamer carbonate tablet 800 mg TID WM    simethicone chewable tablet 80 mg TID PRN    warfarin (COUMADIN) tablet 5 mg Daily       Objective:     Vital Signs (Most Recent):  Temp: 97.3 °F (36.3 °C) (12/21/23 0756)  Pulse: 101 (12/21/23 1343)  Resp: 17 (12/21/23 0756)  BP: 128/65 (12/21/23 1343)  SpO2: (!) 94 % (12/21/23 0756) Vital Signs (24h Range):  Temp:  [97.3 °F (36.3 °C)-97.7 °F (36.5 °C)] 97.3 °F (36.3 °C)  Pulse:  [] 101  Resp:  [17-18] 17  SpO2:  [94 %-99 %] 94 %  BP: (108-148)/(52-72) 128/65     Weight: 128.2 kg (282 lb 10.1 oz) (12/21/23 0737)  Body mass index is 47.03 kg/m².  Body surface area is 2.42 meters  squared.    I/O last 3 completed shifts:  In: 1533 [P.O.:933; Other:600]  Out: 2100 [Other:2100]     Physical Exam  Constitutional:       Appearance: She is obese.   HENT:      Head: Normocephalic.   Cardiovascular:      Rate and Rhythm: Normal rate.   Pulmonary:      Effort: Pulmonary effort is normal.   Musculoskeletal:         General: No swelling.   Skin:     General: Skin is warm.   Neurological:      General: No focal deficit present.      Mental Status: She is alert and oriented to person, place, and time.   Psychiatric:         Mood and Affect: Mood normal.         Behavior: Behavior normal.          Significant Labs:  CBC:   Recent Labs   Lab 12/21/23  0540   WBC 10.65   RBC 2.48*   HGB 7.5*   HCT 22.8*      MCV 92   MCH 30.2   MCHC 32.9     CMP:   Recent Labs   Lab 12/21/23  0540   GLU 95   CALCIUM 8.9   ALBUMIN 2.3*   PROT 5.9*   *   K 4.8   CO2 20*   CL 99   BUN 47*   CREATININE 6.7*   ALKPHOS 122   ALT 53*   AST 37   BILITOT 1.2*

## 2023-12-21 NOTE — NURSING
Right IJ Trialysis catheter removed per order. Tolerated well without any complication. Sutures removed. Occlusive dressing and gauze applied. Pressure held for 10 minutes. No hematoma or bleeding noted.  Educated patient to lay flat for 30 minutes. Informed patient and her sister of any warning signs and when to notify nurse. Will continue to monitor.

## 2023-12-21 NOTE — NURSING TRANSFER
Nursing Transfer Note      12/21/2023   9:00 AM    Reason patient is being transferred: Ultrasound    Transfer From: CSU    Transfer via wheelchair    Transfer with cardiac monitoring    Transported by: Pt.escort    Telemetry: Box Number [id number]  Order for Tele Monitor? Yes

## 2023-12-21 NOTE — PROGRESS NOTES
Dialysis complete.  Blood returned.  RIJ PC flushed, heparin locked, capped and taped.    Net UF 1.5 L.  Tolerated well.    Transported from [facility] to room 354 via w/c by transporter.

## 2023-12-21 NOTE — PROGRESS NOTES
Congenital Cardiac Surgery Note:     Ms. Megan Cook underwent re-replacement of her aortic valve and re-replacement of her RV-PA conduit/pulmonary valve on 12-8-2023.       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      Post-op course complicated by Acute Renal Failure.     Interval Events  Doing well.    Medically ready for discharge.   Unfortunately, Insurance approval for her outpatient dialysis is still pending.    On a positive note, she made more urine, she says she voided a moderate amount twice yesterday.             Vitals:    12/21/23 1315 12/21/23 1343 12/21/23 1407 12/21/23 1516   BP: 119/65 128/65 (!) 133/58    BP Location:  Right arm Right arm    Patient Position:  Lying Sitting    Pulse: 96 101 (!) 113 108   Resp:       Temp:       TempSrc:       SpO2:       Weight:       Height:             Physical Exam:  General: appears well   CV: normal rate and regular  Resp: normal effort  Neuro: alert and oriented  Incision: clean, intact, with staples  Ext: Mild edema, left upper extremity remains more swollen than others      Imaging:  CXR: X-ray yesterday looked good with mild left lower lobe atelectasis     Assessment and Plan:    Medically ready for discharge.  Outpatient dialysis approval is the rate limiting step for her discharge.     Looks great.  Reports more urine production but remains dialysis dependent and is scheduled for dialysis today.         INR 2.4 today  Decreased warfarin to 3mg daily    Received 5mg 12/16, 2.5mg 12/17-12/18, and 5mg 12/19-12/20    Heparin discontinued  On daily aspirin  Hgb stable, 7.5 today      Anticoagulation plans are INR target of 2.5(goal range 2-3) with daily aspirin.       Continue supportive care  Encourage pulmonary toilet and frequent  ambulation     Discharge echo yesterday, looked good.    Discharge X-ray looked good  Ordered LUE ultrasound to evaluate Left arm swelling, no thrombus identified    Left IJ central line  removed.        Discussed her case with /social work on the step down unit.  Dialysis and nephrology follow up being arranged.  Will follow up in the Coumadin clinic for INR and Dr. Pietro Wilcox will be point of reference as needed. I will place an ambulatory referral to the coumadin clinic at discharge.       I continue to appreciate the nephrology team for their assistance.    Discharge pending outpatient dialysis approval       Alexandru Campbell MD

## 2023-12-21 NOTE — NURSING TRANSFER
Nursing Transfer Note      12/21/2023   9:27 AM    Nurse giving handoff:ADWOA Luna      Reason patient is being transferred: Dialysis    Transfer From: Ultrasound    Transfer To: Dialysis  Transfer via wheelchair      Transfer with cardiac monitoring

## 2023-12-21 NOTE — PROGRESS NOTES
LALIT contacted [facility] central intake for an update regarding pt's outpt dialysis referral ([phone]). LALIT spoke with pt coordinator Kelli who informed pt coordinator Katarina is assigned to this referral ([id number]). Kelli stated pt's records have been received and it currently pending medical and financial approval.     SW to continue with updates.    Evelyn Franklin, ELVIA  [facility]  X [phone]

## 2023-12-21 NOTE — PROGRESS NOTES
[facility] - Cardiology Stepdown  Nephrology  Progress Note    Patient Name: Megan Cook  MRN: [id number]  Admission Date: 12/8/2023  Hospital Length of Stay: 13 days  Attending Provider: Alexandru Campbell MD   Primary Care Physician: Adelaida Disla NP  Principal Problem:Rheumatic aortic stenosis    Subjective:     HPI: HPI:  Ms. 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        The patient presents to the SICU s/p AVR and PVR with Dr. Campbell on 12/08/2023. During the procedure the patient was on bypass for 398 minutes and clamped for 138 minutes.      On admission, they are intubated, sedated with Precedex, and in stable condition. Inotropic and vasopressor requirements upon admission are 0.05 of Epinephrine, and 0.1 Vasopressin, 0.25 Milrinone to maintain blood pressure at a MAP 65 and SBP < 120. Central access includes a RIJ CVC, arterial access includes a left radial arterial line. They also have 2 pleural chest tubed and 1 mediastinal chest tube with appropriate output.     Intraoperatively, they received 2,000 mL of NS, 4,965 mL of PRBCs, 441 mL FFP, 1,077mL Platelets, and 475 mL Cryoprecipitate. Urine output intraoperatively was 1,835cc.      Immediate post-operative plans include hemodynamic stabilization and weaning of cardiac and respiratory support. Plan to wean vasopressors and inotropes as tolerated, also wean ventilator support with goal of early extubation, and closely monitor fluid status with strict Is/Os and continued fluid resuscitation as needed.    Interval History:   Seen this morning on dialysis, tolerating well with no complaints.  OP dialysis workup in process.  She  reports having 2 unmeasured urinary occurrences yesterday although minimal.  Labs w/o evidence of renal recovery at this time.      Review of patient's allergies indicates:   Allergen Reactions    Cephalexin Other (See Comments)     She gets mouth sores.     Penicillin g Other (See Comments)     Causes mouth sores.      Current Facility-Administered Medications   Medication Frequency    acetaminophen oral solution 499.5074 mg Q4H PRN    artificial tears 0.5 % ophthalmic solution 1 drop PRN    aspirin chewable tablet 81 mg Daily    balsam peru-castor oiL Oint BID    calcium carbonate 200 mg calcium (500 mg) chewable tablet 500 mg BID PRN    cetirizine tablet 5 mg Once per day on Mon Wed Fri    dextrose 10% bolus 125 mL 125 mL PRN    dextrose 10% bolus 250 mL 250 mL PRN    heparin (porcine) injection 1,000 Units PRN    LIDOcaine 5 % patch 1 patch Q24H    melatonin tablet 6 mg QHS    midodrine tablet 15 mg PRN    norethindrone tablet 0.35 mg [Patient Supplied Medication] Daily    ondansetron injection 4 mg Q12H PRN    oxyCODONE immediate release tablet 5 mg Q6H PRN    polyethylene glycol packet 17 g Daily    senna-docusate 8.6-50 mg per tablet 1 tablet BID    sevelamer carbonate tablet 800 mg TID WM    simethicone chewable tablet 80 mg TID PRN    warfarin (COUMADIN) tablet 5 mg Daily       Objective:     Vital Signs (Most Recent):  Temp: 97.3 °F (36.3 °C) (12/21/23 0756)  Pulse: 101 (12/21/23 1343)  Resp: 17 (12/21/23 0756)  BP: 128/65 (12/21/23 1343)  SpO2: (!) 94 % (12/21/23 0756) Vital Signs (24h Range):  Temp:  [97.3 °F (36.3 °C)-97.7 °F (36.5 °C)] 97.3 °F (36.3 °C)  Pulse:  [] 101  Resp:  [17-18] 17  SpO2:  [94 %-99 %] 94 %  BP: (108-148)/(52-72) 128/65     Weight: 128.2 kg (282 lb 10.1 oz) (12/21/23 0737)  Body mass index is 47.03 kg/m².  Body surface area is 2.42 meters squared.    I/O last 3 completed shifts:  In: 1533 [P.O.:933; Other:600]  Out: 2100 [Other:2100]     Physical Exam  Constitutional:        Appearance: She is obese.   HENT:      Head: Normocephalic.   Cardiovascular:      Rate and Rhythm: Normal rate.   Pulmonary:      Effort: Pulmonary effort is normal.   Musculoskeletal:         General: No swelling.   Skin:     General: Skin is warm.   Neurological:      General: No focal deficit present.      Mental Status: She is alert and oriented to person, place, and time.   Psychiatric:         Mood and Affect: Mood normal.         Behavior: Behavior normal.          Significant Labs:  CBC:   Recent Labs   Lab 12/21/23  0540   WBC 10.65   RBC 2.48*   HGB 7.5*   HCT 22.8*      MCV 92   MCH 30.2   MCHC 32.9     CMP:   Recent Labs   Lab 12/21/23  0540   GLU 95   CALCIUM 8.9   ALBUMIN 2.3*   PROT 5.9*   *   K 4.8   CO2 20*   CL 99   BUN 47*   CREATININE 6.7*   ALKPHOS 122   ALT 53*   AST 37   BILITOT 1.2*        Assessment/Plan:     Renal/  Hyperphosphatemia  -low phos diet  -phos binders with meals    TINO (acute kidney injury)  Aute kidney injury secondary to iATN from hypotension episodes/hemodynamic instability during OR  (Baseline 0.4-1)  SLED started 12/9 for oliguric TINO    Plan  -HD today for metabolic clearance/volume management  -UF 1-1.5L as tolerated  -Keep MAP > 65, avaoid drastic changes in BP  -low phos diet with phos binders  -reports an increase in UOP, please measure strict I/O's.  Hopeful to have renal recovery.          Denis Correa NP  Nephrology  [facility] - Cardiology Stepdown

## 2023-12-21 NOTE — PROGRESS NOTES
LALIT received call from [facility] central intake pt coordinator Katarina. Per Katarina, [facility] regional admissions facilitator Phuong is requesting additional medical records for this pt. Per Katarina, Phuong is requesting hep b total core antibody results, recent H&P, pt's height and weight, nephrology progress notes, and cardiology notes. This SW informed Katarina that hep b total core antibody results, recent H&P, and cardiology progress notes were included in the initial referral packet. LALIT directed Katarina to where these records are located in the referral packet. Katarina verbalized understanding. LALIT informed I will send nephrology progress notes and height and weight to [facility] central intake. Katarina stated she will escalate this pt's referral as Phuong is requesting records that are not generally required.    UPDATE 2:42PM: LALIT attempted to contact [facility] regional admissions facilitator Phuong at number provided by Katarina ([phone]). LALIT was informed that Phuong has left for the day. LALIT was advised to attempt to contact Phuong again tomorrow morning.    Inpt treatment team updated via secure chat.    LALIT to continue to follow with updates.    Evelyn Franklin, ELVIA  [facility]  X [phone]

## 2023-12-21 NOTE — ASSESSMENT & PLAN NOTE
Aute kidney injury secondary to iATN from hypotension episodes/hemodynamic instability during OR  (Baseline 0.4-1)  SLED started 12/9 for oliguric TINO    Plan  -HD today for metabolic clearance/volume management  -UF 1-1.5L as tolerated  -Keep MAP > 65, avaoid drastic changes in BP  -low phos diet with phos binders  -reports an increase in UOP, please measure strict I/O's.  Hopeful to have renal recovery.

## 2023-12-21 NOTE — PLAN OF CARE
AAOX4,VS Stable(see flowsheet) ,O2 sats 97% on room air.Post op day 12.Plan of care discussed with patient. Pt.has Heparin infusing @23u/kg.Patient on sternal precaution, mid sternal incision with staples, open to air. Patient has no complaints of pain/SOB. Discussed medications and care. Patient has no questions at this time.Pt visualised and stable.Call light within reach.Pt resting,bed at lowest position.Pt's family by the bedside.Will continue to monitor through the shift.       Problem: Adult Inpatient Plan of Care  Goal: Plan of Care Review  12/20/2023 1852 by Cheryl Ernandez RN  Outcome: Ongoing, Progressing  12/20/2023 1843 by Cheryl Eranndez RN  Outcome: Ongoing, Progressing  Goal: Patient-Specific Goal (Individualized)  12/20/2023 1852 by Cheryl Ernandez RN  Outcome: Ongoing, Progressing  12/20/2023 1843 by Cheryl Ernandez RN  Outcome: Ongoing, Progressing  Goal: Absence of Hospital-Acquired Illness or Injury  12/20/2023 1852 by Cheryl Ernandez RN  Outcome: Ongoing, Progressing  12/20/2023 1843 by Cheryl Ernandez RN  Outcome: Ongoing, Progressing  Goal: Optimal Comfort and Wellbeing  12/20/2023 1852 by Cheryl Ernandez RN  Outcome: Ongoing, Progressing  12/20/2023 1843 by Cheryl Ernandez RN  Outcome: Ongoing, Progressing  Goal: Readiness for Transition of Care  12/20/2023 1852 by Cheryl Ernandez RN  Outcome: Ongoing, Progressing  12/20/2023 1843 by Cheryl Ernandez RN  Outcome: Ongoing, Progressing     Problem: Bariatric Environmental Safety  Goal: Safety Maintained with Care  12/20/2023 1852 by Cheryl Ernandez RN  Outcome: Ongoing, Progressing  12/20/2023 1843 by Cheryl Ernandez RN  Outcome: Ongoing, Progressing     Problem: Infection  Goal: Absence of Infection Signs and Symptoms  12/20/2023 1852 by Cheryl Ernandez RN  Outcome: Ongoing, Progressing  12/20/2023 1843 by Cheryl Ernandez RN  Outcome: Ongoing,  Progressing     Problem: Activity Intolerance (Cardiovascular Surgery)  Goal: Improved Activity Tolerance  12/20/2023 1852 by Cehryl Ernandez RN  Outcome: Ongoing, Progressing  12/20/2023 1843 by Cheryl Ernandez RN  Outcome: Ongoing, Progressing     Problem: Adjustment to Surgery (Cardiovascular Surgery)  Goal: Optimal Coping with Heart Surgery  12/20/2023 1852 by Cheryl Ernandez RN  Outcome: Ongoing, Progressing  12/20/2023 1843 by Cheryl Ernandez RN  Outcome: Ongoing, Progressing     Problem: Bleeding (Cardiovascular Surgery)  Goal: Bleeding (Cardiovascular Surgery)  12/20/2023 1852 by Cheryl Ernandez RN  Outcome: Ongoing, Progressing  12/20/2023 1843 by Cheryl Ernandez RN  Outcome: Ongoing, Progressing     Problem: Bowel Motility Impaired (Cardiovascular Surgery)  Goal: Effective Bowel Elimination (Cardiovascular Surgery)  12/20/2023 1852 by Cheryl Ernandez RN  Outcome: Ongoing, Progressing  12/20/2023 1843 by Cheryl Ernandez RN  Outcome: Ongoing, Progressing     Problem: Cardiac Function Impaired (Cardiovascular Surgery)  Goal: Effective Cardiac Function  12/20/2023 1852 by Cheryl Ernandez RN  Outcome: Ongoing, Progressing  12/20/2023 1843 by Cheryl Ernandez RN  Outcome: Ongoing, Progressing     Problem: Cerebral Tissue Perfusion (Cardiovascular Surgery)  Goal: Optimal Cerebral Tissue Perfusion (Cardiovascular Surgery)  12/20/2023 1852 by Cheryl Ernandez RN  Outcome: Ongoing, Progressing  12/20/2023 1843 by Cheryl Ernandez RN  Outcome: Ongoing, Progressing     Problem: Fluid and Electrolyte Imbalance (Cardiovascular Surgery)  Goal: Fluid and Electrolyte Balance (Cardiovascular Surgery)  12/20/2023 1852 by Cheryl Ernandez RN  Outcome: Ongoing, Progressing  12/20/2023 1843 by Cheryl Ernandez RN  Outcome: Ongoing, Progressing     Problem: Glycemic Control Impaired (Cardiovascular Surgery)  Goal: Blood Glucose Level  Within Targeted Range (Cardiovascular Surgery)  12/20/2023 1852 by Cheryl Ernandez RN  Outcome: Ongoing, Progressing  12/20/2023 1843 by Cheryl Ernandez RN  Outcome: Ongoing, Progressing     Problem: Infection (Cardiovascular Surgery)  Goal: Absence of Infection Signs and Symptoms  12/20/2023 1852 by Cheryl Ernandez RN  Outcome: Ongoing, Progressing  12/20/2023 1843 by Cheryl Ernandez RN  Outcome: Ongoing, Progressing     Problem: Ongoing Anesthesia Effects (Cardiovascular Surgery)  Goal: Anesthesia/Sedation Recovery  12/20/2023 1852 by Cheryl Ernandez RN  Outcome: Ongoing, Progressing  12/20/2023 1843 by Cheryl Ernandez RN  Outcome: Ongoing, Progressing     Problem: Pain (Cardiovascular Surgery)  Goal: Acceptable Pain Control  12/20/2023 1852 by Cheryl Ernandez RN  Outcome: Ongoing, Progressing  12/20/2023 1843 by Cheryl Ernandez RN  Outcome: Ongoing, Progressing     Problem: Postoperative Nausea and Vomiting (Cardiovascular Surgery)  Goal: Nausea and Vomiting Relief (Cardiovascular Surgery)  12/20/2023 1852 by Cheryl Ernandez RN  Outcome: Ongoing, Progressing  12/20/2023 1843 by Cheryl Ernandez RN  Outcome: Ongoing, Progressing     Problem: Postoperative Urinary Retention (Cardiovascular Surgery)  Goal: Effective Urinary Elimination (Cardiovascular Surgery)  12/20/2023 1852 by Cheryl Ernandez RN  Outcome: Ongoing, Progressing  12/20/2023 1843 by Cheryl Ernandez RN  Outcome: Ongoing, Progressing     Problem: Respiratory Compromise (Cardiovascular Surgery)  Goal: Effective Oxygenation and Ventilation (Cardiovascular Surgery)  12/20/2023 1852 by Cheryl Ernandez RN  Outcome: Ongoing, Progressing  12/20/2023 1843 by Cheryl Ernandez RN  Outcome: Ongoing, Progressing     Problem: Fall Injury Risk  Goal: Absence of Fall and Fall-Related Injury  12/20/2023 1852 by Cheryl Ernandez RN  Outcome: Ongoing,  Progressing  12/20/2023 1843 by Cheryl Ernandez RN  Outcome: Ongoing, Progressing     Problem: Skin Injury Risk Increased  Goal: Skin Health and Integrity  12/20/2023 1852 by Cheryl Ernandez RN  Outcome: Ongoing, Progressing  12/20/2023 1843 by Cheryl Ernandez RN  Outcome: Ongoing, Progressing     Problem: Fluid and Electrolyte Imbalance (Acute Kidney Injury/Impairment)  Goal: Fluid and Electrolyte Balance  12/20/2023 1852 by Cheryl Ernandez RN  Outcome: Ongoing, Progressing  12/20/2023 1843 by Cheryl Ernandez RN  Outcome: Ongoing, Progressing     Problem: Oral Intake Inadequate (Acute Kidney Injury/Impairment)  Goal: Optimal Nutrition Intake  12/20/2023 1852 by Cheryl Ernandez RN  Outcome: Ongoing, Progressing  12/20/2023 1843 by Cheryl Ernandez RN  Outcome: Ongoing, Progressing     Problem: Renal Function Impairment (Acute Kidney Injury/Impairment)  Goal: Effective Renal Function  12/20/2023 1852 by Cheryl Ernandez RN  Outcome: Ongoing, Progressing  12/20/2023 1843 by Cheryl Ernandez RN  Outcome: Ongoing, Progressing     Problem: Device-Related Complication Risk (CRRT (Continuous Renal Replacement Therapy))  Goal: Safe, Effective Therapy Delivery  12/20/2023 1852 by Cheryl Ernandez RN  Outcome: Ongoing, Progressing  12/20/2023 1843 by Cheryl Ernandez RN  Outcome: Ongoing, Progressing     Problem: Hypothermia (CRRT (Continuous Renal Replacement Therapy))  Goal: Body Temperature Maintained in Desired Range  12/20/2023 1852 by Cheryl Ernandez RN  Outcome: Ongoing, Progressing  12/20/2023 1843 by Cheryl Ernandez RN  Outcome: Ongoing, Progressing     Problem: Infection (CRRT (Continuous Renal Replacement Therapy))  Goal: Absence of Infection Signs and Symptoms  12/20/2023 1852 by Cheryl Ernandez RN  Outcome: Ongoing, Progressing  12/20/2023 1843 by Cheryl Ernandez RN  Outcome: Ongoing, Progressing     Problem:  Impaired Wound Healing  Goal: Optimal Wound Healing  12/20/2023 1852 by Cheryl Ernandez RN  Outcome: Ongoing, Progressing  12/20/2023 1843 by Cheryl Ernandez RN  Outcome: Ongoing, Progressing     Problem: Device-Related Complication Risk (Hemodialysis)  Goal: Safe, Effective Therapy Delivery  12/20/2023 1852 by Cheryl Ernandez RN  Outcome: Ongoing, Progressing  12/20/2023 1843 by Cheryl Ernandez RN  Outcome: Ongoing, Progressing     Problem: Hemodynamic Instability (Hemodialysis)  Goal: Effective Tissue Perfusion  12/20/2023 1852 by Cheryl Ernandez RN  Outcome: Ongoing, Progressing  12/20/2023 1843 by Cheryl Ernandez RN  Outcome: Ongoing, Progressing     Problem: Infection (Hemodialysis)  Goal: Absence of Infection Signs and Symptoms  12/20/2023 1852 by Cheryl Ernandez RN  Outcome: Ongoing, Progressing  12/20/2023 1843 by Cheryl Ernandez RN  Outcome: Ongoing, Progressing

## 2023-12-21 NOTE — PT/OT/SLP PROGRESS
Occupational Therapy      Patient Name:  Megan Cook   MRN:  [id number]    Patient not seen today secondary to Other (Comment) (Pt BARRY x2 attempts today. Pt receiving ultrasound on first attempt and was at HD on second attempt). Pt also attempted this afternoon politely refusing. Will follow-up per POC.    12/21/2023

## 2023-12-21 NOTE — PT/OT/SLP PROGRESS
Physical Therapy      Patient Name:  Megan Cook   MRN:  [id number]    Patient not seen today secondary to at 1004 attempt RN reports pt BARRY for US and will be at dialysis directly after for the rest of the day. Will follow-up at next scheduled visit.

## 2023-12-22 ENCOUNTER — ANTI-COAG VISIT (OUTPATIENT)
Dept: CARDIOLOGY | Facility: CLINIC | Age: 38
End: 2023-12-22
Payer: COMMERCIAL

## 2023-12-22 ENCOUNTER — PATIENT MESSAGE (OUTPATIENT)
Dept: CARDIOLOGY | Facility: CLINIC | Age: 38
End: 2023-12-22

## 2023-12-22 ENCOUNTER — PATIENT MESSAGE (OUTPATIENT)
Dept: PEDIATRIC CARDIOLOGY | Facility: CLINIC | Age: 38
End: 2023-12-22
Payer: COMMERCIAL

## 2023-12-22 VITALS
DIASTOLIC BLOOD PRESSURE: 52 MMHG | BODY MASS INDEX: 46.65 KG/M2 | TEMPERATURE: 99 F | WEIGHT: 280 LBS | SYSTOLIC BLOOD PRESSURE: 95 MMHG | HEART RATE: 102 BPM | OXYGEN SATURATION: 99 % | HEIGHT: 65 IN | RESPIRATION RATE: 16 BRPM

## 2023-12-22 DIAGNOSIS — Z95.2 S/P AORTIC VALVE REPLACEMENT: ICD-10-CM

## 2023-12-22 DIAGNOSIS — Z79.01 LONG TERM (CURRENT) USE OF ANTICOAGULANTS: Primary | ICD-10-CM

## 2023-12-22 DIAGNOSIS — Z95.2 S/P AORTIC VALVE REPLACEMENT: Primary | ICD-10-CM

## 2023-12-22 LAB
ALBUMIN SERPL BCP-MCNC: 2.7 G/DL (ref 3.5–5.2)
ALP SERPL-CCNC: 128 U/L (ref 55–135)
ALT SERPL W/O P-5'-P-CCNC: 55 U/L (ref 10–44)
ANION GAP SERPL CALC-SCNC: 13 MMOL/L (ref 8–16)
AST SERPL-CCNC: 39 U/L (ref 10–40)
BASOPHILS # BLD AUTO: 0.06 K/UL (ref 0–0.2)
BASOPHILS NFR BLD: 0.6 % (ref 0–1.9)
BILIRUB SERPL-MCNC: 1.3 MG/DL (ref 0.1–1)
BUN SERPL-MCNC: 21 MG/DL (ref 6–20)
CA-I BLDV-SCNC: 1.17 MMOL/L (ref 1.06–1.42)
CALCIUM SERPL-MCNC: 9.1 MG/DL (ref 8.7–10.5)
CHLORIDE SERPL-SCNC: 99 MMOL/L (ref 95–110)
CO2 SERPL-SCNC: 21 MMOL/L (ref 23–29)
CREAT SERPL-MCNC: 5.3 MG/DL (ref 0.5–1.4)
DIFFERENTIAL METHOD: ABNORMAL
EOSINOPHIL # BLD AUTO: 0.2 K/UL (ref 0–0.5)
EOSINOPHIL NFR BLD: 2.4 % (ref 0–8)
ERYTHROCYTE [DISTWIDTH] IN BLOOD BY AUTOMATED COUNT: 16.8 % (ref 11.5–14.5)
EST. GFR  (NO RACE VARIABLE): 10.1 ML/MIN/1.73 M^2
FACT X PPP CHRO-ACNC: <0.1 IU/ML (ref 0.3–0.7)
GLUCOSE SERPL-MCNC: 98 MG/DL (ref 70–110)
HCT VFR BLD AUTO: 24.8 % (ref 37–48.5)
HGB BLD-MCNC: 7.9 G/DL (ref 12–16)
IMM GRANULOCYTES # BLD AUTO: 0.09 K/UL (ref 0–0.04)
IMM GRANULOCYTES NFR BLD AUTO: 0.9 % (ref 0–0.5)
INR PPP: 2.7 (ref 0.8–1.2)
LYMPHOCYTES # BLD AUTO: 1.3 K/UL (ref 1–4.8)
LYMPHOCYTES NFR BLD: 13.2 % (ref 18–48)
MAGNESIUM SERPL-MCNC: 2.1 MG/DL (ref 1.6–2.6)
MCH RBC QN AUTO: 29.4 PG (ref 27–31)
MCHC RBC AUTO-ENTMCNC: 31.9 G/DL (ref 32–36)
MCV RBC AUTO: 92 FL (ref 82–98)
MONOCYTES # BLD AUTO: 0.8 K/UL (ref 0.3–1)
MONOCYTES NFR BLD: 8.1 % (ref 4–15)
NEUTROPHILS # BLD AUTO: 7.4 K/UL (ref 1.8–7.7)
NEUTROPHILS NFR BLD: 74.8 % (ref 38–73)
NRBC BLD-RTO: 0 /100 WBC
PHOSPHATE SERPL-MCNC: 5.6 MG/DL (ref 2.7–4.5)
PLATELET # BLD AUTO: 443 K/UL (ref 150–450)
PMV BLD AUTO: 10 FL (ref 9.2–12.9)
POTASSIUM SERPL-SCNC: 4.6 MMOL/L (ref 3.5–5.1)
PROT SERPL-MCNC: 6.5 G/DL (ref 6–8.4)
PROTHROMBIN TIME: 27.4 SEC (ref 9–12.5)
RBC # BLD AUTO: 2.69 M/UL (ref 4–5.4)
SODIUM SERPL-SCNC: 133 MMOL/L (ref 136–145)
WBC # BLD AUTO: 9.88 K/UL (ref 3.9–12.7)

## 2023-12-22 PROCEDURE — 63600175 PHARM REV CODE 636 W HCPCS: Performed by: STUDENT IN AN ORGANIZED HEALTH CARE EDUCATION/TRAINING PROGRAM

## 2023-12-22 PROCEDURE — 80053 COMPREHEN METABOLIC PANEL: CPT | Performed by: THORACIC SURGERY (CARDIOTHORACIC VASCULAR SURGERY)

## 2023-12-22 PROCEDURE — 25000003 PHARM REV CODE 250: Performed by: STUDENT IN AN ORGANIZED HEALTH CARE EDUCATION/TRAINING PROGRAM

## 2023-12-22 PROCEDURE — 85025 COMPLETE CBC W/AUTO DIFF WBC: CPT | Performed by: THORACIC SURGERY (CARDIOTHORACIC VASCULAR SURGERY)

## 2023-12-22 PROCEDURE — 97116 GAIT TRAINING THERAPY: CPT

## 2023-12-22 PROCEDURE — 93793 ANTICOAG MGMT PT WARFARIN: CPT | Mod: S$GLB,,,

## 2023-12-22 PROCEDURE — 85520 HEPARIN ASSAY: CPT | Performed by: THORACIC SURGERY (CARDIOTHORACIC VASCULAR SURGERY)

## 2023-12-22 PROCEDURE — 83735 ASSAY OF MAGNESIUM: CPT | Performed by: THORACIC SURGERY (CARDIOTHORACIC VASCULAR SURGERY)

## 2023-12-22 PROCEDURE — 82330 ASSAY OF CALCIUM: CPT | Performed by: THORACIC SURGERY (CARDIOTHORACIC VASCULAR SURGERY)

## 2023-12-22 PROCEDURE — 85610 PROTHROMBIN TIME: CPT | Performed by: THORACIC SURGERY (CARDIOTHORACIC VASCULAR SURGERY)

## 2023-12-22 PROCEDURE — 36415 COLL VENOUS BLD VENIPUNCTURE: CPT | Performed by: THORACIC SURGERY (CARDIOTHORACIC VASCULAR SURGERY)

## 2023-12-22 PROCEDURE — 84100 ASSAY OF PHOSPHORUS: CPT | Performed by: THORACIC SURGERY (CARDIOTHORACIC VASCULAR SURGERY)

## 2023-12-22 PROCEDURE — 93793 PR ANTICOAGULANT MGMT FOR PT TAKING WARFARIN: ICD-10-PCS | Mod: S$GLB,,,

## 2023-12-22 RX ORDER — TRAMADOL HYDROCHLORIDE 50 MG/1
50 TABLET ORAL EVERY 8 HOURS PRN
Qty: 21 EACH | Refills: 0 | Status: SHIPPED | OUTPATIENT
Start: 2023-12-22 | End: 2023-12-22

## 2023-12-22 RX ORDER — SODIUM CHLORIDE 9 MG/ML
INJECTION, SOLUTION INTRAVENOUS ONCE
Status: DISCONTINUED | OUTPATIENT
Start: 2023-12-23 | End: 2023-12-22 | Stop reason: HOSPADM

## 2023-12-22 RX ORDER — TRAMADOL HYDROCHLORIDE 50 MG/1
50 TABLET ORAL EVERY 8 HOURS PRN
Qty: 21 EACH | Refills: 0 | Status: SHIPPED | OUTPATIENT
Start: 2023-12-22 | End: 2023-12-29

## 2023-12-22 RX ADMIN — ONDANSETRON 4 MG: 2 INJECTION INTRAMUSCULAR; INTRAVENOUS at 03:12

## 2023-12-22 RX ADMIN — ACETAMINOPHEN AND CODEINE PHOSPHATE 0.35 MG: 120; 12 SOLUTION ORAL at 08:12

## 2023-12-22 RX ADMIN — SEVELAMER CARBONATE 800 MG: 800 TABLET, FILM COATED ORAL at 01:12

## 2023-12-22 RX ADMIN — ASPIRIN 81 MG CHEWABLE TABLET 81 MG: 81 TABLET CHEWABLE at 08:12

## 2023-12-22 RX ADMIN — SEVELAMER CARBONATE 800 MG: 800 TABLET, FILM COATED ORAL at 08:12

## 2023-12-22 RX ADMIN — CETIRIZINE HYDROCHLORIDE 5 MG: 5 TABLET, FILM COATED ORAL at 08:12

## 2023-12-22 NOTE — DISCHARGE SUMMARY
[facility] - Cardiology Stepdown  Congenital Cardiovascular Surgery  Discharge Summary      Patient Name: Megan Cook  MRN: [id number]  Admission Date: 12/8/2023  Hospital Length of Stay: 14 days  Discharge Date and Time:  12/22/2023 12:02 PM  Attending Physician: Alexandru Campbell MD   Discharging Provider: Alexandru Campbell MD  Primary Care Provider: Adelaida Disla NP     HPI: Ms. Megan Cook is a 38-year-old woman with history of rheumatic heart disease and Ross procedure and mitral valve repair(including ring annuloplasty) in 2000 followed by repair of autograft aneurysm repair and aortic valve replacement with 23mm CE bioprothesis in 2012 who presented on 12/8/2023  for redo aortic and pulmonary valve replacement.       Procedure(s) (LRB):  Replacement-valve-aortic (N/A)  REPLACEMENT, PULMONARY VALVE (N/A)    Hospital Course:   Ms. Megan Cook underwent re-replacement of her aortic valve and re-replacement of her RV-PA conduit/pulmonary valve on 12-8-2023.       She now has  a 25 mechanical aortic valve and 29mm bioprosthetic pulmonary valve within a dacron graft conduit.        Her operative course was complicated by prolonged bypass time due to need to redo the RV to PA conduit for both suspected distal obstruction as well as difficulty obtaining hemostasis around the proximal connection of the conduit to the prior ventriculotomy and old homograft which was severely calcified.    She was stable throughout the operation but required significant transfusions.    Unfortunately she suffered acute renal failure in the immediate post-operative course, etiology likely from the long bypass run and hemolysis.   This was severe and presented as anuria within hours of arriving to the ICU.      She was started on dialysis the following day.      From a cardiac standpoint, she did well and was weaned from inotropes within the first two days.      She did require some low dose vasopressors for the next  couple of days while aggressive volume removal was performed with CRRT.      She was extubated on the 5th post-operative day after getting her euvolemic.      From that point, she did quite well.  She was on room air by the following day.     Echo showed normal cardiac function and normally functioning prosthetic aortic and pulmonary valves.      She no longer required vasopressors but she did continue to have CRRT/SLED through the next weekend because of limited nursing staff for standard hemodialysis.      A right IJ tunneled hemodialysis catheter was placed on 12/19/2023.  She tolerated HD without issue that day and then again on Thursday the 21st.     She has been ambulating well, eating well, having normal bowel function and pain controlled without narcotics this whole week.      She was medically ready for discharge on 12/21/2023 once her INR was therapeutic and she no longer needed to be bridged with heparin infusion for her mechanical aortic valve.      Unfortunately, her outpatient dialysis plan was still pending approval.      On 12/22 we were informed that the outpatient dialysis clinic's medical director was not approving her seat at the dialysis clinic citing that she was too unstable and were going to re-evaluate her next week after the holidays.  This did not make sense to me as her admitting physician as Ms. Cook had been medically stable for outpatient dialysis for a week in my opinion and I offered to discuss this directly with the director.   Ms. Cook was also eager to go home, she had been feeling well and herself and asking to go home for days at this point understanding that we did need to wait for her INR to be ok but this hold up on the outpatient dialysis plan was understandably frustrating for her.  In the interim, our team identified that there is a local hospital in her hometown in [place] with an acute care dialysis clinic.  They were contacted and Ms. Cook discussed the  plan of obtaining dialysis there until the outpatient clinic authorization was approved.  The acute care unit indicated this would not be a problem.      Assuming her seat at the regular outpatient clinic is not approved prior to next week, She will plan to go to the hospital with the acute clinic tomorrow, Saturday the 23rd and request dialysis, they will confirm the need based on lab draw and if not needed Saturday, she will return on Sunday.  She will return again on Tuesday to the acute clinic for dialysis if the outpatient unit is still not approved and continue this process until her seat at the outpatient unit is approved.      She would like to move forward with this plan and this is completely reasonable given the unfortunate circumstances with the outpatient clinic in her hometown.      She has follow up arranged with nephrology.  She has follow up arranged with myself and Dr. Michael Wilcox, her cardiologist, next week.      She had a repeat discharge echo on 12/20 that continued to show normal biventricular function and well functioning new prosthesis.      As to her anticoagulation, she is new to warfarin.    Her anticoagulation plan is warfarin with an INR target of 2.5(range 2.0-3.0) and a daily aspirin.      Warfarin was initiated on 12/16  She received 5mg 12/16, 2.5mg 12/17-12/18, and 5mg 12/19-12/20, and 3mg on 12/21.   Her INR was 2.4 on 12/21 and 2.7 on 12/22 the day of discharge.   She will have follow up with the Coumadin clinic and Dr. Wilcox will act as the physician contact as needed.      She is being discharged on 3mg of warfarin daily for now.      She is being discharged in good condition with the new need for dialysis.  She did begin making more urine the last few days before discharge but remains dialysis dependent.  She received HD most recently on 12/19 and 12/21.        Consults (From admission, onward)          Status Ordering Provider     Inpatient consult to Midline team  Once         Provider:  (Not yet assigned)    Completed ALVARO BUTLER     Inpatient consult to Midline team  Once        Provider:  (Not yet assigned)    Completed HUGH MEDRANO     Inpatient consult to Interventional Radiology  Once        Provider:  (Not yet assigned)    Completed TYRONE, SANJUAN     Inpatient consult to Electrophysiology  Once        Provider:  (Not yet assigned)    Completed TYRONE, SANAA     Inpatient consult to Registered Dietitian/Nutritionist  Once        Provider:  (Not yet assigned)    Completed TYRONE, SANAA     Inpatient consult to Nephrology  Once        Provider:  (Not yet assigned)    Completed TYRONE, SANAA     Inpatient consult to Endocrinology  Once        Provider:  (Not yet assigned)    Completed ANASTASIIA DICKERSON     Inpatient consult to Registered Dietitian/Nutritionist  Once        Provider:  (Not yet assigned)    Completed SID HICKS            Significant Diagnostic Studies: Echo 12/20/2023-good biventricular function, well function prosthetic aortic and pulmonary valves, no significant effusion  CXR 12/20/2023  LUE Ultrasound 12/21/2023-No thrombus      Final Active Diagnoses:    Diagnosis Date Noted POA    Hyperphosphatemia [E83.39] 12/18/2023 No    Class 3 severe obesity due to excess calories with serious comorbidity and body mass index (BMI) of 50.0 to 59.9 in adult [E66.01, Z68.43] 12/09/2023 Not Applicable    TINO (acute kidney injury) [N17.9] 12/09/2023 Unknown    Oliguria [R34] 12/09/2023 Yes    Encounter for continuous renal replacement therapy (CRRT) for acute renal failure [N17.9] 12/09/2023 Not Applicable    S/P aortic valve replacement [Z95.2] 05/24/2023 Not Applicable      Problems Resolved During this Admission:    Diagnosis Date Noted Date Resolved POA    PRINCIPAL PROBLEM:  Rheumatic aortic stenosis [I06.0] 05/24/2023 12/21/2023 Yes    Encounter for central line placement [Z45.2] 12/15/2023 12/21/2023 Not Applicable    Accelerated junctional rhythm [I49.8]  12/10/2023 12/21/2023 Yes    Transient hyperglycemia post procedure [R73.9] 12/09/2023 12/21/2023 Unknown    Pulmonary artery stenosis of peripheral branch at or beyond the hilar bifurcation [Q25.6] 12/09/2023 12/21/2023 Yes    Hypervolemia [E87.70] 12/09/2023 12/21/2023 Yes    Anasarca [R60.1] 12/09/2023 12/21/2023 Yes        Discharged Condition:  Good condition, dialysis dependent    Disposition: Home or Self Care    Follow Up:   Follow-up Information       Alexandru Campbell MD Follow up.    Specialty: Pediatric Cardiothoracic Surgery  Why: As scheduled  Contact information:  [address] [id number]  [phone]               Michael Wilcox MD Follow up.    Specialties: Pediatric Cardiology, Cardiology  Why: As scheduled  Contact information:  [address]  [place] [address]  [phone]               Carlota Solorzano MD Follow up.    Specialty: Nephrology  Why: As scheduled or make appointment immediately upon dsicharge  Contact information:  [address]  [place] [id number] [phone]                           Patient Instructions:      Ambulatory referral/consult to Anticoagulation Monitoring (PHARMACIST MANAGED)   Standing Status: Future   Referral Priority: Urgent Referral Type: Consultation   Referral Reason: Specialty Services Required   Requested Specialty: Cardiology   Number of Visits Requested: 1     Notify your health care provider if you experience any of the following:  temperature >100.4     Notify your health care provider if you experience any of the following:  persistent nausea and vomiting or diarrhea     Notify your health care provider if you experience any of the following:  severe uncontrolled pain     Notify your health care provider if you experience any of the following:  redness, tenderness, or signs of infection (pain, swelling, redness, odor or green/yellow discharge around incision site)     Notify your health care provider if you experience any  of the following:  difficulty breathing or increased cough     Notify your health care provider if you experience any of the following:  severe persistent headache     Notify your health care provider if you experience any of the following:  worsening rash     Notify your health care provider if you experience any of the following:  persistent dizziness, light-headedness, or visual disturbances     Notify your health care provider if you experience any of the following:  increased confusion or weakness     Notify your health care provider if you experience any of the following:     No dressing needed     Activity as tolerated   Order Comments: With Sternal precautions     Medications:  Reconciled Home Medications:      Medication List        START taking these medications      acetaminophen 500 MG tablet  Commonly known as: TYLENOL  Take 1 tablet (500 mg total) by mouth every 4 (four) hours as needed for Pain.     aspirin 81 MG Chew  Chew and swallow 1 tablet (81 mg total) by mouth once daily.     polyethylene glycol 17 gram/dose powder  Commonly known as: GLYCOLAX  Use cap to measure 17g, mix with liquid, and drink by mouth once daily. for 14 days     senna-docusate 8.6-50 mg 8.6-50 mg per tablet  Commonly known as: PERICOLACE  Take 1 tablet by mouth once daily. for 14 days     sevelamer carbonate 800 mg Tab  Commonly known as: RENVELA  Take 1 tablet (800 mg total) by mouth 3 (three) times daily with meals.     traMADoL 50 mg tablet  Commonly known as: ULTRAM  Take 1 tablet (50 mg total) by mouth every 8 (eight) hours as needed for Pain.     warfarin 3 MG tablet  Commonly known as: COUMADIN  Take 1 tablet (3 mg total) by mouth Daily.            CHANGE how you take these medications      * PAMELA 0.35 mg tablet  Generic drug: norethindrone  Take by mouth.  What changed: Another medication with the same name was added. Make sure you understand how and when to take each.     * norethindrone 0.35 mg tablet  Commonly  known as: MICRONOR  Take 1 tablet (0.35 mg total) by mouth once daily. for 28 doses  What changed: You were already taking a medication with the same name, and this prescription was added. Make sure you understand how and when to take each.           * This list has 2 medication(s) that are the same as other medications prescribed for you. Read the directions carefully, and ask your doctor or other care provider to review them with you.                CONTINUE taking these medications      cetirizine 10 MG tablet  Commonly known as: ZYRTEC  Take 10 mg by mouth once daily.     cholecalciferol (vitamin D3) 125 mcg (5,000 unit) Tab  Take 5,000 Units by mouth.            STOP taking these medications      furosemide 20 MG tablet  Commonly known as: LASIX     metoprolol succinate 50 MG 24 hr tablet  Commonly known as: TOPROL-XL              Alexandru Campbell MD  Congenital Cardiovascular Surgery  [facility] - [facility]

## 2023-12-22 NOTE — PLAN OF CARE
Patient progressing well towards goals. D/c recommendation updated to low intensity therapy.  Problem: Physical Therapy  Goal: Physical Therapy Goal  Description: Goals to be met by: 2023     Patient will increase functional independence with mobility by performin. Sit to stand transfer with Minimal Assistance - met   1a. Sit to stand transfer with SBA - not met  2. Bed to chair transfer with Moderate Assistance using physician approved AD - met   2a. Bed to chair transfer with stand by assist using physician approved AD - not met  3. Gait  x 20 feet with Moderate Assistance using physician approved AD. - goal met   4. Ascend/descend 14 stair with right Handrails Minimal Assistance using physician approved AD. - not met  5. Stand for 5 minutes with Contact Guard Assistance using physician approved AD- not met  6. Lower extremity exercise program x30 reps per handout, with independence- not met    Outcome: Ongoing, Progressing

## 2023-12-22 NOTE — PROGRESS NOTES
39 yo F with PMH significant for aortic stenosis, pulmonary stenosis, rheumatic heart disease s/p Ross procedure, mitral valve repair in 2000, AVR in 2012. Patient admitted 12/8/23 for re-replacement of her aortic valve and re-replacement of her RV-PA conduit/pulmonary valve. Pt now with a 25 mechanical aortic valve and 29mm bioprosthetic pulmonary valve. Post-op course complicated by acute renal failure - pt currently on hemodialysis. Patient started on warfarin 12/16/23, bridged with heparin. Patient discharged on warfarin 3 mg daily, plan for INR on 12/26.

## 2023-12-22 NOTE — PT/OT/SLP PROGRESS
Physical Therapy Treatment    Patient Name:  Megan Cook   MRN:  [id number]  Admitting Diagnosis:  Rheumatic aortic stenosis   Recent Surgery: Procedure(s) (LRB):  Replacement-valve-aortic (N/A)  REPLACEMENT, PULMONARY VALVE (N/A) 14 Days Post-Op  Admit Date: 12/8/2023  Length of Stay: 14 days    Recommendations:     Discharge Recommendations:    Low Intensity Therapy   Discharge Equipment Recommendations: bath bench   Barriers to discharge: Inaccessible home      Plan:     During this hospitalization, patient to be seen 5 x/week to address the identified rehab impairments via gait training, therapeutic activities, therapeutic exercises, neuromuscular re-education and progress towards the established goals.  Plan of Care Expires:  01/08/24  Plan of Care Reviewed with: patient, family    Assessment:     Megan Cook is a 38 y.o. female admitted with a medical diagnosis of Rheumatic aortic stenosis. Pt found upright in chair agreeable to therapy session. Pt demo'd improvements as she increased ambulation distance this session with no rest breaks required during trial, but pt reporting fatigue after trial. Pt declined stair training this date. Patient limited by decreased endurance and activity tolerance at this time. Patient would benefit from skilled therapy services to maximize safety and independence, increase activity tolerance, decrease fall risk, decrease caregiver burden, improve QOL, improve patient's functional mobility, and decrease risk of contractures and pressure sores.  Patient continues to demonstrate the need for low intensity therapy on a scheduled basis exhibited by decreased independence with functional mobility    Problem List: weakness, impaired endurance, impaired self care skills, impaired functional mobility, impaired balance, gait instability, decreased upper extremity function, impaired cardiopulmonary response to activity, decreased safety awareness.  Rehab Prognosis: Good; patient  "would benefit from acute skilled PT services to address these deficits and reach maximum level of function.      Goals:   Multidisciplinary Problems       Physical Therapy Goals          Problem: Physical Therapy    Goal Priority Disciplines Outcome Goal Variances Interventions   Physical Therapy Goal     PT, PT/OT Ongoing, Progressing     Description: Goals to be met by: 2023     Patient will increase functional independence with mobility by performin. Sit to stand transfer with Minimal Assistance - met   1a. Sit to stand transfer with SBA - not met  2. Bed to chair transfer with Moderate Assistance using physician approved AD - met   2a. Bed to chair transfer with stand by assist using physician approved AD - not met  3. Gait  x 20 feet with Moderate Assistance using physician approved AD. - not met  4. Ascend/descend 14 stair with right Handrails Minimal Assistance using physician approved AD. - not met  5. Stand for 5 minutes with Contact Guard Assistance using physician approved AD- not met  6. Lower extremity exercise program x30 reps per handout, with independence- not met                         Subjective     RN notified prior to session. Family present upon PT entrance into room. Patient agreeable to PT treatment session.    Chief Complaint: "Yeah I'm ready to walk"  Patient/Family Comments/goals: go home  Pain/Comfort:  Pain Rating 1: 0/10  Pain Rating Post-Intervention 1: 0/10      Objective:       Patient found up in chair with: telemetry   Cognition:   Alert and Cooperative  Patient is oriented to Person, Place, Time, Situation  General Precautions: Standard, Cardiac fall, sternal   Orthopedic Precautions:N/A   Braces: N/A   Body mass index is 46.59 kg/m².  Oxygen Device: Room Air  Vitals: /60 (BP Location: Left arm, Patient Position: Sitting)   Pulse 106   Temp 97.8 °F (36.6 °C) (Tympanic)   Resp 17   Ht 5' 5" (1.651 m)   Wt 127 kg (279 lb 15.8 oz)   LMP 2023 " (Approximate)   SpO2 97%   Breastfeeding No   BMI 46.59 kg/m²     Outcome Measures:  AM-PAC 6 CLICK MOBILITY  Turning over in bed (including adjusting bedclothes, sheets and blankets)?: 3  Sitting down on and standing up from a chair with arms (e.g., wheelchair, bedside commode, etc.): 3  Moving from lying on back to sitting on the side of the bed?: 3  Moving to and from a bed to a chair (including a wheelchair)?: 3  Need to walk in hospital room?: 3  Climbing 3-5 steps with a railing?: 3  Basic Mobility Total Score: 18     Functional Mobility:    Bed Mobility:   Pt found/returned to bedside chair    Transfers:   Sit <> Stand Transfer: supervision with no assistive device     Balance:  Standing:  Static: supervision  Dynamic: supervision      Gait:  Patient ambulated: 260'    Patient required: supervision  Patient used:  no assistive device   Gait Deviation(s): decreased step length, flexed posture, and decreased marc  all lines remained intact throughout ambulation trial  Comments: Patient steady with no LOB but reporting fatigue after trial.     Stairs:  Pt declined stair training     Education:  Time provided for education, counseling and discussion of health disposition in regards to patient's current status  All questions answered within PT scope of practice and to patient's satisfaction  PT role in POC to address current functional deficits  Pt educated on proper body mechanics, safety techniques, and energy conservation with PT facilitation and cueing throughout session  Call nursing/pct to transfer to chair/use bathroom. Pt stated understanding.  Sternal Precautions: patient able to voice 3/3 precautions without assistance. Patient able to maintain precautions throughout session with no verbal cues.    Patient left up in chair with all lines intact, call button in reach, and family  present.    Time Tracking:     PT Received On: 12/22/23  PT Start Time: 0854     PT Stop Time: 0902  PT Total Time (min):  8 min     Billable Minutes:   Gait Training 8 minutes    Treatment Type: Treatment  PT/PTA: PT       12/22/2023

## 2023-12-22 NOTE — PLAN OF CARE
AAOX4,VS Stable(see flowsheet),O2 sat 99% on room air.Plan of care discussed with patient.Patient educated about diet(green leafy vegetables) while taking Coumadin.Patient has no complaints of pain/SOB. Discussed medications and care. Patient has no questions at this time.Pt visualised and stable.Call light within reach.Pt resting,bed at lowest position.Pt's sister by the bedside.Will continue to monitor through the shift.     Problem: Adult Inpatient Plan of Care  Goal: Plan of Care Review  Outcome: Ongoing, Progressing  Goal: Patient-Specific Goal (Individualized)  Outcome: Ongoing, Progressing  Goal: Absence of Hospital-Acquired Illness or Injury  Outcome: Ongoing, Progressing  Goal: Optimal Comfort and Wellbeing  Outcome: Ongoing, Progressing  Goal: Readiness for Transition of Care  Outcome: Ongoing, Progressing     Problem: Bariatric Environmental Safety  Goal: Safety Maintained with Care  Outcome: Ongoing, Progressing     Problem: Infection  Goal: Absence of Infection Signs and Symptoms  Outcome: Ongoing, Progressing     Problem: Activity Intolerance (Cardiovascular Surgery)  Goal: Improved Activity Tolerance  Outcome: Ongoing, Progressing     Problem: Adjustment to Surgery (Cardiovascular Surgery)  Goal: Optimal Coping with Heart Surgery  Outcome: Ongoing, Progressing     Problem: Bleeding (Cardiovascular Surgery)  Goal: Bleeding (Cardiovascular Surgery)  Outcome: Ongoing, Progressing     Problem: Bowel Motility Impaired (Cardiovascular Surgery)  Goal: Effective Bowel Elimination (Cardiovascular Surgery)  Outcome: Ongoing, Progressing     Problem: Cardiac Function Impaired (Cardiovascular Surgery)  Goal: Effective Cardiac Function  Outcome: Ongoing, Progressing     Problem: Cerebral Tissue Perfusion (Cardiovascular Surgery)  Goal: Optimal Cerebral Tissue Perfusion (Cardiovascular Surgery)  Outcome: Ongoing, Progressing     Problem: Fluid and Electrolyte Imbalance (Cardiovascular Surgery)  Goal: Fluid and  Electrolyte Balance (Cardiovascular Surgery)  Outcome: Ongoing, Progressing     Problem: Glycemic Control Impaired (Cardiovascular Surgery)  Goal: Blood Glucose Level Within Targeted Range (Cardiovascular Surgery)  Outcome: Ongoing, Progressing     Problem: Infection (Cardiovascular Surgery)  Goal: Absence of Infection Signs and Symptoms  Outcome: Ongoing, Progressing     Problem: Ongoing Anesthesia Effects (Cardiovascular Surgery)  Goal: Anesthesia/Sedation Recovery  Outcome: Ongoing, Progressing     Problem: Pain (Cardiovascular Surgery)  Goal: Acceptable Pain Control  Outcome: Ongoing, Progressing     Problem: Postoperative Nausea and Vomiting (Cardiovascular Surgery)  Goal: Nausea and Vomiting Relief (Cardiovascular Surgery)  Outcome: Ongoing, Progressing     Problem: Postoperative Urinary Retention (Cardiovascular Surgery)  Goal: Effective Urinary Elimination (Cardiovascular Surgery)  Outcome: Ongoing, Progressing     Problem: Respiratory Compromise (Cardiovascular Surgery)  Goal: Effective Oxygenation and Ventilation (Cardiovascular Surgery)  Outcome: Ongoing, Progressing     Problem: Fall Injury Risk  Goal: Absence of Fall and Fall-Related Injury  Outcome: Ongoing, Progressing     Problem: Skin Injury Risk Increased  Goal: Skin Health and Integrity  Outcome: Ongoing, Progressing     Problem: Fluid and Electrolyte Imbalance (Acute Kidney Injury/Impairment)  Goal: Fluid and Electrolyte Balance  Outcome: Ongoing, Progressing     Problem: Oral Intake Inadequate (Acute Kidney Injury/Impairment)  Goal: Optimal Nutrition Intake  Outcome: Ongoing, Progressing     Problem: Renal Function Impairment (Acute Kidney Injury/Impairment)  Goal: Effective Renal Function  Outcome: Ongoing, Progressing     Problem: Device-Related Complication Risk (CRRT (Continuous Renal Replacement Therapy))  Goal: Safe, Effective Therapy Delivery  Outcome: Ongoing, Progressing     Problem: Hypothermia (CRRT (Continuous Renal Replacement  Therapy))  Goal: Body Temperature Maintained in Desired Range  Outcome: Ongoing, Progressing     Problem: Infection (CRRT (Continuous Renal Replacement Therapy))  Goal: Absence of Infection Signs and Symptoms  Outcome: Ongoing, Progressing     Problem: Impaired Wound Healing  Goal: Optimal Wound Healing  Outcome: Ongoing, Progressing     Problem: Device-Related Complication Risk (Hemodialysis)  Goal: Safe, Effective Therapy Delivery  Outcome: Ongoing, Progressing     Problem: Hemodynamic Instability (Hemodialysis)  Goal: Effective Tissue Perfusion  Outcome: Ongoing, Progressing     Problem: Infection (Hemodialysis)  Goal: Absence of Infection Signs and Symptoms  Outcome: Ongoing, Progressing

## 2023-12-22 NOTE — PLAN OF CARE
Patient's discharge was going to be delayed due to [facility] Medical Director leaving the office at 10 am and not returning until 12/26.   Patient and provider were very upset by this delay so, another plan was formulated. Leo Duron and Rigoberto discussed and made arrangements for patient to receive HD over the weekend and on Tuesday with [facility] and SW will follow up with her chair placement with [facility].  Dr. Campbell, patient, and all members of treatment team are in agreement with this plan.    Marcus Duron, [facility]  Case Management Department  [email or web address]    [facility] - Cardiology Stepdown  Discharge Final Note    Primary Care Provider: Adelaida Disla NP    Expected Discharge Date: 12/22/2023    Final Discharge Note (most recent)       Final Note - 12/22/23 1421          Final Note    Assessment Type Final Discharge Note     Anticipated Discharge Disposition Home or Self Care     Hospital Resources/Appts/Education Provided Provided patient/caregiver with written discharge plan information;Appointments scheduled and added to AVS;Community resources provided        Post-Acute Status    Post-Acute Authorization Dialysis     Diaylsis Status Discharge Plan Changed     Discharge Delays None known at this time                     Important Message from Medicare             Contact Info       Alexandru Campbell MD   Specialty: Pediatric Cardiothoracic Surgery    [address] [address]   Phone: [phone]       Next Steps: Follow up    Instructions: As scheduled    Michael Wilcox MD   Specialty: Pediatric Cardiology, Cardiology    [address] [address]   Phone: [phone]       Next Steps: Follow up    Instructions: As scheduled    Carlota Solorzano MD   Specialty: Nephrology    [address] [id number]   Phone: [phone]       Next Steps: Follow up    Instructions: As scheduled or make appointment immediately upon dsicharge          Future  Appointments   Date Time Provider Department Center   12/23/2023  7:15 AM DIALYSIS, ALEJANDRO MARTIN [facility] DLYS [facility]   12/28/2023  1:00 PM [facility] OIC-XRAY [facility] XRAY [facility]   12/28/2023  1:40 PM LAB, APPOINTMENT [facility] LAB VNP [facility]   12/28/2023  2:00 PM EKG, APPT [facility] EKG [facility]   12/28/2023  2:30 PM ECHO, [facility] ECHOSTR [facility]   12/28/2023  3:15 PM Alexandru Campbell MD [facility] PED  [address] [address]   12/28/2023  3:30 PM Michael Wilcox MD [facility] CONHR Alejandro kashif

## 2023-12-22 NOTE — PT/OT/SLP PROGRESS
Occupational Therapy      Patient Name:  Megan Cook   MRN:  [id number]    Patient not seen today secondary to Other (Comment) (Pt visited this AM at 1010 sleeping requesting to return later in day (pt fatigued after working with PT). Per chart review this afternoon, pt with discharge orders in place to leave facility.). Will follow-up for therapy  if pt remains hospitalized.    12/22/2023

## 2023-12-22 NOTE — PROGRESS NOTES
Please see previous notes from this SW for continuity  ________________________________    SW received update pt desiring to leave hospital as soon as possible. Pt unlikely to have an outpt dialysis chair confirmed before Tuesday (12/26).     LALIT contacted [facility] in [place], [place]. LALIT spoke with ER triage nurse Redd. Per Redd, this [facility] has an acute dialysis unit that can provide emergent dialysis without requiring pts to be admitted. Per Redd, if this pt presents to [facility] and is in need of emergent dialysis, they will be able to provide this service.     If pt desires to leave hospital prior to securing outpt dialysis clinic, pt can present to [facility] ED for dialysis on an emergent basis until outpt dialysis clinic is confirmed.      See below for dialysis locations and information:    -[facility] ED  [address] [place] [address] [place] [address]  [phone]  **Pt can present to this ED for emergent dialysis      -[address] [place] [address]  [phone]  **Pt's referral is currently pending medical director approval at this clinic

## 2023-12-22 NOTE — NURSING
Patient is ready for discharge. Patient stable alert and oriented. IV removed. No complaints of pain. Discussed discharge plan. Reinforced sternal precautions.  Reviewed medications and side effects, appointments, and answered questions with patient and sister.

## 2023-12-22 NOTE — PROGRESS NOTES
LALIT contacted [facility] for an update regarding pt's outpt dialysis referral. LALIT spoke with clinic manager Yasmeen. Yasmeen informed that pt's referral is being routed to [facility]. Yasmeen stated that the medical director (Freddy Rowe MD) for both [facility] and [facility] is stating that the pt is currently too unstable for the clinic to accept. Per Yasmeen, the medical director wants to wait until Tuesday to review updated medical records for this pt.     Inpt treatment team updated via secure chat.     UPDATE 10:51AM: LALIT attempted to contact [facility] where pt's referral is currently pending. Clinic manager currently unavailable.    UPDATE 10:53AM: LALIT contacted [facility] and spoke with clinic manager Yasmeen. LALIT informed that MD Adrian is would like to discuss this pt with MD Jae. Per Yasmeen, MD Jae is out for the day and will not be back until Tuesday (12/26).     UPDATE 10:56AM: LALIT contacted [facility] again and attempted to speak with clinic manager to discuss pt. Clinic manager currently unavailable. LALIT LVM with contact information requesting call back.    Inpt treatment team updated via secure chat.    LALIT to continue to follow with updates.    Evelyn Franklin LMSW  [facility]  [phone]

## 2023-12-26 ENCOUNTER — PATIENT MESSAGE (OUTPATIENT)
Dept: CARDIOLOGY | Facility: CLINIC | Age: 38
End: 2023-12-26
Payer: COMMERCIAL

## 2023-12-26 NOTE — PROGRESS NOTES
Patient verified Coumadin as 3 mg tablet & tan color tablet taking 3mg daily.  Patient is requesting INR to be done on 12/28 with other labs already scheduled at [facility], she is unable to get to lab today.She will send a portal message or call back with the lab closest to her in [place] after checking with insurance for coverage purposes.     Patient education completed regarding vitamin K diet, when to contact Coumadin Clinic, and Coumadin must be taken after 5 pm.  Diet plan is to eat high vitamin K foods 2-3xper week. She is also drinking Ensure 3-4x weekly. She does not drink alcohol. She is taking a 81mg Asprin daily.

## 2023-12-28 ENCOUNTER — ANTI-COAG VISIT (OUTPATIENT)
Dept: CARDIOLOGY | Facility: CLINIC | Age: 38
End: 2023-12-28
Payer: COMMERCIAL

## 2023-12-28 ENCOUNTER — HOSPITAL ENCOUNTER (OUTPATIENT)
Dept: CARDIOLOGY | Facility: HOSPITAL | Age: 38
Discharge: HOME | End: 2023-12-28
Attending: THORACIC SURGERY (CARDIOTHORACIC VASCULAR SURGERY)
Payer: COMMERCIAL

## 2023-12-28 ENCOUNTER — HOSPITAL ENCOUNTER (OUTPATIENT)
Dept: RADIOLOGY | Facility: HOSPITAL | Age: 38
Discharge: HOME | End: 2023-12-28
Attending: THORACIC SURGERY (CARDIOTHORACIC VASCULAR SURGERY)
Payer: COMMERCIAL

## 2023-12-28 ENCOUNTER — OFFICE VISIT (OUTPATIENT)
Dept: VASCULAR SURGERY | Facility: CLINIC | Age: 38
End: 2023-12-28
Attending: THORACIC SURGERY (CARDIOTHORACIC VASCULAR SURGERY)
Payer: COMMERCIAL

## 2023-12-28 ENCOUNTER — OFFICE VISIT (OUTPATIENT)
Dept: CARDIOLOGY | Facility: CLINIC | Age: 38
End: 2023-12-28
Attending: THORACIC SURGERY (CARDIOTHORACIC VASCULAR SURGERY)
Payer: COMMERCIAL

## 2023-12-28 ENCOUNTER — PATIENT MESSAGE (OUTPATIENT)
Dept: CARDIOLOGY | Facility: CLINIC | Age: 38
End: 2023-12-28
Payer: COMMERCIAL

## 2023-12-28 ENCOUNTER — HOSPITAL ENCOUNTER (OUTPATIENT)
Dept: CARDIOLOGY | Facility: CLINIC | Age: 38
Discharge: HOME | End: 2023-12-28
Attending: THORACIC SURGERY (CARDIOTHORACIC VASCULAR SURGERY)
Payer: COMMERCIAL

## 2023-12-28 VITALS
DIASTOLIC BLOOD PRESSURE: 70 MMHG | SYSTOLIC BLOOD PRESSURE: 148 MMHG | OXYGEN SATURATION: 100 % | WEIGHT: 287.69 LBS | HEART RATE: 104 BPM | BODY MASS INDEX: 46.23 KG/M2 | HEIGHT: 66 IN

## 2023-12-28 VITALS — BODY MASS INDEX: 46.65 KG/M2 | HEIGHT: 65 IN | WEIGHT: 280 LBS

## 2023-12-28 DIAGNOSIS — I37.0 NONRHEUMATIC PULMONARY VALVE STENOSIS: ICD-10-CM

## 2023-12-28 DIAGNOSIS — Q24.9 ADULT CONGENITAL HEART DISEASE: Primary | ICD-10-CM

## 2023-12-28 DIAGNOSIS — Z95.2 S/P AORTIC VALVE REPLACEMENT: ICD-10-CM

## 2023-12-28 DIAGNOSIS — I06.0 RHEUMATIC AORTIC STENOSIS: ICD-10-CM

## 2023-12-28 DIAGNOSIS — Z95.4 H/O ROSS PROCEDURE: ICD-10-CM

## 2023-12-28 DIAGNOSIS — Z95.3 S/P PULMONARY VALVE REPLACEMENT WITH BIOPROSTHETIC VALVE: ICD-10-CM

## 2023-12-28 DIAGNOSIS — Z79.01 LONG TERM (CURRENT) USE OF ANTICOAGULANTS: Primary | ICD-10-CM

## 2023-12-28 DIAGNOSIS — I01.9 RHEUMATIC FEVER WITH CARDIAC INVOLVEMENT: ICD-10-CM

## 2023-12-28 DIAGNOSIS — N17.9 AKI (ACUTE KIDNEY INJURY): ICD-10-CM

## 2023-12-28 DIAGNOSIS — Z95.2 S/P AORTIC VALVE REPLACEMENT: Primary | ICD-10-CM

## 2023-12-28 PROCEDURE — 71046 X-RAY EXAM CHEST 2 VIEWS: CPT | Mod: 26,,, | Performed by: RADIOLOGY

## 2023-12-28 PROCEDURE — 93325 DOPPLER ECHO COLOR FLOW MAPG: CPT

## 2023-12-28 PROCEDURE — 99024 POSTOP FOLLOW-UP VISIT: CPT | Mod: S$GLB,,, | Performed by: THORACIC SURGERY (CARDIOTHORACIC VASCULAR SURGERY)

## 2023-12-28 PROCEDURE — 93325 ECHO (CUPID ONLY): ICD-10-PCS | Mod: 26,,, | Performed by: PEDIATRICS

## 2023-12-28 PROCEDURE — 93005 ELECTROCARDIOGRAM TRACING: CPT | Mod: S$GLB,,, | Performed by: THORACIC SURGERY (CARDIOTHORACIC VASCULAR SURGERY)

## 2023-12-28 PROCEDURE — 99214 PR OFFICE/OUTPT VISIT, EST, LEVL IV, 30-39 MIN: ICD-10-PCS | Mod: S$GLB,,, | Performed by: PEDIATRICS

## 2023-12-28 PROCEDURE — 99999 PR PBB SHADOW E&M-EST. PATIENT-LVL I: CPT | Mod: PBBFAC,,, | Performed by: THORACIC SURGERY (CARDIOTHORACIC VASCULAR SURGERY)

## 2023-12-28 PROCEDURE — 93005 EKG 12-LEAD: ICD-10-PCS | Mod: S$GLB,,, | Performed by: THORACIC SURGERY (CARDIOTHORACIC VASCULAR SURGERY)

## 2023-12-28 PROCEDURE — 93325 DOPPLER ECHO COLOR FLOW MAPG: CPT | Mod: 26,,, | Performed by: PEDIATRICS

## 2023-12-28 PROCEDURE — 93010 EKG 12-LEAD: ICD-10-PCS | Mod: S$GLB,,, | Performed by: INTERNAL MEDICINE

## 2023-12-28 PROCEDURE — 99214 OFFICE O/P EST MOD 30 MIN: CPT | Mod: S$GLB,,, | Performed by: PEDIATRICS

## 2023-12-28 PROCEDURE — 93320 DOPPLER ECHO COMPLETE: CPT | Mod: 26,,, | Performed by: PEDIATRICS

## 2023-12-28 PROCEDURE — 99999 PR PBB SHADOW E&M-EST. PATIENT-LVL II: CPT | Mod: PBBFAC,,, | Performed by: PEDIATRICS

## 2023-12-28 PROCEDURE — 71046 X-RAY EXAM CHEST 2 VIEWS: CPT | Mod: TC

## 2023-12-28 PROCEDURE — 99024 PR POST-OP FOLLOW-UP VISIT: ICD-10-PCS | Mod: S$GLB,,, | Performed by: THORACIC SURGERY (CARDIOTHORACIC VASCULAR SURGERY)

## 2023-12-28 PROCEDURE — 93010 ELECTROCARDIOGRAM REPORT: CPT | Mod: S$GLB,,, | Performed by: INTERNAL MEDICINE

## 2023-12-28 PROCEDURE — 99999 PR PBB SHADOW E&M-EST. PATIENT-LVL II: ICD-10-PCS | Mod: PBBFAC,,, | Performed by: PEDIATRICS

## 2023-12-28 PROCEDURE — 93320 DOPPLER ECHO COMPLETE: CPT

## 2023-12-28 PROCEDURE — 99999 PR PBB SHADOW E&M-EST. PATIENT-LVL I: ICD-10-PCS | Mod: PBBFAC,,, | Performed by: THORACIC SURGERY (CARDIOTHORACIC VASCULAR SURGERY)

## 2023-12-28 PROCEDURE — 93303 ECHO TRANSTHORACIC: CPT | Mod: 26,,, | Performed by: PEDIATRICS

## 2023-12-28 PROCEDURE — 93320 ECHO (CUPID ONLY): ICD-10-PCS | Mod: 26,,, | Performed by: PEDIATRICS

## 2023-12-28 PROCEDURE — 93303 ECHO (CUPID ONLY): ICD-10-PCS | Mod: 26,,, | Performed by: PEDIATRICS

## 2023-12-28 PROCEDURE — 71046 XR CHEST PA AND LATERAL: ICD-10-PCS | Mod: 26,,, | Performed by: RADIOLOGY

## 2023-12-28 RX ORDER — WARFARIN SODIUM 5 MG/1
5 TABLET ORAL DAILY
Qty: 30 TABLET | Refills: 11 | Status: SHIPPED | OUTPATIENT
Start: 2023-12-28 | End: 2024-12-27

## 2023-12-28 NOTE — PROGRESS NOTES
2023    re:Megan Cook  :1985    Massiel Muro, NP  [address] [facility] [place] [address]    Dr. Ahmad Agha [facility]     Dear Doctors:    Megan Cook is a 38 y.o. female seen in my [facility] clinic today for evaluation of heart disease.  To summarize her diagnoses are as follow:  1. Rheumatic heart disease diagnosed at age 11, primarily aortic valve disease   - initially treated with a Ross procedure at the [facility] in    - subsequent aortic root repair (plicated, no prosthetic material used) and aortic valve replacement with a  23 mm Kerri-Davis pericardial valve  23 mm Kerri-Davis pericardial valve 12 by Dr. Jack Gutierrez   - severe aortic and moderate pulmonary valve stenosis with preserved ventricular function necessitating surgery 23:    *Aortic valve replaced with 25mm Carbomedics supra-annular Top-Hat Valve.  Sub therapeutic INR in clinic today.  *Pulmonary Valve replaced with 29mm bioprosthesis inside a dacron graft.     - mitral valve ring noted (likely done at time of Ross) with only mild insufficiency and no significant stenosis  2. Hypertension  3. Obesity  4. Penicillin and cephalexin allergy  5. Postoperative renal failure, on dialysis    To summarize, my recommendations are as follows:  1. SBE prophylaxis is definitely indicated before dental work  2. Take 5 mg of Coumadin today.  Coumadin clinic should contact her tomorrow.  I will reach out to them as well.  3. Follow-up with me with chest x-ray and EKG in 2 months.  4. Follow-up as scheduled with Nephrology and for dialysis.  5. Sternotomy precautions were reinforced.      Discussion:  Given her difficult operative and postoperative course, she really looks great.  Biventricular function looks excellent.  Her valves are working well.  I am very hopeful that her kidney function will return, and I am encouraged by her increased urine output.   Her INR was low today, and I recommended she take 5 mg of Coumadin.  The Coumadin clinic should reach out.    History of present illness:  She had a difficult surgical course as noted below.  Since discharge on December 22, 2023, she has actually done really well.  Her dyspnea on exertion is still present, but it is definitely better than it was before her surgery.  No chest pain, palpitations, syncope, near-syncope, fever, or drainage from her wound.  No concerns about infection.  She continues to get dialysis, most recently yesterday.  She has some mild pedal edema.  They purposely did not take all the fluid off yesterday, and she definitely feels like her urine output has increased some although it is not back to normal.    Lab Results   Component Value Date    WBC 6.49 12/28/2023    HGB 7.3 (L) 12/28/2023    HCT 22.4 (L) 12/28/2023    MCV 90 12/28/2023     12/28/2023       Lab Results   Component Value Date    INR 1.3 (H) 12/28/2023    INR 1.3 (H) 12/28/2023    INR 2.7 (H) 12/22/2023     CMP  Sodium   Date Value Ref Range Status   12/28/2023 133 (L) 136 - 145 mmol/L Final     Potassium   Date Value Ref Range Status   12/28/2023 3.9 3.5 - 5.1 mmol/L Final     Chloride   Date Value Ref Range Status   12/28/2023 92 (L) 95 - 110 mmol/L Final     CO2   Date Value Ref Range Status   12/28/2023 29 23 - 29 mmol/L Final     Glucose   Date Value Ref Range Status   12/28/2023 102 70 - 110 mg/dL Final     BUN   Date Value Ref Range Status   12/28/2023 21 (H) 6 - 20 mg/dL Final     Creatinine   Date Value Ref Range Status   12/28/2023 6.1 (H) 0.5 - 1.4 mg/dL Final     Calcium   Date Value Ref Range Status   12/28/2023 8.6 (L) 8.7 - 10.5 mg/dL Final     Total Protein   Date Value Ref Range Status   12/28/2023 6.1 6.0 - 8.4 g/dL Final     Albumin   Date Value Ref Range Status   12/28/2023 2.9 (L) 3.5 - 5.2 g/dL Final     Total Bilirubin   Date Value Ref Range Status   12/28/2023 0.9 0.1 - 1.0 mg/dL Final     Comment:      For infants and newborns, interpretation of results should be based  on gestational age, weight and in agreement with clinical  observations.    Premature Infant recommended reference ranges:  Up to 24 hours.............<8.0 mg/dL  Up to 48 hours............<12.0 mg/dL  3-5 days..................<15.0 mg/dL  6-29 days.................<15.0 mg/dL       Alkaline Phosphatase   Date Value Ref Range Status   12/28/2023 109 55 - 135 U/L Final     AST   Date Value Ref Range Status   12/28/2023 21 10 - 40 U/L Final     ALT   Date Value Ref Range Status   12/28/2023 30 10 - 44 U/L Final     Anion Gap   Date Value Ref Range Status   12/28/2023 12 8 - 16 mmol/L Final     eGFR   Date Value Ref Range Status   12/28/2023 8.5 (A) >60 mL/min/1.73 m^2 Final         She went to the operating room on December 8, 2023.  Her operative course was complicated by a prolonged bypass time due to the need to twice redo the right ventricle to pulmonary artery conduit due to suspected distal obstruction and difficulty obtaining hemostasis.  Although she was stable throughout the operation, she required extensive transfusions.  She developed acute renal failure and was treated with dialysis in the hospital.  She was discharged home on dialysis.  She was on inotropes for the 1st couple of days after surgery and intubated for 5 days after surgery.  Postoperative echocardiogram looked great.    Cath 8/1/23:  1. Rheumatic heart disease status post Ross procedure and mitral valve ring and subsequent bioprosthetic aortic valve replacement (23mm CE) for autograft AI.  2. Severe bioprosthetic aortic valve stenosis, peak gradient 55 mm Hg.  Likely some degree of patient-prosthesis mismatch.  3. LV diastolic dysfunction with mildly elevated LV end-diastolic pressure (16 mmHg).  4. 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  Heavily calcified shrunken homograft conduit.  5. Free RV to pulmonary artery conduit insufficiency.  6. Normal  systemic and pulmonary veins.    7. No shunts detected.    CTA 5/29/23:  TAVR measurements as above.  Postoperative changes of aortic valve and aortic root replacement with mild dilation of the aortic root measuring 4.1 cm.  Uterine fibroid.    The review of systems is as noted above. It is otherwise negative for other symptoms related to the general, neurological, psychiatric, endocrine, gastrointestinal, genitourinary, respiratory, dermatologic, musculoskeletal, hematologic, and immunologic systems.    Past Medical History:   Diagnosis Date    Rheumatic fever with cardiac involvement      Past Surgical History:   Procedure Laterality Date    ANGIOGRAM, CORONARY, PEDIATRIC  8/1/2023    Procedure: Angiogram, Coronary, Pediatric;  Surgeon: Anthony Dubois Jr., MD;  Location: [facility] CATH LAB;  Service: Cardiology;;    ANGIOGRAM, PULMONARY, PEDIATRIC  8/1/2023    Procedure: Angiogram, Pulmonary, Pediatric;  Surgeon: Anthony Dubois Jr., MD;  Location: [facility] CATH LAB;  Service: Cardiology;;    AORTIC VALVE REPLACEMENT  2000    AORTIC VALVE REPLACEMENT  2012    AORTIC VALVE REPLACEMENT  2012    with aortic root replacement at [facility]    AORTIC VALVE REPLACEMENT N/A 12/8/2023    Procedure: Replacement-valve-aortic;  Surgeon: Alexandru Campbell MD;  Location: [facility] OR [address] [place] [address];  Service: Cardiovascular;  Laterality: N/A;    COMBINED RIGHT AND RETROGRADE LEFT HEART CATHETERIZATION FOR CONGENITAL HEART DEFECT N/A 8/1/2023    Procedure: Catheterization, Heart, Combined Right and Retrograde Left, for Congenital Heart Defect;  Surgeon: Anthony Dubois Jr., MD;  Location: [facility] CATH LAB;  Service: Cardiology;  Laterality: N/A;    PULMONARY VALVE REPLACEMENT N/A 12/8/2023    Procedure: REPLACEMENT, PULMONARY VALVE;  Surgeon: Alexandru Campbell MD;  [facility];  Service: Cardiovascular;  Laterality: N/A;    REPLACEMENT OF AORTIC VALVE USING ROSS PROCEDURE N/A 2000    [facility]     Family History   Problem Relation Age of Onset     Cardiomyopathy Other     Congenital heart disease Other      Social History     Socioeconomic History    Marital status: Single   Tobacco Use    Smoking status: Never    Smokeless tobacco: Never   Substance and Sexual Activity    Alcohol use: Yes     Alcohol/week: 1.0 standard drink of alcohol     Types: 1 Glasses of wine per week     Comment: social     Social Determinants of Health     Financial Resource Strain: Low Risk  (12/11/2023)    Overall Financial Resource Strain (CARDIA)     Difficulty of Paying Living Expenses: Not hard at all   Food Insecurity: No Food Insecurity (12/11/2023)    Hunger Vital Sign     Worried About Running Out of Food in the Last Year: Never true     Ran Out of Food in the Last Year: Never true   Transportation Needs: No Transportation Needs (12/11/2023)    PRAPARE - Transportation     Lack of Transportation (Medical): No     Lack of Transportation (Non-Medical): No   Physical Activity: Insufficiently Active (12/11/2023)    Exercise Vital Sign     Days of Exercise per Week: 2 days     Minutes of Exercise per Session: 20 min   Stress: Patient Declined (12/11/2023)    [employer] [place] [employer] - Occupational Stress Questionnaire     Feeling of Stress : Patient declined   Social Connections: Socially Isolated (12/11/2023)    Social Connection and Isolation Panel [NHANES]     Frequency of Communication with Friends and Family: Once a week     Frequency of Social Gatherings with Friends and Family: Once a week     Attends Orthodoxy Services: Never     Active Member of Clubs or Organizations: No     Attends Club or Organization Meetings: Never     Marital Status:    Housing Stability: Unknown (12/11/2023)    Housing Stability Vital Sign     Unable to Pay for Housing in the Last Year: No     Unstable Housing in the Last Year: No     Current Outpatient Medications on File Prior to Visit   Medication Sig Dispense Refill    acetaminophen (TYLENOL) 500 MG tablet Take 1  "tablet (500 mg total) by mouth every 4 (four) hours as needed for Pain. 84 tablet 0    aspirin 81 MG Chew Chew and swallow 1 tablet (81 mg total) by mouth once daily. 90 tablet 3    cetirizine (ZYRTEC) 10 MG tablet Take 10 mg by mouth once daily.      cholecalciferol, vitamin D3, 125 mcg (5,000 unit) Tab Take 5,000 Units by mouth.      PAMELA 0.35 mg tablet Take by mouth.      polyethylene glycol (GLYCOLAX) 17 gram/dose powder Use cap to measure 17g, mix with liquid, and drink by mouth once daily. for 14 days 238 g 0    senna-docusate 8.6-50 mg (PERICOLACE) 8.6-50 mg per tablet Take 1 tablet by mouth once daily. for 14 days 14 tablet 0    sevelamer carbonate (RENVELA) 800 mg Tab Take 1 tablet (800 mg total) by mouth 3 (three) times daily with meals. 90 tablet 11    traMADoL (ULTRAM) 50 mg tablet Take 1 tablet (50 mg total) by mouth every 8 (eight) hours as needed for Pain. 21 each 0    warfarin (COUMADIN) 3 MG tablet Take 1 tablet (3 mg total) by mouth Daily. 30 tablet 11    [DISCONTINUED] norethindrone (MICRONOR) 0.35 mg tablet Take 1 tablet (0.35 mg total) by mouth once daily. for 28 doses (Patient not taking: Reported on 12/28/2023) 28 tablet 0    [DISCONTINUED] traMADoL (ULTRAM) 50 mg tablet Take 1 tablet (50 mg total) by mouth every 8 (eight) hours as needed for Pain. (Patient not taking: Reported on 12/28/2023) 21 each 0     No current facility-administered medications on file prior to visit.     Review of patient's allergies indicates:   Allergen Reactions    Cephalexin Other (See Comments)     She gets mouth sores.     Penicillin g Other (See Comments)     Causes mouth sores.       Vitals:    12/28/23 1518   BP: (!) 148/70   BP Location: Right arm   Patient Position: Sitting   Pulse: 104   SpO2: 100%   Weight: 130.5 kg (287 lb 11.2 oz)   Height: 5' 5.98" (1.676 m)     In general, she is an obese but otherwise very healthy-appearing nondysmorphic female in no apparent distress.  The eyes, nares, and oropharynx " are clear.  Eyelids and conjunctiva are normal without drainage or erythema.  Pupils equal and round bilaterally.  The head is normocephalic and atraumatic.  The neck is supple without jugular venous distention or thyroid enlargement.  The lungs are clear to auscultation bilaterally.  Her median sternotomy incision is healing well with no evidence of infection.  First heart sound is normal, 2nd is mechanical.  Grade 1-2/6 systolic ejection murmur auscultated.  No diastolic murmur.  No gallop  The abdominal exam is benign without hepatosplenomegaly, tenderness, or distention.  Pulses are normal in all 4 extremities with brisk capillary refill and no clubbing, cyanosis.  Trace pedal edema noted.  No rashes are noted.    I personally reviewed the following tests performed today and my interpretation follows:  EKG in clinic today reveals sinus tachycardia with rate 102 beats per minute.  Nonspecific T-wave changes.    The official echo report is pending.  I reviewed that study in detail.  My interpretation is as follows:  1. Somewhat thickened appearing pericardium with trivial effusion  2. Excellent biventricular function   3. Mild tricuspid insufficiency estimates a right ventricular systolic pressure around 40 mmHg.  Mild flow acceleration across the pulmonary valve at 2.5 m/sec with mean gradient 15 mmHg.  4. No aortic insufficiency, peak velocity across the aortic valve 2 m/sec   5. Mild flow acceleration across the mitral valve with mean gradient around 7.5 mmHg and no significant insufficiency        Thank you for referring this patient to our clinic.  Please call with any questions.    Sincerely,        Michael Wilcox MD  Pediatric Cardiology  Adult Congenital Heart Disease  Pediatric Heart Failure and Transplantation  [facility] [address] [place]  [address]  [phone]

## 2023-12-29 ENCOUNTER — TELEPHONE (OUTPATIENT)
Dept: NEPHROLOGY | Facility: CLINIC | Age: 38
End: 2023-12-29
Payer: COMMERCIAL

## 2023-12-29 ENCOUNTER — PATIENT MESSAGE (OUTPATIENT)
Dept: CARDIOLOGY | Facility: CLINIC | Age: 38
End: 2023-12-29
Payer: COMMERCIAL

## 2023-12-29 NOTE — PROGRESS NOTES
Congenital Cardiac Surgery Note:     Ms. Megan Cook underwent re-replacement of her aortic valve and re-replacement of her RV-PA conduit/pulmonary valve on 12-8-2023.       She now has  a 25 mechanical aortic valve and 29mm bioprosthetic pulmonary valve within a dacron graft conduit.     Post-operative course was complicated by acute renal failure.     She presents today for her routine post-operative visit.      She reports no concerns and says she has been feeling better each day.     She established care at her local dialysis clinic and has follow up with a nephrologist tomorrow.     There were no vitals filed for this visit.      Physical Exam:  General: appears well  CV: normal rate and regular  Resp: normal effort  Neuro: alert and oriented, no focal deficits, normal strength  Ext: moderate edema  Incision: Sternotomy site healing well, expected erythema around edges given staples were still in place, staples removed in clinic     Imaging:  CXR:  Overall clear with likely small left effusion vs lower lobe atelectasis, sternal wires intact  ECG: Sinus tachycardia(Rate 102)  ECHO: Reviewed with Dr. Wilcox, no concerns, formal report pending     Assessment and Plan:    Overall doing well with no new concerns since discharge on her end.     Unfortunately, she continues to be dialysis dependent. She does say she has been making more urine since discharge.    She is seeing her nephrologist tomorrow and has established follow up with her local dialysis clinic.     INR today was only 1.3.    She had been reduced to 3mg of warfarin daily on day of discharge which she has been taking.  She does not report any diet changes that would impact the change.  Dr. Wilcox increased her dose and she is being followed by the Coumadin clinic.   Her INR target is 2.5(range 2.0-3.0).  She is continuing a daily aspirin as well.       From my standpoint, we discussed her wound care and sternal precautions.  She should follow the  sternal precautions for a total of 8 weeks from the day of surgery.      She will follow up with Dr. Wilcox as her cardiologist.    I am happy to assist in anyway needed with her care moving forward but she does not need follow up in my office again unless new concerns arise.        Alexandru Campbell MD

## 2023-12-29 NOTE — PROGRESS NOTES
[email or web address]    2023 1:36 PM    Assessment/Plan:    Patient presents today with subtherapeutic  INR.    Assessment of patient findings and chart review: pt has been taking warfarin 3 mg daily since hospital discharge    Recommendation for patient's warfarin regimen:  Pt advised to take warfarin 6 mg yesterday. Patient also saw Dr. Wilcox yesterday, new rx for warfarin 5 mg sent, patient picked up rx 23. Plan to use warfarin 5 mg tablets for now and increase weekly dose per calendar     Recommend repeat INR on Tuesday  _________________________________________________________________    Megan Cook (38 y.o.) is followed by the [facility].    Anticoagulation Summary  As of 2023      INR goal:  2.0-3.0   TTR:  --   INR used for dosin.3 (2023)   Warfarin maintenance plan:  2.5 mg (5 mg x 0.5) every Mon, Wed, Fri; 5 mg (5 mg x 1) all other days   Weekly warfarin total:  27.5 mg   Plan last modified:  Manisha Mccloud, PharmD (2023)   Next INR check:  2024   Target end date:      Indications    S/P aortic valve replacement [Z95.2]  Long term (current) use of anticoagulants [Z79.01]                 Anticoagulation Episode Summary       INR check location:      Preferred lab:      Send INR reminders to:  [facility] COUMADIN MONITORING POOL    Comments:            Anticoagulation Care Providers       Provider Role Specialty Phone number    Alexandru Campbell MD Referring Pediatric Cardiothoracic Surgery [phone]    Michael Wilcox MD Responsible Pediatric Cardiology [phone]            Patient Findings       Positives:  Change in medications    Negatives:  Signs/symptoms of thrombosis, Signs/symptoms of bleeding, Change in health, Change in alcohol use, Upcoming invasive procedure, Emergency department visit, Upcoming dental procedure, Missed doses, Extra doses, Change in diet/appetite, Hospital  admission, Bruising    Comments:  Pt has been taking 3mg daily. Pts cardiologist said pt should start on 5's tonight. Pt wanted to confirm with us first. Pt denies any other changes to be noted.

## 2024-01-01 ENCOUNTER — PATIENT MESSAGE (OUTPATIENT)
Dept: CARDIOLOGY | Facility: CLINIC | Age: 39
End: 2024-01-01
Payer: COMMERCIAL

## 2024-01-01 ENCOUNTER — PATIENT MESSAGE (OUTPATIENT)
Dept: VASCULAR SURGERY | Facility: CLINIC | Age: 39
End: 2024-01-01
Payer: COMMERCIAL

## 2024-01-02 ENCOUNTER — TELEPHONE (OUTPATIENT)
Dept: CARDIOLOGY | Facility: CLINIC | Age: 39
End: 2024-01-02
Payer: COMMERCIAL

## 2024-01-02 ENCOUNTER — TELEPHONE (OUTPATIENT)
Dept: NEPHROLOGY | Facility: CLINIC | Age: 39
End: 2024-01-02
Payer: COMMERCIAL

## 2024-01-02 ENCOUNTER — PATIENT MESSAGE (OUTPATIENT)
Dept: CARDIOLOGY | Facility: CLINIC | Age: 39
End: 2024-01-02
Payer: COMMERCIAL

## 2024-01-02 NOTE — TELEPHONE ENCOUNTER
LALIT received call from [facility] clinic manager Tamara. Per Tamara, pt was accepted for outpt dialysis at this clinic and had initial appointment with them 12/27/23.      LALIT will sign off.    Evelyn Franklin LMSW  [facility]  [phone]

## 2024-01-04 ENCOUNTER — PATIENT MESSAGE (OUTPATIENT)
Dept: PEDIATRIC CARDIOLOGY | Facility: CLINIC | Age: 39
End: 2024-01-04
Payer: COMMERCIAL

## 2024-01-04 LAB
ASCENDING AORTA: 2.02 CM
AV INDEX (PROSTH): 0.65
AV MEAN GRADIENT: 10 MMHG
AV PEAK GRADIENT: 19 MMHG
AV VALVE AREA BY VELOCITY RATIO: 1.86 CM²
AV VALVE AREA: 1.96 CM²
AV VELOCITY RATIO: 0.62
BSA FOR ECHO PROCEDURE: 2.41 M2
CV ECHO LV RWT: 0.36 CM
DOP CALC AO PEAK VEL: 2.16 M/S
DOP CALC AO VTI: 38.42 CM
DOP CALC LVOT AREA: 3 CM2
DOP CALC LVOT DIAMETER: 1.96 CM
DOP CALC LVOT PEAK VEL: 1.33 M/S
DOP CALC LVOT STROKE VOLUME: 75.45 CM3
DOP CALC MV VTI: 43.73 CM
DOP CALC RVOT PEAK VEL: 1.03 M/S
DOP CALC RVOT VTI: 20.29 CM
DOP CALCLVOT PEAK VEL VTI: 25.02 CM
E WAVE DECELERATION TIME: 247.35 MSEC
E/A RATIO: 1.15
E/E' RATIO: 14.96 M/S
ECHO LV POSTERIOR WALL: 0.86 CM (ref 0.6–1.1)
FRACTIONAL SHORTENING: 30 % (ref 28–44)
INTERVENTRICULAR SEPTUM: 0.9 CM (ref 0.6–1.1)
LA MAJOR: 6.64 CM
LA MINOR: 6.61 CM
LA WIDTH: 4.28 CM
LEFT ATRIUM SIZE: 4.72 CM
LEFT ATRIUM VOLUME INDEX: 48.8 ML/M2
LEFT ATRIUM VOLUME: 113.76 CM3
LEFT INTERNAL DIMENSION IN SYSTOLE: 3.33 CM (ref 2.1–4)
LEFT VENTRICLE DIASTOLIC VOLUME INDEX: 45.13 ML/M2
LEFT VENTRICLE DIASTOLIC VOLUME: 105.16 ML
LEFT VENTRICLE MASS INDEX: 61 G/M2
LEFT VENTRICLE SYSTOLIC VOLUME INDEX: 19.3 ML/M2
LEFT VENTRICLE SYSTOLIC VOLUME: 44.98 ML
LEFT VENTRICULAR INTERNAL DIMENSION IN DIASTOLE: 4.75 CM (ref 3.5–6)
LEFT VENTRICULAR MASS: 140.98 G
LV LATERAL E/E' RATIO: 10.12 M/S
LV SEPTAL E/E' RATIO: 28.67 M/S
MV A" WAVE DURATION": 6.28 MSEC
MV MEAN GRADIENT: 8 MMHG
MV PEAK A VEL: 1.49 M/S
MV PEAK E VEL: 1.72 M/S
MV PEAK GRADIENT: 16 MMHG
MV STENOSIS PRESSURE HALF TIME: 71.73 MS
MV VALVE AREA BY CONTINUITY EQUATION: 1.73 CM2
MV VALVE AREA P 1/2 METHOD: 3.07 CM2
PISA TR MAX VEL: 3.2 M/S
PULM VEIN S/D RATIO: 0.83
PV MEAN GRADIENT: 2 MMHG
PV PEAK D VEL: 0.72 M/S
PV PEAK GRADIENT: 23 MMHG
PV PEAK S VEL: 0.6 M/S
PV PEAK VELOCITY: 2.41 M/S
RA MAJOR: 6.03 CM
RA WIDTH: 5.25 CM
RIGHT VENTRICULAR END-DIASTOLIC DIMENSION: 4.2 CM
SINUS: 2.3 CM
STJ: 1.92 CM
TDI LATERAL: 0.17 M/S
TDI SEPTAL: 0.06 M/S
TDI: 0.12 M/S
TR MAX PG: 41 MMHG
TRICUSPID ANNULAR PLANE SYSTOLIC EXCURSION: 1.86 CM
Z-SCORE OF LEFT VENTRICULAR DIMENSION IN END DIASTOLE: -6.87
Z-SCORE OF LEFT VENTRICULAR DIMENSION IN END SYSTOLE: -4.23

## 2024-01-06 ENCOUNTER — PATIENT MESSAGE (OUTPATIENT)
Dept: CARDIOLOGY | Facility: CLINIC | Age: 39
End: 2024-01-06
Payer: COMMERCIAL

## 2024-01-08 NOTE — PROGRESS NOTES
1/08/24  Patient called to reschedule 1/02 missed lab appointment to 1/09/24 at "[facility], standing lab order was generated to be signed and faxed

## 2024-01-10 ENCOUNTER — PATIENT MESSAGE (OUTPATIENT)
Dept: CARDIOLOGY | Facility: CLINIC | Age: 39
End: 2024-01-10
Payer: COMMERCIAL

## 2024-01-11 NOTE — PROGRESS NOTES
1/11/24 Patient called regarding 1/09 lab, states went to [facility] but no lab order was found so she had it drawn 1/09 at her Cardiologist's office -Dr Deann St ph # [phone] and she gave a verbal INR result of: 3.9, reports as per Dr St she held dose 1/09 and 1/09 and today will resume dose of 3 mg Monday & Friday and 5 mg all other days, does not thinks she will be seeing [facility] anymore but not really sure

## 2024-01-11 NOTE — PROGRESS NOTES
I called and spoke to nurse with Dr Deann St ([phone]). I explained that patient reports her coumadin is being managed by Dr St and she is not certain if she will follow up with [facility]. I explained that we wanted to confirm that Dr St will be managing her warfarin before we discharge her from our coumadin clinic. She will speak to MD and call me back.

## 2024-01-15 NOTE — PROGRESS NOTES
[facility] - Cardiology Stepdown  Wound Care    Patient Name:  Megan Cook   MRN:  [id number]  Date: 12/20/2023  Diagnosis: Rheumatic aortic stenosis    History:     Past Medical History:   Diagnosis Date    Rheumatic fever with cardiac involvement        Social History     Socioeconomic History    Marital status: Single   Tobacco Use    Smoking status: Never    Smokeless tobacco: Never   Substance and Sexual Activity    Alcohol use: Yes     Alcohol/week: 1.0 standard drink of alcohol     Types: 1 Glasses of wine per week     Comment: social     Social Determinants of Health     Financial Resource Strain: Low Risk  (12/11/2023)    Overall Financial Resource Strain (CARDIA)     Difficulty of Paying Living Expenses: Not hard at all   Food Insecurity: No Food Insecurity (12/11/2023)    Hunger Vital Sign     Worried About Running Out of Food in the Last Year: Never true     Ran Out of Food in the Last Year: Never true   Transportation Needs: No Transportation Needs (12/11/2023)    PRAPARE - Transportation     Lack of Transportation (Medical): No     Lack of Transportation (Non-Medical): No   Physical Activity: Insufficiently Active (12/11/2023)    Exercise Vital Sign     Days of Exercise per Week: 2 days     Minutes of Exercise per Session: 20 min   Stress: Patient Declined (12/11/2023)    [employer] - Occupational Stress Questionnaire     Feeling of Stress : Patient declined   Social Connections: Socially Isolated (12/11/2023)    Social Connection and Isolation Panel [NHANES]     Frequency of Communication with Friends and Family: Once a week     Frequency of Social Gatherings with Friends and Family: Once a week     Attends [employer] Services: Never     Active Member of Clubs or Organizations: No     Attends Club or Organization Meetings: Never     Marital Status:    Housing Stability: Unknown (12/11/2023)    Housing Stability Vital Sign     Unable to Pay for Housing in the Last  Year: No     Unstable Housing in the Last Year: No       Precautions:     Allergies as of 10/10/2023 - Reviewed 10/09/2023   Allergen Reaction Noted    Cephalexin Other (See Comments) 10/17/2022    Penicillin g Other (See Comments) 10/17/2022       WO Assessment Details/Treatment     Patient seen for wound/ skin follow up; last seen 12/12.    Reviewed chart for this encounter.   See Flow Sheet for findings.    Pt awake sitting up in bedside chair-agreeable to targeted skin assmt at this time. R buttock wound deferred at this time- will return to complete assmt. L breast continued with resolving bruise. L axilla region beneath support bra strap continues with linear pink/ red discoloration but blanchable- border foam dressings continued as preventative dressing. Lower extremities and heels clear. Pt able to voice good understanding re care.    RECOMMENDATIONS:  R buttock- altered skin integrity: BPCO to wound twice daily; cover with 4x4 border foam dressing.     L breast/ chest/ torso beneath bra strap- altered skin integrity: border foam dressing to cove    Discussed POC with patient   See EMR for orders     Bedside nursing to continue care & monitoring.  Bedside nursing to maintain pressure injury prevention interventions.  Current documented Nathan score is 19 with a nutrition sub scale score of 3.     12/20/23 1130   WOCN Assessment   WOCN Total Time (mins) 30   Visit Date 12/20/23   Visit Time 1130   Consult Type Follow Up   WOCN Speciality Wound   Intervention assessed;changed;chart review   Teaching on-going  (skin/ wound assmt with contined dsg and PI prevention.)   Skin Interventions   Dehiscence Prevention/Management wound/incision splinting encouraged   Device Skin Pressure Protection skin-to-device areas padded   Pressure Reduction Devices positioning supports utilized   Pressure Reduction Techniques frequent weight shift encouraged   Skin Protection adhesive use limited   Positioning   Body Position    (up in bedside chair.)   Head of Bed (HOB) Positioning HOB elevated   Positioning/Transfer Devices pillows   Pressure Injury Prevention    Check Moisture Management Pad Done   Check Medical Devices Done        Incision/Site 12/08/23 2144 Chest   Date First Assessed/Time First Assessed: 12/08/23 2144   Location: Chest   Incision WDL WDL   Dressing Appearance No dressing   Drainage Amount None   Appearance Staples intact   Periwound Area Dry;Intact     Photos taken for reference.   L axilla     L breast    Will continue to follow as needed and/ or as directed until discharge.    .sign  12/20/2023     Yes

## 2024-01-16 ENCOUNTER — ANTI-COAG VISIT (OUTPATIENT)
Dept: CARDIOLOGY | Facility: CLINIC | Age: 39
End: 2024-01-16
Payer: COMMERCIAL

## 2024-01-16 DIAGNOSIS — Z79.01 LONG TERM (CURRENT) USE OF ANTICOAGULANTS: ICD-10-CM

## 2024-01-16 DIAGNOSIS — Z95.2 S/P AORTIC VALVE REPLACEMENT: Primary | ICD-10-CM

## 2024-01-16 NOTE — PROGRESS NOTES
I was able to reach Bryanna at Dr St's office. She confirmed that Dr St is now managing her coumadin. We will discharge from coumadin clinic.

## 2024-01-17 ENCOUNTER — PATIENT MESSAGE (OUTPATIENT)
Dept: PEDIATRIC CARDIOLOGY | Facility: CLINIC | Age: 39
End: 2024-01-17
Payer: COMMERCIAL

## 2024-03-11 PROBLEM — N17.9 ENCOUNTER FOR CONTINUOUS RENAL REPLACEMENT THERAPY (CRRT) FOR ACUTE RENAL FAILURE: Status: RESOLVED | Noted: 2023-12-09 | Resolved: 2024-03-11

## 2024-03-11 PROBLEM — N17.9 AKI (ACUTE KIDNEY INJURY): Status: RESOLVED | Noted: 2023-12-09 | Resolved: 2024-03-11

## 2025-01-06 LAB
ALBUMIN/GLOB SERPL: 1.2 {RATIO}
ALBUMIN: 4.1
ALP ISOS SERPL LEV INH-CCNC: 82 U/L
ALT SERPL-CCNC: 16 UNIT/L
ANION GAP SERPL CALC-SCNC: NORMAL MMOL/L
AST: 20
BILIRUB SERPL-MCNC: 0.5 MG/DL (ref 0.1–1.4)
BILIRUBIN DIRECT+TOT PNL SERPL-MCNC: NORMAL
BUN SERPL-MCNC: 26 MG/DL
BUN SERPL-MCNC: NORMAL MG/DL
BUN/CREAT SERPL: 15
CALCIUM SERPL-MCNC: 9.8 MG/DL
CHLORIDE SERPL-SCNC: 102 MMOL/L (ref 99–108)
CHOL/HDLC RATIO: 3.9
CHOLEST SERPL-MSCNC: 245 MG/DL (ref 0–200)
CHOLEST/HDLC SERPL: ABNORMAL {RATIO}
CK-BB: NORMAL
CO2 SERPL-SCNC: 27 MMOL/L
CO2 SERPL-SCNC: NORMAL MMOL/L
CREAT SERPL-MCNC: 1.7 MG/DL
EGFR: 39
GLOBULIN SER-MCNC: 3.4 G/DL
GLUCOSE SERPL-MCNC: 83 MG/DL
HBA1C MFR BLD: 5.5 % (ref 4–6)
HDL/CHOLESTEROL RATIO: ABNORMAL
HDLC SERPL-MCNC: 63 MG/DL (ref 35–70)
IRON: NORMAL
LDL CHOLESTEROL DIRECT: ABNORMAL
LDLC SERPL CALC-MCNC: 162 MG/DL (ref 0–160)
LDLC SERPL CALC-MCNC: ABNORMAL MG/DL
MAGNESIUM SERPL-MCNC: NORMAL MG/DL
NONHDLC SERPL-MCNC: 182 MG/DL
PHOSPHATE FLD-MCNC: NORMAL MG/DL
POTASSIUM SERPL-SCNC: 4.1 MMOL/L (ref 3.4–5.3)
PROTEIN TOTAL: 7.5
SODIUM BLD-SCNC: 139 MMOL/L (ref 137–147)
TRIGL SERPL-MCNC: 96 MG/DL (ref 40–160)
URATE SERPL-MCNC: NORMAL MG/DL
VLDL CHOLESTEROL: ABNORMAL

## 2025-01-13 ENCOUNTER — OFFICE VISIT (OUTPATIENT)
Dept: FAMILY MEDICINE | Facility: CLINIC | Age: 40
End: 2025-01-13
Payer: COMMERCIAL

## 2025-01-13 VITALS
OXYGEN SATURATION: 99 % | HEART RATE: 82 BPM | DIASTOLIC BLOOD PRESSURE: 106 MMHG | SYSTOLIC BLOOD PRESSURE: 140 MMHG | WEIGHT: 293 LBS | BODY MASS INDEX: 48.79 KG/M2

## 2025-01-13 DIAGNOSIS — Z95.4 H/O ROSS PROCEDURE: ICD-10-CM

## 2025-01-13 DIAGNOSIS — E66.813 CLASS 3 SEVERE OBESITY DUE TO EXCESS CALORIES WITH SERIOUS COMORBIDITY AND BODY MASS INDEX (BMI) OF 50.0 TO 59.9 IN ADULT: ICD-10-CM

## 2025-01-13 DIAGNOSIS — R79.89 ABNORMAL THYROID BLOOD TEST: ICD-10-CM

## 2025-01-13 DIAGNOSIS — I10 HYPERTENSION, UNSPECIFIED TYPE: Primary | ICD-10-CM

## 2025-01-13 DIAGNOSIS — E66.01 CLASS 3 SEVERE OBESITY DUE TO EXCESS CALORIES WITH SERIOUS COMORBIDITY AND BODY MASS INDEX (BMI) OF 50.0 TO 59.9 IN ADULT: ICD-10-CM

## 2025-01-13 DIAGNOSIS — Z76.89 ESTABLISHING CARE WITH NEW DOCTOR, ENCOUNTER FOR: ICD-10-CM

## 2025-01-13 DIAGNOSIS — Q24.9 ADULT CONGENITAL HEART DISEASE: ICD-10-CM

## 2025-01-13 DIAGNOSIS — Z95.2 S/P AORTIC VALVE REPLACEMENT: ICD-10-CM

## 2025-01-13 DIAGNOSIS — I37.0 NONRHEUMATIC PULMONARY VALVE STENOSIS: ICD-10-CM

## 2025-01-13 DIAGNOSIS — Z23 NEEDS FLU SHOT: ICD-10-CM

## 2025-01-13 DIAGNOSIS — Z79.01 LONG TERM (CURRENT) USE OF ANTICOAGULANTS: ICD-10-CM

## 2025-01-13 DIAGNOSIS — E78.2 MIXED HYPERLIPIDEMIA: ICD-10-CM

## 2025-01-13 PROBLEM — E83.39 HYPERPHOSPHATEMIA: Status: RESOLVED | Noted: 2023-12-18 | Resolved: 2025-01-13

## 2025-01-13 PROBLEM — R34 OLIGURIA: Status: RESOLVED | Noted: 2023-12-09 | Resolved: 2025-01-13

## 2025-01-13 PROBLEM — I01.9 RHEUMATIC FEVER WITH CARDIAC INVOLVEMENT: Status: RESOLVED | Noted: 2023-05-24 | Resolved: 2025-01-13

## 2025-01-13 PROCEDURE — 1160F RVW MEDS BY RX/DR IN RCRD: CPT | Mod: S$GLB,,, | Performed by: FAMILY MEDICINE

## 2025-01-13 PROCEDURE — 99204 OFFICE O/P NEW MOD 45 MIN: CPT | Mod: S$GLB,,, | Performed by: FAMILY MEDICINE

## 2025-01-13 PROCEDURE — 99999 PR PBB SHADOW E&M-EST. PATIENT-LVL IV: CPT | Mod: PBBFAC,,, | Performed by: FAMILY MEDICINE

## 2025-01-13 PROCEDURE — G2211 COMPLEX E/M VISIT ADD ON: HCPCS | Mod: S$GLB,,, | Performed by: FAMILY MEDICINE

## 2025-01-13 PROCEDURE — 1159F MED LIST DOCD IN RCRD: CPT | Mod: S$GLB,,, | Performed by: FAMILY MEDICINE

## 2025-01-13 PROCEDURE — 3077F SYST BP >= 140 MM HG: CPT | Mod: S$GLB,,, | Performed by: FAMILY MEDICINE

## 2025-01-13 PROCEDURE — 3080F DIAST BP >= 90 MM HG: CPT | Mod: S$GLB,,, | Performed by: FAMILY MEDICINE

## 2025-01-13 PROCEDURE — 3008F BODY MASS INDEX DOCD: CPT | Mod: S$GLB,,, | Performed by: FAMILY MEDICINE

## 2025-01-13 RX ORDER — CARVEDILOL 6.25 MG/1
6.25 TABLET ORAL 2 TIMES DAILY
COMMUNITY

## 2025-01-13 RX ORDER — TIRZEPATIDE 2.5 MG/.5ML
2.5 INJECTION, SOLUTION SUBCUTANEOUS WEEKLY
COMMUNITY
Start: 2024-12-04

## 2025-01-13 NOTE — PROGRESS NOTES
Subjective:       Patient ID: Megan Cook is a 39 y.o. female.    Chief Complaint: Establish Care    HPI    History of Present Illness    CHIEF COMPLAINT:  Patient presents today for evaluation of thyroid function and weight management.    THYROID CONCERNS:  TSH is elevated at 5.14. She has a family history of hypothyroidism through her mother. She takes multivitamins which may be affecting TSH test results due to biotin content.    WEIGHT MANAGEMENT:  She has lost 10 lbs on her current high protein diet using ZipBound for weight loss. She expresses concern about potential hair loss associated with her weight loss.    CARDIOVASCULAR:  She is followed by Dr. St for cardiology. The cardiologist is concerned about thyroid function affecting her cholesterol and blood pressure. Total cholesterol is 245 with LDL of 162. She has a history of rheumatic fever and underwent a Ross procedure during childhood at the [facility].    RENAL:  She has a history of acute kidney injury requiring temporary dialysis following her last surgery. She was diagnosed with oliguria in December 2023, which has since resolved. Creatinine is 1.72 with an estimated GFR of 39.    MEDICATIONS:  She takes Warfarin, baby aspirin, and Carvedilol 6.25 mg twice daily. She is currently on a multivitamin but is considering temporary discontinuation.    ALLERGIES:  She reports allergies to penicillin and Keflex.    SOCIAL HISTORY:  She has never smoked. She previously consumed alcohol weekly but has discontinued since starting Zofran.          Review of Systems   Constitutional:  Positive for unexpected weight change.   All other systems reviewed and are negative.        Reviewed family, medical, surgical, and social history.    Objective:      Physical Exam    General: No acute distress. Well-developed. Well-nourished.  Eyes: EOMI. Sclerae anicteric.  HENT: Normocephalic. Atraumatic. Nares patent. Moist oral mucosa. Thyroid  exam is normal.  Ears: Bilateral TMs clear. Bilateral EACs clear.  Cardiovascular: Regular rate. Regular rhythm. No murmurs. No rubs. No gallops. Normal S1, S2.  Respiratory: Normal respiratory effort. Clear to auscultation bilaterally. No rales. No rhonchi. No wheezing.  Abdomen: Soft. Non-tender. Non-distended. Normoactive bowel sounds.  Musculoskeletal: No  obvious deformity.  Extremities: No lower extremity edema.  Neurological: Alert & oriented x3. No slurred speech. Normal gait.  Psychiatric: Normal mood. Normal affect. Good insight. Good judgment.  Skin: Warm. Dry. No rash.       BP (!) 140/106 (BP Location: Left arm, Patient Position: Sitting)   Pulse 82   Wt (!) 137.1 kg (302 lb 2.3 oz)   SpO2 99%   BMI 48.79 kg/m²   Physical Exam  Vitals and nursing note reviewed.   Constitutional:       General: She is not in acute distress.     Appearance: She is obese. She is not ill-appearing, toxic-appearing or diaphoretic.   Eyes:      General: No scleral icterus.  Cardiovascular:      Rate and Rhythm: Regular rhythm.   Pulmonary:      Effort: Pulmonary effort is normal. No respiratory distress.      Breath sounds: Normal breath sounds.   Skin:     General: Skin is warm and dry.      Capillary Refill: Capillary refill takes less than 2 seconds.      Coloration: Skin is not jaundiced or pale.   Neurological:      General: No focal deficit present.      Mental Status: She is alert and oriented to person, place, and time.   Psychiatric:         Mood and Affect: Mood normal.         Behavior: Behavior normal.         Thought Content: Thought content normal.         Judgment: Judgment normal.         Assessment:       1. Hypertension, unspecified type    2. Mixed hyperlipidemia    3. Long term (current) use of anticoagulants    4. Abnormal thyroid blood test    5. Adult congenital heart disease    6. H/O Ross procedure    7. S/P aortic valve replacement    8. Nonrheumatic pulmonary valve stenosis    9. Class 3 severe  obesity due to excess calories with serious comorbidity and body mass index (BMI) of 50.0 to 59.9 in adult    10. Needs flu shot    11. Establishing care with new doctor, encounter for        Plan:       Assessment & Plan    IMPRESSION:  - Elevated TSH (5.14) with family history of hypothyroidism suggests possible thyroid dysfunction  - Consider hypothyroidism as potential contributing factor to elevated LDL, blood pressure, and weight issues  - Biotin in multivitamin may have skewed previous TSH results; ordered thyroid panel without biotin interference  - Suspect insulin resistance/hyperinsulinemia contributing to abdominal obesity  - Plan to reassess thyroid function after clean set of labs without biotin influence  - Noted history of TINO with slightly elevated creatinine (1.72) as new baseline  - Hemoglobin A1C of 5.5 suggests possible postprandial glucose/insulin issues  - Considering autoimmune hypothyroidism as possibility, given history of rheumatic fever    THYROID DISORDER:  - Ordered a comprehensive thyroid panel including TSH, free T4, free T3, and anti-TPO antibodies.  - Explained the connection between hypothyroidism and elevated cholesterol levels.  - Elucidated the concept of hormone interplay (thyroid, vitamin D, insulin) in weight regulation.  - Noted that the patient's thyroid levels have been out of range multiple times over the past 2 years, with current TSH elevated at 5.14.  - Performed a physical exam of the thyroid, which was found to be normal.  - Suspected hypothyroidism based on elevated TSH and family history, considering possible autoimmune hypothyroidism or 'lazy thyroid' that may improve with weight loss.  - Advised the patient to discontinue multivitamin intake 5 days before blood draw to avoid biotin interference.  - Recommend weight loss and thyroid support with selenium and iodine to potentially improve thyroid function.    HYPERLIPIDEMIA:  - Noted high total cholesterol at 245  and high LDL at 162, with HDL at 63 and triglycerides at 96.  - Explained the connection between hypothyroidism and elevated cholesterol levels.  - Suggested that addressing thyroid issues may help improve cholesterol levels.  - Focused treatment on addressing underlying thyroid issues and weight loss, which may indirectly improve lipid levels.    CHRONIC KIDNEY DISEASE:  - Ordered urinalysis with microalbumin.  - Noted elevated BUN and creatinine levels at 2.6 and 1.72 respectively, with estimated GFR at 39.  - Observed improvement in creatinine levels from previous readings (2.59 a year ago).  - Acknowledged that slightly elevated creatinine levels are expected due to the patient's history of TINO and dialysis.  - Implied continued monitoring of kidney function.    ANTICOAGULATION THERAPY:  - Confirmed that the patient is currently taking warfarin (Coumadin) as an anticoagulant.  - Noted that the patient's anticoagulation is managed by cardiologist Dr. St.  - Advised continuation of warfarin therapy.  - Cautioned about potential interaction with vitamin K in multivitamins.  - Noted that the patient is currently taking baby aspirin along with Coumadin.  - Implied continuation of aspirin therapy.    CARDIOVASCULAR HISTORY:  - Noted the patient's history of Ross procedure, which involved valve replacement.  - Mentioned that the Ross procedure was performed at [facility].  - Noted that the patient is under the care of cardiologist Dr. St.  - Continued Carvedilol 6.25 mg twice daily for cardiovascular management.    WEIGHT MANAGEMENT:  - Educated the patient on the relationship between insulin resistance, Alzheimer's, and addiction.  - Explained the concept of hormone interplay (thyroid, vitamin D, insulin) in weight regulation.  - Discussed the development of medication tolerance with GLP-1 receptor agonists over time.  - Continued Zab Bound (semaglutide) injections at current dose (2.5  mg).  - Patient to continue weight loss efforts, focusing on fat loss while maintaining   muscle mass.  - Patient to increase water intake throughout the day.  - Patient to maintain elevated protein intake as part of dietary changes.    INSULIN RESISTANCE:  - Noted fasting glucose of 83 and Hemoglobin A1C of 5.5.  - Suspected possible postprandial blood sugar spikes and insulin resistance based on A1C and patient's body composition.  - Assessed that the patient likely has insulin resistance contributing to abdominal obesity.    HYPERTENSION:  - Instructed the patient to continue weekly blood pressure checks at home.  - Noted elevated blood pressure of 302 during the visit, higher than usual for the patient.  - Continued Carvedilol 6.25 mg twice daily for blood pressure management.    MEDICATIONS/SUPPLEMENTS:  - Discontinued multivitamin for 5 days prior to upcoming lab draw.  - Restarted multivitamin after lab draw, considering switch to Centrum for better quality.    LABS:  - Complete blood count (CBC) ordered.  - Iron panel with ferritin ordered.  - Vitamin B12 level ordered.  - Vitamin D level ordered.    INFLUENZA VACCINATION:  - Influenza vaccine administered in office.    FOLLOW UP:  - Follow up in 3 months for virtual appointment for weight management and medication review.  - Follow up in 6 months for in-person appointment.  - Contact office after lab results are available for treatment recommendations.         1. Hypertension, unspecified type  -     CBC Auto Differential; Future; Expected date: 01/13/2025  -     Microalbumin/Creatinine Ratio, Urine; Future; Expected date: 01/13/2025  Stable. Continue current treatment. Monitor labs as warranted.    2. Mixed hyperlipidemia  Stable. Continue current treatment. Monitor labs as warranted.      3. Long term (current) use of anticoagulants  Stable. Continue current treatment. Monitor labs as warranted.  -     CBC Auto Differential; Future; Expected date:  01/13/2025  -     Iron and TIBC; Future; Expected date: 01/13/2025  -     Ferritin; Future; Expected date: 01/13/2025  -     Vitamin B12; Future; Expected date: 01/13/2025  -     Vitamin D; Future; Expected date: 01/13/2025  -     Microalbumin/Creatinine Ratio, Urine; Future; Expected date: 01/13/2025    4. Abnormal thyroid blood test  See above  -     T4, Free; Future; Expected date: 01/13/2025  -     T3, Free; Future; Expected date: 01/13/2025  -     TSH; Future; Expected date: 01/13/2025  -     Thyroid Peroxidase Antibody; Future; Expected date: 01/13/2025    5. Adult congenital heart disease; 6. H/O Ross procedure; 7. S/P aortic valve replacement; 8. Nonrheumatic pulmonary valve stenosis  Noted  Stable. Continue current treatment. Monitor labs as warranted.    9. Class 3 severe obesity due to excess calories with serious comorbidity and body mass index (BMI) of 50.0 to 59.9 in adult  Recommend a diet low in salt and sugar, increased physical activity, and avoidance of processed foods.    10. Needs flu shot  -     influenza (Flulaval, Fluzone, Fluarix) 45 mcg/0.5 mL IM vaccine (> or = 6 mo) 0.5 mL    11. Establishing care with new doctor, encounter for  Request outside records  Schedule f/u wt mgmt and lab review              Strict return precautions reviewed and patient verbalized understanding. Risks, benefits, and alternatives to the plan were reviewed in detail and all questions answered to the patient's satisfaction. All questions were answered to the fullest satisfaction of the patient, and patient verbalized understanding and agreement to treatment plan. Patient agreed to call with any new or worsening symptoms, or present to the ER.     50 minutes total were spent on today's visit, not limited to but including time based on counseling and coordination of care.    This note was generated with the assistance of ambient listening technology. Verbal consent was obtained by the patient and accompanying  visitor(s) for the recording of patient appointment to facilitate this note. I attest to having reviewed and edited the generated note for accuracy, though some syntax or spelling errors may persist. Please contact the author of this note for any clarification.    Follow up in about 11 weeks (around 3/31/2025) for myc E visit for wt mgmt; follow up 6 months in person.

## 2025-01-17 ENCOUNTER — LAB VISIT (OUTPATIENT)
Dept: LAB | Facility: CLINIC | Age: 40
End: 2025-01-17
Payer: COMMERCIAL

## 2025-01-17 DIAGNOSIS — R79.89 ABNORMAL THYROID BLOOD TEST: ICD-10-CM

## 2025-01-17 DIAGNOSIS — Z79.01 LONG TERM (CURRENT) USE OF ANTICOAGULANTS: ICD-10-CM

## 2025-01-17 DIAGNOSIS — I10 HYPERTENSION, UNSPECIFIED TYPE: ICD-10-CM

## 2025-01-17 LAB
25(OH)D3+25(OH)D2 SERPL-MCNC: 60 NG/ML (ref 30–96)
BASOPHILS # BLD AUTO: 0.07 K/UL (ref 0–0.2)
BASOPHILS NFR BLD: 0.7 % (ref 0–1.9)
DIFFERENTIAL METHOD BLD: ABNORMAL
EOSINOPHIL # BLD AUTO: 0.2 K/UL (ref 0–0.5)
EOSINOPHIL NFR BLD: 2.4 % (ref 0–8)
ERYTHROCYTE [DISTWIDTH] IN BLOOD BY AUTOMATED COUNT: 13.2 % (ref 11.5–14.5)
FERRITIN SERPL-MCNC: 114 NG/ML (ref 20–300)
HCT VFR BLD AUTO: 38.2 % (ref 37–48.5)
HGB BLD-MCNC: 11.9 G/DL (ref 12–16)
IMM GRANULOCYTES # BLD AUTO: 0.02 K/UL (ref 0–0.04)
IMM GRANULOCYTES NFR BLD AUTO: 0.2 % (ref 0–0.5)
IRON SERPL-MCNC: 31 UG/DL (ref 30–160)
LYMPHOCYTES # BLD AUTO: 1.7 K/UL (ref 1–4.8)
LYMPHOCYTES NFR BLD: 16.5 % (ref 18–48)
MCH RBC QN AUTO: 27.4 PG (ref 27–31)
MCHC RBC AUTO-ENTMCNC: 31.2 G/DL (ref 32–36)
MCV RBC AUTO: 88 FL (ref 82–98)
MONOCYTES # BLD AUTO: 0.8 K/UL (ref 0.3–1)
MONOCYTES NFR BLD: 7.5 % (ref 4–15)
NEUTROPHILS # BLD AUTO: 7.3 K/UL (ref 1.8–7.7)
NEUTROPHILS NFR BLD: 72.7 % (ref 38–73)
NRBC BLD-RTO: 0 /100 WBC
PLATELET # BLD AUTO: 351 K/UL (ref 150–450)
PMV BLD AUTO: 10.5 FL (ref 9.2–12.9)
RBC # BLD AUTO: 4.35 M/UL (ref 4–5.4)
SATURATED IRON: 10 % (ref 20–50)
T3FREE SERPL-MCNC: 2.8 PG/ML (ref 2.3–4.2)
T4 FREE SERPL-MCNC: 1.17 NG/DL (ref 0.71–1.51)
THYROPEROXIDASE IGG SERPL-ACNC: <6 IU/ML
TOTAL IRON BINDING CAPACITY: 303 UG/DL (ref 250–450)
TRANSFERRIN SERPL-MCNC: 205 MG/DL (ref 200–375)
TSH SERPL DL<=0.005 MIU/L-ACNC: 3.55 UIU/ML (ref 0.4–4)
VIT B12 SERPL-MCNC: 549 PG/ML (ref 210–950)
WBC # BLD AUTO: 10.04 K/UL (ref 3.9–12.7)

## 2025-01-17 PROCEDURE — 36415 COLL VENOUS BLD VENIPUNCTURE: CPT | Mod: ,,, | Performed by: FAMILY MEDICINE

## 2025-01-17 PROCEDURE — 82607 VITAMIN B-12: CPT | Performed by: FAMILY MEDICINE

## 2025-01-17 PROCEDURE — 82728 ASSAY OF FERRITIN: CPT | Performed by: FAMILY MEDICINE

## 2025-01-17 PROCEDURE — 85025 COMPLETE CBC W/AUTO DIFF WBC: CPT | Performed by: FAMILY MEDICINE

## 2025-01-17 PROCEDURE — 84481 FREE ASSAY (FT-3): CPT | Performed by: FAMILY MEDICINE

## 2025-01-17 PROCEDURE — 84443 ASSAY THYROID STIM HORMONE: CPT | Performed by: FAMILY MEDICINE

## 2025-01-17 PROCEDURE — 84466 ASSAY OF TRANSFERRIN: CPT | Performed by: FAMILY MEDICINE

## 2025-01-17 PROCEDURE — 84439 ASSAY OF FREE THYROXINE: CPT | Performed by: FAMILY MEDICINE

## 2025-01-17 PROCEDURE — 86376 MICROSOMAL ANTIBODY EACH: CPT | Performed by: FAMILY MEDICINE

## 2025-01-17 PROCEDURE — 82306 VITAMIN D 25 HYDROXY: CPT | Performed by: FAMILY MEDICINE

## 2025-01-26 ENCOUNTER — PATIENT MESSAGE (OUTPATIENT)
Dept: FAMILY MEDICINE | Facility: CLINIC | Age: 40
End: 2025-01-26
Payer: COMMERCIAL

## 2025-01-26 NOTE — PATIENT INSTRUCTIONS
Dante Guzman,     If you are due for any health screening(s) below please notify me so we can arrange them to be ordered and scheduled. Most healthy patients at your age complete them, but you are free to accept or refuse.     If you can't do it, I'll definitely understand. If you can, I'd certainly appreciate it!    Tests to Keep You Healthy    Cervical Cancer Screening: DUE  Last Blood Pressure <= 139/89 (1/13/2025): NO      Your cervical cancer screening is due     Our records indicate that you may be overdue for your screening Pap smear. A Pap smear is an important health screening that can detect abnormal cells that can become cervical cancer. Cervical cancer screenings allow for early diagnosis and increase the likelihood of successful treatment.     The current recommendation for Pap smear screening is every 3-5 years for women at average risk. We encourage you to schedule your appointment with your [facility] health provider. Many women see a gynecologist for this screening, but some primary care providers also provide Pap screening.     If you recently had your Pap smear screening performed outside of [facility], please let your health care team know so that they can update your health record.      Lets manage your high blood pressure     Your blood pressure was above 140/90 today during your visit. We recommend that you schedule a nurse visit in two weeks to check your blood pressure and discuss ways to support your health goals.     You can also manage your health and record your blood pressure from the comfort of home by keeping a daily blood pressure log. These results are shared with and reviewed by your provider. Please print this form (Daily Blood Pressure Log) to assist you in keeping track of your blood pressure at home.     Schedule your nurse visit in two weeks to learn more about how to track and manage high blood pressure.    Daily Blood Pressure  Log    Name:__________________________________                  Date of Birth:_________    Average Blood Pressure:  __________      Date: Time  (a.m.) Blood  Pressure: Pulse  Rate: Time  (p.m.) Blood  Pressure : Pulse  Rate:   Sample 8:37 127/83 84                                                                                                                                                                                             Common side effects include, but are not limited to:    · Gastrointestinal: Nausea/vomiting, abdominal pain, Diarrhea/constipation, dyspepsia, abdominal distension, eructation, flatulence, gastroenteritis, GERD, gastritis, lipase increase, amylase increase    · Neurological: Headache, dizziness    · Cardiac: Heart rate increase, Hypotension    · Endocrine: Fatigue, hypoglycemia (diabetic patients), alopecia    · Ophthalmic: Retinal disorder (diabetic patients)    · Skin: redness or pain at injection site    Serious Reactions include, but are not limited to:    · Thyroid C-cell tumor (animal studies)    · Medullary thyroid cancer    · Hypersensitivity reaction    · Anaphylaxis    · Angioedema    · Acute kidney injury    · Chronic renal failure exacerbation    · Pancreatitis    · Cholelithiasis    · Cholecystitis    · Syncope

## 2025-02-11 ENCOUNTER — TELEPHONE (OUTPATIENT)
Dept: FAMILY MEDICINE | Facility: CLINIC | Age: 40
End: 2025-02-11
Payer: COMMERCIAL

## 2025-02-28 ENCOUNTER — TELEPHONE (OUTPATIENT)
Dept: FAMILY MEDICINE | Facility: CLINIC | Age: 40
End: 2025-02-28
Payer: COMMERCIAL

## 2025-02-28 NOTE — TELEPHONE ENCOUNTER
Patient informed to initiated an e-visit for medication dosage change. Patient verbalized understanding.

## 2025-02-28 NOTE — TELEPHONE ENCOUNTER
----- Message from Judith sent at 2/28/2025  8:40 AM CST -----  Contact: PT  Type:  RX Refill RequestWho Called:  PTRefill or New Rx: New RXRX Name and Strength:  Zepbound 5 Mg (Not listed in chart)How is the patient currently taking it? (ex. 1XDay):Directed Is this a 30 day or 90 day RX: Directed Preferred Pharmacy with phone number:  [facility] [address] [facility] [id number] [address] [address]Ybbai: [phone] Fax: [phone] Call Back Number:  [phone] Information: PT has appt scheduled for 3/28

## 2025-03-05 ENCOUNTER — E-VISIT (OUTPATIENT)
Dept: FAMILY MEDICINE | Facility: CLINIC | Age: 40
End: 2025-03-05
Payer: COMMERCIAL

## 2025-03-05 DIAGNOSIS — E66.01 CLASS 3 SEVERE OBESITY DUE TO EXCESS CALORIES WITH SERIOUS COMORBIDITY AND BODY MASS INDEX (BMI) OF 50.0 TO 59.9 IN ADULT: Primary | ICD-10-CM

## 2025-03-05 DIAGNOSIS — E66.813 CLASS 3 SEVERE OBESITY DUE TO EXCESS CALORIES WITH SERIOUS COMORBIDITY AND BODY MASS INDEX (BMI) OF 50.0 TO 59.9 IN ADULT: Primary | ICD-10-CM

## 2025-03-05 RX ORDER — TIRZEPATIDE 5 MG/.5ML
5 INJECTION, SOLUTION SUBCUTANEOUS
Qty: 4 PEN | Refills: 0 | Status: SHIPPED | OUTPATIENT
Start: 2025-03-05

## 2025-03-05 NOTE — PROGRESS NOTES
Patient ID: Megan Cook is a 40 y.o. female.    Chief Complaint: General Illness (Entered automatically based on patient selection in [facility].)    The patient initiated a request through [facility] on 3/5/2025 for evaluation and management with a chief complaint of General Illness (Entered automatically based on patient selection in [facility].)     I evaluated the questionnaire submission on 3/5/2025.    [facility] Peq Evisit [facility]-Medication    3/5/2025 10:22 AM CST - Filed by Patient   What do you need help with? Medication Request   Do you agree to participate in an E-Visit? Yes   If you have any of the following symptoms, please present to your local emergency room or call 911:  I acknowledge   Medication requests for narcotics will not be addressed via an E-Visit.  Please schedule an appointment. I acknowledge   Do you have any of the following pregnancy-related conditions? None   Do you want to address a new or existing medication? I would like to address a medication I currently take   What is the main issue you would like addressed today? Refill Zepbound 5mg or move virtual visit up 2 weeks   Would you like to change or continue your medication? Continue medication   What medication would you like to continue?  Zepbound 5mg   Are you taking it as prescribed? Yes    What medical condition is the  medication intended to treat? Obesity   Is the medication helping your condition? Yes   Are you having any side effects from the medication? No   Provide any additional information you feel is important. I take my last dose of 5mg on 3/14 and dont have virtual visit until 3/28. Would like a refill prior or the appt moved up so I dont have to miss 2 doses please   Please attach any relevant images or files    Are you able to take your vital signs? Yes   Systolic Blood Pressure: 128   Diastolic Blood Pressure: 95   Weight: 287   Height: 66   Pulse: 76   Temperature:    Respiration rate:    Pulse Oxygen:          Encounter  Diagnosis   Name Primary?    Class 3 severe obesity due to excess calories with serious comorbidity and body mass index (BMI) of 50.0 to 59.9 in adult Yes        No orders of the defined types were placed in this encounter.     Medications Ordered This Encounter   Medications    tirzepatide, weight loss, (ZEPBOUND) 5 mg/0.5 mL PnIj     Sig: Inject 5 mg into the skin every 7 days.     Dispense:  4 Pen     Refill:  0        Follow up in about 3 weeks (around 3/26/2025) for as scheduled for wt mgmt.      E-Visit Time Trackin min

## 2025-03-28 ENCOUNTER — OFFICE VISIT (OUTPATIENT)
Dept: FAMILY MEDICINE | Facility: CLINIC | Age: 40
End: 2025-03-28
Payer: COMMERCIAL

## 2025-03-28 ENCOUNTER — PATIENT MESSAGE (OUTPATIENT)
Dept: INTERNAL MEDICINE | Facility: CLINIC | Age: 40
End: 2025-03-28
Payer: COMMERCIAL

## 2025-03-28 VITALS — DIASTOLIC BLOOD PRESSURE: 86 MMHG | SYSTOLIC BLOOD PRESSURE: 127 MMHG

## 2025-03-28 DIAGNOSIS — E66.01 CLASS 3 SEVERE OBESITY DUE TO EXCESS CALORIES WITH SERIOUS COMORBIDITY AND BODY MASS INDEX (BMI) OF 50.0 TO 59.9 IN ADULT: ICD-10-CM

## 2025-03-28 DIAGNOSIS — Z79.01 LONG TERM (CURRENT) USE OF ANTICOAGULANTS: ICD-10-CM

## 2025-03-28 DIAGNOSIS — I10 HYPERTENSION, UNSPECIFIED TYPE: Primary | ICD-10-CM

## 2025-03-28 DIAGNOSIS — E66.813 CLASS 3 SEVERE OBESITY DUE TO EXCESS CALORIES WITH SERIOUS COMORBIDITY AND BODY MASS INDEX (BMI) OF 50.0 TO 59.9 IN ADULT: ICD-10-CM

## 2025-03-28 RX ORDER — TIRZEPATIDE 5 MG/.5ML
5 INJECTION, SOLUTION SUBCUTANEOUS
Qty: 4 PEN | Refills: 2 | Status: SHIPPED | OUTPATIENT
Start: 2025-03-28

## 2025-03-28 NOTE — PROGRESS NOTES
[facility]  [address] [place] [address]      The patient location is: [place]  The chief complaint leading to consultation is: follow up med refills, HTN    Visit type: audiovisual    Face to Face time with patient: 14 min  20 minutes of total time spent on the encounter, which includes face to face time and non-face to face time preparing to see the patient (eg, review of tests), Obtaining and/or reviewing separately obtained history, Documenting clinical information in the electronic or other health record, Independently interpreting results (not separately reported) and communicating results to the patient/family/caregiver, or Care coordination (not separately reported).         Each patient to whom he or she provides medical services by telemedicine is:  (1) informed of the relationship between the physician and patient and the respective role of any other health care provider with respect to management of the patient; and (2) notified that he or she may decline to receive medical services by telemedicine and may withdraw from such care at any time.    Notes:     Labs/Tests:  CBC:  Lab Results   Component Value Date    WBC 10.04 01/17/2025    RBC 4.35 01/17/2025    HGB 11.9 (L) 01/17/2025    HCT 38.2 01/17/2025    MCV 88 01/17/2025    MCH 27.4 01/17/2025    MCHC 31.2 (L) 01/17/2025    RDW 13.2 01/17/2025     01/17/2025    MPV 10.5 01/17/2025    GRAN 7.3 01/17/2025    GRAN 72.7 01/17/2025    LYMPH 1.7 01/17/2025    LYMPH 16.5 (L) 01/17/2025    MONO 0.8 01/17/2025    MONO 7.5 01/17/2025    EOS 0.2 01/17/2025    BASO 0.07 01/17/2025    EOSINOPHIL 2.4 01/17/2025    BASOPHIL 0.7 01/17/2025    DIFFMETHOD Automated 01/17/2025     CMP:  Lab Results   Component Value Date     (L) 12/28/2023    K 3.9 12/28/2023    CL 92 (L) 12/28/2023    CO2 29 12/28/2023    BUN 21 (H) 12/28/2023    CREATININE 6.1 (H) 12/28/2023     12/28/2023    CALCIUM 8.6 (L)  12/28/2023    MG 2.1 12/22/2023    PHOS 5.6 (H) 12/22/2023    ALKPHOS 109 12/28/2023    PROT 6.1 12/28/2023    ALBUMIN 2.9 (L) 12/28/2023    BILITOT 0.9 12/28/2023    AST 21 12/28/2023    ALT 30 12/28/2023    ANIONGAP 12 12/28/2023    EGFRNORACEVR 8.5 (A) 12/28/2023     HA1C:  Lab Results   Component Value Date    HGBA1C 5.5 11/14/2022     LIPIDS:  Lab Results   Component Value Date    CHOL 254 (H) 04/01/2024    TRIG 127 04/01/2024    HDL 56 04/01/2024    LDLCALC 173 (H) 04/01/2024    NONHDLCHOL 198 (H) 04/01/2024     Magnesium:  Lab Results   Component Value Date    MG 2.1 12/22/2023     MicroAlbumin/Creatinine Ratio, Urine:  Lab Results   Component Value Date    CREATRANDUR 229.7 11/14/2022     Iron / TIBC:  Lab Results   Component Value Date    IRON 31 01/17/2025    TRANSFERRIN 205 01/17/2025    TIBC 303 01/17/2025    FESATURATED 10 (L) 01/17/2025    FERRITIN 114 01/17/2025     TSH / T3 / T4:  Lab Results   Component Value Date    TSH 3.554 01/17/2025    T3FREE 2.8 01/17/2025    FREET4 1.17 01/17/2025     Vit B / Vit D:  Lab Results   Component Value Date    BTBCBIIU26 549 01/17/2025    SSGXZUWH57DU 60 01/17/2025     UA Reflex w/ Culture:  Lab Results   Component Value Date    PH 7.393 12/14/2023    GLUCOSE 96 11/14/2022         Subjective:       Patient ID: Megan Cook is a 40 y.o. female.    Chief Complaint: Follow-up and Obesity      History of Present Illness    CHIEF COMPLAINT:  Patient presents today for follow up of weight management and blood pressure.    WEIGHT MANAGEMENT:  She reports a total weight loss of over 30 lbs in approximately three months, from 312 lbs to 281 lbs. Initial loss was about 8 lbs in the first week, attributed to reduced inflammation and water weight. Since then, she averages about 2 lbs of weight loss weekly, with fluctuations including weeks of gaining a pound, maintaining weight, or losing up to 2.5 lbs. She notes fluid retention during menstrual periods and with increased  physical activity. She is currently taking ZepBound 5mg, tonight being the 6th dose, and denies any side effects. She expresses satisfaction with current dosage and weight loss progress, with no intention to increase dosage.    CARDIOVASCULAR:  She reports last blood pressure reading in January was 140/106. Echocardiogram showed EF of 65.    MEDICATIONS:  She takes Coumadin 5mg daily with 7.5mg on Thursdays, daily baby aspirin, and a progestin-only birth control pill.    PREVENTIVE CARE:  Her last pap smear was approximately 2 years ago. She has an upcoming gynecologist appointment scheduled for Monday.            312 lb 12-  Zepbound 5mg x 5 doses  280.1lb 3-    Sees OBGYN on Monday of this week.    Review of Systems   Constitutional:  Negative for activity change and unexpected weight change.   HENT:  Negative for hearing loss, rhinorrhea and trouble swallowing.    Eyes:  Negative for discharge and visual disturbance.   Respiratory:  Negative for chest tightness and wheezing.    Cardiovascular:  Negative for chest pain and palpitations.   Gastrointestinal:  Negative for blood in stool, constipation, diarrhea and vomiting.   Endocrine: Negative for polydipsia and polyuria.   Genitourinary:  Negative for difficulty urinating, dysuria, hematuria and menstrual problem.   Musculoskeletal:  Negative for arthralgias, joint swelling and neck pain.   Neurological:  Negative for weakness and headaches.   Psychiatric/Behavioral:  Negative for confusion and dysphoric mood.    All other systems reviewed and are negative.        Reviewed family, medical, surgical, and social history.    Objective:      /86 (BP Location: Right arm)   Physical Exam  Vitals reviewed.   Constitutional:       General: She is not in acute distress.     Appearance: She is obese. She is not ill-appearing or toxic-appearing.   Pulmonary:      Effort: Pulmonary effort is normal. No respiratory distress.   Neurological:      Mental  Status: She is alert and oriented to person, place, and time.   Psychiatric:         Mood and Affect: Mood normal.         Behavior: Behavior normal.         Thought Content: Thought content normal.         Judgment: Judgment normal.         d      Assessment:   Assessment & Plan    I10 Essential (primary) hypertension  Z79.01 Long term (current) use of anticoagulants  Z79.82 Long term (current) use of aspirin  Z79.3 Long term (current) use of hormonal contraceptives    Visit today included increased complexity associated with the care of the episodic problem listed below addressed and managing the longitudinal care of the patient due to the serious and/or complex managed problem(s) listed below.        IMPRESSION:  - Reviewed weight loss progress on ZepBound, noting reduction from 312 lbs to 281 lbs over 3 months.  - Assessed BP improvement from 140/106 to current 127/86.  - Evaluated current ZepBound dosage and effectiveness, deciding to maintain at 5 mg weekly injections.  - Considered potential interaction between GLP-1 agonists and progestin-only birth control, informing about possible decreased effectiveness.  - Acknowledged recent normal echocardiogram results with EF of 65%.    HYPERTENSION:  - Monitored blood pressure readings: Last reading in January was 140/106, current reading is 127/86 on the right arm.  - Evaluated current blood pressure reading of 127/86 as 'Beautiful', indicating improvement.  - Reviewed and updated medication dosage from 2.5mg to 5mg.  - Prescribed 5mg of blood pressure medication (likely ZepBound).  - Plan to renew the prescription at the current dose.  - Noted recent echocardiogram results were perfect with an EF of 65.    ANTICOAGULANT USE:  - Confirmed the patient's current use of Coumadin (warfarin).  - Acknowledged the patient is under cardiologist care for anticoagulant management.  - Continued Coumadin 5mg daily, with 7.5mg on Thursdays, as managed by a  cardiologist.    ASPIRIN USE:  - Confirmed the patient's daily use of baby aspirin.  - Continued current regimen of daily baby aspirin.    HORMONAL CONTRACEPTIVE USE:  - Confirmed the patient's current use of progestin-only birth control pill.  - Discussed potential decreased effectiveness of oral contraceptives when used with GLP-1 agonists like ZepBound.  - Advised the patient to continue current progestin-only birth control pill.  - Educated the patient on potential decreased effectiveness of oral contraceptives with GLP-1 agonists.    WEIGHT MANAGEMENT:  - Noted improved insulin resistance with GLP-1 agonists may increase fertility.  - Mentioned that patients who became pregnant while on GLP-1 agonists and discontinued upon discovery have had healthy pregnancies.  - Explained weight loss pattern may not be linear, comparing it to stock market fluctuations.  - Discussed potential for fluid retention during menstrual cycle and increased physical activity.  - Continued ZepBound at 5 mg weekly injections.    FOLLOW-UP:  - Follow up in July as scheduled and late October.  - Contact office if anything is needed before July appointment.       1. Hypertension, unspecified type    2. Class 3 severe obesity due to excess calories with serious comorbidity and body mass index (BMI) of 50.0 to 59.9 in adult    3. Long term (current) use of anticoagulants        Plan:       Hypertension, unspecified type    Class 3 severe obesity due to excess calories with serious comorbidity and body mass index (BMI) of 50.0 to 59.9 in adult  -     tirzepatide, weight loss, (ZEPBOUND) 5 mg/0.5 mL PnIj; Inject 5 mg into the skin every 7 days.  Dispense: 4 Pen; Refill: 2    Long term (current) use of anticoagulants              Risks, benefits, and side effects were discussed with the patient. All questions were answered to the fullest satisfaction of the patient, and patient verbalized understanding and agreement to treatment plan. Patient  agreed to call with any new or worsening symptoms, or present to the ER.    Follow up in about 3 months (around 6/28/2025) for f/u wt mgmt, HTN, med rf.    This note was generated with the assistance of ambient listening technology. Verbal consent was obtained by the patient and accompanying visitor(s) for the recording of patient appointment to facilitate this note. I attest to having reviewed and edited the generated note for accuracy, though some syntax or spelling errors may persist. Please contact the author of this note for any clarification.      Ijeoma Mcqueen MD

## 2025-04-16 ENCOUNTER — PATIENT MESSAGE (OUTPATIENT)
Dept: INTERNAL MEDICINE | Facility: CLINIC | Age: 40
End: 2025-04-16
Payer: COMMERCIAL

## 2025-06-03 ENCOUNTER — PATIENT OUTREACH (OUTPATIENT)
Dept: ADMINISTRATIVE | Facility: HOSPITAL | Age: 40
End: 2025-06-03
Payer: COMMERCIAL

## 2025-06-10 DIAGNOSIS — E66.813 CLASS 3 SEVERE OBESITY DUE TO EXCESS CALORIES WITH SERIOUS COMORBIDITY AND BODY MASS INDEX (BMI) OF 50.0 TO 59.9 IN ADULT: ICD-10-CM

## 2025-06-10 DIAGNOSIS — E66.01 CLASS 3 SEVERE OBESITY DUE TO EXCESS CALORIES WITH SERIOUS COMORBIDITY AND BODY MASS INDEX (BMI) OF 50.0 TO 59.9 IN ADULT: ICD-10-CM

## 2025-06-10 NOTE — TELEPHONE ENCOUNTER
Refill Routing Note   Medication(s) are not appropriate for processing by [facility] for the following reason(s):        Other    [facility] action(s):  Defer      Medication Therapy Plan: REFUSED 6/9/25 = Other (See comments) (Contact pharmacy for refill.); PENDED AND READY; PLEASE ADVISE      Appointments  past 12m or future 3m with PCP    Date Provider   Last Visit   3/28/2025 Ijeoma Mcqueen MD   Next Visit   7/21/2025 Ijeoma Mcqueen MD   ED visits in past 90 days: 0        Note composed:1:49 PM 06/10/2025           no

## 2025-06-10 NOTE — TELEPHONE ENCOUNTER
Last office visit: 3/28/25  Upcoming Appointment: 7/21/25  Type:  RX Refill Request    Who Called: pt  Refill or New Rx:refill  RX Name and Strength:  Disp Refills Start End    tirzepatide, weight loss, (ZEPBOUND) 5 mg/0.5 mL PnIj 4 Pen 2 3/28/2025 -   Sig - Route: Inject 5 mg into the skin every 7 days. - Subcutaneous   Sent to pharmacy as: tirzepatide, weight loss, (ZEPBOUND) 5 mg/0.5 mL PnIj   E-Prescribing Status: Receipt confirmed by pharmacy (6/9/2025 12:43 PM CDT)       How is the patient currently taking it? (ex. 1XDay):see above  Is this a 30 day or 90 day RX:see aboved  Preferred Pharmacy with phone number:  [facility] [address] [address] [address]  Phone: [phone] Fax: [phone]  Local or Mail Order:  Ordering Provider:  Would the patient rather a call back or a response via [facility]? call  Best Call Back Number:[phone]    Additional Information:  pt states 2nd time calling about rx refill

## 2025-06-15 RX ORDER — TIRZEPATIDE 5 MG/.5ML
5 INJECTION, SOLUTION SUBCUTANEOUS
Qty: 12 PEN | Refills: 0 | Status: SHIPPED | OUTPATIENT
Start: 2025-06-15

## 2025-07-21 ENCOUNTER — OFFICE VISIT (OUTPATIENT)
Dept: FAMILY MEDICINE | Facility: CLINIC | Age: 40
End: 2025-07-21
Payer: COMMERCIAL

## 2025-07-21 VITALS
DIASTOLIC BLOOD PRESSURE: 78 MMHG | SYSTOLIC BLOOD PRESSURE: 124 MMHG | HEIGHT: 66 IN | WEIGHT: 239.31 LBS | OXYGEN SATURATION: 99 % | BODY MASS INDEX: 38.46 KG/M2 | HEART RATE: 84 BPM

## 2025-07-21 DIAGNOSIS — I10 HYPERTENSION, UNSPECIFIED TYPE: Primary | ICD-10-CM

## 2025-07-21 DIAGNOSIS — E61.1 IRON DEFICIENCY: ICD-10-CM

## 2025-07-21 DIAGNOSIS — I83.893 VARICOSE VEINS OF BILATERAL LOWER EXTREMITIES WITH OTHER COMPLICATIONS: ICD-10-CM

## 2025-07-21 DIAGNOSIS — E66.812 CLASS 2 SEVERE OBESITY DUE TO EXCESS CALORIES WITH SERIOUS COMORBIDITY AND BODY MASS INDEX (BMI) OF 38.0 TO 38.9 IN ADULT: ICD-10-CM

## 2025-07-21 DIAGNOSIS — E66.01 CLASS 2 SEVERE OBESITY DUE TO EXCESS CALORIES WITH SERIOUS COMORBIDITY AND BODY MASS INDEX (BMI) OF 38.0 TO 38.9 IN ADULT: ICD-10-CM

## 2025-07-21 PROCEDURE — 1160F RVW MEDS BY RX/DR IN RCRD: CPT | Mod: S$GLB,,, | Performed by: FAMILY MEDICINE

## 2025-07-21 PROCEDURE — G2211 COMPLEX E/M VISIT ADD ON: HCPCS | Mod: S$GLB,,, | Performed by: FAMILY MEDICINE

## 2025-07-21 PROCEDURE — 3044F HG A1C LEVEL LT 7.0%: CPT | Mod: S$GLB,,, | Performed by: FAMILY MEDICINE

## 2025-07-21 PROCEDURE — 3078F DIAST BP <80 MM HG: CPT | Mod: S$GLB,,, | Performed by: FAMILY MEDICINE

## 2025-07-21 PROCEDURE — 1159F MED LIST DOCD IN RCRD: CPT | Mod: S$GLB,,, | Performed by: FAMILY MEDICINE

## 2025-07-21 PROCEDURE — 99999 PR PBB SHADOW E&M-EST. PATIENT-LVL IV: CPT | Mod: PBBFAC,,, | Performed by: FAMILY MEDICINE

## 2025-07-21 PROCEDURE — 3074F SYST BP LT 130 MM HG: CPT | Mod: S$GLB,,, | Performed by: FAMILY MEDICINE

## 2025-07-21 PROCEDURE — 99214 OFFICE O/P EST MOD 30 MIN: CPT | Mod: S$GLB,,, | Performed by: FAMILY MEDICINE

## 2025-07-21 PROCEDURE — 3008F BODY MASS INDEX DOCD: CPT | Mod: S$GLB,,, | Performed by: FAMILY MEDICINE

## 2025-07-21 RX ORDER — TIRZEPATIDE 7.5 MG/.5ML
7.5 INJECTION, SOLUTION SUBCUTANEOUS
Qty: 4 PEN | Refills: 5 | Status: SHIPPED | OUTPATIENT
Start: 2025-07-21

## 2025-07-21 NOTE — PROGRESS NOTES
Subjective:       Patient ID: Megan Cook is a 40 y.o. female.    Chief Complaint: Follow-up (6mnth follow up weight management )    Follow-up        History of Present Illness    CHIEF COMPLAINT:  Patient presents today for follow-up    WEIGHT MANAGEMENT:  She has lost approximately 70 lbs since January, averaging 10 lbs per month. She is beginning to notice loose skin developing on belly and arms. She remains motivated and consistent with her weight loss efforts. She continues Zepbound 5 mg weekly on Fridays and is considering increasing to 7.5 mg. She has experienced mild sour burps once or twice with egg salad mixed with cottage cheese but denies other significant side effects. She has anti-nausea medication available if needed. The medication has been helpful for blood sugar management.    DIET AND EXERCISE:  She maintains a structured daily diet focused on protein intake, targeting 131-132 g of protein and 25 g of fiber daily. Her morning routine includes coffee with Premier protein shake and collagen peptides. Lunch typically consists of breakfast foods such as eggs, egg wraps, or yogurt bowls with granola. Dinner includes rotisserie chicken in carb counter wraps or turkey sandwiches. She supplements with Premier Life and Nuri protein shakes. She denies any gut-related issues despite increased fiber intake. She exercises daily, performing 30 minutes each on treadmill and stationary bike indoors due to hot weather. She incorporates weight lifting twice weekly and notes significant improvement in exercise tolerance due to pain reduction.    BLOOD PRESSURE:  Home blood pressure readings are consistently around 120/80-82, improved from January (140/106) and December (148/70). She expresses interest in potentially discontinuing blood pressure medication in the future.    SLEEP:  She reports significant improvement in sleep quality, noting more sound sleep and feeling more rested.    ANEMIA:  She has a history  of significant anemia with hemoglobin previously under 12. She reports improvement in symptoms since starting iron supplementation, specifically no longer feeling cold.    VARICOSE VEINS:  She has a long-standing history of varicose veins since early 20s, which were previously concealed by subcutaneous fat and are now more visible. The veins are located along the saphenous vein and outer thighs.    CONTRACEPTION:  She reports spotting while taking the mini pill birth control, which she attributes to recent weight loss.    CURRENT MEDICATIONS:  She takes Coreg 6.25 mg twice daily, baby aspirin daily, Zyrtec daily (previously experienced mouth sores when unable to take after surgery), progesterone-based birth control, multivitamin, and Ferrosorb for iron deficiency.      ROS:  General: -fever, -chills, -fatigue, -weight gain, -weight loss, +increased energy levels  Eyes: -vision changes, -redness, -discharge  ENT: -ear pain, -nasal congestion, -sore throat  Cardiovascular: -chest pain, -palpitations, -lower extremity edema  Respiratory: -cough, -shortness of breath  Gastrointestinal: -abdominal pain, -nausea, -vomiting, -diarrhea, -constipation, -blood in stool  Genitourinary: -dysuria, -hematuria, -frequency  Musculoskeletal: -joint pain, -muscle pain  Skin: -rash, -lesion, +skin texture changes  Neurological: -headache, -dizziness, -numbness, -tingling  Psychiatric: -anxiety, -depression, -sleep difficulty  Female Genitourinary: +excessive vaginal bleeding         Review of Systems   All other systems reviewed and are negative.        Reviewed family, medical, surgical, and social history.    Objective:      Physical Exam    Vitals: BMI: 38.6.  General: No acute distress. Well-developed. Well-nourished.  Eyes: EOMI. Sclerae anicteric.  HENT: Normocephalic. Atraumatic. Nares patent. Moist oral mucosa.  Ears: Bilateral TMs clear. Bilateral EACs clear.  Cardiovascular: Regular rate. Regular rhythm. No murmurs. No rubs.  "No gallops. Normal S1, S2.  Respiratory: Normal respiratory effort. Clear to auscultation bilaterally. No rales. No rhonchi. No wheezing.  Abdomen: Soft. Non-distended.   Musculoskeletal: No  obvious deformity.  Extremities: No lower extremity edema. Varicose veins in saphenous distribution. Spider veins on outer thighs.  Neurological: Alert & oriented x3. No slurred speech. Normal gait.  Psychiatric: Normal mood. Normal affect. Good insight. Good judgment.  Skin: Warm. Dry. No rash.       /78 (BP Location: Right arm, Patient Position: Sitting)   Pulse 84   Ht 5' 5.98" (1.676 m)   Wt 108.6 kg (239 lb 5 oz)   SpO2 99%   BMI 38.65 kg/m²   Physical Exam    Assessment:       1. Hypertension, unspecified type    2. Iron deficiency    3. Class 2 severe obesity due to excess calories with serious comorbidity and body mass index (BMI) of 38.0 to 38.9 in adult    4. Varicose veins of bilateral lower extremities with other complications        Plan:       Assessment & Plan      Visit today included increased complexity associated with the care of the episodic problem listed below addressed and managing the longitudinal care of the patient due to the serious and/or complex managed problem(s) listed below.    Z68.38 Body mass index [BMI] 38.0-38.9, adult  I10 Essential (primary) hypertension  D50.9 Iron deficiency anemia, unspecified  I83.93 Asymptomatic varicose veins of bilateral lower extremities      OBESITY MANAGEMENT:  - Adjusted obesity diagnosis from class III to class II based on current BMI of 38.6.  - Patient has achieved impressive weight loss of 70+ lbs since January, averaging 10 lbs monthly on 5 mg Zepbound.  - Increasing Zepbound from 5 mg to 7.5 mg weekly to optimize weight loss benefits within first year of treatment; patient informed about potential side effects with dose increase.  - Advised to increase protein intake to 150 g daily to support muscle building, noting that not all protein listed in " shakes is fully absorbed.  - Recommend performing 10 slow, controlled squats before high-carb meals when dining out.  - Explained importance of strength training for muscle building and glucose regulation, with goal to increase to twice weekly over next 3 months.  - Educated on relationship between weight loss, fertility, and birth control effectiveness.    HYPERTENSION:  - Blood pressure now consistently around 120/80, improved from 140/106 in January and 148/70 in December.  - Will consider further BP medication reduction if improvement trend continues.  - Patient to continue current regimen with regular blood pressure monitoring.  - Visit labeled as hypertension for insurance purposes.    IRON DEFICIENCY ANEMIA:  - Patient takes ferrosorb daily, which has improved cold intolerance symptoms.  - Previous hemoglobin was under 12, indicating anemia.  - Ordered CBC, Ferritin level, and Iron panel to reassess iron status.  - Patient to continue ferrosorb and multivitamin regimen.    VARICOSE VEINS:  - Documented visible but asymptomatic varicose veins on legs, likely becoming more apparent with weight loss.  - Recommend compression socks (starting with men's large or extra large size) for management.  - Discussed possible referral to vascular surgeon or sclerotherapy if veins become symptomatic in the future.  - Explained mechanism of sclerotherapy for treating spider veins.    MENSTRUAL IRREGULARITIES:  - Patient experiences spotting, possibly related to rapid weight loss and medication.  - Informed that Zepbound can affect birth control effectiveness.  - Advised to take birth control pill at consistent times to manage spotting.    EXERCISE REGIMEN:  - Patient exercises daily with 30 minutes on treadmill and 30 minutes on stationary bike indoors due to hot weather.  - Encouraged to continue this regimen while incorporating the recommended strength training sessions.    FOLLOW-UP PLAN:  - Continue multivitamin  daily.  - Complete ordered lab work before October appointment if possible.  - Follow up in 3 months.         1. Hypertension, unspecified type  See above    2. Iron deficiency  See above  -     Iron and TIBC; Future; Expected date: 07/21/2025  -     Ferritin; Future; Expected date: 07/21/2025  -     CBC Auto Differential; Future; Expected date: 07/21/2025    3. Class 2 severe obesity due to excess calories with serious comorbidity and body mass index (BMI) of 38.0 to 38.9 in adult  See above  -     tirzepatide, weight loss, (ZEPBOUND) 7.5 mg/0.5 mL PnIj; Inject 7.5 mg into the skin every 7 days.  Dispense: 4 Pen; Refill: 5    4. Varicose veins of bilateral lower extremities with other complications  Cont to monitor; compression socks; referral if desired--pt will consider.              Strict return precautions reviewed and patient verbalized understanding. Risks, benefits, and alternatives to the plan were reviewed in detail and all questions answered to the patient's satisfaction. All questions were answered to the fullest satisfaction of the patient, and patient verbalized understanding and agreement to treatment plan. Patient agreed to call with any new or worsening symptoms, or present to the ER.     30 minutes total were spent on today's visit, not limited to but including time based on counseling and coordination of care.    Follow up in about 3 months (around 10/21/2025) for wt mgmt, htn, anemia.      This note was generated with the assistance of ambient listening technology. Verbal consent was obtained by the patient and accompanying visitor(s) for the recording of patient appointment to facilitate this note. I attest to having reviewed and edited the generated note for accuracy, though some syntax or spelling errors may persist. Please contact the author of this note for any clarification.       Ijeoma Mcqueen MD

## 2025-07-28 NOTE — PATIENT INSTRUCTIONS
Dante Guzman,     If you are due for any health screening(s) below please notify me so we can arrange them to be ordered and scheduled. Most healthy patients at your age complete them, but you are free to accept or refuse.     If you can't do it, I'll definitely understand. If you can, I'd certainly appreciate it!    Tests to Keep You Healthy    Cervical Cancer Screening: DUE  Last Blood Pressure <= 139/89 (7/21/2025): Yes      Your cervical cancer screening is due     Our records indicate that you may be overdue for your screening Pap smear. A Pap smear is an important health screening that can detect abnormal cells that can become cervical cancer. Cervical cancer screenings allow for early diagnosis and increase the likelihood of successful treatment.     The current recommendation for Pap smear screening is every 3-5 years for women at average risk. We encourage you to schedule your appointment with your womens health provider. Many women see a gynecologist for this screening, but some primary care providers also provide Pap screening.     If you recently had your Pap smear screening performed outside of [facility], please let your health care team know so that they can update your health record.

## 2025-08-13 ENCOUNTER — PATIENT OUTREACH (OUTPATIENT)
Dept: ADMINISTRATIVE | Facility: HOSPITAL | Age: 40
End: 2025-08-13
Payer: COMMERCIAL

## 2025-08-13 ENCOUNTER — PATIENT MESSAGE (OUTPATIENT)
Dept: ADMINISTRATIVE | Facility: HOSPITAL | Age: 40
End: 2025-08-13
Payer: COMMERCIAL

## 2025-08-27 ENCOUNTER — PATIENT OUTREACH (OUTPATIENT)
Dept: ADMINISTRATIVE | Facility: HOSPITAL | Age: 40
End: 2025-08-27
Payer: COMMERCIAL

## (undated) DEVICE — CONNECTOR TUBE CATH 3/16X3/8

## (undated) DEVICE — CATH ALL PUR URTHL RR 10FR

## (undated) DEVICE — CATH JL5 4FR INFINITY

## (undated) DEVICE — SUT PROLENE 4-0 RB-1 BL MO

## (undated) DEVICE — DRESSING MEPILEX SACR 22X25CM

## (undated) DEVICE — COVER LIGHT HANDLE

## (undated) DEVICE — DRAIN CHAN RND HUBLS 8MM 24FR

## (undated) DEVICE — PACK PEDIATRIC ANGIOGRAPHY OMC

## (undated) DEVICE — CONNECTOR Y 3/8X3/8X3/8

## (undated) DEVICE — BLADE SURGICAL 15C

## (undated) DEVICE — CATH INFINITI 4F 3DRC 100CM

## (undated) DEVICE — DRESSING AQUACEL AG RBBN 2X45

## (undated) DEVICE — KIT MICROINTRO 4F .018X40X7CM

## (undated) DEVICE — PACK OPEN HEART PEDIATRIC OMC

## (undated) DEVICE — COVER BAND BAG 40 X 40

## (undated) DEVICE — KIT PROBE COVER WITH GEL

## (undated) DEVICE — NDL BOX COUNTER

## (undated) DEVICE — DRAPE ANGIO BRACH 38X44IN

## (undated) DEVICE — CATH AL1 4FR

## (undated) DEVICE — CATH DXTERITY PG145 110CM 6FR

## (undated) DEVICE — CATH IMA INFINITI 4FRX100CM

## (undated) DEVICE — CATH MPA2 INFINITI 4FR 100CM

## (undated) DEVICE — TOWEL OR DISP STRL BLUE 4/PK

## (undated) DEVICE — SHEATH INTRODUCER 7FR 11CM

## (undated) DEVICE — CATH WEDGE 7FRX110CM

## (undated) DEVICE — CATH ANG PIGTAIL 4FR INFINITY

## (undated) DEVICE — COVER LIGHT HANDLE 80/CA

## (undated) DEVICE — GUIDEWIRE AMPLATZ .035X260

## (undated) DEVICE — DRESSING AQUACEL FOAM 4 X 12

## (undated) DEVICE — SPIKE CONTRAST CONTROLLER

## (undated) DEVICE — BLADE SAW STERNAL REG

## (undated) DEVICE — COVER PROXIMA MAYO STAND

## (undated) DEVICE — HEMOSTAT SURGICEL 4X8IN

## (undated) DEVICE — KIT CUSTOM MANIFOLD

## (undated) DEVICE — PLEDGET SUT SOFT 3/8X3/16X1/16

## (undated) DEVICE — SUT LIGACLIP SMALL XTRA

## (undated) DEVICE — DRAPE SLUSH WARMER WITH DISC

## (undated) DEVICE — SUT ETHIBOND EXCEL 2-0 SH-2

## (undated) DEVICE — SYR MARK 7 ARTERION 150ML

## (undated) DEVICE — WIRE GUIDE SAFE-T-J .035 260CM

## (undated) DEVICE — SUT PROLENE 5/0 RB-1 36 IN

## (undated) DEVICE — NDL 20GX1-1/2IN IB

## (undated) DEVICE — KIT URINARY CATH URINE METER

## (undated) DEVICE — Device

## (undated) DEVICE — GUIDE WIRE WHOLLY FLOPPY

## (undated) DEVICE — COR KNOT QUICK LOAD SINGLES

## (undated) DEVICE — SUT PROLENE TF 5-0 24IN

## (undated) DEVICE — CATH INFINITI 4F JL4 .042X100

## (undated) DEVICE — GUIDEWIRE STF .035X180CM ANG

## (undated) DEVICE — DRAPE INCISE IOBAN 2 23X17IN

## (undated) DEVICE — DRESSING TRANS 2X2 TEGADERM

## (undated) DEVICE — GOWN NONREINF SET-IN SLV XL

## (undated) DEVICE — STAPLER SKIN PROXIMATE WIDE

## (undated) DEVICE — SUT PROLENE 2-0 36IN MH BLU

## (undated) DEVICE — VISE RADIFOCUS MULTI TORQUE

## (undated) DEVICE — DRAIN CHEST DRY SUCTION

## (undated) DEVICE — DRESSING TRANS 4X4 TEGADERM

## (undated) DEVICE — GLOVE BIOGEL SENSOR SZ 6.5

## (undated) DEVICE — CATH IV INTROCAN 18G X 1 1/4

## (undated) DEVICE — PACK PEDIATRIC DRAPE PEELER

## (undated) DEVICE — IMPLANTABLE DEVICE
Type: IMPLANTABLE DEVICE | Site: HEART | Status: NON-FUNCTIONAL
Removed: 2023-12-08

## (undated) DEVICE — CATH INFINITI JUDKINS JR4

## (undated) DEVICE — GUIDEWIRE EMERALD 150CM PTFE

## (undated) DEVICE — SYR 10CC LUER LOCK

## (undated) DEVICE — SUT PROLENE 4-0 SH BLU 36IN

## (undated) DEVICE — LINE 60IN PRESSURE MON.

## (undated) DEVICE — KIT MNTR POLE MT DUL 12&48 MAC

## (undated) DEVICE — TRAY SKIN SCRUB WET PREMIUM

## (undated) DEVICE — GLOVE BIOGEL SENSOR SZ 7.5

## (undated) DEVICE — DRESSING TEGASORB THIN 4X4 3/4

## (undated) DEVICE — DEVICE COR KNOT MIS COMBO KIT

## (undated) DEVICE — DRESSING ALGINATE SILVER ROPE

## (undated) DEVICE — COVER BAND BAG 28 X 12

## (undated) DEVICE — VISIPAQUE 320 200ML +PK

## (undated) DEVICE — TIP YANKAUERS BULB NO VENT

## (undated) DEVICE — CATH ARI 4FR

## (undated) DEVICE — PROTECTION STATION PLUS

## (undated) DEVICE — TRAY CATH FOL SIL URIMTR 16FR

## (undated) DEVICE — HEMOSTAT SURGICEL NU-KNIT 6X9

## (undated) DEVICE — BRA CLASSIC COMFORT 46 BEIGE

## (undated) DEVICE — SHEATH INTRODUCER 4FR 11CM